# Patient Record
Sex: FEMALE | Race: WHITE | Employment: OTHER | URBAN - NONMETROPOLITAN AREA
[De-identification: names, ages, dates, MRNs, and addresses within clinical notes are randomized per-mention and may not be internally consistent; named-entity substitution may affect disease eponyms.]

---

## 2021-03-11 ENCOUNTER — IMMUNIZATION (OUTPATIENT)
Dept: PRIMARY CARE CLINIC | Age: 66
End: 2021-03-11
Payer: OTHER GOVERNMENT

## 2021-03-11 PROCEDURE — 0001A COVID-19, PFIZER VACCINE 30MCG/0.3ML DOSE: CPT

## 2021-03-11 PROCEDURE — 91300 COVID-19, PFIZER VACCINE 30MCG/0.3ML DOSE: CPT

## 2021-04-01 ENCOUNTER — IMMUNIZATION (OUTPATIENT)
Dept: PRIMARY CARE CLINIC | Age: 66
End: 2021-04-01
Payer: OTHER GOVERNMENT

## 2021-04-01 PROCEDURE — 0002A COVID-19, PFIZER VACCINE 30MCG/0.3ML DOSE: CPT | Performed by: FAMILY MEDICINE

## 2021-07-26 ENCOUNTER — HOSPITAL ENCOUNTER (INPATIENT)
Age: 66
LOS: 4 days | Discharge: HOME | DRG: 199 | End: 2021-07-30
Attending: INTERNAL MEDICINE | Admitting: INTERNAL MEDICINE
Payer: MEDICAID

## 2021-07-26 DIAGNOSIS — I16.1 HYPERTENSIVE EMERGENCY: Primary | ICD-10-CM

## 2021-07-26 PROBLEM — I10 ACCELERATED ESSENTIAL HYPERTENSION: Status: ACTIVE | Noted: 2021-07-26

## 2021-07-26 LAB
ALBUMIN SERPL-MCNC: 3.9 G/DL (ref 3.5–5.1)
ALP BLD-CCNC: 98 U/L (ref 38–126)
ALT SERPL-CCNC: 10 U/L (ref 11–66)
ANION GAP SERPL CALCULATED.3IONS-SCNC: 12 MEQ/L (ref 8–16)
ANISOCYTOSIS: PRESENT
AST SERPL-CCNC: 14 U/L (ref 5–40)
ATYPICAL LYMPHOCYTES: ABNORMAL %
BACTERIA: ABNORMAL
BASOPHILS # BLD: 0.6 %
BASOPHILS ABSOLUTE: 0.1 THOU/MM3 (ref 0–0.1)
BILIRUB SERPL-MCNC: 0.5 MG/DL (ref 0.3–1.2)
BILIRUBIN URINE: NEGATIVE
BLOOD, URINE: ABNORMAL
BUN BLDV-MCNC: 25 MG/DL (ref 7–22)
CALCIUM SERPL-MCNC: 9.8 MG/DL (ref 8.5–10.5)
CASTS: ABNORMAL /LPF
CASTS: ABNORMAL /LPF
CHARACTER, URINE: CLEAR
CHLORIDE BLD-SCNC: 103 MEQ/L (ref 98–111)
CO2: 22 MEQ/L (ref 23–33)
COLOR: YELLOW
CREAT SERPL-MCNC: 1.2 MG/DL (ref 0.4–1.2)
CRYSTALS: ABNORMAL
EOSINOPHIL # BLD: 4.1 %
EOSINOPHILS ABSOLUTE: 0.5 THOU/MM3 (ref 0–0.4)
EPITHELIAL CELLS, UA: ABNORMAL /HPF
ERYTHROCYTE [DISTWIDTH] IN BLOOD BY AUTOMATED COUNT: 12.6 % (ref 11.5–14.5)
ERYTHROCYTE [DISTWIDTH] IN BLOOD BY AUTOMATED COUNT: 38.5 FL (ref 35–45)
GLUCOSE BLD-MCNC: 163 MG/DL (ref 70–108)
GLUCOSE, URINE: NEGATIVE MG/DL
HCT VFR BLD CALC: 39.8 % (ref 37–47)
HEMOGLOBIN: 12.6 GM/DL (ref 12–16)
IMMATURE GRANS (ABS): 0.04 THOU/MM3 (ref 0–0.07)
IMMATURE GRANULOCYTES: 0.3 %
KETONES, URINE: NEGATIVE
LEUKOCYTE ESTERASE, URINE: ABNORMAL
LYMPHOCYTES # BLD: 37.5 %
LYMPHOCYTES ABSOLUTE: 4.7 THOU/MM3 (ref 1–4.8)
MAGNESIUM: 2.3 MG/DL (ref 1.6–2.4)
MCH RBC QN AUTO: 27 PG (ref 26–33)
MCHC RBC AUTO-ENTMCNC: 31.7 GM/DL (ref 32.2–35.5)
MCV RBC AUTO: 85.4 FL (ref 81–99)
MISCELLANEOUS LAB TEST RESULT: ABNORMAL
MONOCYTES # BLD: 7.5 %
MONOCYTES ABSOLUTE: 0.9 THOU/MM3 (ref 0.4–1.3)
NITRITE, URINE: NEGATIVE
NUCLEATED RED BLOOD CELLS: 0 /100 WBC
OSMOLALITY CALCULATION: 281.8 MOSMOL/KG (ref 275–300)
PH UA: 7.5 (ref 5–9)
PLATELET # BLD: 284 THOU/MM3 (ref 130–400)
PMV BLD AUTO: 9.7 FL (ref 9.4–12.4)
POTASSIUM SERPL-SCNC: 4.7 MEQ/L (ref 3.5–5.2)
PROTEIN UA: 300 MG/DL
RBC # BLD: 4.66 MILL/MM3 (ref 4.2–5.4)
RBC URINE: ABNORMAL /HPF
RENAL EPITHELIAL, UA: ABNORMAL
SCAN OF BLOOD SMEAR: NORMAL
SEG NEUTROPHILS: 50 %
SEGMENTED NEUTROPHILS ABSOLUTE COUNT: 6.3 THOU/MM3 (ref 1.8–7.7)
SODIUM BLD-SCNC: 137 MEQ/L (ref 135–145)
SPECIFIC GRAVITY UA: 1.01 (ref 1–1.03)
TOTAL PROTEIN: 7.6 G/DL (ref 6.1–8)
TROPONIN T: 0.02 NG/ML
TROPONIN T: 0.03 NG/ML
TSH SERPL DL<=0.05 MIU/L-ACNC: 0.86 UIU/ML (ref 0.4–4.2)
UROBILINOGEN, URINE: 0.2 EU/DL (ref 0–1)
WBC # BLD: 12.6 THOU/MM3 (ref 4.8–10.8)
WBC UA: ABNORMAL /HPF
YEAST: ABNORMAL

## 2021-07-26 PROCEDURE — 36415 COLL VENOUS BLD VENIPUNCTURE: CPT

## 2021-07-26 PROCEDURE — 99283 EMERGENCY DEPT VISIT LOW MDM: CPT

## 2021-07-26 PROCEDURE — 6360000002 HC RX W HCPCS: Performed by: INTERNAL MEDICINE

## 2021-07-26 PROCEDURE — 6370000000 HC RX 637 (ALT 250 FOR IP): Performed by: INTERNAL MEDICINE

## 2021-07-26 PROCEDURE — 2500000003 HC RX 250 WO HCPCS: Performed by: NURSE PRACTITIONER

## 2021-07-26 PROCEDURE — 2060000000 HC ICU INTERMEDIATE R&B

## 2021-07-26 PROCEDURE — 81001 URINALYSIS AUTO W/SCOPE: CPT

## 2021-07-26 PROCEDURE — 80053 COMPREHEN METABOLIC PANEL: CPT

## 2021-07-26 PROCEDURE — 85025 COMPLETE CBC W/AUTO DIFF WBC: CPT

## 2021-07-26 PROCEDURE — 99223 1ST HOSP IP/OBS HIGH 75: CPT | Performed by: INTERNAL MEDICINE

## 2021-07-26 PROCEDURE — 83735 ASSAY OF MAGNESIUM: CPT

## 2021-07-26 PROCEDURE — 6360000002 HC RX W HCPCS: Performed by: HOSPITALIST

## 2021-07-26 PROCEDURE — 84443 ASSAY THYROID STIM HORMONE: CPT

## 2021-07-26 PROCEDURE — 2500000003 HC RX 250 WO HCPCS: Performed by: INTERNAL MEDICINE

## 2021-07-26 PROCEDURE — 84484 ASSAY OF TROPONIN QUANT: CPT

## 2021-07-26 PROCEDURE — 2580000003 HC RX 258: Performed by: INTERNAL MEDICINE

## 2021-07-26 PROCEDURE — 96374 THER/PROPH/DIAG INJ IV PUSH: CPT

## 2021-07-26 RX ORDER — SODIUM CHLORIDE 9 MG/ML
25 INJECTION, SOLUTION INTRAVENOUS PRN
Status: DISCONTINUED | OUTPATIENT
Start: 2021-07-26 | End: 2021-07-30 | Stop reason: SDUPTHER

## 2021-07-26 RX ORDER — ASPIRIN 81 MG/1
81 TABLET, CHEWABLE ORAL DAILY
Status: DISCONTINUED | OUTPATIENT
Start: 2021-07-27 | End: 2021-07-30 | Stop reason: HOSPADM

## 2021-07-26 RX ORDER — SODIUM CHLORIDE 0.9 % (FLUSH) 0.9 %
10 SYRINGE (ML) INJECTION PRN
Status: DISCONTINUED | OUTPATIENT
Start: 2021-07-26 | End: 2021-07-30 | Stop reason: SDUPTHER

## 2021-07-26 RX ORDER — ONDANSETRON 2 MG/ML
4 INJECTION INTRAMUSCULAR; INTRAVENOUS EVERY 6 HOURS PRN
Status: DISCONTINUED | OUTPATIENT
Start: 2021-07-26 | End: 2021-07-30 | Stop reason: HOSPADM

## 2021-07-26 RX ORDER — MAGNESIUM SULFATE IN WATER 40 MG/ML
2000 INJECTION, SOLUTION INTRAVENOUS PRN
Status: DISCONTINUED | OUTPATIENT
Start: 2021-07-26 | End: 2021-07-30 | Stop reason: HOSPADM

## 2021-07-26 RX ORDER — POTASSIUM CHLORIDE 7.45 MG/ML
10 INJECTION INTRAVENOUS PRN
Status: DISCONTINUED | OUTPATIENT
Start: 2021-07-26 | End: 2021-07-30 | Stop reason: HOSPADM

## 2021-07-26 RX ORDER — DIAPER,BRIEF,INFANT-TODD,DISP
EACH MISCELLANEOUS 2 TIMES DAILY
Status: DISCONTINUED | OUTPATIENT
Start: 2021-07-26 | End: 2021-07-30 | Stop reason: HOSPADM

## 2021-07-26 RX ORDER — LABETALOL 20 MG/4 ML (5 MG/ML) INTRAVENOUS SYRINGE
20 ONCE
Status: COMPLETED | OUTPATIENT
Start: 2021-07-26 | End: 2021-07-26

## 2021-07-26 RX ORDER — ONDANSETRON 4 MG/1
4 TABLET, ORALLY DISINTEGRATING ORAL EVERY 8 HOURS PRN
Status: DISCONTINUED | OUTPATIENT
Start: 2021-07-26 | End: 2021-07-30 | Stop reason: HOSPADM

## 2021-07-26 RX ORDER — ACETAMINOPHEN 325 MG/1
650 TABLET ORAL EVERY 6 HOURS PRN
Status: DISCONTINUED | OUTPATIENT
Start: 2021-07-26 | End: 2021-07-30 | Stop reason: HOSPADM

## 2021-07-26 RX ORDER — ACETAMINOPHEN 650 MG/1
650 SUPPOSITORY RECTAL EVERY 6 HOURS PRN
Status: DISCONTINUED | OUTPATIENT
Start: 2021-07-26 | End: 2021-07-30 | Stop reason: HOSPADM

## 2021-07-26 RX ORDER — ASPIRIN 81 MG/1
81 TABLET, CHEWABLE ORAL DAILY
COMMUNITY

## 2021-07-26 RX ORDER — POTASSIUM CHLORIDE 20 MEQ/1
40 TABLET, EXTENDED RELEASE ORAL PRN
Status: DISCONTINUED | OUTPATIENT
Start: 2021-07-26 | End: 2021-07-30 | Stop reason: HOSPADM

## 2021-07-26 RX ORDER — POLYETHYLENE GLYCOL 3350 17 G/17G
17 POWDER, FOR SOLUTION ORAL DAILY PRN
Status: DISCONTINUED | OUTPATIENT
Start: 2021-07-26 | End: 2021-07-30 | Stop reason: HOSPADM

## 2021-07-26 RX ORDER — FAMOTIDINE 20 MG/1
20 TABLET, FILM COATED ORAL 2 TIMES DAILY
Status: DISCONTINUED | OUTPATIENT
Start: 2021-07-26 | End: 2021-07-30 | Stop reason: HOSPADM

## 2021-07-26 RX ORDER — HYDRALAZINE HYDROCHLORIDE 20 MG/ML
20 INJECTION INTRAMUSCULAR; INTRAVENOUS EVERY 6 HOURS PRN
Status: DISCONTINUED | OUTPATIENT
Start: 2021-07-26 | End: 2021-07-28

## 2021-07-26 RX ORDER — SODIUM CHLORIDE 0.9 % (FLUSH) 0.9 %
10 SYRINGE (ML) INJECTION EVERY 12 HOURS SCHEDULED
Status: DISCONTINUED | OUTPATIENT
Start: 2021-07-26 | End: 2021-07-30 | Stop reason: SDUPTHER

## 2021-07-26 RX ADMIN — HYDRALAZINE HYDROCHLORIDE 20 MG: 20 INJECTION INTRAMUSCULAR; INTRAVENOUS at 20:34

## 2021-07-26 RX ADMIN — FAMOTIDINE 20 MG: 20 TABLET, FILM COATED ORAL at 20:34

## 2021-07-26 RX ADMIN — CEFTRIAXONE SODIUM 1000 MG: 1 INJECTION, POWDER, FOR SOLUTION INTRAMUSCULAR; INTRAVENOUS at 20:24

## 2021-07-26 RX ADMIN — ENOXAPARIN SODIUM 40 MG: 40 INJECTION SUBCUTANEOUS at 20:34

## 2021-07-26 RX ADMIN — SODIUM CHLORIDE, PRESERVATIVE FREE 10 ML: 5 INJECTION INTRAVENOUS at 20:24

## 2021-07-26 RX ADMIN — HYDROCORTISONE: 1 CREAM TOPICAL at 20:24

## 2021-07-26 RX ADMIN — LABETALOL 20 MG/4 ML (5 MG/ML) INTRAVENOUS SYRINGE 20 MG: at 14:42

## 2021-07-26 RX ADMIN — DEXTROSE MONOHYDRATE 5 MG/HR: 50 INJECTION, SOLUTION INTRAVENOUS at 17:44

## 2021-07-26 ASSESSMENT — ENCOUNTER SYMPTOMS
SHORTNESS OF BREATH: 0
EYE REDNESS: 0
PHOTOPHOBIA: 0
RESPIRATORY NEGATIVE: 1
GASTROINTESTINAL NEGATIVE: 1
EYE PAIN: 0
PHOTOPHOBIA: 1
ABDOMINAL PAIN: 0
CHEST TIGHTNESS: 0
ALLERGIC/IMMUNOLOGIC NEGATIVE: 1

## 2021-07-26 ASSESSMENT — PAIN SCALES - GENERAL
PAINLEVEL_OUTOF10: 0

## 2021-07-26 NOTE — ED NOTES
Patient presents to ED for hypertension. States she was seen at [address] [place] [address] today and told to go to Dr. Donnlel Maria office for evaluation of leaking retina's. States her BP read 200's/102 and was told to come here for evaluation. Denies any headache, dizziness, or new visual changes. Denies any pain. Shows no signs of distress. Skin warm and dry. Respirations even and unlabored.       Yasmine Estrada RN  07/26/21 2446

## 2021-07-26 NOTE — H&P
HISTORY AND PHYSICAL             Date: 7/26/2021        Patient Name: Eula Medina     YOB: 1955      Age:  60 y.o. Chief Complaint     Chief Complaint   Patient presents with    Hypertension      Blurry vision     History Obtained From   patient    History of Present Illness   Patient presents to ED for hypertension. States she was seen at [address] [place] [address] today and told to go to Dr. Gutierrez Cook office for evaluation of leaking retina's. States her BP read 200's/102 and was told to come go to ED for evaluation. Denies any headache, dizziness, or new visual changes. Denies any pain. Shows no signs of distress. But does report blurry vision after the pupils were dilated for retinal exam    Past Medical History   Hypertension  Psoriasis     Past Surgical History   None    Medications Prior to Admission     Prior to Admission medications    Medication Sig Start Date End Date Taking? Authorizing Provider   aspirin 81 MG chewable tablet Take 81 mg by mouth daily    Historical Provider, MD        Allergies   Patient has no known allergies. Social History     Social History     Tobacco History     Smoking Status  Former Smoker    Smokeless Tobacco Use  Never Used          Alcohol History     Alcohol Use Status  Yes Comment  social          Drug Use     Drug Use Status  Never          Sexual Activity     Sexually Active  Not Asked                Family History   Non contributory     Review of Systems   Review of Systems   Constitutional: Negative. HENT: Negative. Eyes: Positive for photophobia and visual disturbance. Respiratory: Negative. Cardiovascular: Negative. Gastrointestinal: Negative. Endocrine: Negative. Genitourinary: Negative. Musculoskeletal: Negative. Skin: Negative. Allergic/Immunologic: Negative. Neurological: Negative. Hematological: Negative. Psychiatric/Behavioral: Negative.         Physical Exam   BP (!) 162/80   Pulse 77   Temp 98.4 °F (36.9 °C) (Oral)   Resp 16   Ht 5' 10\" (1.778 m)   Wt 168 lb (76.2 kg)   SpO2 96%   BMI 24.11 kg/m²     Physical Exam  Constitutional:       Appearance: Normal appearance. He is normal weight. HENT:      Head: Normocephalic and atraumatic. Right Ear: External ear normal.      Left Ear: External ear normal.      Nose: Nose normal.      Mouth/Throat:      Mouth: Mucous membranes are moist.      Pharynx: Oropharynx is clear. Eyes:      Conjunctiva/sclera: Conjunctivae normal.      Pupils: Pupils are equal, round, and reactive to light. Cardiovascular:      Rate and Rhythm: Normal rate. Pulses: Normal pulses. Pulmonary:      Effort: Pulmonary effort is normal.   Abdominal:      General: Bowel sounds are normal.      Palpations: Abdomen is soft. Musculoskeletal:         General: Normal range of motion. Cervical back: Normal range of motion. Skin:     General: Skin is warm. Capillary Refill: Capillary refill takes less than 2 seconds. Comments: silvery white scales noted on the dorsal aspects the upper extremities   Neurological:      General: No focal deficit present. Mental Status: He is alert.    Psychiatric:         Mood and Affect: Mood normal.       Labs      Recent Results (from the past 24 hour(s))   CBC auto differential    Collection Time: 07/26/21  2:32 PM   Result Value Ref Range    WBC 12.6 (H) 4.8 - 10.8 thou/mm3    RBC 4.66 (L) 4.70 - 6.10 mill/mm3    Hemoglobin 12.6 (L) 14.0 - 18.0 gm/dl    Hematocrit 39.8 (L) 42.0 - 52.0 %    MCV 85.4 80.0 - 94.0 fL    MCH 27.0 26.0 - 33.0 pg    MCHC 31.7 (L) 32.2 - 35.5 gm/dl    RDW-CV 12.6 11.5 - 14.5 %    RDW-SD 38.5 35.0 - 45.0 fL    Platelets 440 300 - 228 thou/mm3    MPV 9.7 9.4 - 12.4 fL    Seg Neutrophils 50.0 %    Lymphocytes 37.5 %    Monocytes 7.5 %    Eosinophils 4.1 %    Basophils 0.6 %    Immature Granulocytes 0.3 %    Atypical Lymphocytes RARE %    Segs Absolute 6.3 1 - 7 thou/mm3    Lymphocytes Absolute 4.7 1.0 - 4.8 thou/mm3    Monocytes Absolute 0.9 0.4 - 1.3 thou/mm3    Eosinophils Absolute 0.5 (H) 0.0 - 0.4 thou/mm3    Basophils Absolute 0.1 0.0 - 0.1 thou/mm3    Immature Grans (Abs) 0.04 0.00 - 0.07 thou/mm3    nRBC 0 /100 wbc    Anisocytosis Present Absent   Comprehensive Metabolic Panel    Collection Time: 07/26/21  2:32 PM   Result Value Ref Range    Glucose 163 (H) 70 - 108 mg/dL    CREATININE 1.2 0.4 - 1.2 mg/dL    BUN 25 (H) 7 - 22 mg/dL    Sodium 137 135 - 145 meq/L    Potassium 4.7 3.5 - 5.2 meq/L    Chloride 103 98 - 111 meq/L    CO2 22 (L) 23 - 33 meq/L    Calcium 9.8 8.5 - 10.5 mg/dL    AST 14 5 - 40 U/L    Alkaline Phosphatase 98 38 - 126 U/L    Total Protein 7.6 6.1 - 8.0 g/dL    Albumin 3.9 3.5 - 5.1 g/dL    Total Bilirubin 0.5 0.3 - 1.2 mg/dL    ALT 10 (L) 11 - 66 U/L   Troponin    Collection Time: 07/26/21  2:32 PM   Result Value Ref Range    Troponin T 0.028 (A) ng/ml   Magnesium    Collection Time: 07/26/21  2:32 PM   Result Value Ref Range    Magnesium 2.3 1.6 - 2.4 mg/dL   TSH without Reflex    Collection Time: 07/26/21  2:32 PM   Result Value Ref Range    TSH 0.865 0.400 - 4.200 uIU/mL   Anion Gap    Collection Time: 07/26/21  2:32 PM   Result Value Ref Range    Anion Gap 12.0 8.0 - 16.0 meq/L   Glomerular Filtration Rate, Estimated    Collection Time: 07/26/21  2:32 PM   Result Value Ref Range    Est, Glom Filt Rate 61 (A) ml/min/1.73m2   Osmolality    Collection Time: 07/26/21  2:32 PM   Result Value Ref Range    Osmolality Calc 281.8 275.0 - 300.0 mOsmol/kg   Scan of Blood Smear    Collection Time: 07/26/21  2:32 PM   Result Value Ref Range    SCAN OF BLOOD SMEAR see below    Urinalysis with microscopic    Collection Time: 07/26/21  2:45 PM   Result Value Ref Range    Glucose, Urine NEGATIVE NEGATIVE mg/dl    Bilirubin Urine NEGATIVE NEGATIVE    Ketones, Urine NEGATIVE NEGATIVE    Specific Gravity, UA 1.007 1.002 - 1.030    Blood, Urine SMALL (A) NEGATIVE    pH, UA 7.5 5.0 - 9.0    Protein,  (A) NEGATIVE mg/dl    Urobilinogen, Urine 0.2 0.0 - 1.0 eu/dl    Nitrite, Urine NEGATIVE NEGATIVE    Leukocyte Esterase, Urine MODERATE (A) NEGATIVE    Color, UA YELLOW YELLOW-STRAW    Character, Urine CLEAR [id number]    RBC, UA 3-5 0-2/hpf /hpf    WBC, UA  0-4/hpf /hpf    Epithelial Cells, UA 0-2 3-5/hpf /hpf    Bacteria, UA MANY FEW/NONE SEEN    Casts NONE SEEN NONE SEEN /lpf    Crystals NONE SEEN NONE SEEN    Renal Epithelial, UA NONE SEEN NONE SEEN    Yeast, UA NONE SEEN NONE SEEN    Casts NONE SEEN /lpf    Miscellaneous Lab Test Result NONE SEEN         Imaging/Diagnostics Last 24 Hours   No results found. Assessment / Plan      1. Accelerated hypertension, initial blood pressure was noted to be 225 slightly improved to 170s with IV labetalol in the ER  Will admit to stepdown- CCU, and started on Cardene drip to titrate blood pressure to below 180 in about 160  2. Mild elevation troponin no EKG changes noted we will continue to trend and add Lopressor if troponin continues to go up   3. Possible urinary tract infection, moderate leukocyte esterase noted however nitrite is negative nevertheless we will start on IV ceftriaxone and send urine for culture and sensitivities  4.   History of psoriasis no acute flare noted, continue to monitor           Consultations Ordered:  None    Electronically signed by Nae Cooney MD on 7/26/21 at 4:36 PM EDT

## 2021-07-26 NOTE — ED NOTES
Patient resting quietly in cot showing no signs of distress at this time. Denies any pain. Family at bedside. Medicated per MAR. Updated on POC. Will continue to monitor.       Isabel Barrera RN  07/26/21 [id number]

## 2021-07-26 NOTE — PLAN OF CARE
Problem: Falls - Risk of:  Goal: Will remain free from falls  Description: Will remain free from falls  Outcome: Ongoing  Note: Call light in reach, bed in lowest position, and bed alarm activated. Education given on use of call light before ambulation and when in need of assistance. Patient expressed understanding. Hourly visual checks performed and charted. Toileting offered to patient. No falls this shift, at any time. Will continue to monitor. Problem: Cardiac:  Goal: Ability to maintain vital signs within normal range will improve  Description: Ability to maintain vital signs within normal range will improve  Outcome: Ongoing  Note:   Vitals:    07/26/21 1445 07/26/21 1539 07/26/21 1627 07/26/21 1704   BP: (!) 197/72 (!) 170/71 (!) 162/80 (!) 195/82   Pulse:  77 77 74   Resp:  16 16 18   Temp:    98.1 °F (36.7 °C)   TempSrc:    Oral   SpO2:  96% 96% 98%   Weight:    230 lb (104.3 kg)   Height:    5' 10\" (1.778 m)          Problem: Discharge Planning:  Goal: Discharged to appropriate level of care  Description: Discharged to appropriate level of care  Outcome: Ongoing  Note: Patient currently living with her daughter. Discharge planning in process and discussed with patient/family. Social work consulted for any additional needs. Care manager aware of discharge needs. Problem: Pain:  Goal: Pain level will decrease  Description: Pain level will decrease  Outcome: Ongoing  Note: Patient able to use 0-10 pain scale, pain goal of no pain. Denies pain at this time. Agreeable to take PRN pain medications. Care plan reviewed with patient. Patient verbalizes understanding of the plan of care and contributed to goal setting.

## 2021-07-26 NOTE — ED PROVIDER NOTES
[facility] Emergency Department    CHIEF COMPLAINT       Chief Complaint   Patient presents with    Hypertension     Nurses Notes reviewed and I agree except as noted in the HPI. HISTORY OF PRESENT ILLNESS    Jesus Alberto Mares rio 60 y.o. male who presents to the ED for evaluation of hypertension. Patient states she was at a retinal specialist today for the evaluation of a leaky retina, and they dilated her eyes but before they could continue they checked her BP which was elevated to 230/90. She says she has blurry vision d/t the dilated eyes. She denies HA, n/v, diplopia, abdominal pain, hematuria, dysuria. She notes history of hypertension in the past but has not been on medicine for several years. She denies chest pain or shortness of breath. She notes she signed up for health insurance in January but she was scammed. Experienced previously: yes    HPI was provided by the patient. REVIEW OF SYSTEMS     Review of Systems   Constitutional: Negative for chills, diaphoresis and fatigue. Eyes: Negative for photophobia, pain, redness and visual disturbance. Respiratory: Negative for chest tightness and shortness of breath. Cardiovascular: Negative for chest pain, palpitations and leg swelling. Gastrointestinal: Negative for abdominal pain. Genitourinary: Negative for dysuria, frequency and hematuria. Neurological: Negative for dizziness, tremors, syncope, weakness, light-headedness and headaches. PAST MEDICAL HISTORY     Past Medical History:   Diagnosis Date    Hypertension     Psoriasis        SURGICALHISTORY      has no past surgical history on file. CURRENT MEDICATIONS       Current Discharge Medication List      CONTINUE these medications which have NOT CHANGED    Details   aspirin 81 MG chewable tablet Take 81 mg by mouth daily             ALLERGIES     has No Known Allergies. FAMILY HISTORY     He indicated that his mother is .  He indicated that his father is . family history includes Diabetes in his mother; Heart Disease in his father and mother; Other in his mother. SOCIAL HISTORY       Social History     Socioeconomic History    Marital status: Single     Spouse name: Not on file    Number of children: 1    Years of education: Not on file    Highest education level: Not on file   Occupational History    Not on file   Tobacco Use    Smoking status: Former Smoker     Packs/day: 0.50     Years: 15.00     Pack years: 7.50     Types: Cigarettes    Smokeless tobacco: Never Used   Substance and Sexual Activity    Alcohol use: Yes     Comment: social    Drug use: Never    Sexual activity: Not on file   Other Topics Concern    Not on file   Social History Narrative    Not on file     Social Determinants of Health     Financial Resource Strain:     Difficulty of Paying Living Expenses:    Food Insecurity:     Worried About Running Out of Food in the Last Year:     [address] in the Last Year:    Transportation Needs:     Lack of Transportation (Medical):  Lack of Transportation (Non-Medical):    Physical Activity:     Days of Exercise per Week:     Minutes of Exercise per Session:    Stress:     Feeling of Stress :    Social Connections:     Frequency of Communication with Friends and Family:     Frequency of Social Gatherings with Friends and Family:     Attends [employer] Services:     Active Member of Clubs or Organizations:     Attends Club or Organization Meetings:     Marital Status:    Intimate Partner Violence:     Fear of Current or Ex-Partner:     Emotionally Abused:     Physically Abused:     Sexually Abused:        PHYSICAL EXAM     INITIAL VITALS:  height is 5' 10\" (1.778 m) and weight is 230 lb (104.3 kg). His oral temperature is 98.1 °F (36.7 °C). His blood pressure is 195/82 (abnormal) and his pulse is 74. His respiration is 18 and oxygen saturation is 98%.     Physical Exam  Constitutional:       General: He is components:    Glucose 163 (*)     BUN 25 (*)     CO2 22 (*)     ALT 10 (*)     All other components within normal limits   TROPONIN - Abnormal; Notable for the following components:    Troponin T 0.028 (*)     All other components within normal limits   URINALYSIS WITH MICROSCOPIC - Abnormal; Notable for the following components:    Blood, Urine SMALL (*)     Protein,  (*)     Leukocyte Esterase, Urine MODERATE (*)     All other components within normal limits   GLOMERULAR FILTRATION RATE, ESTIMATED - Abnormal; Notable for the following components:    Est, Glom Filt Rate 61 (*)     All other components within normal limits   MAGNESIUM   TSH WITHOUT REFLEX   ANION GAP   OSMOLALITY   SCAN OF BLOOD SMEAR   TROPONIN   TROPONIN       EMERGENCY DEPARTMENT COURSE:   Vitals:    Vitals:    07/26/21 1445 07/26/21 1539 07/26/21 1627 07/26/21 1704   BP: (!) 197/72 (!) 170/71 (!) 162/80 (!) 195/82   Pulse:  77 77 74   Resp:  16 16 18   Temp:    98.1 °F (36.7 °C)   TempSrc:    Oral   SpO2:  96% 96% 98%   Weight:    230 lb (104.3 kg)   Height:    5' 10\" (1.778 m)       MDM    Patient was seen and evaluated in the emergency department, patient appears to be in no acute distress, vital signs reviewed, significant hypertension noted. Physical exam was completed, no significant findings noted. Labs were obtained, noted proteinuria and elevated troponin. She was treated with labetalol, noted slight improvement, patient has no family doctor to follow-up, I do not feel comfortable discharging her on new oral antihypertensive. She needs further work-up associated with elevated troponin. She verbalizes understanding. Discussed the case with hospitalist service who agrees to admit the patient.   Medications   aspirin chewable tablet 81 mg (has no administration in time range)   sodium chloride flush 0.9 % injection 10 mL (has no administration in time range)   sodium chloride flush 0.9 % injection 10 mL (has no administration in time range)   0.9 % sodium chloride infusion (has no administration in time range)   enoxaparin (LOVENOX) injection 40 mg (has no administration in time range)   ondansetron (ZOFRAN-ODT) disintegrating tablet 4 mg (has no administration in time range)     Or   ondansetron (ZOFRAN) injection 4 mg (has no administration in time range)   polyethylene glycol (GLYCOLAX) packet 17 g (has no administration in time range)   acetaminophen (TYLENOL) tablet 650 mg (has no administration in time range)     Or   acetaminophen (TYLENOL) suppository 650 mg (has no administration in time range)   potassium chloride (KLOR-CON M) extended release tablet 40 mEq (has no administration in time range)     Or   potassium bicarb-citric acid (EFFER-K) effervescent tablet 40 mEq (has no administration in time range)     Or   potassium chloride 10 mEq/100 mL IVPB (Peripheral Line) (has no administration in time range)   magnesium sulfate 2000 mg in 50 mL IVPB premix (has no administration in time range)   famotidine (PEPCID) tablet 20 mg (has no administration in time range)   niCARdipine (CARDENE) 25 mg in dextrose 5 % 250 mL infusion (has no administration in time range)   cefTRIAXone (ROCEPHIN) 1000 mg IVPB in 50 mL D5W minibag (has no administration in time range)   labetalol (NORMODYNE;TRANDATE) injection syringe 20 mg (20 mg Intravenous Given 7/26/21 1442)       Patient was seenindependently by myself. The patient's final impression and disposition and plan was determined by myself. CRITICAL CARE:   None    CONSULTS:  Hospitalist    PROCEDURES:  None    FINAL IMPRESSION     1. Hypertensive emergency          DISPOSITION/PLAN   Patient admitted  PATIENT REFERREDTO:  No follow-up provider specified.     DISCHARGE MEDICATIONS:  Current Discharge Medication List          (Please note that portions of this note were completed with a voice recognition program.  Efforts were made to edit the dictations but occasionally words are mis-transcribed.)    Provider:  I personally performed the services described in the documentation,reviewed and edited the documentation which was dictated to the scribe in my presence, and it accurately records my words and actions.     Manolo Julian CNP 07/26/21 5:45 PM    JOSÉ MIGUEL Sands - KARAN          [employer], JOSÉ MIGUEL - CNP  07/26/21 0010

## 2021-07-26 NOTE — PROGRESS NOTES
Patient admitted to [facility] Room 04 from ED. IV site free of s/s of infection or infiltration. Vital signs obtained, assessment completed. Oriented to room. Policies and procedures for 4a explained. All questions answered with no further questions at this time. Fall prevention and safety discussed with patient. 2 person skin check completed.

## 2021-07-26 NOTE — ED NOTES
ED to inpatient nurses report    Chief Complaint   Patient presents with    Hypertension      Present to ED from home  LOC: alert and orientated to name, place, date  Vital signs   Vitals:    07/26/21 1141 07/26/21 1349 07/26/21 1445 07/26/21 1539   BP: (!) 222/88 (!) 230/94 (!) 197/72 (!) 170/71   Pulse: 74 77  77   Resp: 16 16  16   Temp:  98.4 °F (36.9 °C)     TempSrc:  Oral     SpO2: 98% 98%  96%   Weight:  168 lb (76.2 kg)     Height:  5' 10\" (1.778 m)        Oxygen Baseline roomair    Current needs required none   Bipap/Cpap No  LDAs:   Peripheral IV 07/26/21 Right Forearm (Active)   Site Assessment Clean;Dry; Intact 07/26/21 1441   Line Status Blood return noted;Normal saline locked; Flushed 07/26/21 1441   Dressing Status Clean;Dry; Intact 07/26/21 1441   Dressing Intervention New 07/26/21 1441     Mobility: Requires assistance * 1  Pending ED orders: none  Present condition: stable    Electronically signed by Jerry Joe RN on 7/26/2021 at 4:27 PM       Jerry Joe RN  07/26/21 4842

## 2021-07-27 LAB
ALBUMIN SERPL-MCNC: 3.1 G/DL (ref 3.5–5.1)
ALP BLD-CCNC: 80 U/L (ref 38–126)
ALT SERPL-CCNC: 8 U/L (ref 11–66)
ANION GAP SERPL CALCULATED.3IONS-SCNC: 9 MEQ/L (ref 8–16)
AST SERPL-CCNC: 11 U/L (ref 5–40)
BILIRUB SERPL-MCNC: 0.4 MG/DL (ref 0.3–1.2)
BILIRUBIN DIRECT: < 0.2 MG/DL (ref 0–0.3)
BUN BLDV-MCNC: 29 MG/DL (ref 7–22)
CALCIUM SERPL-MCNC: 9.1 MG/DL (ref 8.5–10.5)
CHLORIDE BLD-SCNC: 104 MEQ/L (ref 98–111)
CO2: 22 MEQ/L (ref 23–33)
CREAT SERPL-MCNC: 1.5 MG/DL (ref 0.4–1.2)
EKG ATRIAL RATE: 76 BPM
EKG ATRIAL RATE: 82 BPM
EKG P AXIS: 62 DEGREES
EKG P AXIS: 69 DEGREES
EKG P-R INTERVAL: 132 MS
EKG P-R INTERVAL: 140 MS
EKG Q-T INTERVAL: 410 MS
EKG Q-T INTERVAL: 412 MS
EKG QRS DURATION: 148 MS
EKG QRS DURATION: 148 MS
EKG QTC CALCULATION (BAZETT): 461 MS
EKG QTC CALCULATION (BAZETT): 481 MS
EKG R AXIS: 19 DEGREES
EKG R AXIS: 24 DEGREES
EKG T AXIS: 18 DEGREES
EKG T AXIS: 19 DEGREES
EKG VENTRICULAR RATE: 76 BPM
EKG VENTRICULAR RATE: 82 BPM
ERYTHROCYTE [DISTWIDTH] IN BLOOD BY AUTOMATED COUNT: 12.6 % (ref 11.5–14.5)
ERYTHROCYTE [DISTWIDTH] IN BLOOD BY AUTOMATED COUNT: 39.2 FL (ref 35–45)
GLUCOSE BLD-MCNC: 175 MG/DL (ref 70–108)
HCT VFR BLD CALC: 33.6 % (ref 37–47)
HEMOGLOBIN: 10.6 GM/DL (ref 12–16)
LACTIC ACID: 1 MMOL/L (ref 0.5–2)
LV EF: 55 %
LVEF MODALITY: NORMAL
MCH RBC QN AUTO: 27 PG (ref 26–33)
MCHC RBC AUTO-ENTMCNC: 31.5 GM/DL (ref 32.2–35.5)
MCV RBC AUTO: 85.7 FL (ref 81–99)
PLATELET # BLD: 256 THOU/MM3 (ref 130–400)
PMV BLD AUTO: 9.8 FL (ref 9.4–12.4)
POTASSIUM SERPL-SCNC: 4.7 MEQ/L (ref 3.5–5.2)
RBC # BLD: 3.92 MILL/MM3 (ref 4.2–5.4)
SODIUM BLD-SCNC: 135 MEQ/L (ref 135–145)
TOTAL PROTEIN: 6.3 G/DL (ref 6.1–8)
TROPONIN T: 0.03 NG/ML
TROPONIN T: 0.03 NG/ML
TROPONIN T: 0.04 NG/ML
WBC # BLD: 14.1 THOU/MM3 (ref 4.8–10.8)

## 2021-07-27 PROCEDURE — 80053 COMPREHEN METABOLIC PANEL: CPT

## 2021-07-27 PROCEDURE — 84484 ASSAY OF TROPONIN QUANT: CPT

## 2021-07-27 PROCEDURE — 6360000002 HC RX W HCPCS: Performed by: HOSPITALIST

## 2021-07-27 PROCEDURE — 99233 SBSQ HOSP IP/OBS HIGH 50: CPT | Performed by: INTERNAL MEDICINE

## 2021-07-27 PROCEDURE — 93306 TTE W/DOPPLER COMPLETE: CPT

## 2021-07-27 PROCEDURE — 93005 ELECTROCARDIOGRAM TRACING: CPT | Performed by: INTERNAL MEDICINE

## 2021-07-27 PROCEDURE — 6360000002 HC RX W HCPCS: Performed by: INTERNAL MEDICINE

## 2021-07-27 PROCEDURE — 82248 BILIRUBIN DIRECT: CPT

## 2021-07-27 PROCEDURE — 36415 COLL VENOUS BLD VENIPUNCTURE: CPT

## 2021-07-27 PROCEDURE — 2580000003 HC RX 258: Performed by: INTERNAL MEDICINE

## 2021-07-27 PROCEDURE — 83605 ASSAY OF LACTIC ACID: CPT

## 2021-07-27 PROCEDURE — 2060000000 HC ICU INTERMEDIATE R&B

## 2021-07-27 PROCEDURE — 6370000000 HC RX 637 (ALT 250 FOR IP): Performed by: INTERNAL MEDICINE

## 2021-07-27 PROCEDURE — 85027 COMPLETE CBC AUTOMATED: CPT

## 2021-07-27 RX ORDER — CLONIDINE HYDROCHLORIDE 0.1 MG/1
0.1 TABLET ORAL PRN
Status: DISCONTINUED | OUTPATIENT
Start: 2021-07-27 | End: 2021-07-28

## 2021-07-27 RX ORDER — MAGNESIUM SULFATE IN WATER 40 MG/ML
2000 INJECTION, SOLUTION INTRAVENOUS ONCE
Status: COMPLETED | OUTPATIENT
Start: 2021-07-27 | End: 2021-07-27

## 2021-07-27 RX ORDER — AMLODIPINE BESYLATE 10 MG/1
10 TABLET ORAL DAILY
Status: DISCONTINUED | OUTPATIENT
Start: 2021-07-27 | End: 2021-07-30 | Stop reason: HOSPADM

## 2021-07-27 RX ORDER — CLONIDINE HYDROCHLORIDE 0.1 MG/1
0.1 TABLET ORAL ONCE
Status: DISCONTINUED | OUTPATIENT
Start: 2021-07-27 | End: 2021-07-27

## 2021-07-27 RX ORDER — LISINOPRIL 10 MG/1
10 TABLET ORAL DAILY
Status: DISCONTINUED | OUTPATIENT
Start: 2021-07-27 | End: 2021-07-28

## 2021-07-27 RX ADMIN — AMLODIPINE BESYLATE 10 MG: 10 TABLET ORAL at 09:51

## 2021-07-27 RX ADMIN — ASPIRIN 81 MG: 81 TABLET, CHEWABLE ORAL at 09:23

## 2021-07-27 RX ADMIN — HYDROCORTISONE: 1 CREAM TOPICAL at 09:23

## 2021-07-27 RX ADMIN — FAMOTIDINE 20 MG: 20 TABLET, FILM COATED ORAL at 09:23

## 2021-07-27 RX ADMIN — HYDRALAZINE HYDROCHLORIDE 20 MG: 20 INJECTION INTRAMUSCULAR; INTRAVENOUS at 23:29

## 2021-07-27 RX ADMIN — ENOXAPARIN SODIUM 40 MG: 40 INJECTION SUBCUTANEOUS at 21:09

## 2021-07-27 RX ADMIN — SODIUM CHLORIDE, PRESERVATIVE FREE 10 ML: 5 INJECTION INTRAVENOUS at 21:00

## 2021-07-27 RX ADMIN — SODIUM CHLORIDE, PRESERVATIVE FREE 10 ML: 5 INJECTION INTRAVENOUS at 09:24

## 2021-07-27 RX ADMIN — FAMOTIDINE 20 MG: 20 TABLET, FILM COATED ORAL at 21:10

## 2021-07-27 RX ADMIN — LISINOPRIL 10 MG: 10 TABLET ORAL at 09:51

## 2021-07-27 RX ADMIN — CEFTRIAXONE SODIUM 1000 MG: 1 INJECTION, POWDER, FOR SOLUTION INTRAMUSCULAR; INTRAVENOUS at 18:32

## 2021-07-27 RX ADMIN — HYDROCORTISONE: 1 CREAM TOPICAL at 21:10

## 2021-07-27 RX ADMIN — MAGNESIUM SULFATE HEPTAHYDRATE 2000 MG: 40 INJECTION, SOLUTION INTRAVENOUS at 11:05

## 2021-07-27 ASSESSMENT — PAIN SCALES - GENERAL
PAINLEVEL_OUTOF10: 0

## 2021-07-27 NOTE — PROGRESS NOTES
Hospitalist Progress Note      Patient:  Irvin Pulse    Unit/Bed:4A-04/004-A  YOB: 1955  MRN: [id number]   Acct: [de-identified]   PCP: No primary care provider on file. Date of Admission: 7/26/2021    Assessment/Plan:    1. Hypertension emergency   - elevated BP with end organ damage. BP improved on cardene. Will start on PO medications and wean off cardene   - Has had history of Hypertension but stopped taking them some time ago    2. Elevated troponins due to above   - Trend troponin   - No chest pain, EKG NSR   - Check echo    3. LOLY due to #1   - monitor renal function closely   - Avoid nephrotoxins    4. Acute cystitis   - Continue rocephin   - Await cultures    5. Hx of psoriasis - stable      Chief Complaint: Vision changes    Initial H and P:-    Patient presents to ED for hypertension.  States she was seen at [facility] center today and told to go to Dr. Mayra Emmanuel office for evaluation of leaking retina's.  States her BP read 200's/102 and was told to come go to ED for evaluation.  Denies any headache, dizziness, or new visual changes.  Denies any pain.  Shows no signs of distress.    But does report blurry vision after the pupils were dilated for retinal exam    Subjective (past 24 hours):     Feels good. Wants to go home. Has no complaints. Denies chest pain, dyspnea, NV. Past medical history, family history, social history and allergies reviewed again and is unchanged since admission. ROS (12 point review of systems completed. Pertinent positives noted.  Otherwise ROS is negative)     Medications:  Reviewed    Infusion Medications    sodium chloride       Scheduled Medications    amLODIPine  10 mg Oral Daily    lisinopril  10 mg Oral Daily    aspirin  81 mg Oral Daily    sodium chloride flush  10 mL Intravenous 2 times per day    enoxaparin  40 mg Subcutaneous Q24H    famotidine  20 mg Oral BID    cefTRIAXone (ROCEPHIN) IV  1,000 mg Intravenous Q24H    hydrocortisone   Topical BID     PRN Meds: perflutren lipid microspheres, cloNIDine, sodium chloride flush, sodium chloride, ondansetron **OR** ondansetron, polyethylene glycol, acetaminophen **OR** acetaminophen, potassium chloride **OR** potassium alternative oral replacement **OR** potassium chloride, magnesium sulfate, hydrALAZINE      Intake/Output Summary (Last 24 hours) at 7/27/2021 1355  Last data filed at 7/27/2021 1256  Gross per 24 hour   Intake 1399.73 ml   Output --   Net 1399.73 ml       Diet:  ADULT DIET; Regular; Low Fat/Low Chol/High Fiber/JACKI; Low Sodium (2 gm)    Exam:  BP (!) 150/63   Pulse 69   Temp 98.2 °F (36.8 °C) (Oral)   Resp 18   Ht 5' 10\" (1.778 m)   Wt 230 lb (104.3 kg)   SpO2 96%   BMI 33.00 kg/m²   General appearance: No apparent distress, appears stated age and cooperative. HEENT: Pupils equal, round, and reactive to light. Conjunctivae/corneas clear. Neck: Supple, with full range of motion. No jugular venous distention. Trachea midline. Respiratory:  Normal respiratory effort. Clear to auscultation, bilaterally without Rales/Wheezes/Rhonchi. Cardiovascular: Regular rate and rhythm with normal S1/S2 without murmurs, rubs or gallops. Abdomen: Soft, non-tender, non-distended with normal bowel sounds. Musculoskeletal: passive and active ROM x 4 extremities. Skin: psoriasis b/l hands  Neurologic:  Neurovascularly intact without any focal sensory/motor deficits.  Cranial nerves: II-XII intact, grossly non-focal.  Psychiatric: Alert and oriented, thought content appropriate, normal insight  Capillary Refill: Brisk,< 3 seconds   Peripheral Pulses: +2 palpable, equal bilaterally     Labs:   Recent Labs     07/26/21  1432 07/27/21  0335   WBC 12.6* 14.1*   HGB 12.6* 10.6*   HCT 39.8* 33.6*    256     Recent Labs     07/26/21  1432 07/27/21  0335    135   K 4.7 4.7    104   CO2 22* 22*   BUN 25* 29*   CREATININE 1.2 1.5* CALCIUM 9.8 9.1     Recent Labs     07/26/21  1432 07/27/21  0335   AST 14 11   ALT 10* 8*   BILIDIR  --  <0.2   BILITOT 0.5 0.4   ALKPHOS 98 80     No results for input(s): INR in the last 72 hours. No results for input(s): Joyice Branchland in the last 72 hours. Microbiology:    Blood culture #1: No results found for: BC    Blood culture #2:No results found for: Darnell Ruts    Organism:No results found for: ORG    No results found for: LABGRAM    MRSA culture only:No results found for: [facility]    Urine culture: No results found for: LABURIN    Respiratory culture: No results found for: CULTRESP    Aerobic and Anaerobic :  No results found for: LABAERO  No results found for: LABANAE    Urinalysis:      Lab Results   Component Value Date    NITRU NEGATIVE 07/26/2021    WBCUA  07/26/2021    BACTERIA MANY 07/26/2021    RBCUA 3-5 07/26/2021    BLOODU SMALL 07/26/2021    [address] 1.007 07/26/2021       Radiology:  No orders to display     No results found.     Electronically signed by Ayse Corrigan MD on 7/27/2021 at 1:55 PM

## 2021-07-27 NOTE — CARE COORDINATION
7/27/21, 7:13 AM EDT    DISCHARGE ON GOING EVALUATION    Critical access hospital day: 1  Location: -04/004-A Reason for admit: Accelerated essential hypertension [I10]   Procedure: none  Barriers to Discharge: From ED, creatinine 1.5, troponin 0.027, WBC 14.1, urine (+), telemetry, Asa, IV Rocephin, Lovenox, Pepcid, IV Apresoline prn, Cardene drip, electrolyte replacement protocols. PCP: No primary care provider on file. Readmission Risk Score: 11%  Patient Goals/Plan/Treatment Preferences: Met with [facility]. She is from [place] but will be staying here in [place] with her daughter. She is independent. She denies need for DME and declines HH. She is agreeable to being established the [address] [address] for hospital follow up. Will follow.

## 2021-07-27 NOTE — PLAN OF CARE
Problem: Falls - Risk of:  Goal: Will remain free from falls  Description: Will remain free from falls  7/26/2021 2241 by Bin Atkinson RN  Outcome: Ongoing  Note: Patient remains free from falls this shift. Fall precautions in place with bed/chair exit alarmed. Fall sign posted and fall armband in place. Nonskid footwear used with transferring. Educated patient to use call light when in need of staff assistance with transferring, ambulating, and other activities of daily living. Patient appropriately uses call light this shift. Problem: Falls - Risk of:  Goal: Absence of physical injury  Description: Absence of physical injury  7/26/2021 2241 by Bin Atkinson RN  Outcome: Ongoing  Note: Hourly rounding in place to anticipate patient needs, such as assistance with pain, toileting, or repositioning, in order to decrease risk for injuries such as falls. Problem: Cardiac:  Goal: Ability to maintain vital signs within normal range will improve  Description: Ability to maintain vital signs within normal range will improve  7/26/2021 2241 by Bin Atkinson RN  Outcome: Ongoing  Note:   Vitals:    07/26/21 2030 07/26/21 2059 07/26/21 2130 07/26/21 2230   BP: (!) 190/85 (!) 122/56 100/84 (!) 127/59   Pulse: 76 76 77 79   Resp:       Temp:       TempSrc:       SpO2:       Weight:       Height:              Problem: Discharge Planning:  Goal: Discharged to appropriate level of care  Description: Discharged to appropriate level of care  7/26/2021 2241 by Bin Atkinson RN  Outcome: Ongoing  Note: Patient came from home with family and would like to return there at discharge. Problem: Pain:  Goal: Pain level will decrease  Description: Pain level will decrease  7/26/2021 2241 by Bin Atkinson RN  Outcome: Ongoing  Note: Patient able to use 0-10 pain scale. Denies pain at this time. Patient has a pain goal of \"no pain. \" Agreeable to take PRN pain medications.        Problem: Skin Integrity:  Goal: Absence of new skin breakdown  Description: Absence of new skin breakdown  Outcome: Ongoing  Note: No signs of skin breakdown. Skin warm, dry, and intact. Mucous membranes pink and moist.  Assistance with turns/ambulation provided PRN. Will continue to monitor. Care plan reviewed with patient and daughter. Patient and daughter verbalize understanding of the plan of care and contribute to goal setting.

## 2021-07-28 LAB
ANION GAP SERPL CALCULATED.3IONS-SCNC: 10 MEQ/L (ref 8–16)
BUN BLDV-MCNC: 27 MG/DL (ref 7–22)
CALCIUM SERPL-MCNC: 9 MG/DL (ref 8.5–10.5)
CHLORIDE BLD-SCNC: 103 MEQ/L (ref 98–111)
CO2: 22 MEQ/L (ref 23–33)
CREAT SERPL-MCNC: 1.5 MG/DL (ref 0.4–1.2)
GFR SERPL CREATININE-BSD FRML MDRD: 35 ML/MIN/1.73M2
GFR SERPL CREATININE-BSD FRML MDRD: 35 ML/MIN/1.73M2
GFR SERPL CREATININE-BSD FRML MDRD: 45 ML/MIN/1.73M2
GLUCOSE BLD-MCNC: 151 MG/DL (ref 70–108)
MAGNESIUM: 2.5 MG/DL (ref 1.6–2.4)
POTASSIUM SERPL-SCNC: 4.6 MEQ/L (ref 3.5–5.2)
SODIUM BLD-SCNC: 135 MEQ/L (ref 135–145)
TROPONIN T: 0.05 NG/ML

## 2021-07-28 PROCEDURE — 99232 SBSQ HOSP IP/OBS MODERATE 35: CPT | Performed by: INTERNAL MEDICINE

## 2021-07-28 PROCEDURE — 6370000000 HC RX 637 (ALT 250 FOR IP): Performed by: INTERNAL MEDICINE

## 2021-07-28 PROCEDURE — 80048 BASIC METABOLIC PNL TOTAL CA: CPT

## 2021-07-28 PROCEDURE — 84484 ASSAY OF TROPONIN QUANT: CPT

## 2021-07-28 PROCEDURE — 6360000002 HC RX W HCPCS: Performed by: INTERNAL MEDICINE

## 2021-07-28 PROCEDURE — 2580000003 HC RX 258: Performed by: INTERNAL MEDICINE

## 2021-07-28 PROCEDURE — 83735 ASSAY OF MAGNESIUM: CPT

## 2021-07-28 PROCEDURE — 2060000000 HC ICU INTERMEDIATE R&B

## 2021-07-28 PROCEDURE — 99223 1ST HOSP IP/OBS HIGH 75: CPT | Performed by: INTERNAL MEDICINE

## 2021-07-28 PROCEDURE — 36415 COLL VENOUS BLD VENIPUNCTURE: CPT

## 2021-07-28 PROCEDURE — 87086 URINE CULTURE/COLONY COUNT: CPT

## 2021-07-28 RX ORDER — HYDRALAZINE HYDROCHLORIDE 10 MG/1
10 TABLET, FILM COATED ORAL EVERY 8 HOURS SCHEDULED
Status: DISCONTINUED | OUTPATIENT
Start: 2021-07-28 | End: 2021-07-30 | Stop reason: HOSPADM

## 2021-07-28 RX ORDER — ALPRAZOLAM 0.25 MG/1
0.25 TABLET ORAL 4 TIMES DAILY PRN
Status: DISCONTINUED | OUTPATIENT
Start: 2021-07-28 | End: 2021-07-30 | Stop reason: HOSPADM

## 2021-07-28 RX ORDER — ALPRAZOLAM 0.5 MG/1
0.5 TABLET ORAL 4 TIMES DAILY PRN
Status: DISCONTINUED | OUTPATIENT
Start: 2021-07-28 | End: 2021-07-28

## 2021-07-28 RX ADMIN — SODIUM CHLORIDE, PRESERVATIVE FREE 10 ML: 5 INJECTION INTRAVENOUS at 08:48

## 2021-07-28 RX ADMIN — SODIUM CHLORIDE, PRESERVATIVE FREE 10 ML: 5 INJECTION INTRAVENOUS at 20:24

## 2021-07-28 RX ADMIN — HYDRALAZINE HYDROCHLORIDE 10 MG: 10 TABLET, FILM COATED ORAL at 20:24

## 2021-07-28 RX ADMIN — ENOXAPARIN SODIUM 40 MG: 40 INJECTION SUBCUTANEOUS at 20:25

## 2021-07-28 RX ADMIN — HYDROCORTISONE: 1 CREAM TOPICAL at 20:25

## 2021-07-28 RX ADMIN — ASPIRIN 81 MG: 81 TABLET, CHEWABLE ORAL at 08:47

## 2021-07-28 RX ADMIN — HYDROCORTISONE: 1 CREAM TOPICAL at 08:48

## 2021-07-28 RX ADMIN — FAMOTIDINE 20 MG: 20 TABLET, FILM COATED ORAL at 20:24

## 2021-07-28 RX ADMIN — AMLODIPINE BESYLATE 10 MG: 10 TABLET ORAL at 08:47

## 2021-07-28 RX ADMIN — ALPRAZOLAM 0.5 MG: 0.5 TABLET ORAL at 14:00

## 2021-07-28 RX ADMIN — HYDRALAZINE HYDROCHLORIDE 10 MG: 10 TABLET, FILM COATED ORAL at 13:09

## 2021-07-28 RX ADMIN — LISINOPRIL 10 MG: 10 TABLET ORAL at 08:47

## 2021-07-28 RX ADMIN — FAMOTIDINE 20 MG: 20 TABLET, FILM COATED ORAL at 08:47

## 2021-07-28 RX ADMIN — CEFTRIAXONE SODIUM 1000 MG: 1 INJECTION, POWDER, FOR SOLUTION INTRAMUSCULAR; INTRAVENOUS at 17:38

## 2021-07-28 ASSESSMENT — PAIN SCALES - GENERAL
PAINLEVEL_OUTOF10: 0

## 2021-07-28 NOTE — PROGRESS NOTES
Urine culture order noted. Sample from ED too old to be used. New  Sample will have to be obtained. Dr. Tanya Bueno made aware. Patient made aware of need for sample and how to obtain. She verbalized understanding. Patient does not need to void at this time.

## 2021-07-28 NOTE — CONSULTS
[address] [place] [address]                                  CONSULTATION    PATIENT NAME: Ken Clement                     :        1955  MED REC NO:   [id number]                           ROOM:       0004  ACCOUNT NO:   [de-identified]                           ADMIT DATE: 2021  PROVIDER:     Nguyễn Eli. Davina Pope M.D.    Yogesh Beet:  2021    REASON OF CONSULTATION:  Elevated troponin in a 78-year-old female  patient, admitted with accelerated hypertension. HISTORY OF PRESENT ILLNESS:  This is actually a 78-year-old female  patient, presented to the emergency room after sent from the  Ophthalmology office for accelerated hypertension. The patient stated  that she went to retinal specialist for evaluation and management, and  there blood pressure was checked and it was noted to be markedly  elevated like 230/90, and the patient was having blurring of vision  following the use of eye dilator, but denied having any headache. No  chest pain and no shortness of breath. No history of chest pain. No  known history of coronary artery disease, and her blood pressure is  markedly elevated. The patient has been managed after admission here  with nicardipine and subsequent medication and following those  medications, blood pressure seems to be controlled; however, she has  initially troponin elevated and the troponin remained to be elevated,  hence Cardiology evaluation was sought. Currently, the patient has no  history of chest pain and she is comfortable. REVIEW OF SYSTEMS:  Negative except the above-mentioned ones. PAST MEDICAL HISTORY:  Includes hypertension, psoriasis. She was supposed to be on a medication, but she stopped her blood  pressure medication long time ago. PAST SURGICAL HISTORY:  None. FAMILY HISTORY:  Her father and mother has coronary artery disease with  bypass in their 76s.     SOCIAL HISTORY: Former smoker, half pack a day for 15 years, quit a  while ago, and occasional alcohol consumption. No drug use. ALLERGIES:  NO KNOWN DRUG ALLERGIES. HOME MEDICATIONS:  Prior to this admission, aspirin and she was supposed  to be on a blood pressure medication, but she is not taking blood  pressure medication. PHYSICAL EXAMINATION:  GENERAL:  Looks sick, in no form distress. VITAL SIGNS:  Blood pressure 125/58, pulse rate 67, respiratory rate 16,  temperature 98.1. On arrival, blood pressure was markedly elevated like  to a maximum of 201/89 has been recorded. HEENT:  Pink conjunctivae. Anicteric sclerae. Pupils are equal and  reactive bilaterally to light. NECK:  No lymphadenopathy. No goiter. No JVD. CHEST:  Clear to auscultation and resonant to percussion. CARDIOVASCULAR:  Arteries are felt; carotid, femoral, pedal.  No bruits. S1, S2 well heard. No murmur or gallop rhythm. ABDOMEN:  Soft, nontender, nondistended. Bowel sounds are positive. GENITOURINARY:  No CVA, flank, or suprapubic tenderness. LOCOMOTOR:  No cyanosis, clubbing, or muscle or joint tenderness. SKIN:  No rashes, ulcers, or nodules. CNS:  Alert, awake, and oriented to time, person, and place. Cranial  nerves are intact. Sensation is intact to light touch and pain. Motor:  Normal muscle strength and tone. Appropriate mood and affect. WORKUP:  EKG:  Sinus rhythm with right bundle-branch block. No acute  EKG abnormality. Blood chemistry:  Sodium 135, potassium 4.6, BUN 25, creatinine 1.5. Magnesium 2.5. Enzymes:  Troponin on admission 0.021, 0.027, 0.04,  0.028, 0.049. I have done echocardiogram on 07/27/2021, ejection fraction 55% and has  evidence of urinary tract infection with new abnormalities, microscopic  show wbc 75 to 100. ASSESSMENT:  1. Accelerated hypertension, apparently now controlled.   2.  Elevated troponin with no chest pain, no angina or anginal  equivalent, probably seems to be related to the accelerated  hypertension, could be a demand ischemia. 3.  Acute kidney injury and chronic kidney disease. 4.  Urinary tract infection. 5.  History of visual problem. 6.  History of balance problem, walks with a cane. 7.  History of clubfoot as a childhood, corrected without any surgery,  is on physical therapy. PLAN AND RECOMMENDATION:  1. At this level, the elevated troponin seems to be demand-related  ischemia and the patient does not have any chest pain, no angina or  anginal equivalent and she has abnormal EKG which has a right  bundle-branch block. For the benefit of the doubt, I believe it is  prudent to do functional study in view of the elevated troponin and  abnormal EKG. So, I will do functional study, Lexiscan nuclear stress  test provided the functional study is acceptable. We will continue with  medication, medical therapy, and control of the blood pressure. 2.  Continue with significant control of the blood pressure. The  patient agreed to be on her medication down the line. The patient used  to be on blood pressure medication, she stopped a while ago. Based on the course, we will gauge further care. Thank you for allowing me to participate in the care of this patient. I  will follow up with you. Brady Winters.  Rosezetta Najjar, M.D.    D: 07/28/2021 13:06:23       T: 07/28/2021 14:43:25     [email or web address]  Job#: [id number]     Doc#: [id number]    CC:

## 2021-07-28 NOTE — PROGRESS NOTES
Patient tearful. Reports a lot of stress/anxiety related to mostly outside of hospital life & personal things. . Denies depression. She feels a lot of that may be playing a role in Blood pressure. Dr. Abdelrahman Calvin made aware. See orders for details.

## 2021-07-28 NOTE — CARE COORDINATION
7/28/21, 1:52 PM EDT    DISCHARGE ON GOING EVALUATION    Critical access hospital day: 2  Location: -04/004-A Reason for admit: Accelerated essential hypertension [I10]   Procedure:   Stress test Lexiscan pending  Barriers to Discharge: Cardiology consult, troponin 0.049, WBC 12.6, creatinine 1.5, Xanax prn, IV Rocephin, Norvasc, Asa, Lovenox, Pepcid, electrolyte replacement protocols. PCP: No primary care provider on file. Readmission Risk Score: 11%  Patient Goals/Plan/Treatment Preferences: Plan is return home with daughter. Will follow for potential needs.

## 2021-07-28 NOTE — PLAN OF CARE
Problem: Falls - Risk of:  Goal: Will remain free from falls  Description: Will remain free from falls  7/28/2021 1554 by Tosha Dos Santos  Outcome: Ongoing  Note: Call light in reach, bed in lowest position, and bed alarm activated. Education given on use of call light before ambulation and when in need of assistance. Patient expressed understanding. Hourly visual checks performed and charted. Toileting offered to patient. No falls this shift, at any time. Arm band and falling star in place. Will continue to monitor. 7/28/2021 1554 by Tosha Dos Santos  Outcome: Ongoing  Goal: Absence of physical injury  Description: Absence of physical injury  7/28/2021 1554 by Tosha Dos Santos  Outcome: Ongoing  Note: Call light in reach, bed in lowest position, and bed alarm activated. Education given on use of call light before ambulation and when in need of assistance. Patient expressed understanding. Hourly visual checks performed and charted. Toileting offered to patient. No falls this shift, at any time. Arm band and falling star in place. Will continue to monitor.    7/28/2021 1554 by Tosha Dos Santos  Outcome: Ongoing     Problem: Cardiac:  Goal: Ability to maintain vital signs within normal range will improve  Description: Ability to maintain vital signs within normal range will improve  7/28/2021 1554 by Tosha Dos Santos  Outcome: Ongoing  Note: Monitor VS per routine and PRN  7/28/2021 1554 by Tosha Dos Santos  Outcome: Ongoing  Goal: Cardiovascular alteration will improve  Description: Cardiovascular alteration will improve  7/28/2021 1554 by Tosha Dos Santos  Outcome: Ongoing  Note: Monitor VS per routine and PRN  7/28/2021 1554 by Tosha Dos Santos  Outcome: Ongoing     Problem: Discharge Planning:  Goal: Discharged to appropriate level of care  Description: Discharged to appropriate level of care  7/28/2021 1554 by Tosha Dos Santos  Outcome: Ongoing  7/28/2021 1554 by Yandel Babcock  Outcome: Ongoing     Problem: Pain:  Goal: Pain level will decrease  Description: Pain level will decrease  7/28/2021 1554 by Yandel Babcock  Outcome: Ongoing  Note: Patient able to use 0-10 pain scale. Denies pain at this time. Agreeable to take PRN pain medications. 7/28/2021 1554 by Yandel Babcock  Outcome: Ongoing  Goal: Control of acute pain  Description: Control of acute pain  7/28/2021 1554 by Yandel Babcock  Outcome: Ongoing  Note: Patient able to use 0-10 pain scale. Denies pain at this time. Agreeable to take PRN pain medications. 7/28/2021 1554 by Yandel Babcock  Outcome: Ongoing  Goal: Control of chronic pain  Description: Control of chronic pain  7/28/2021 1554 by Yandel Babcock  Outcome: Ongoing  7/28/2021 1554 by Yandel Babcock  Outcome: Ongoing     Problem: Skin Integrity:  Goal: Absence of new skin breakdown  Description: Absence of new skin breakdown  7/28/2021 1554 by Yandel Babcock  Outcome: Ongoing  Note: No signs of skin breakdown. Skin warm, dry, and intact. Mucous membranes pink and moist.  Assistance with turns/ambulation provided PRN. Will continue to monitor. 7/28/2021 1554 by Yandel Babcock  Outcome: Ongoing   Care plan reviewed with patient. Patient verbalized understanding of the plan of care and contributed to goal setting.

## 2021-07-28 NOTE — PROGRESS NOTES
Patient reported feeling \"loopy\" after xanax administration. Dr. Rachelle Lepe made aware. Received orders to decrease dose to 0.25mg.  completed.

## 2021-07-28 NOTE — PROGRESS NOTES
Hospitalist      Patient:  Phani Scott    Unit/Bed:4A-04/004-A  YOB: 1955  MRN: [id number]   Acct: [de-identified]     PCP: No primary care provider on file. Date of Admission: 7/26/2021        Assessment and Plan:        1. Hypertensive emergency: BPs are under slightly better control, patient currently on Norvasc 10 mg lisinopril 10 mg. Possibly secondary to stopping of hold the clonidine abruptly potential clonidine withdrawal syndrome  2. Elevated troponins still increasing normal EKG, echo EF 55%, will consult cardiology. 3. LOLY creatinine 1.5 baseline 1.2 it appears will DC lisinopril and add hydralazine 10 mg every 8 hours  4. Acute cystitis: Urine cultures not ordered on admission correctly will obtain urine culture now    CC: Vision changes    HPI: Patient presents to the ED for hypertension. Patient states she was at [address] [place] [address] told to go to the doctor's office for evaluation of leaking redness. States her blood pressure was 200/102 was told to come to the ED for evaluation denies any headache dizziness or new vision changes but does report blurry vision and the pupils were dilated for retinal exam.    ROS (14 point review of systems completed. Pertinent positives noted. Otherwise ROS is negative) : Vision changes    PMH:  Per HPI and       Diagnosis Date    Hypertension     Psoriasis      SHX:  No past surgical history on file.   FHX:       Problem Relation Age of Onset    Other Mother         thyroidectomy    Diabetes Mother     Heart Disease Mother     Heart Disease Father      SOCHX:   Social History     Socioeconomic History    Marital status: Single     Spouse name: Not on file    Number of children: 1    Years of education: Not on file    Highest education level: Not on file   Occupational History    Not on file   Tobacco Use    Smoking status: Former Smoker     Packs/day: 0.50     Years: 15.00     Pack years: 7.50     Types: Cigarettes    Smokeless tobacco: Never Used   Substance and Sexual Activity    Alcohol use: Yes     Comment: social    Drug use: Never    Sexual activity: Not on file   Other Topics Concern    Not on file   Social History Narrative    Not on file     Social Determinants of Health     Financial Resource Strain:     Difficulty of Paying Living Expenses:    Food Insecurity:     Worried About Running Out of Food in the Last Year:     [address] in the Last Year:    Transportation Needs:     Lack of Transportation (Medical):  Lack of Transportation (Non-Medical):    Physical Activity:     Days of Exercise per Week:     Minutes of Exercise per Session:    Stress:     Feeling of Stress :    Social Connections:     Frequency of Communication with Friends and Family:     Frequency of Social Gatherings with Friends and Family:     Attends [employer] Services:     Active Member of Clubs or Organizations:     Attends Club or Organization Meetings:     Marital Status:    Intimate Partner Violence:     Fear of Current or Ex-Partner:     Emotionally Abused:     Physically Abused:     Sexually Abused: Allergies: Patient has no known allergies. Medications:     sodium chloride        amLODIPine  10 mg Oral Daily    lisinopril  10 mg Oral Daily    aspirin  81 mg Oral Daily    sodium chloride flush  10 mL Intravenous 2 times per day    enoxaparin  40 mg Subcutaneous Q24H    famotidine  20 mg Oral BID    cefTRIAXone (ROCEPHIN) IV  1,000 mg Intravenous Q24H    hydrocortisone   Topical BID     Prior to Admission medications    Medication Sig Start Date End Date Taking?  Authorizing Provider   aspirin 81 MG chewable tablet Take 81 mg by mouth daily   Yes Historical Provider, MD      PHYSICAL EXAM:    BP (!) 125/59   Pulse 67   Temp 98.1 °F (36.7 °C) (Oral)   Resp 16   Ht 5' 10\" (1.778 m)   Wt 230 lb 8 oz (104.6 kg)   SpO2 96%   BMI 33.07 kg/m²     General appearance:  No apparent distress, appears stated age and cooperative. HEENT:  Normal cephalic, atraumatic without obvious deformity. Pupils equal, round, and reactive to light. Extra ocular muscles intact. Conjunctivae/corneas clear. Neck: Supple, with full range of motion. no jugular venous distention. Trachea midline. no carotid bruits  Respiratory:  Normal respiratory effort. Clear to auscultation, bilaterally without Rales/Wheezes/Rhonchi. Breath sounds equal bilaterally  Cardiovascular:  Regular rate and rhythm with normal S1/S2 without murmurs, rubs or gallops. PMI non displaced  Abdomen: Soft, non-tender, non-distended with normal bowel sounds. No guarding, rebound. Musculoskeletal:  No clubbing, cyanosis or edema bilaterally. Full range of motion without deformity. Skin: Skin color, texture, turgor normal.  No rashes or lesions, or suspicious lesions. Neurologic:  Neurovascularly intact without any focal sensory/motor deficits. Cranial nerves: II-XII intact, grossly non-focal.  Psychiatric:  Alert and oriented, thought content appropriate, normal insight  Capillary Refill: Brisk,< 2 seconds   Peripheral Pulses: +2 palpable, equal bilaterally upper and lower extremities  Lymphatics: no lymphadenopathy    Data: (All radiographs, tracings, PFTs, and imaging are personally viewed and interpreted unless otherwise noted).    Recent Labs     07/26/21  1432 07/27/21  0335   WBC 12.6* 14.1*   HGB 12.6* 10.6*   HCT 39.8* 33.6*    256      Recent Labs     07/26/21  1432 07/27/21  0335 07/28/21  0358    135 135   K 4.7 4.7 4.6    104 103   CO2 22* 22* 22*   BUN 25* 29* 27*   CREATININE 1.2 1.5* 1.5*   CALCIUM 9.8 9.1 9.0      Recent Labs     07/26/21  1432 07/27/21  0335   AST 14 11   ALT 10* 8*   BILIDIR  --  <0.2   BILITOT 0.5 0.4   ALKPHOS 98 80       No results for input(s): INR in the last 72 hours.  No results for input(s): Truong Snowball in the last 72 hours.     Radiology reports-    Echo Complete    Result Date: 7/27/2021  Transthoracic Echocardiography Report (TTE)  Demographics   Patient Name    Luis Enrique Record Gender               Male   MR #            [id number]      Race                                                   Ethnicity   Account #       [de-identified]      Room Number          0004   Accession       [id number]     Date of Study        07/27/2021  Number   Date of Birth   1955     Referring Physician  Ubaldo Mendiola MD   Age             60 year(s)     [address] [facility]                                  Interpreting         Echo reader of the                                 Physician            week                                                      Rudy Mora MD  Procedure Type of Study   TTE procedure:ECHOCARDIOGRAM COMPLETE 2D W DOPPLER W COLOR. Procedure Date Date: 07/27/2021 Start: 10:09 AM Study Location: Bedside Technical Quality: Adequate visualization Indications:Elevated troponin and Dyspnea / Shortness of breath. Additional Medical History:Hypertension Patient Status: Routine Height: 70 inches Weight: 230.01 pounds BSA: 2.22 m^2 BMI: 33 kg/m^2 BP: 177/74 mmHg  Conclusions   Summary  Ejection fraction is visually estimated at 55%. Overall left ventricular function is normal.   Signature   ----------------------------------------------------------------  Electronically signed by Rudy Mora MD (Interpreting  physician) on 07/27/2021 at 06:40 PM  ----------------------------------------------------------------   Findings   Mitral Valve  The mitral valve structure was normal with normal leaflet separation. DOPPLER: The transmitral velocity was within the normal range with no  evidence for mitral stenosis. There was no evidence of mitral  regurgitation. Aortic Valve  The aortic valve was trileaflet with normal thickness and cuspal  separation. DOPPLER: Transaortic velocity was within the normal range with  no evidence of aortic stenosis.  There was no evidence of aortic  regurgitation. Tricuspid Valve  Tricuspid valve was not well visualized. Trivial tricuspid regurgitation visualized. Pulmonic Valve  The pulmonic valve was not well visualized . Trivial pulmonic regurgitation visualized. Left Atrium  Left atrial size was normal.   Left Ventricle  Ejection fraction is visually estimated at 55%. Overall left ventricular function is normal.   Right Atrium  Right atrial size was normal.   Right Ventricle  The right ventricular size was normal with normal systolic function and  wall thickness. Pericardial Effusion  The pericardium was normal in appearance with no evidence of a pericardial  effusion. Pleural Effusion  No evidence of pleural effusion. Aorta / Great Vessels  -Aortic root dimension within normal limits.  -The Pulmonary artery is within normal limits. -IVC size is within normal limits with normal respiratory phasic changes.   M-Mode/2D Measurements & Calculations   LV Diastolic    LV Systolic Dimension:    AV Cusp Separation: 1.7 cmLA  Dimension: 4.7  3.3 cm                    Dimension: 4 cmAO Root  cm              LV Volume Diastolic: 684  Dimension: 2.7 cmLA Area: 13.4  LV FS:29.8 %    ml                        cm^2  LV PW           LV Volume Systolic: 22.4  Diastolic: 0.8  ml  cm              LV EDV/LV EDV Index: 102  Septum          ml/46 m^2LV ESV/LV ESV    RV Diastolic Dimension: 2.2 cm  Diastolic: 0.9  Index: 59.1 ml/20 m^2  cm              EF Calculated: 56.8 %     LA/Aorta: 1.48                                             LA volume/Index: 32.5 ml /15m^2  Doppler Measurements & Calculations   MV Peak E-Wave: 117 cm/s   AV Peak Velocity: 157  LVOT Peak Velocity: 105  MV Peak A-Wave: 93.8 cm/s  cm/s                   cm/s  MV E/A Ratio: 1.25         AV Peak Gradient: 9.86 LVOT Peak Gradient: 4  MV Peak Gradient: 5.48     mmHg                   mmHg  mmHg                                                    TV Peak E-Wave: 55.3  MV Deceleration Time: 197                         cm/s  msec                                              TV Peak A-Wave: 49.3  MV P1/2t: 58 msec                                 cm/s  MVA by PHT:3.79 cm^2                                                    TV Peak Gradient: 1.22  MV E' Septal Velocity: 5.9 AV DVI (Vmax):0.67     mmHg  cm/s                                              TR Velocity:231 cm/s  MV A' Septal Velocity: 8.6                        TR Gradient:21.34 mmHg  cm/s                                              PV Peak Velocity: 89.1  MV E' Lateral Velocity:                           cm/s  5.2 cm/s                                          PV Peak Gradient: 3.18  MV A' Lateral Velocity:                           mmHg  8.2 cm/s  E/E' septal: 19.83  E/E' lateral: 22.5  MR Velocity: 471 cm/s  [email or web address] [id number]      Electronically signed by Erika Rubio DO on 7/28/2021 at 10:29 AM

## 2021-07-29 ENCOUNTER — APPOINTMENT (OUTPATIENT)
Dept: NON INVASIVE DIAGNOSTICS | Age: 66
DRG: 199 | End: 2021-07-29
Payer: MEDICAID

## 2021-07-29 LAB
ANION GAP SERPL CALCULATED.3IONS-SCNC: 10 MEQ/L (ref 8–16)
BUN BLDV-MCNC: 32 MG/DL (ref 7–22)
CALCIUM SERPL-MCNC: 9 MG/DL (ref 8.5–10.5)
CHLORIDE BLD-SCNC: 100 MEQ/L (ref 98–111)
CO2: 24 MEQ/L (ref 23–33)
CREAT SERPL-MCNC: 1.5 MG/DL (ref 0.4–1.2)
GFR SERPL CREATININE-BSD FRML MDRD: 35 ML/MIN/1.73M2
GLUCOSE BLD-MCNC: 234 MG/DL (ref 70–108)
POTASSIUM SERPL-SCNC: 4.7 MEQ/L (ref 3.5–5.2)
SODIUM BLD-SCNC: 134 MEQ/L (ref 135–145)
URINE CULTURE, ROUTINE: NORMAL

## 2021-07-29 PROCEDURE — 93017 CV STRESS TEST TRACING ONLY: CPT | Performed by: INTERNAL MEDICINE

## 2021-07-29 PROCEDURE — 6370000000 HC RX 637 (ALT 250 FOR IP): Performed by: INTERNAL MEDICINE

## 2021-07-29 PROCEDURE — 99232 SBSQ HOSP IP/OBS MODERATE 35: CPT | Performed by: INTERNAL MEDICINE

## 2021-07-29 PROCEDURE — 2060000000 HC ICU INTERMEDIATE R&B

## 2021-07-29 PROCEDURE — 6360000002 HC RX W HCPCS

## 2021-07-29 PROCEDURE — 36415 COLL VENOUS BLD VENIPUNCTURE: CPT

## 2021-07-29 PROCEDURE — 78452 HT MUSCLE IMAGE SPECT MULT: CPT

## 2021-07-29 PROCEDURE — 2580000003 HC RX 258: Performed by: INTERNAL MEDICINE

## 2021-07-29 PROCEDURE — A9500 TC99M SESTAMIBI: HCPCS | Performed by: INTERNAL MEDICINE

## 2021-07-29 PROCEDURE — 99232 SBSQ HOSP IP/OBS MODERATE 35: CPT | Performed by: PHYSICIAN ASSISTANT

## 2021-07-29 PROCEDURE — 80048 BASIC METABOLIC PNL TOTAL CA: CPT

## 2021-07-29 PROCEDURE — 2580000003 HC RX 258: Performed by: PHYSICIAN ASSISTANT

## 2021-07-29 PROCEDURE — 3430000000 HC RX DIAGNOSTIC RADIOPHARMACEUTICAL: Performed by: INTERNAL MEDICINE

## 2021-07-29 RX ORDER — AMLODIPINE BESYLATE 10 MG/1
10 TABLET ORAL DAILY
Qty: 30 TABLET | Refills: 3 | Status: SHIPPED | OUTPATIENT
Start: 2021-07-30 | End: 2021-07-30

## 2021-07-29 RX ORDER — HYDRALAZINE HYDROCHLORIDE 10 MG/1
10 TABLET, FILM COATED ORAL EVERY 8 HOURS SCHEDULED
Qty: 90 TABLET | Refills: 3 | Status: SHIPPED | OUTPATIENT
Start: 2021-07-29 | End: 2021-07-30

## 2021-07-29 RX ORDER — SODIUM CHLORIDE 9 MG/ML
INJECTION, SOLUTION INTRAVENOUS CONTINUOUS
Status: DISCONTINUED | OUTPATIENT
Start: 2021-07-29 | End: 2021-07-30

## 2021-07-29 RX ADMIN — HYDROCORTISONE: 1 CREAM TOPICAL at 20:12

## 2021-07-29 RX ADMIN — FAMOTIDINE 20 MG: 20 TABLET, FILM COATED ORAL at 20:12

## 2021-07-29 RX ADMIN — Medication 10.2 MILLICURIE: at 08:00

## 2021-07-29 RX ADMIN — Medication 34 MILLICURIE: at 08:59

## 2021-07-29 RX ADMIN — SODIUM CHLORIDE: 9 INJECTION, SOLUTION INTRAVENOUS at 17:21

## 2021-07-29 RX ADMIN — HYDRALAZINE HYDROCHLORIDE 10 MG: 10 TABLET, FILM COATED ORAL at 23:32

## 2021-07-29 RX ADMIN — SODIUM CHLORIDE, PRESERVATIVE FREE 10 ML: 5 INJECTION INTRAVENOUS at 07:39

## 2021-07-29 RX ADMIN — HYDROCORTISONE: 1 CREAM TOPICAL at 07:42

## 2021-07-29 RX ADMIN — ASPIRIN 81 MG: 81 TABLET, CHEWABLE ORAL at 07:39

## 2021-07-29 RX ADMIN — AMLODIPINE BESYLATE 10 MG: 10 TABLET ORAL at 07:39

## 2021-07-29 RX ADMIN — HYDRALAZINE HYDROCHLORIDE 10 MG: 10 TABLET, FILM COATED ORAL at 15:18

## 2021-07-29 RX ADMIN — FAMOTIDINE 20 MG: 20 TABLET, FILM COATED ORAL at 07:39

## 2021-07-29 RX ADMIN — HYDRALAZINE HYDROCHLORIDE 10 MG: 10 TABLET, FILM COATED ORAL at 06:13

## 2021-07-29 ASSESSMENT — PAIN SCALES - GENERAL
PAINLEVEL_OUTOF10: 0

## 2021-07-29 NOTE — CARE COORDINATION
7/29/21, 9:32 AM EDT    DISCHARGE ON GOING EVALUATION    [facility] day: 3  Location: -04/004-A Reason for admit: Accelerated essential hypertension [I10]   Procedure: 07/29 stress test today  Barriers to Discharge: follow blood pressures, on Amlodipine and Hydralazine (new meds), cont ASA. PCP: No primary care provider on file. Readmission Risk Score: 11%  Patient Goals/Plan/Treatment Preferences:plans home with daughter; assisting with new PCP (Dr Beto Watkins). See AVS.        7/29/21, 9:32 AM EDT    Patient goals/plan/ treatment preferences discussed by  and . Patient goals/plan/ treatment preferences reviewed with patient/ family. Patient/ family verbalize understanding of discharge plan and are in agreement with goal/plan/treatment preferences. Understanding was demonstrated using the teach back method. AVS provided by RN at time of discharge, which includes all necessary medical information pertaining to the patients current course of illness, treatment, post-discharge goals of care, and treatment preferences.

## 2021-07-29 NOTE — PLAN OF CARE
Problem: Falls - Risk of:  Goal: Will remain free from falls  Description: Will remain free from falls  7/28/2021 2137 by Dat Fernando RN  Outcome: Ongoing  Note: Call light in reach, bed in lowest position, and bed alarm activated. Education given on use of call light before ambulation and when in need of assistance. Patient expressed understanding. Hourly visual checks performed and charted. Toileting offered to patient. No falls this shift, at any time. Arm band and falling star in place. Will continue to monitor. Problem: Falls - Risk of:  Goal: Absence of physical injury  Description: Absence of physical injury  7/28/2021 2137 by Dat Fernando RN  Outcome: Ongoing  Note: Call light in reach, bed in lowest position, and bed alarm activated. Education given on use of call light before ambulation and when in need of assistance. Patient expressed understanding. Hourly visual checks performed and charted. Toileting offered to patient. No falls this shift, at any time. Arm band and falling star in place. Will continue to monitor. Problem: Cardiac:  Goal: Ability to maintain vital signs within normal range will improve  Description: Ability to maintain vital signs within normal range will improve  7/28/2021 2137 by Dat Fernando RN  Outcome: Ongoing  Note:   Vitals:    07/28/21 1304 07/28/21 1511 07/28/21 1600 07/28/21 2019   BP: (!) 166/66 (!) 116/57  124/65   Pulse:  62  61   Resp:  16 16 14   Temp:  98.2 °F (36.8 °C)  97.8 °F (36.6 °C)   TempSrc:  Oral  Oral   SpO2:  97%  98%   Weight:       Height:         Hydarlazine 10 mg Q8H started and lisinopril held due to LOLY. Patient's vitals within defined limits. Problem: Cardiac:  Goal: Cardiovascular alteration will improve  Description: Cardiovascular alteration will improve  7/28/2021 2137 by Dat Fernando RN  Outcome: Ongoing  Note: Patient's troponin remains elevated. Cardiology consulted. Plan for stress test on 7/29/21. Problem: Discharge Planning:  Goal: Discharged to appropriate level of care  Description: Discharged to appropriate level of care  7/28/2021 2137 by Lady Barrington RN  Outcome: Ongoing  Note: Discharge planning in process and discussed with patient/family. Social work consulted for any additional needs. Care manager aware of discharge needs. Patient is from home with family support and plans to return there upon discharge. Problem: Pain:  Goal: Pain level will decrease  Description: Pain level will decrease  7/28/2021 2137 by Lady Barrington RN  Outcome: Ongoing  Note: Patient able to use 0-10 pain scale. Denies pain at this time. Patient has a pain goal of \"no pain. \" Agreeable to take PRN pain medications. Problem: Skin Integrity:  Goal: Absence of new skin breakdown  Description: Absence of new skin breakdown  7/28/2021 2137 by Lady Barrington RN  Outcome: Ongoing  Note: No signs of skin breakdown. Skin warm, dry, and intact. Mucous membranes pink and moist.  Assistance with turns/ambulation provided PRN. Will continue to monitor. Care plan reviewed with patient and family. Patient and family verbalize understanding of the plan of care and contribute to goal setting.

## 2021-07-29 NOTE — PROGRESS NOTES
Cardiology Progress Note      Patient:  Eula Medina  YOB: 1955  MRN: [id number]   Acct: [de-identified]  Admit Date:  7/26/2021  Primary Cardiologist: none    Note per dr Nataliia Boyce OF CONSULTATION:  Elevated troponin in a 80-year-old female  patient, admitted with accelerated hypertension.     HISTORY OF PRESENT ILLNESS:  This is actually a 80-year-old female  patient, presented to the emergency room after sent from the  Ophthalmology office for accelerated hypertension. The patient stated  that she went to retinal specialist for evaluation and management, and  there blood pressure was checked and it was noted to be markedly  elevated like 230/90, and the patient was having blurring of vision  following the use of eye dilator, but denied having any headache. No  chest pain and no shortness of breath. No history of chest pain. No  known history of coronary artery disease, and her blood pressure is  markedly elevated. The patient has been managed after admission here  with nicardipine and subsequent medication and following those  medications, blood pressure seems to be controlled; however, she has  initially troponin elevated and the troponin remained to be elevated,  hence Cardiology evaluation was sought. Currently, the patient has no  history of chest pain and she is comfortable. \"    Subjective (Events in last 24 hours): pt awake and alert. NAD.  No cp or sob      Objective:   BP (!) 127/93   Pulse 66   Temp 97.8 °F (36.6 °C) (Oral)   Resp 15   Ht 5' 10\" (1.778 m)   Wt 227 lb 6.4 oz (103.1 kg)   SpO2 99%   BMI 32.63 kg/m²        TELEMETRY: nsr    Physical Exam:  General Appearance: alert and oriented to person, place and time, in no acute distress  Cardiovascular: normal rate, regular rhythm, normal S1 and S2, no murmurs, rubs, clicks, or gallops, distal pulses intact, no carotid bruits, no JVD  Pulmonary/Chest: clear to auscultation bilaterally- no wheezes, rales or rhonchi, normal air movement, no respiratory distress  Abdomen: soft, non-tender, non-distended, normal bowel sounds, no masses Extremities: no cyanosis, clubbing or edema, pulse   Skin: warm and dry  Head: normocephalic and atraumatic  Eyes: pupils equal, round, and reactive to light  Neck: supple and non-tender without mass, no thyromegaly   Neurological: alert, oriented, normal speech, no focal findings or movement disorder noted    Medications:    hydrALAZINE  10 mg Oral 3 times per day    amLODIPine  10 mg Oral Daily    aspirin  81 mg Oral Daily    sodium chloride flush  10 mL Intravenous 2 times per day    enoxaparin  40 mg Subcutaneous Q24H    famotidine  20 mg Oral BID    cefTRIAXone (ROCEPHIN) IV  1,000 mg Intravenous Q24H    hydrocortisone   Topical BID      sodium chloride       ALPRAZolam, 0.25 mg, 4x Daily PRN  perflutren lipid microspheres, 1.5 mL, ONCE PRN  sodium chloride flush, 10 mL, PRN  sodium chloride, 25 mL, PRN  ondansetron, 4 mg, Q8H PRN   Or  ondansetron, 4 mg, Q6H PRN  polyethylene glycol, 17 g, Daily PRN  acetaminophen, 650 mg, Q6H PRN   Or  acetaminophen, 650 mg, Q6H PRN  potassium chloride, 40 mEq, PRN   Or  potassium alternative oral replacement, 40 mEq, PRN   Or  potassium chloride, 10 mEq, PRN  magnesium sulfate, 2,000 mg, PRN        Diagnostics:  TTE 7/27/21  Summary   Ejection fraction is visually estimated at 55%. Overall left ventricular function is normal.      Signature      ----------------------------------------------------------------   Electronically signed by Jacey Pinon MD (Interpreting   physician) on 07/27/2021 at 06:40 PM    Lab Data:    Cardiac Enzymes:  No results for input(s): CKTOTAL, CKMB, CKMBINDEX, TROPONINI in the last 72 hours.     CBC:   Lab Results   Component Value Date    WBC 14.1 07/27/2021    RBC 3.92 07/27/2021    HGB 10.6 07/27/2021    HCT 33.6 07/27/2021     07/27/2021       CMP:    Lab Results   Component Value Date     07/28/2021    K 4.6 07/28/2021     07/28/2021    CO2 22 07/28/2021    BUN 27 07/28/2021    CREATININE 1.5 07/28/2021    LABGLOM 35 07/28/2021    GLUCOSE 151 07/28/2021    CALCIUM 9.0 07/28/2021       Hepatic Function Panel:    Lab Results   Component Value Date    ALKPHOS 80 07/27/2021    ALT 8 07/27/2021    AST 11 07/27/2021    PROT 6.3 07/27/2021    BILITOT 0.4 07/27/2021    BILIDIR <0.2 07/27/2021    LABALBU 3.1 07/27/2021       Magnesium:    Lab Results   Component Value Date    MG 2.5 07/28/2021       PT/INR:  No results found for: PROTIME, INR    HgBA1c:  No results found for: LABA1C    FLP:  No results found for: TRIG, HDL, LDLCALC, LDLDIRECT, LABVLDL    TSH:    Lab Results   Component Value Date    TSH 0.865 07/26/2021         Assessment:    Accelerated HTN - improved  Elevated troponins - denies anginal symptoms - likely related to accelerated HTN or LOLY/CKD, cannot exclude CAD  Ef 55 per TTE 7/27/21  LOLY/CKD  UTI - ATB per attending      Plan:    Stress test today  bp management per attending  If stress test unremarkable, will see prn  F/up dr Sharan Vergara 2-4 weeks         Electronically signed by Abby Rodas PA-C on 7/29/2021 at 8:55 AM

## 2021-07-29 NOTE — PROGRESS NOTES
Collette Squires called and stated patient's stress test abnormal. Cardio wants a heart cath for tomorrow. She gave pre cath orders. Orders signed and held.

## 2021-07-29 NOTE — PROGRESS NOTES
Cath lab called. Heart cath to be added on for tomorrow. Sarah Arnett stated it would likely be after lunch. Aspirin okay to admin, but hold next lovenox dose. Dr. Evan Arciniega made aware and gave order to hold dose. Kamran ANGELA updated to hold.

## 2021-07-29 NOTE — PLAN OF CARE
Problem: Falls - Risk of:  Goal: Will remain free from falls  Description: Will remain free from falls  Outcome: Ongoing  Goal: Absence of physical injury  Description: Absence of physical injury  Outcome: Ongoing     Problem: Cardiac:  Goal: Ability to maintain vital signs within normal range will improve  Description: Ability to maintain vital signs within normal range will improve  Outcome: Ongoing  Goal: Cardiovascular alteration will improve  Description: Cardiovascular alteration will improve  Outcome: Ongoing     Problem: Discharge Planning:  Goal: Discharged to appropriate level of care  Description: Discharged to appropriate level of care  Outcome: Ongoing     Problem: Pain:  Goal: Pain level will decrease  Description: Pain level will decrease  Outcome: Ongoing  Goal: Control of acute pain  Description: Control of acute pain  Outcome: Ongoing  Goal: Control of chronic pain  Description: Control of chronic pain  Outcome: Ongoing     Problem: Skin Integrity:  Goal: Absence of new skin breakdown  Description: Absence of new skin breakdown  Outcome: Ongoing

## 2021-07-29 NOTE — PROGRESS NOTES
Hospitalist      Patient:  Douglas Reyes    Unit/Bed:4A-04/004-A  YOB: 1955  MRN: [id number]   Acct: [de-identified]     PCP: No primary care provider on file. Date of Admission: 7/26/2021        Assessment and Plan:        1. Hypertensive emergency: BPs are under slightly better control, patient currently on Norvasc 10 mg lisinopril 10 mg. Possibly secondary to stopping of hold the clonidine abruptly potential clonidine withdrawal syndrome  2. Elevated troponins still increasing normal EKG, echo EF 55%, will consult cardiology. 3. LOLY creatinine 1.5 baseline 1.2 it appears will DC lisinopril and add hydralazine 10 mg every 8 hours  4. Acute cystitis: Urine cultures not ordered on admission correctly will obtain urine culture now    7/29/2021 blood pressures markedly improved and stable undergoing nuclear Kevin Drafts today, if negative will discharge home, she agrees to start taking her medicine long-term. CC: Vision changes    HPI: Patient presents to the ED for hypertension. Patient states she was at [address] [place] [address] told to go to the doctor's office for evaluation of leaking redness. States her blood pressure was 200/102 was told to come to the ED for evaluation denies any headache dizziness or new vision changes but does report blurry vision and the pupils were dilated for retinal exam.    ROS (14 point review of systems completed. Pertinent positives noted. Otherwise ROS is negative) : Vision changes    PMH:  Per HPI and       Diagnosis Date    Hypertension     Psoriasis      SHX:  No past surgical history on file.   FHX:       Problem Relation Age of Onset    Other Mother         thyroidectomy    Diabetes Mother     Heart Disease Mother     Heart Disease Father      SOCHX:   Social History     Socioeconomic History    Marital status: Single     Spouse name: Not on file    Number of children: 1    Years of education: Not on file    Highest education level: Not on file Occupational History    Not on file   Tobacco Use    Smoking status: Former Smoker     Packs/day: 0.50     Years: 15.00     Pack years: 7.50     Types: Cigarettes    Smokeless tobacco: Never Used   Substance and Sexual Activity    Alcohol use: Yes     Comment: social    Drug use: Never    Sexual activity: Not on file   Other Topics Concern    Not on file   Social History Narrative    Not on file     Social Determinants of Health     Financial Resource Strain:     Difficulty of Paying Living Expenses:    Food Insecurity:     Worried About Running Out of Food in the Last Year:     [address] in the Last Year:    Transportation Needs:     Lack of Transportation (Medical):  Lack of Transportation (Non-Medical):    Physical Activity:     Days of Exercise per Week:     Minutes of Exercise per Session:    Stress:     Feeling of Stress :    Social Connections:     Frequency of Communication with Friends and Family:     Frequency of Social Gatherings with Friends and Family:     Attends Jain Services:     Active Member of Clubs or Organizations:     Attends Club or Organization Meetings:     Marital Status:    Intimate Partner Violence:     Fear of Current or Ex-Partner:     Emotionally Abused:     Physically Abused:     Sexually Abused: Allergies: Patient has no known allergies. Medications:     sodium chloride        hydrALAZINE  10 mg Oral 3 times per day    amLODIPine  10 mg Oral Daily    aspirin  81 mg Oral Daily    sodium chloride flush  10 mL Intravenous 2 times per day    enoxaparin  40 mg Subcutaneous Q24H    famotidine  20 mg Oral BID    cefTRIAXone (ROCEPHIN) IV  1,000 mg Intravenous Q24H    hydrocortisone   Topical BID     Prior to Admission medications    Medication Sig Start Date End Date Taking?  Authorizing Provider   aspirin 81 MG chewable tablet Take 81 mg by mouth daily   Yes Historical Provider, MD      PHYSICAL EXAM:    BP (!) 127/93   Pulse 66 Temp 97.8 °F (36.6 °C) (Oral)   Resp 15   Ht 5' 10\" (1.778 m)   Wt 227 lb 6.4 oz (103.1 kg)   SpO2 99%   BMI 32.63 kg/m²     General appearance:  No apparent distress, appears stated age and cooperative. HEENT:  Normal cephalic, atraumatic without obvious deformity. Pupils equal, round, and reactive to light. Extra ocular muscles intact. Conjunctivae/corneas clear. Neck: Supple, with full range of motion. no jugular venous distention. Trachea midline. no carotid bruits  Respiratory:  Normal respiratory effort. Clear to auscultation, bilaterally without Rales/Wheezes/Rhonchi. Breath sounds equal bilaterally  Cardiovascular:  Regular rate and rhythm with normal S1/S2 without murmurs, rubs or gallops. PMI non displaced  Abdomen: Soft, non-tender, non-distended with normal bowel sounds. No guarding, rebound. Musculoskeletal:  No clubbing, cyanosis or edema bilaterally. Full range of motion without deformity. Skin: Skin color, texture, turgor normal.  No rashes or lesions, or suspicious lesions. Neurologic:  Neurovascularly intact without any focal sensory/motor deficits. Cranial nerves: II-XII intact, grossly non-focal.  Psychiatric:  Alert and oriented, thought content appropriate, normal insight  Capillary Refill: Brisk,< 2 seconds   Peripheral Pulses: +2 palpable, equal bilaterally upper and lower extremities  Lymphatics: no lymphadenopathy    Data: (All radiographs, tracings, PFTs, and imaging are personally viewed and interpreted unless otherwise noted).       Recent Labs     07/26/21  1432 07/27/21  0335   WBC 12.6* 14.1*   HGB 12.6 10.6*   HCT 39.8 33.6*    256     Recent Labs     07/26/21  1432 07/27/21  0335 07/28/21  0358    135 135   K 4.7 4.7 4.6    104 103   CO2 22* 22* 22*   BUN 25* 29* 27*   CREATININE 1.2 1.5* 1.5*   CALCIUM 9.8 9.1 9.0     Recent Labs     07/26/21  1432 07/27/21  0335   AST 14 11   ALT 10* 8*   BILIDIR  --  <0.2   BILITOT 0.5 0.4   ALKPHOS 98 80     No results for input(s): INR in the last 72 hours. No results for input(s): [place] in the last 72 hours. Radiology reports-    Echo Complete    Result Date: 7/27/2021  Transthoracic Echocardiography Report (TTE)  Demographics   Patient Name    Donita Suarez Gender               Male   MR #            [id number]      Race                                                   Ethnicity   Account #       [de-identified]      Room Number          0004   Accession       [id number]     Date of Study        07/27/2021  Number   Date of Birth   1955     Referring Physician  Simón Samuels MD   Age             60 year(s)     [facility]                                  Interpreting         Echo reader of the                                 Physician            week                                                      Jil Linda MD  Procedure Type of Study   TTE procedure:ECHOCARDIOGRAM COMPLETE 2D W DOPPLER W COLOR. Procedure Date Date: 07/27/2021 Start: 10:09 AM Study Location: Bedside Technical Quality: Adequate visualization Indications:Elevated troponin and Dyspnea / Shortness of breath. Additional Medical History:Hypertension Patient Status: Routine Height: 70 inches Weight: 230.01 pounds BSA: 2.22 m^2 BMI: 33 kg/m^2 BP: 177/74 mmHg  Conclusions   Summary  Ejection fraction is visually estimated at 55%. Overall left ventricular function is normal.   Signature   ----------------------------------------------------------------  Electronically signed by Jil Linda MD (Interpreting  physician) on 07/27/2021 at 06:40 PM  ----------------------------------------------------------------   Findings   Mitral Valve  The mitral valve structure was normal with normal leaflet separation. DOPPLER: The transmitral velocity was within the normal range with no  evidence for mitral stenosis. There was no evidence of mitral  regurgitation.    Aortic Valve  The aortic valve was trileaflet with normal thickness and cuspal  separation. DOPPLER: Transaortic velocity was within the normal range with  no evidence of aortic stenosis. There was no evidence of aortic  regurgitation. Tricuspid Valve  Tricuspid valve was not well visualized. Trivial tricuspid regurgitation visualized. Pulmonic Valve  The pulmonic valve was not well visualized . Trivial pulmonic regurgitation visualized. Left Atrium  Left atrial size was normal.   Left Ventricle  Ejection fraction is visually estimated at 55%. Overall left ventricular function is normal.   Right Atrium  Right atrial size was normal.   Right Ventricle  The right ventricular size was normal with normal systolic function and  wall thickness. Pericardial Effusion  The pericardium was normal in appearance with no evidence of a pericardial  effusion. Pleural Effusion  No evidence of pleural effusion. Aorta / Great Vessels  -Aortic root dimension within normal limits.  -The Pulmonary artery is within normal limits. -IVC size is within normal limits with normal respiratory phasic changes.   M-Mode/2D Measurements & Calculations   LV Diastolic    LV Systolic Dimension:    AV Cusp Separation: 1.7 cmLA  Dimension: 4.7  3.3 cm                    Dimension: 4 cmAO Root  cm              LV Volume Diastolic: 372  Dimension: 2.7 cmLA Area: 13.4  LV FS:29.8 %    ml                        cm^2  LV PW           LV Volume Systolic: 04.0  Diastolic: 0.8  ml  cm              LV EDV/LV EDV Index: 102  Septum          ml/46 m^2LV ESV/LV ESV    RV Diastolic Dimension: 2.2 cm  Diastolic: 0.9  Index: 15.0 ml/20 m^2  cm              EF Calculated: 56.8 %     LA/Aorta: 1.48                                             LA volume/Index: 32.5 ml /15m^2  Doppler Measurements & Calculations   MV Peak E-Wave: 117 cm/s   AV Peak Velocity: 157  LVOT Peak Velocity: 105  MV Peak A-Wave: 93.8 cm/s  cm/s                   cm/s  MV E/A Ratio: 1.25         AV Peak Gradient: 9.86 LVOT Peak Gradient: 4  MV Peak Gradient: 5.48     mmHg                   mmHg  mmHg                                                    TV Peak E-Wave: 55.3  MV Deceleration Time: 197                         cm/s  msec                                              TV Peak A-Wave: 49.3  MV P1/2t: 58 msec                                 cm/s  MVA by PHT:3.79 cm^2                                                    TV Peak Gradient: 1.22  MV E' Septal Velocity: 5.9 AV DVI (Vmax):0.67     mmHg  cm/s                                              TR Velocity:231 cm/s  MV A' Septal Velocity: 8.6                        TR Gradient:21.34 mmHg  cm/s                                              PV Peak Velocity: 89.1  MV E' Lateral Velocity:                           cm/s  5.2 cm/s                                          PV Peak Gradient: 3.18  MV A' Lateral Velocity:                           mmHg  8.2 cm/s  E/E' septal: 19.83  E/E' lateral: 22.5  MR Velocity: 471 cm/s  [id number]      Electronically signed by Antonino Abraham DO on 7/29/2021 at 9:23 AM

## 2021-07-29 NOTE — FLOWSHEET NOTE
[place] [address] PROGRESS NOTE      Patient: Noah Guthrie  Room #: 4A-04/004-A            YOB: 1955  Age: 60 y.o. Gender: female            Admit Date & Time: 7/26/2021  1:43 PM    Assessment:  Melva Appiah is a 61 yo lady who thought she was going home yesterday and now is staying for a heart cath tomorrow. She admits to anxiety over this as it was unexpected. 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 She is even trying to deal with details of that while she is in the hospital, due to pressure from her brother who said, \"Youa re just sitting there. Why can't you make the phone calls? \"    Her daughter and KIKI live in [facility]; patient has been living in [place]. Patient admits to carrying some anxiety as her norm and always feeling like she has to do things for others. Interventions:  Discussed the priority of self care - especially when it comes to health. Discussed strategy for dealing with people pressuring her. Offered song and prayer for healing and peace. Outcomes:  \"You came in here exactly at the right time. Right when I needed you. \"    Plan:    1. Continue support as needed.     Electronically signed by Divya Ram, on 7/29/2021 at 7:36 PM.  [facility]  [phone]

## 2021-07-30 VITALS
TEMPERATURE: 97.5 F | HEART RATE: 66 BPM | OXYGEN SATURATION: 98 % | RESPIRATION RATE: 18 BRPM | WEIGHT: 227.7 LBS | SYSTOLIC BLOOD PRESSURE: 148 MMHG | HEIGHT: 70 IN | DIASTOLIC BLOOD PRESSURE: 56 MMHG | BODY MASS INDEX: 32.6 KG/M2

## 2021-07-30 LAB
ABO: NORMAL
ANION GAP SERPL CALCULATED.3IONS-SCNC: 12 MEQ/L (ref 8–16)
ANTIBODY SCREEN: NORMAL
APTT: 34.4 SECONDS (ref 22–38)
AVERAGE GLUCOSE: 156 MG/DL (ref 70–126)
BUN BLDV-MCNC: 27 MG/DL (ref 7–22)
CALCIUM SERPL-MCNC: 9.2 MG/DL (ref 8.5–10.5)
CHLORIDE BLD-SCNC: 106 MEQ/L (ref 98–111)
CHOLESTEROL, TOTAL: 249 MG/DL (ref 100–199)
CO2: 21 MEQ/L (ref 23–33)
CREAT SERPL-MCNC: 1.2 MG/DL (ref 0.4–1.2)
ERYTHROCYTE [DISTWIDTH] IN BLOOD BY AUTOMATED COUNT: 12.6 % (ref 11.5–14.5)
ERYTHROCYTE [DISTWIDTH] IN BLOOD BY AUTOMATED COUNT: 39.6 FL (ref 35–45)
GFR SERPL CREATININE-BSD FRML MDRD: 45 ML/MIN/1.73M2
GLUCOSE BLD-MCNC: 157 MG/DL (ref 70–108)
HBA1C MFR BLD: 7.2 % (ref 4.4–6.4)
HCT VFR BLD CALC: 39.3 % (ref 37–47)
HDLC SERPL-MCNC: 44 MG/DL
HEMOGLOBIN: 12.2 GM/DL (ref 12–16)
INR BLD: 0.92 (ref 0.85–1.13)
LDL CHOLESTEROL CALCULATED: 167 MG/DL
MCH RBC QN AUTO: 26.8 PG (ref 26–33)
MCHC RBC AUTO-ENTMCNC: 31 GM/DL (ref 32.2–35.5)
MCV RBC AUTO: 86.2 FL (ref 81–99)
PLATELET # BLD: 299 THOU/MM3 (ref 130–400)
PMV BLD AUTO: 9.5 FL (ref 9.4–12.4)
POTASSIUM REFLEX MAGNESIUM: 4.8 MEQ/L (ref 3.5–5.2)
RBC # BLD: 4.56 MILL/MM3 (ref 4.2–5.4)
RH FACTOR: NORMAL
SODIUM BLD-SCNC: 139 MEQ/L (ref 135–145)
TRIGL SERPL-MCNC: 188 MG/DL (ref 0–199)
WBC # BLD: 10.1 THOU/MM3 (ref 4.8–10.8)

## 2021-07-30 PROCEDURE — 85610 PROTHROMBIN TIME: CPT

## 2021-07-30 PROCEDURE — 36415 COLL VENOUS BLD VENIPUNCTURE: CPT

## 2021-07-30 PROCEDURE — 85027 COMPLETE CBC AUTOMATED: CPT

## 2021-07-30 PROCEDURE — 80048 BASIC METABOLIC PNL TOTAL CA: CPT

## 2021-07-30 PROCEDURE — 83036 HEMOGLOBIN GLYCOSYLATED A1C: CPT

## 2021-07-30 PROCEDURE — 93005 ELECTROCARDIOGRAM TRACING: CPT | Performed by: PHYSICIAN ASSISTANT

## 2021-07-30 PROCEDURE — 6370000000 HC RX 637 (ALT 250 FOR IP): Performed by: INTERNAL MEDICINE

## 2021-07-30 PROCEDURE — 85730 THROMBOPLASTIN TIME PARTIAL: CPT

## 2021-07-30 PROCEDURE — 86901 BLOOD TYPING SEROLOGIC RH(D): CPT

## 2021-07-30 PROCEDURE — 80061 LIPID PANEL: CPT

## 2021-07-30 PROCEDURE — 99232 SBSQ HOSP IP/OBS MODERATE 35: CPT | Performed by: PHYSICIAN ASSISTANT

## 2021-07-30 PROCEDURE — 86900 BLOOD TYPING SEROLOGIC ABO: CPT

## 2021-07-30 PROCEDURE — 2580000003 HC RX 258: Performed by: STUDENT IN AN ORGANIZED HEALTH CARE EDUCATION/TRAINING PROGRAM

## 2021-07-30 PROCEDURE — 99239 HOSP IP/OBS DSCHRG MGMT >30: CPT | Performed by: INTERNAL MEDICINE

## 2021-07-30 PROCEDURE — 86850 RBC ANTIBODY SCREEN: CPT

## 2021-07-30 RX ORDER — NICOTINE POLACRILEX 4 MG
15 LOZENGE BUCCAL PRN
Status: CANCELLED | OUTPATIENT
Start: 2021-07-30

## 2021-07-30 RX ORDER — SODIUM CHLORIDE 0.9 % (FLUSH) 0.9 %
5-40 SYRINGE (ML) INJECTION EVERY 12 HOURS SCHEDULED
Status: DISCONTINUED | OUTPATIENT
Start: 2021-07-30 | End: 2021-07-30 | Stop reason: HOSPADM

## 2021-07-30 RX ORDER — AMLODIPINE BESYLATE 10 MG/1
10 TABLET ORAL DAILY
Qty: 30 TABLET | Refills: 1 | Status: SHIPPED | OUTPATIENT
Start: 2021-07-30 | End: 2021-08-04 | Stop reason: SDUPTHER

## 2021-07-30 RX ORDER — ATORVASTATIN CALCIUM 40 MG/1
40 TABLET, FILM COATED ORAL NIGHTLY
Status: DISCONTINUED | OUTPATIENT
Start: 2021-07-30 | End: 2021-07-30 | Stop reason: HOSPADM

## 2021-07-30 RX ORDER — SODIUM CHLORIDE 9 MG/ML
25 INJECTION, SOLUTION INTRAVENOUS PRN
Status: DISCONTINUED | OUTPATIENT
Start: 2021-07-30 | End: 2021-07-30 | Stop reason: HOSPADM

## 2021-07-30 RX ORDER — DEXTROSE MONOHYDRATE 25 G/50ML
12.5 INJECTION, SOLUTION INTRAVENOUS PRN
Status: CANCELLED | OUTPATIENT
Start: 2021-07-30

## 2021-07-30 RX ORDER — HYDRALAZINE HYDROCHLORIDE 10 MG/1
10 TABLET, FILM COATED ORAL EVERY 8 HOURS SCHEDULED
Qty: 90 TABLET | Refills: 1 | Status: SHIPPED | OUTPATIENT
Start: 2021-07-30 | End: 2021-08-04 | Stop reason: ALTCHOICE

## 2021-07-30 RX ORDER — SODIUM CHLORIDE 0.9 % (FLUSH) 0.9 %
5-40 SYRINGE (ML) INJECTION PRN
Status: DISCONTINUED | OUTPATIENT
Start: 2021-07-30 | End: 2021-07-30 | Stop reason: HOSPADM

## 2021-07-30 RX ORDER — 0.9 % SODIUM CHLORIDE 0.9 %
500 INTRAVENOUS SOLUTION INTRAVENOUS ONCE
Status: COMPLETED | OUTPATIENT
Start: 2021-07-30 | End: 2021-07-30

## 2021-07-30 RX ORDER — ATORVASTATIN CALCIUM 40 MG/1
40 TABLET, FILM COATED ORAL NIGHTLY
Qty: 30 TABLET | Refills: 1 | Status: SHIPPED | OUTPATIENT
Start: 2021-07-30 | End: 2021-08-04 | Stop reason: SDUPTHER

## 2021-07-30 RX ORDER — ASPIRIN 325 MG
325 TABLET ORAL ONCE
Status: DISCONTINUED | OUTPATIENT
Start: 2021-07-30 | End: 2021-07-30 | Stop reason: HOSPADM

## 2021-07-30 RX ORDER — DEXTROSE MONOHYDRATE 50 MG/ML
100 INJECTION, SOLUTION INTRAVENOUS PRN
Status: CANCELLED | OUTPATIENT
Start: 2021-07-30

## 2021-07-30 RX ORDER — SODIUM CHLORIDE 9 MG/ML
INJECTION, SOLUTION INTRAVENOUS CONTINUOUS
Status: DISCONTINUED | OUTPATIENT
Start: 2021-07-30 | End: 2021-07-30

## 2021-07-30 RX ADMIN — SODIUM CHLORIDE 500 ML: 9 INJECTION, SOLUTION INTRAVENOUS at 11:56

## 2021-07-30 RX ADMIN — ALPRAZOLAM 0.25 MG: 0.25 TABLET ORAL at 13:45

## 2021-07-30 RX ADMIN — ASPIRIN 81 MG: 81 TABLET, CHEWABLE ORAL at 09:04

## 2021-07-30 RX ADMIN — FAMOTIDINE 20 MG: 20 TABLET, FILM COATED ORAL at 09:04

## 2021-07-30 RX ADMIN — HYDRALAZINE HYDROCHLORIDE 10 MG: 10 TABLET, FILM COATED ORAL at 06:29

## 2021-07-30 RX ADMIN — AMLODIPINE BESYLATE 10 MG: 10 TABLET ORAL at 09:04

## 2021-07-30 ASSESSMENT — PAIN SCALES - GENERAL
PAINLEVEL_OUTOF10: 0
PAINLEVEL_OUTOF10: 0

## 2021-07-30 NOTE — CARE COORDINATION
7/30/21, 11:46 AM EDT    DISCHARGE ON GOING EVALUATION    [facility] day: 4  Location: -04/004-A Reason for admit: Accelerated essential hypertension [I10]   Procedure:   7/29 stress test  7/30 Left heart cath  Barriers to Discharge: Cardiology following, diabetes management, telemetry, IV fluids, Asa, Lovenox, Pepcid, Zofran, electrolyte replacement protocols. Amlodipine and Hydralazine for BP. PCP: No primary care provider on file. Readmission Risk Score: 11%  Patient Goals/Plan/Treatment Preferences: Plan is to return home with daughter. Will follow for needs. 7/30/21, 2:10 PM EDT    Patient goals/plan/ treatment preferences discussed by  and . Patient goals/plan/ treatment preferences reviewed with patient/ family. Patient/ family verbalize understanding of discharge plan and are in agreement with goal/plan/treatment preferences. Understanding was demonstrated using the teach back method. AVS provided by RN at time of discharge, which includes all necessary medical information pertaining to the patients current course of illness, treatment, post-discharge goals of care, and treatment preferences. Pt to be discharged to home with daughter. Denies needs or services.

## 2021-07-30 NOTE — PLAN OF CARE
Problem: Falls - Risk of:  Goal: Will remain free from falls  Description: Will remain free from falls  7/29/2021 2152 by Rosa Moncada RN  Outcome: Ongoing  Note: Patient remains free from falls this shift. Fall precautions in place with bed/chair exit alarmed. Fall sign posted and fall armband in place. Nonskid footwear used with transferring. Educated patient to use call light when in need of staff assistance with transferring, ambulating, and other activities of daily living. Patient appropriately uses call light this shift. Problem: Falls - Risk of:  Goal: Absence of physical injury  Description: Absence of physical injury  7/29/2021 2152 by Rosa Moncada RN  Outcome: Ongoing  Note: Hourly rounding in place to anticipate patient needs, such as assistance with pain, toileting, or repositioning, in order to decrease risk for injuries such as falls.         Problem: Cardiac:  Goal: Ability to maintain vital signs within normal range will improve  Description: Ability to maintain vital signs within normal range will improve  7/29/2021 2152 by Rosa Moncada RN  Outcome: Ongoing  Note:   Vitals:    07/29/21 1220 07/29/21 1527 07/29/21 1625 07/29/21 2006   BP:  (!) 148/67  (!) 148/100   Pulse:  65  65   Resp: 18 17 16 16   Temp:  95.2 °F (35.1 °C)  97.7 °F (36.5 °C)   TempSrc:  Oral  Oral   SpO2:  97%  97%   Weight:       Height:              Problem: Cardiac:  Goal: Cardiovascular alteration will improve  Description: Cardiovascular alteration will improve  7/29/2021 2152 by Rosa Moncada RN  Outcome: Ongoing  Note: Blood pressure is WNL at this time, heart cath tomorrow after lunch planned     Problem: Discharge Planning:  Goal: Discharged to appropriate level of care  Description: Discharged to appropriate level of care  7/29/2021 2152 by Rosa Moncada RN  Outcome: Ongoing  Note: Patient came from home alone and plans to return there when medically stable      Problem: Pain:  Goal: Pain level will decrease  Description: Pain level will decrease  7/29/2021 2152 by Melba Trevino RN  Outcome: Ongoing  Note: Patient able to use 0-10 pain scale. Denies pain at this time. Patient has a pain goal of \"no pain. \" Agreeable to take PRN pain medications. Problem: Skin Integrity:  Goal: Absence of new skin breakdown  Description: Absence of new skin breakdown  7/29/2021 2152 by Melba Trevino RN  Outcome: Ongoing  Note: No signs of skin breakdown. Skin warm, dry, and intact. Mucous membranes pink and moist.  Assistance with turns/ambulation provided PRN. Will continue to monitor. Patient has psoriasis on bilateral hands and elbows, patient applying hydrocortisone cream to areas.

## 2021-07-30 NOTE — PLAN OF CARE
Problem: Falls - Risk of:  Goal: Will remain free from falls  Description: Will remain free from falls  Outcome: Completed     Problem: Falls - Risk of:  Goal: Absence of physical injury  Description: Absence of physical injury  Outcome: Completed     Problem: Cardiac:  Goal: Ability to maintain vital signs within normal range will improve  Description: Ability to maintain vital signs within normal range will improve  Outcome: Completed     Problem: Cardiac:  Goal: Cardiovascular alteration will improve  Description: Cardiovascular alteration will improve  Outcome: Completed     Problem: Discharge Planning:  Goal: Discharged to appropriate level of care  Description: Discharged to appropriate level of care  Outcome: Completed     Problem: Pain:  Goal: Pain level will decrease  Description: Pain level will decrease  Outcome: Completed     Problem: Pain:  Goal: Control of acute pain  Description: Control of acute pain  Outcome: Completed     Problem: Pain:  Goal: Control of chronic pain  Description: Control of chronic pain  Outcome: Completed     Problem: Skin Integrity:  Goal: Absence of new skin breakdown  Description: Absence of new skin breakdown  Outcome: Completed

## 2021-07-30 NOTE — DISCHARGE SUMMARY
Hospital Medicine Discharge Summary      Patient Identification:   Jake Rowland   : 1955  MRN: [id number]   Account: [de-identified]      Patient's PCP: No primary care provider on file. Admit Date: 2021     Discharge Date:   2021    Admitting Physician: No admitting provider for patient encounter. Discharge Physician: Price Raphael MD     Discharge Diagnoses:  Hypertensive emergency: with associated visual changes. Resolved. Weaned off of Cardene gtt. Started on Norvasc and Lisinopril (later stopped due to mild LOLY). BP better controlled at 120s-140s at this time. Will discharge on Norvasc and Hydralazine at this time. Will need close follow up outpatient with Cardiology. Elevated troponin: likely demand ischemia. No chest pain. TTE with normal EF. Stress test done with ?ischemia. Due to patient being asymptomatic and resolving LOLY, inpatient cath cancelled. Continue aspirin and lipitor. No BB due to borderline HR. Follow up with Cardiology outpatient. Patient instructed to return if becomes symptomatic. May need outpatient Cardiac cath in future. LOLY: resolved. Lisinopril discontinued. Cr 1.2 at baseline. Suspected acute cystitis: UA positive for WBC and bacteria. Asymptomatic per patient. Culture pending. Will defer treatment at this time. Follow up with PCP. Hx Psoriasis: rash noted on both hands. Patient stating it is getting worse due to anxiety related to hospitalization. Continue topical steroid cream and hand lotion as needed. The patient was seen and examined on day of discharge and this discharge summary is in conjunction with any daily progress note from day of discharge. Hospital Course:   Jake Rowland is a 60 y.o. female admitted to [address] [place] [address] on 2021 for vision changes. Patient presents to the ED for hypertension.   Patient states she was at [address] [place] [address] told to go to the doctor's office for evaluation of leaking redness. States her blood pressure was 200/102 was told to come to the ED for evaluation denies any headache dizziness or new vision changes but does report blurry vision and the pupils were dilated for retinal exam. Admitted for hypertensive emergency. Noted to have uptrending troponin. EKG NSR positive for RBB. TTE 7/27/2021 showed EF 55% with overall normal LV function. Stress test 7/29/2021: There was a small sized, mild in intensity, paradoxical myocardial perfusion defect of the inferior wall secondary to attenuation artifact. There was a moderate sized, mild in intensity, partially reversible myocardial perfusion defect of the mid to apical anterior wall. This is suggestive of ischemia Vs breast attenuation artifact. Initial plan was for Cardiac cath on 7/30/2021 but on review, inpatient cath was cancelled per interventional Cardiology with plan for close follow up outpatient with Cardiology. Patient reported no symptoms at the time of discharge. Exam:     Vitals:  Vitals:    07/30/21 0306 07/30/21 0629 07/30/21 0809 07/30/21 1241   BP: 129/62 (!) 128/59 130/80 (!) 148/56   Pulse: 70  63 66   Resp: 16  18 18   Temp: 98 °F (36.7 °C)  97.8 °F (36.6 °C) 97.5 °F (36.4 °C)   TempSrc: Oral  Oral Oral   SpO2: 97%  98% 98%   Weight: 227 lb 11.2 oz (103.3 kg)      Height:         Weight: Weight: 227 lb 11.2 oz (103.3 kg)     24 hour intake/output:    Intake/Output Summary (Last 24 hours) at 7/30/2021 1405  Last data filed at 7/30/2021 0306  Gross per 24 hour   Intake 1557.82 ml   Output --   Net 1557.82 ml         General appearance:  No apparent distress, appears stated age and cooperative. HEENT:  Normal cephalic, atraumatic without obvious deformity. Extra ocular muscles intact. Conjunctivae/corneas clear. Neck: Supple, with full range of motion. No jugular venous distention. Respiratory:  Normal respiratory effort. Clear to auscultation, bilaterally without Rales/Wheezes/Rhonchi.   Cardiovascular: Regular rate and rhythm with normal S1/S2 without murmurs, rubs or gallops. Abdomen: Soft, non-tender, non-distended with normal bowel sounds. Musculoskeletal:  No clubbing, cyanosis or edema bilaterally. Full range of motion without deformity. Skin: Skin color, texture, turgor normal.  Acute on chronic flaky lesions related to psoriasis noted on BUE. Neurologic:  Neurovascularly intact without any focal sensory/motor deficits. Cranial nerves: II-XII intact, grossly non-focal.  Psychiatric:  Alert and oriented, thought content appropriate, normal insight  Capillary Refill: Brisk,< 3 seconds   Peripheral Pulses: +2 palpable, equal bilaterally       Labs: For convenience and continuity at follow-up the following most recent labs are provided:      CBC:    Lab Results   Component Value Date    WBC 10.1 07/30/2021    HGB 12.2 07/30/2021    HCT 39.3 07/30/2021     07/30/2021       Renal:    Lab Results   Component Value Date     07/30/2021    K 4.8 07/30/2021     07/30/2021    CO2 21 07/30/2021    BUN 27 07/30/2021    CREATININE 1.2 07/30/2021    CALCIUM 9.2 07/30/2021         Significant Diagnostic Studies    Radiology:   No orders to display          Consults:     IP CONSULT TO CARDIOLOGY    Disposition: Home  Condition at Discharge: Stable    Code Status:  Full Code     Patient Instructions:    Discharge lab work: Hgb A1c pending. Activity: activity as tolerated  Diet: Diet NPO Exceptions are: Sips of Water with Meds  Diet NPO Exceptions are: Sips of Water with Meds  ADULT DIET;  Regular      Follow-up visits:   Sonia Daly MD  [address]  [phone]    On 8/4/2021  appt time:  2pm           Discharge Medications:      Cliff Lizama   Home Medication Instructions WSD:[id number]    Printed on:07/30/21 1405   Medication Information                      amLODIPine (NORVASC) 10 MG tablet  Take 1 tablet by mouth daily             aspirin 81 MG chewable tablet  Take 81 mg by mouth daily             atorvastatin (LIPITOR) 40 MG tablet  Take 1 tablet by mouth nightly             hydrALAZINE (APRESOLINE) 10 MG tablet  Take 1 tablet by mouth every 8 hours                 Time Spent on discharge is more than 1 hour in the examination, evaluation, counseling and review of medications and discharge plan.       Signed:    Electronically signed by Moses Ruggiero MD on 7/30/2021 at 2:05 PM

## 2021-07-30 NOTE — FLOWSHEET NOTE
Pt was anointed     07/30/21 0382   Encounter Summary   Services provided to: Patient and family together   [facility]ult [facility]: [facility]   Support System Children   Continue Visiting No  (7/30)   Complexity of Encounter Low   Length of Encounter 15 minutes   Routine   Type Follow up   Assessment Approachable;Calm   Intervention Prayer;[place]   Outcome Acceptance;Expressed gratitude;Encouraged; Hopeful;Receptive   Sacraments   Sacrament of Sick-Anointing Anointed

## 2021-07-30 NOTE — PROGRESS NOTES
Cardiology Progress Note      Patient:  Jake Rowland  YOB: 1955  MRN: [id number]   Acct: [de-identified]  Admit Date:  7/26/2021  Primary Cardiologist: none    Note per dr Leonard Stake OF CONSULTATION:  Elevated troponin in a 71-year-old female  patient, admitted with accelerated hypertension.     HISTORY OF PRESENT ILLNESS:  This is actually a 71-year-old female  patient, presented to the emergency room after sent from the  Ophthalmology office for accelerated hypertension. The patient stated  that she went to retinal specialist for evaluation and management, and  there blood pressure was checked and it was noted to be markedly  elevated like 230/90, and the patient was having blurring of vision  following the use of eye dilator, but denied having any headache. No  chest pain and no shortness of breath. No history of chest pain. No  known history of coronary artery disease, and her blood pressure is  markedly elevated. The patient has been managed after admission here  with nicardipine and subsequent medication and following those  medications, blood pressure seems to be controlled; however, she has  initially troponin elevated and the troponin remained to be elevated,  hence Cardiology evaluation was sought. Currently, the patient has no  history of chest pain and she is comfortable. \"    Subjective (Events in last 24 hours):   Pt awake and alert. NAD. No cp or sob.   No complaints    Objective:   /80 Comment: manual blood pressure  Pulse 63   Temp 97.8 °F (36.6 °C) (Oral)   Resp 18   Ht 5' 10\" (1.778 m)   Wt 227 lb 11.2 oz (103.3 kg)   SpO2 98%   BMI 32.67 kg/m²        TELEMETRY: nsr    Physical Exam:  General Appearance: alert and oriented to person, place and time, in no acute distress  Cardiovascular: normal rate, regular rhythm, normal S1 and S2, no murmurs, rubs, clicks, or gallops, distal pulses intact, no carotid bruits, no JVD  Pulmonary/Chest: clear to auscultation bilaterally- no wheezes, rales or rhonchi, normal air movement, no respiratory distress  Abdomen: soft, non-tender, non-distended, normal bowel sounds, no masses Extremities: no cyanosis, clubbing or edema, pulse   Skin: warm and dry  Head: normocephalic and atraumatic  Eyes: pupils equal, round, and reactive to light  Neck: supple and non-tender without mass, no thyromegaly   Neurological: alert, oriented, normal speech, no focal findings or movement disorder noted    Medications:    sodium chloride flush  5-40 mL Intravenous 2 times per day    hydrALAZINE  10 mg Oral 3 times per day    amLODIPine  10 mg Oral Daily    aspirin  81 mg Oral Daily    enoxaparin  40 mg Subcutaneous Q24H    famotidine  20 mg Oral BID    hydrocortisone   Topical BID      sodium chloride      sodium chloride 75 mL/hr at 07/29/21 1721     sodium chloride flush, 5-40 mL, PRN  sodium chloride, 25 mL, PRN  ALPRAZolam, 0.25 mg, 4x Daily PRN  perflutren lipid microspheres, 1.5 mL, ONCE PRN  ondansetron, 4 mg, Q8H PRN   Or  ondansetron, 4 mg, Q6H PRN  polyethylene glycol, 17 g, Daily PRN  acetaminophen, 650 mg, Q6H PRN   Or  acetaminophen, 650 mg, Q6H PRN  potassium chloride, 40 mEq, PRN   Or  potassium alternative oral replacement, 40 mEq, PRN   Or  potassium chloride, 10 mEq, PRN  magnesium sulfate, 2,000 mg, PRN        Diagnostics:  TTE 7/27/21  Summary   Ejection fraction is visually estimated at 55%. Overall left ventricular function is normal.      Signature      ----------------------------------------------------------------   Electronically signed by Jarad Tam MD (Interpreting   physician) on 07/27/2021 at 06:40 PM    Stress test 7/29/21  Summary   There was a small sized, mild in intensity, paradoxical myocardial   perfusion defect of the inferior wall secondary to attenuation artifact.    There was a moderate sized, mild in intensity, partially reversible   myocardial perfusion defect of the mid to apical anterior wall. This is   suggestive of ischemia Vs breast attenuation artifact. Recommendation   Clinical correlation is recommended. Invasive ischemia workup is recommended if clinically indicated. Signatures      ----------------------------------------------------------------   Electronically signed by Julia Alejo MD (Interpreting   Cardiologist) on 07/29/2021 at 13:39    Lab Data:    Cardiac Enzymes:  No results for input(s): CKTOTAL, CKMB, CKMBINDEX, TROPONINI in the last 72 hours.     CBC:   Lab Results   Component Value Date    WBC 14.1 07/27/2021    RBC 3.92 07/27/2021    HGB 10.6 07/27/2021    HCT 33.6 07/27/2021     07/27/2021       CMP:    Lab Results   Component Value Date     07/29/2021    K 4.7 07/29/2021     07/29/2021    CO2 24 07/29/2021    BUN 32 07/29/2021    CREATININE 1.5 07/29/2021    LABGLOM 35 07/29/2021    GLUCOSE 234 07/29/2021    CALCIUM 9.0 07/29/2021       Hepatic Function Panel:    Lab Results   Component Value Date    ALKPHOS 80 07/27/2021    ALT 8 07/27/2021    AST 11 07/27/2021    PROT 6.3 07/27/2021    BILITOT 0.4 07/27/2021    BILIDIR <0.2 07/27/2021    LABALBU 3.1 07/27/2021       Magnesium:    Lab Results   Component Value Date    MG 2.5 07/28/2021       PT/INR:  No results found for: PROTIME, INR    HgBA1c:  No results found for: LABA1C    FLP:  No results found for: TRIG, HDL, LDLCALC, LDLDIRECT, LABVLDL    TSH:    Lab Results   Component Value Date    TSH 0.865 07/26/2021         Assessment:    Accelerated HTN - improved  Elevated troponins - denies anginal symptoms - likely related to accelerated HTN or LOLY/CKD, cannot exclude CAD  Abn stress test 7/29/21  Ef 55 per TTE 7/27/21  LOLY/CKD  UTI - ATB per attending  hyperglycemia - attending to follow      Plan:    Npo  LHC today  Check lipid panel and Hemoglobin A1c  bp management per attending    Addendum  Discussed with dr Scarlet Murphy  Due to patient being asymptomatic, she denies any anginal symptoms, and

## 2021-07-31 LAB
EKG ATRIAL RATE: 66 BPM
EKG P AXIS: 64 DEGREES
EKG P-R INTERVAL: 134 MS
EKG Q-T INTERVAL: 450 MS
EKG QRS DURATION: 148 MS
EKG QTC CALCULATION (BAZETT): 471 MS
EKG R AXIS: 23 DEGREES
EKG T AXIS: 12 DEGREES
EKG VENTRICULAR RATE: 66 BPM

## 2021-08-04 ENCOUNTER — OFFICE VISIT (OUTPATIENT)
Dept: INTERNAL MEDICINE CLINIC | Age: 66
End: 2021-08-04
Payer: MEDICAID

## 2021-08-04 VITALS
WEIGHT: 226.2 LBS | SYSTOLIC BLOOD PRESSURE: 132 MMHG | BODY MASS INDEX: 32.38 KG/M2 | HEART RATE: 74 BPM | HEIGHT: 70 IN | DIASTOLIC BLOOD PRESSURE: 80 MMHG | TEMPERATURE: 97.4 F

## 2021-08-04 DIAGNOSIS — I10 ESSENTIAL HYPERTENSION: Primary | ICD-10-CM

## 2021-08-04 DIAGNOSIS — E78.5 HYPERLIPIDEMIA, UNSPECIFIED HYPERLIPIDEMIA TYPE: ICD-10-CM

## 2021-08-04 DIAGNOSIS — R73.9 HYPERGLYCEMIA: ICD-10-CM

## 2021-08-04 DIAGNOSIS — L40.9 PSORIASIS: ICD-10-CM

## 2021-08-04 PROCEDURE — 99204 OFFICE O/P NEW MOD 45 MIN: CPT | Performed by: STUDENT IN AN ORGANIZED HEALTH CARE EDUCATION/TRAINING PROGRAM

## 2021-08-04 RX ORDER — ATORVASTATIN CALCIUM 40 MG/1
40 TABLET, FILM COATED ORAL NIGHTLY
Qty: 30 TABLET | Refills: 3 | Status: SHIPPED | OUTPATIENT
Start: 2021-08-04 | End: 2021-09-29 | Stop reason: SDUPTHER

## 2021-08-04 RX ORDER — HYDRALAZINE HYDROCHLORIDE 10 MG/1
10 TABLET, FILM COATED ORAL EVERY 8 HOURS SCHEDULED
Qty: 90 TABLET | Refills: 3 | Status: CANCELLED | OUTPATIENT
Start: 2021-08-04

## 2021-08-04 RX ORDER — AMLODIPINE BESYLATE 10 MG/1
10 TABLET ORAL DAILY
Qty: 30 TABLET | Refills: 3 | Status: SHIPPED | OUTPATIENT
Start: 2021-08-04 | End: 2021-09-29 | Stop reason: SDUPTHER

## 2021-08-04 RX ORDER — LISINOPRIL 5 MG/1
5 TABLET ORAL DAILY
Qty: 90 TABLET | Refills: 0 | Status: SHIPPED | OUTPATIENT
Start: 2021-08-04 | End: 2021-09-15 | Stop reason: SDUPTHER

## 2021-08-04 SDOH — ECONOMIC STABILITY: FOOD INSECURITY: WITHIN THE PAST 12 MONTHS, THE FOOD YOU BOUGHT JUST DIDN'T LAST AND YOU DIDN'T HAVE MONEY TO GET MORE.: NEVER TRUE

## 2021-08-04 SDOH — ECONOMIC STABILITY: FOOD INSECURITY: WITHIN THE PAST 12 MONTHS, YOU WORRIED THAT YOUR FOOD WOULD RUN OUT BEFORE YOU GOT MONEY TO BUY MORE.: NEVER TRUE

## 2021-08-04 ASSESSMENT — ENCOUNTER SYMPTOMS
SHORTNESS OF BREATH: 0
COUGH: 0
CHEST TIGHTNESS: 0

## 2021-08-04 ASSESSMENT — SOCIAL DETERMINANTS OF HEALTH (SDOH): HOW HARD IS IT FOR YOU TO PAY FOR THE VERY BASICS LIKE FOOD, HOUSING, MEDICAL CARE, AND HEATING?: NOT HARD AT ALL

## 2021-08-04 ASSESSMENT — PATIENT HEALTH QUESTIONNAIRE - PHQ9
SUM OF ALL RESPONSES TO PHQ QUESTIONS 1-9: 0
2. FEELING DOWN, DEPRESSED OR HOPELESS: 0
SUM OF ALL RESPONSES TO PHQ QUESTIONS 1-9: 0
SUM OF ALL RESPONSES TO PHQ QUESTIONS 1-9: 0
SUM OF ALL RESPONSES TO PHQ9 QUESTIONS 1 & 2: 0
1. LITTLE INTEREST OR PLEASURE IN DOING THINGS: 0

## 2021-08-04 NOTE — PROGRESS NOTES
Kali Kennedy (:  1955) is a 60 y.o. female,New patient, here for evaluation of the following chief complaint(s):  Established New Doctor (HTN / d/c [facility] )         ASSESSMENT/PLAN:  1. Essential hypertension  -     amLODIPine (NORVASC) 10 MG tablet; Take 1 tablet by mouth daily, Disp-30 tablet, R-3Normal  -     lisinopril (PRINIVIL;ZESTRIL) 5 MG tablet; Take 1 tablet by mouth daily, Disp-90 tablet, R-0Normal  2. Hyperglycemia  -     Hemoglobin A1C; Future  3. Hyperlipidemia, unspecified hyperlipidemia type  -     atorvastatin (LIPITOR) 40 MG tablet; Take 1 tablet by mouth nightly, Disp-30 tablet, R-3Normal  -     Basic Metabolic Panel; Future  -     Microalbumin / Creatinine Urine Ratio; Future  4. Psoriasis  -     External Referral To Dermatology    Discontinue Hydralazine. Start on Lisinopril 5 mg daily. Advised to check BP regularly at home, keep a log, and bring to next visit. Will check BMP in 1-2 weeks prior to next visit. Follow up HbA1c in 3 months- 2021  Discussed lifestyle changes with diet and more physical activity. Advised to follow up with Ophthalmology for bilateral blurry vision. Follow up with Cardiology outpatient. Daughter to call and make an appointment within the next 1-2 weeks. Patient seen and case discussed with Dr. Shyanne Buenrostro. Return in about 4 weeks (around 2021), or if symptoms worsen or fail to improve. Subjective   SUBJECTIVE/OBJECTIVE:  59 yo female with recent hospitalization for hypertensive emergency discharged on 2021, here today for a follow up appointment. Visual disturbance: Patient started having blurry vision bilaterally about 2 months ago and has been persistent since then. Patient was also diagnosed with COVID-19 in 2020 which she believes might have contributed. Describes the blurry vision as a \"vaseline layer\" on both eyes. Admits to having issues with balance because she is unable to see the ground well.  Has no associated dizziness/lightheadedness or paresthesia. Patient was seen by Ophthalmology with Ashok Mendoza but was told she has \"leaky retina\" related to high BP and sent to  for further evaluation. Essential HTN: Noted to have BP [id number] on office visuit with ophthalmology. Admitted for hypertensive emergency. Treated with Norvasc and Lisinopril initially. Lisinopril was discontinued due to LOLY. Discharged on Norvasc and Hydralazine. /80 today. Reports checking BP once at home, SBP 110s. Reports no associated symptoms of chest pain, dyspnea, dizziness/lightheadedness, headaches. Reports inconvenience with having to take hydralazine TID. Discussed possible change in medications. Patient amenable. Hyperglycemia: Noted to be hyperglycemic 150s-200s during hospitalization. HbA1c 7/30/2021 at 7.2. Patient admits to unhealthy diet that involves predominantly high calorie food items. Physical activity has been more limited recently due to changes in vision. Psoriasis: Diagnosed clinically. Never worked up for it. Has never seen a Rheumatologist or Dermatologist. Usually, has had psoriatic lesions in hands and elbows. No associated joint pain or known arthritis. Has tired using hydrocortisone cream as well as triple antibiotic cream with some help. Attributes worsening of lesions to anxiety. Willing to see a specialist for further management. Review of Systems   Constitutional: Negative for chills, diaphoresis and fatigue. Eyes: Positive for visual disturbance. Respiratory: Negative for cough, chest tightness and shortness of breath. Cardiovascular: Negative for chest pain, palpitations and leg swelling. Musculoskeletal: Negative for arthralgias and myalgias. Skin: Positive for rash. Negative for wound. Neurological: Negative for dizziness, syncope, weakness, light-headedness and numbness. Psychiatric/Behavioral: The patient is nervous/anxious.     All other systems reviewed and are negative. Objective    /80 (Site: Left Upper Arm, Position: Sitting, Cuff Size: Large Adult)   Pulse 74   Temp 97.4 °F (36.3 °C)   Ht 5' 9.75\" (1.772 m)   Wt 226 lb 3.2 oz (102.6 kg)   BMI 32.69 kg/m²     Physical Exam  Constitutional:       Appearance: She is obese. HENT:      Head: Normocephalic. Nose: Nose normal.   Eyes:      Extraocular Movements: Extraocular movements intact. Conjunctiva/sclera: Conjunctivae normal.   Cardiovascular:      Rate and Rhythm: Normal rate and regular rhythm. Pulses: Normal pulses. Heart sounds: Normal heart sounds. Pulmonary:      Effort: Pulmonary effort is normal.      Breath sounds: Normal breath sounds. Abdominal:      General: Abdomen is flat. Palpations: Abdomen is soft. Musculoskeletal:         General: No swelling. Normal range of motion. Cervical back: Normal range of motion. Skin:     General: Skin is warm and dry. Capillary Refill: Capillary refill takes less than 2 seconds. Neurological:      General: No focal deficit present. Mental Status: She is alert. Psychiatric:         Mood and Affect: Mood normal.            On this date 8/4/2021 I have spent 50 minutes reviewing previous notes, test results and face to face with the patient discussing the diagnosis and importance of compliance with the treatment plan as well as documenting on the day of the visit. An electronic signature was used to authenticate this note. --Altagracia Rowley MD   [address] [place] [address] INTERNAL MEDICINE  [address]  Dept: [phone]  Dept Fax: [phone] : [phone]

## 2021-09-11 ENCOUNTER — NURSE ONLY (OUTPATIENT)
Dept: LAB | Age: 66
End: 2021-09-11

## 2021-09-11 DIAGNOSIS — R73.9 HYPERGLYCEMIA: ICD-10-CM

## 2021-09-11 DIAGNOSIS — E78.5 HYPERLIPIDEMIA, UNSPECIFIED HYPERLIPIDEMIA TYPE: ICD-10-CM

## 2021-09-11 LAB
ANION GAP SERPL CALCULATED.3IONS-SCNC: 9 MEQ/L (ref 8–16)
AVERAGE GLUCOSE: 159 MG/DL (ref 70–126)
BUN BLDV-MCNC: 24 MG/DL (ref 7–22)
CALCIUM SERPL-MCNC: 9.4 MG/DL (ref 8.5–10.5)
CHLORIDE BLD-SCNC: 102 MEQ/L (ref 98–111)
CO2: 25 MEQ/L (ref 23–33)
CREAT SERPL-MCNC: 1.1 MG/DL (ref 0.4–1.2)
CREATININE, URINE: 51.5 MG/DL
GFR SERPL CREATININE-BSD FRML MDRD: 50 ML/MIN/1.73M2
GLUCOSE BLD-MCNC: 264 MG/DL (ref 70–108)
HBA1C MFR BLD: 7.3 % (ref 4.4–6.4)
MICROALBUMIN UR-MCNC: 203.5 MG/DL
MICROALBUMIN/CREAT UR-RTO: 3951 MG/G (ref 0–30)
POTASSIUM SERPL-SCNC: 4.5 MEQ/L (ref 3.5–5.2)
SODIUM BLD-SCNC: 136 MEQ/L (ref 135–145)

## 2021-09-15 DIAGNOSIS — I10 ESSENTIAL HYPERTENSION: ICD-10-CM

## 2021-09-15 RX ORDER — LISINOPRIL 5 MG/1
5 TABLET ORAL DAILY
Qty: 30 TABLET | Refills: 0 | Status: SHIPPED | OUTPATIENT
Start: 2021-09-15 | End: 2021-09-29 | Stop reason: SDUPTHER

## 2021-09-29 ENCOUNTER — OFFICE VISIT (OUTPATIENT)
Dept: INTERNAL MEDICINE CLINIC | Age: 66
End: 2021-09-29
Payer: MEDICAID

## 2021-09-29 ENCOUNTER — TELEPHONE (OUTPATIENT)
Dept: CARDIOLOGY CLINIC | Age: 66
End: 2021-09-29

## 2021-09-29 VITALS
SYSTOLIC BLOOD PRESSURE: 172 MMHG | BODY MASS INDEX: 30.98 KG/M2 | HEART RATE: 84 BPM | HEIGHT: 70 IN | TEMPERATURE: 97.7 F | WEIGHT: 216.4 LBS | DIASTOLIC BLOOD PRESSURE: 104 MMHG

## 2021-09-29 DIAGNOSIS — E78.5 HYPERLIPIDEMIA, UNSPECIFIED HYPERLIPIDEMIA TYPE: ICD-10-CM

## 2021-09-29 DIAGNOSIS — R94.39 ABNORMAL STRESS TEST: ICD-10-CM

## 2021-09-29 DIAGNOSIS — I10 ESSENTIAL HYPERTENSION: Primary | ICD-10-CM

## 2021-09-29 PROCEDURE — G8427 DOCREV CUR MEDS BY ELIG CLIN: HCPCS | Performed by: STUDENT IN AN ORGANIZED HEALTH CARE EDUCATION/TRAINING PROGRAM

## 2021-09-29 PROCEDURE — 99214 OFFICE O/P EST MOD 30 MIN: CPT | Performed by: STUDENT IN AN ORGANIZED HEALTH CARE EDUCATION/TRAINING PROGRAM

## 2021-09-29 PROCEDURE — 1036F TOBACCO NON-USER: CPT | Performed by: STUDENT IN AN ORGANIZED HEALTH CARE EDUCATION/TRAINING PROGRAM

## 2021-09-29 PROCEDURE — G8417 CALC BMI ABV UP PARAM F/U: HCPCS | Performed by: STUDENT IN AN ORGANIZED HEALTH CARE EDUCATION/TRAINING PROGRAM

## 2021-09-29 PROCEDURE — 3017F COLORECTAL CA SCREEN DOC REV: CPT | Performed by: STUDENT IN AN ORGANIZED HEALTH CARE EDUCATION/TRAINING PROGRAM

## 2021-09-29 RX ORDER — ATORVASTATIN CALCIUM 40 MG/1
40 TABLET, FILM COATED ORAL NIGHTLY
Qty: 30 TABLET | Refills: 5 | Status: SHIPPED | OUTPATIENT
Start: 2021-09-29 | End: 2022-04-19 | Stop reason: SDUPTHER

## 2021-09-29 RX ORDER — AMLODIPINE BESYLATE 10 MG/1
10 TABLET ORAL DAILY
Qty: 30 TABLET | Refills: 5 | Status: SHIPPED | OUTPATIENT
Start: 2021-09-29 | End: 2022-04-19 | Stop reason: SDUPTHER

## 2021-09-29 RX ORDER — LISINOPRIL 5 MG/1
10 TABLET ORAL 2 TIMES DAILY
Qty: 30 TABLET | Refills: 5 | Status: SHIPPED | OUTPATIENT
Start: 2021-09-29 | End: 2021-11-17

## 2021-09-29 ASSESSMENT — ENCOUNTER SYMPTOMS
WHEEZING: 0
CHEST TIGHTNESS: 0
COUGH: 0
SHORTNESS OF BREATH: 0
NAUSEA: 0

## 2021-09-29 NOTE — TELEPHONE ENCOUNTER
Call out to patient. No voicemail setup yet. new pt referral  Received: Today  [facility] Clinical Support Pool  New patient referral to Dr. Maddie Kline. Referred by Dr. David Doyle for abnormal stress test. Unable to get in within 2 weeks please contact patient to schedule.  AR

## 2021-09-29 NOTE — PATIENT INSTRUCTIONS
Please take Lisinopril 10 mg twice a day. Continue Norvasc 10 mg daily. Follow up with Cardiology as soon as possible. Follow up with Dermatology.

## 2021-09-29 NOTE — PROGRESS NOTES
Sheri Zuniga (:  1955) is a 60 y.o. female,Established patient, here for evaluation of the following chief complaint(s):  Hypertension (4 weeks - A1c 7.3 on ), Hyperlipidemia, and Hyperglycemia         ASSESSMENT/PLAN:  1. Essential hypertension  -     lisinopril (PRINIVIL;ZESTRIL) 5 MG tablet; Take 2 tablets by mouth 2 times daily, Disp-30 tablet, R-5Normal  -     amLODIPine (NORVASC) 10 MG tablet; Take 1 tablet by mouth daily, Disp-30 tablet, R-5Normal  2. Hyperlipidemia, unspecified hyperlipidemia type  -     atorvastatin (LIPITOR) 40 MG tablet; Take 1 tablet by mouth nightly, Disp-30 tablet, R-5Normal  3. Abnormal stress test  -     Radha Taylor MD, Cardiology, [facility]    Will increase Lisinopril to 10 mg BID. Continue Norvasc 10 mg daily. Advised to check BP daily and keep a log. Bring log to next office visit. Follow up with Dr. Tres Duvall in 3 weeks. Obtain records from Ophthalmology, Dr. Glenn Alcantar. Will check HbA1c at next office visit. Encouraged to continue with healthy lifestyle modifications. Patient seen and plan discussed with Dr. Brandy Ontiveros. Return in about 2 weeks (around 10/13/2021), or if symptoms worsen or fail to improve. Subjective   SUBJECTIVE/OBJECTIVE:  59 yo female with HTN and psoriasis here today for a follow up appointment.      Essential HTN: Noted to have /104 today on arrival. Repeat /110. Reports no associated symptoms of chest pain, dyspnea, dizziness/lightheadedness, headaches. Reportedly taking only 2 pills, unsure which medications so she may not be taking both Norvasc and Lisinopril. Last BP check at home was  with /63. Reports she has been very stressed recently, moving places in [place]. Also dealing with some real estate issues related to two  \"people\". Admits to forgetting details about her medications and follow ups due to stress and blurry vision (unable to read medication labels).  Asking if she can get colored caps on her medications bottles to help her distinguish between meds.     Hyperglycemia: Noted to be hyperglycemic 150s-200s during hospitalization. HbA1c 7/30/2021 at 7.2. Has lost 10lbs since last visit. Trying to make lifestyle changes. BG      Psoriasis: Diagnosed clinically. Never worked up for it. Has never seen a Rheumatologist or Dermatologist. Usually, has had psoriatic lesions in hands and elbows. No associated joint pain or known arthritis. Has tried using hydrocortisone cream as well as triple antibiotic cream with some help. Reports lesions being stable. Did not make it to her dermatology appointment due to her busy schedule. Visual disturbance: Continues to have blurry vision. Seen by Dr. Nathaniel Jeong. Getting \"eye shots for retinal problem\". Last visit Monday 09/27/2021. No documentation see on Epic. Review of Systems   Constitutional: Negative for fatigue and fever. Eyes: Positive for visual disturbance (chronic, no acute changes). Respiratory: Negative for cough, chest tightness, shortness of breath and wheezing. Cardiovascular: Negative for chest pain, palpitations and leg swelling. Gastrointestinal: Negative for nausea. Musculoskeletal: Negative for joint swelling. Skin: Positive for rash (psoraitic lesions). Neurological: Negative for dizziness, syncope, light-headedness and headaches. Psychiatric/Behavioral: The patient is nervous/anxious. All other systems reviewed and are negative. Objective    BP (!) 172/104 (Site: Left Upper Arm, Position: Sitting, Cuff Size: Large Adult)   Pulse 84   Temp 97.7 °F (36.5 °C)   Ht 5' 9.75\" (1.772 m)   Wt 216 lb 6.4 oz (98.2 kg)   BMI 31.27 kg/m²     Physical Exam  Constitutional:       Appearance: Normal appearance. HENT:      Head: Normocephalic. Eyes:      Extraocular Movements: Extraocular movements intact.       Conjunctiva/sclera: Conjunctivae normal.   Cardiovascular:      Rate and Rhythm: Normal rate and regular rhythm. Pulses: Normal pulses. Heart sounds: Normal heart sounds. Pulmonary:      Effort: Pulmonary effort is normal.      Breath sounds: Normal breath sounds. Abdominal:      Palpations: Abdomen is soft. Musculoskeletal:         General: Normal range of motion. Cervical back: Normal range of motion. Skin:     General: Skin is warm and dry. Neurological:      General: No focal deficit present. Mental Status: She is alert and oriented to person, place, and time. Mental status is at baseline. Psychiatric:         Mood and Affect: Mood normal.            On this date 9/29/2021 I have spent 35 minutes reviewing previous notes, test results and face to face with the patient discussing the diagnosis and importance of compliance with the treatment plan as well as documenting on the day of the visit. An electronic signature was used to authenticate this note.     --Daquan Arriaza MD

## 2021-09-29 NOTE — PROGRESS NOTES
Attending supervising physicians attestation statement:      I was present with the resident physician during the history and exam.  I Discussed the findings and plans with the resident physician  personally   After examining the patient, and agree with the resident'S note as outlined . Pt with HTN, who lives [place], here with her daughter who lives here. Pt lives in [place], but prefers to follows with our office, as her daughter lives in the area,   Who brings her to the appointments. Increase the lisinopril to 10 mg BID, and close monitoring, and refer to cardiology. Does have an appt with dr. Tres Duvall . Ambulatory BP readings recommended.        Electronically signed by Melvin Dunbar MD on 9/29/2021 at 3:52 PM

## 2021-09-30 NOTE — TELEPHONE ENCOUNTER
Patient has an appointment on 10/21/2021 at 0800 with Dr. Ani Aguila. She doesn't have a preference and she confirmed appt date and time.

## 2021-10-20 ENCOUNTER — IMMUNIZATION (OUTPATIENT)
Dept: PRIMARY CARE CLINIC | Age: 66
End: 2021-10-20
Payer: MEDICAID

## 2021-10-20 PROCEDURE — 91300 COVID-19, PFIZER VACCINE 30MCG/0.3ML DOSE: CPT

## 2021-10-20 PROCEDURE — 0003A COVID-19, PFIZER VACCINE 30MCG/0.3ML DOSE: CPT

## 2021-10-21 ENCOUNTER — OFFICE VISIT (OUTPATIENT)
Dept: CARDIOLOGY CLINIC | Age: 66
End: 2021-10-21
Payer: MEDICAID

## 2021-10-21 VITALS
BODY MASS INDEX: 31.38 KG/M2 | WEIGHT: 219.2 LBS | DIASTOLIC BLOOD PRESSURE: 60 MMHG | HEIGHT: 70 IN | SYSTOLIC BLOOD PRESSURE: 111 MMHG | HEART RATE: 78 BPM

## 2021-10-21 DIAGNOSIS — R94.39 ABNORMAL NUCLEAR STRESS TEST: ICD-10-CM

## 2021-10-21 DIAGNOSIS — I10 PRIMARY HYPERTENSION: ICD-10-CM

## 2021-10-21 DIAGNOSIS — E78.00 PURE HYPERCHOLESTEROLEMIA: ICD-10-CM

## 2021-10-21 PROBLEM — E78.5 HYPERLIPIDEMIA: Status: ACTIVE | Noted: 2021-10-21

## 2021-10-21 PROCEDURE — G8427 DOCREV CUR MEDS BY ELIG CLIN: HCPCS | Performed by: INTERNAL MEDICINE

## 2021-10-21 PROCEDURE — 3017F COLORECTAL CA SCREEN DOC REV: CPT | Performed by: INTERNAL MEDICINE

## 2021-10-21 PROCEDURE — G8417 CALC BMI ABV UP PARAM F/U: HCPCS | Performed by: INTERNAL MEDICINE

## 2021-10-21 PROCEDURE — G8484 FLU IMMUNIZE NO ADMIN: HCPCS | Performed by: INTERNAL MEDICINE

## 2021-10-21 PROCEDURE — 99214 OFFICE O/P EST MOD 30 MIN: CPT | Performed by: INTERNAL MEDICINE

## 2021-10-21 PROCEDURE — 1036F TOBACCO NON-USER: CPT | Performed by: INTERNAL MEDICINE

## 2021-10-21 NOTE — PROGRESS NOTES
Chief Complaint   Patient presents with   [address] from Hospital   PROVIDER:     Josy Moy M.D.     CONSULT DATE:  07/28/2021     REASON OF CONSULTATION:  Elevated troponin in a 60-year-old female  patient, admitted with accelerated hypertension. Post hospital F/u     Denied chest pain, sob or orthopnea    Denied dizziness , palpitation or syncope    Nonsmoker    FHX  Father had CABg in his 63's  Mother had CABG I her 63's      History reviewed. No pertinent surgical history. No Known Allergies     Family History   Problem Relation Age of Onset    Other Mother         thyroidectomy    Diabetes Mother     Heart Disease Mother     Heart Disease Father         Social History     Socioeconomic History    Marital status: Single     Spouse name: Not on file    Number of children: 1    Years of education: Not on file    Highest education level: Not on file   Occupational History    Not on file   Tobacco Use    Smoking status: Former Smoker     Packs/day: 0.50     Years: 15.00     Pack years: 7.50     Types: Cigarettes     Quit date: 1/1/2011     Years since quitting: 10.8    Smokeless tobacco: Never Used   Substance and Sexual Activity    Alcohol use: Yes     Comment: social    Drug use: Never    Sexual activity: Not on file   Other Topics Concern    Not on file   Social History Narrative    Not on file     Social Determinants of Health     Financial Resource Strain: Low Risk     Difficulty of Paying Living Expenses: Not hard at all   Food Insecurity: No Food Insecurity    Worried About [address] in the Last Year: Never true    [facility] in the Last Year: Never true   Transportation Needs:     Lack of Transportation (Medical):      Lack of Transportation (Non-Medical):    Physical Activity:     Days of Exercise per Week:     Minutes of Exercise per Session:    Stress:     Feeling of Stress :    Social Connections:     Frequency of Communication with Friends and Family:     Frequency of Social Gatherings with Friends and Family:     Attends Congregation Services:     Active Member of Clubs or Organizations:     Attends Club or Organization Meetings:     Marital Status:    Intimate Partner Violence:     Fear of Current or Ex-Partner:     Emotionally Abused:     Physically Abused:     Sexually Abused:        Current Outpatient Medications   Medication Sig Dispense Refill    lisinopril (PRINIVIL;ZESTRIL) 5 MG tablet Take 2 tablets by mouth 2 times daily 30 tablet 5    atorvastatin (LIPITOR) 40 MG tablet Take 1 tablet by mouth nightly 30 tablet 5    amLODIPine (NORVASC) 10 MG tablet Take 1 tablet by mouth daily 30 tablet 5    aspirin 81 MG chewable tablet Take 81 mg by mouth daily       No current facility-administered medications for this visit. Review of Systems -     General ROS: negative  Psychological ROS: negative  Hematological and Lymphatic ROS: No history of blood clots or bleeding disorder. Respiratory ROS: no cough,  or wheezing, the rest see HPI  Cardiovascular ROS: See HPI  Gastrointestinal ROS: negative  Genito-Urinary ROS: no dysuria, trouble voiding, or hematuria  Musculoskeletal ROS: negative  Neurological ROS: no TIA or stroke symptoms  Dermatological ROS: negative      Blood pressure (!) 142/68, pulse 82, height 5' 10\" (1.778 m), weight 219 lb 3.2 oz (99.4 kg).         Physical Examination:    General appearance - alert, well appearing, and in no distress  HEENT- Pink conjunctiva  , Non-icteri sclera,PERRLA  Mental status - alert, oriented to person, place, and time  Neck - supple, no significant adenopathy, no JVD, or carotid bruits  Chest - clear to auscultation, no wheezes, rales or rhonchi, symmetric air entry  Heart - normal rate, regular rhythm, normal S1, S2, no murmurs, rubs, clicks or gallops  Abdomen - soft, nontender, nondistended, no masses or organomegaly  JHON- no CVA or flank tenderness, no suprapubic tenderness  Neurological - alert, oriented, normal speech, no focal findings or movement disorder noted  Musculoskeletal/limbs - no joint tenderness, deformity or swelling   - peripheral pulses normal, no pedal edema, no clubbing or cyanosis  Skin - normal coloration and turgor, no rashes, no suspicious skin lesions noted  Psych- appropriate mood and affect    Lab  No results for input(s): CKTOTAL, CKMB, CKMBINDEX, TROPONINI in the last 72 hours. CBC:   Lab Results   Component Value Date    WBC 10.1 07/30/2021    RBC 4.56 07/30/2021    HGB 12.2 07/30/2021    HCT 39.3 07/30/2021    MCV 86.2 07/30/2021    MCH 26.8 07/30/2021    MCHC 31.0 07/30/2021     07/30/2021    MPV 9.5 07/30/2021     BMP:    Lab Results   Component Value Date     09/11/2021    K 4.5 09/11/2021    K 4.8 07/30/2021     09/11/2021    CO2 25 09/11/2021    BUN 24 09/11/2021    LABALBU 3.1 07/27/2021    CREATININE 1.1 09/11/2021    CALCIUM 9.4 09/11/2021    LABGLOM 50 09/11/2021    GLUCOSE 264 09/11/2021     Hepatic Function Panel:    Lab Results   Component Value Date    ALKPHOS 80 07/27/2021    ALT 8 07/27/2021    AST 11 07/27/2021    PROT 6.3 07/27/2021    BILITOT 0.4 07/27/2021    BILIDIR <0.2 07/27/2021    LABALBU 3.1 07/27/2021     Magnesium:    Lab Results   Component Value Date    MG 2.5 07/28/2021     Warfarin PT/INR:  No components found for: PTPATWAR, PTINRWAR  HgBA1c:    Lab Results   Component Value Date    LABA1C 7.3 09/11/2021     FLP:    Lab Results   Component Value Date    TRIG 188 07/30/2021    HDL 44 07/30/2021    LDLCALC 167 07/30/2021     TSH:    Lab Results   Component Value Date    TSH 0.865 07/26/2021       Conclusions      Summary   Ejection fraction is visually estimated at 55%.    Overall left ventricular function is normal.      Signature      ----------------------------------------------------------------   Electronically signed by Dustin Flores MD (Interpreting   physician) on 07/27/2021 at 06:40 PM ----------------------------------------------------------------   Conclusions      Summary   There was a small sized, mild in intensity, paradoxical myocardial   perfusion defect of the inferior wall secondary to attenuation artifact. There was a moderate sized, mild in intensity, partially reversible   myocardial perfusion defect of the mid to apical anterior wall. This is   suggestive of ischemia Vs breast attenuation artifact. Recommendation   Clinical correlation is recommended. Invasive ischemia workup is recommended if clinically indicated. Signatures      ----------------------------------------------------------------   Electronically signed by Raysa Mckenna MD (Interpreting   Cardiologist) on 07/29/2021 at 13:39    Assessment   Diagnosis Orders   1. Primary hypertension     2. Pure hypercholesterolemia     3.  borderline Abnormal nuclear stress test- NO angina or angina equiv- med RX         July 28, 2021 consult assessment  ASSESSMENT:  1. Accelerated hypertension, apparently now controlled. 2.  Elevated troponin with no chest pain, no angina or anginal  equivalent, probably seems to be related to the accelerated  hypertension, could be a demand ischemia. 3.  Acute kidney injury and chronic kidney disease. 4.  Urinary tract infection. 5.  History of visual problem. 6.  History of balance problem, walks with a cane. 7.  History of clubfoot as a childhood, corrected without any surgery,  is on physical therapy. Plan     Continue the current treatment and with constant vigilance to changes in symptoms and also any potential side effects. Return for care or seek medical attention immediately if symptoms got worse and/or develop new symptoms. Hypertension, on medical treatment. Seems to be under good control. Patient is compliant with medical treatment. Hyperlipidemia: on statins, followed periodically. Patient need periodic lipid and liver profile.     ESM best at aortic area 3/6    Borderline abn nuc   suggestive of ischemia Vs breast attenuation artifact. No angina or angina equivalent  Cont asa and statin  Advised to come back if get cp  For now [facility] record reviewed  D/w the pat the plan of care  Discussed use, benefit, and side effects of prescribed medications. All patient questions answered. Pt voiced understanding. Instructed to continue current medications, diet and exercise. Continue risk factor modification and medical management. Patient agreed with treatment plan. Follow up as directed.         RTC in 5 month      [facility]

## 2021-11-17 ENCOUNTER — OFFICE VISIT (OUTPATIENT)
Dept: INTERNAL MEDICINE CLINIC | Age: 66
End: 2021-11-17
Payer: MEDICAID

## 2021-11-17 VITALS
SYSTOLIC BLOOD PRESSURE: 152 MMHG | TEMPERATURE: 97.4 F | HEART RATE: 84 BPM | WEIGHT: 217.4 LBS | DIASTOLIC BLOOD PRESSURE: 90 MMHG | HEIGHT: 69 IN | BODY MASS INDEX: 32.2 KG/M2

## 2021-11-17 DIAGNOSIS — R73.9 HYPERGLYCEMIA: Primary | ICD-10-CM

## 2021-11-17 DIAGNOSIS — I10 ESSENTIAL HYPERTENSION: ICD-10-CM

## 2021-11-17 DIAGNOSIS — Z23 NEED FOR INFLUENZA VACCINATION: ICD-10-CM

## 2021-11-17 PROCEDURE — G8417 CALC BMI ABV UP PARAM F/U: HCPCS | Performed by: STUDENT IN AN ORGANIZED HEALTH CARE EDUCATION/TRAINING PROGRAM

## 2021-11-17 PROCEDURE — 99214 OFFICE O/P EST MOD 30 MIN: CPT | Performed by: STUDENT IN AN ORGANIZED HEALTH CARE EDUCATION/TRAINING PROGRAM

## 2021-11-17 PROCEDURE — 90471 IMMUNIZATION ADMIN: CPT | Performed by: INTERNAL MEDICINE

## 2021-11-17 PROCEDURE — 3017F COLORECTAL CA SCREEN DOC REV: CPT | Performed by: STUDENT IN AN ORGANIZED HEALTH CARE EDUCATION/TRAINING PROGRAM

## 2021-11-17 PROCEDURE — G8428 CUR MEDS NOT DOCUMENT: HCPCS | Performed by: STUDENT IN AN ORGANIZED HEALTH CARE EDUCATION/TRAINING PROGRAM

## 2021-11-17 PROCEDURE — 1036F TOBACCO NON-USER: CPT | Performed by: STUDENT IN AN ORGANIZED HEALTH CARE EDUCATION/TRAINING PROGRAM

## 2021-11-17 PROCEDURE — G8482 FLU IMMUNIZE ORDER/ADMIN: HCPCS | Performed by: STUDENT IN AN ORGANIZED HEALTH CARE EDUCATION/TRAINING PROGRAM

## 2021-11-17 PROCEDURE — 90674 CCIIV4 VAC NO PRSV 0.5 ML IM: CPT | Performed by: INTERNAL MEDICINE

## 2021-11-17 RX ORDER — SPIRONOLACTONE 25 MG/1
12.5 TABLET ORAL DAILY
Qty: 45 TABLET | Refills: 1 | Status: SHIPPED | OUTPATIENT
Start: 2021-11-17 | End: 2022-01-05 | Stop reason: SINTOL

## 2021-11-17 RX ORDER — LISINOPRIL 10 MG/1
10 TABLET ORAL 2 TIMES DAILY
Qty: 60 TABLET | Refills: 5 | Status: CANCELLED | OUTPATIENT
Start: 2021-11-17

## 2021-11-17 RX ORDER — LISINOPRIL 10 MG/1
20 TABLET ORAL NIGHTLY
Qty: 90 TABLET | Refills: 1 | Status: SHIPPED | OUTPATIENT
Start: 2021-11-17 | End: 2022-04-19 | Stop reason: SDUPTHER

## 2021-11-17 ASSESSMENT — ENCOUNTER SYMPTOMS
ABDOMINAL PAIN: 0
DIARRHEA: 0
COUGH: 0
CONSTIPATION: 0
SHORTNESS OF BREATH: 0
NAUSEA: 0
CHEST TIGHTNESS: 0

## 2021-11-17 NOTE — PROGRESS NOTES
After obtaining consent, and per orders of Dr. Shaina Montes injection of influenza vaccine given in left deltoid by Christina Lowry MA. Patient instructed to remain in clinic for 20 minutes afterwards, and to report any adverse reaction to me immediately.

## 2021-11-17 NOTE — PROGRESS NOTES
Naima Álvarez (:  1955) is a 60 y.o. female,Established patient, here for evaluation of the following chief complaint(s):  Hypertension (1 month ) and Hyperlipidemia         ASSESSMENT/PLAN:  1. Hyperglycemia  -     Basic Metabolic Panel; Future  2. Essential hypertension  -     lisinopril (PRINIVIL;ZESTRIL) 10 MG tablet; Take 2 tablets by mouth nightly, Disp-90 tablet, R-1Normal  -     spironolactone (ALDACTONE) 25 MG tablet; Take 0.5 tablets by mouth daily, Disp-45 tablet, R-1Normal  3. Need for influenza vaccination  -     INFLUENZA, MDCK QUADV, 2 YRS AND OLDER, IM, PF, PREFILL SYR OR SDV, 0.5ML (FLUCELVAX QUADV, PF)  -     WI ADMIN INFLUENZA VIRUS VAC    Change Lisinopril from 10 mg BID to 20 mg nightly. Start on Aldactone 12.5 mg daily. Continue Norvasc 10 mg daily. Check BMP tomorrow for fasting BG. May need to be started on Metformin for new diagnosis of diabetes. Encouraged to follow up with Dermatology despite improvement in rash to establish care. Encouraged to continue making healthy lifestyle changes. May need to transfer care to [place] to be closer to her residence and optimum care. Currently [place] since her daughter lives here. Unable to drive independently. S/p COVID shot. Will receive Flu shot today in the clinic. Patient seen and plan discussed with Dr. Sepideh Jackson. Return in about 4 weeks (around 12/15/2021). Subjective   SUBJECTIVE/OBJECTIVE:  59 yo female with HTN and psoriasis here today for a follow up appointment. Last visit 2021. Essential HTN: Noted to have /104 during the last visit. Reports no associated symptoms of chest pain, dyspnea, dizziness/lightheadedness, headaches. Reports compliance to medications. Unable to check BP regularly at home due to difficulties with vision. States BP has been better at other office visits. Currently feeling anxious.     Hyperglycemia: Noted to be hyperglycemic- not fasting BG.  HbA1c 7.3 on 09/11/2021, previously 7.2 (07/30/2021).    Psoriasis: Diagnosed clinically. Never worked up for it. Has never seen a Rheumatologist or Dermatologist. Usually, has had psoriatic lesions in hands and elbows.  No associated joint pain or known arthritis. Improvement in lesions noted.      Visual disturbance: Continues to have blurry vision. Seen by Dr. Jesus Huerta. Getting \"eye shots for retinal problem\". Last visit yesterday 11/16/2021 and next visit 12/03/2021. Did not go for appt with Dermatology due to her busy schedule. Anxiety: Reports anxiety and feeling emotional due to personal issues. Stressed due to real estate related issues. States she is an anxious person at baseline. This has also been affecting her ability to care for herself. Review of Systems   Constitutional: Negative for activity change, diaphoresis, fatigue and fever. Eyes: Negative for visual disturbance. Respiratory: Negative for cough, chest tightness and shortness of breath. Cardiovascular: Negative for chest pain, palpitations and leg swelling. Gastrointestinal: Negative for abdominal pain, constipation, diarrhea and nausea. Endocrine: Negative for polyuria. Genitourinary: Negative for dysuria. Neurological: Positive for dizziness. Negative for syncope and weakness. Intermittent, usually when standing up for too long   Psychiatric/Behavioral: The patient is nervous/anxious. All other systems reviewed and are negative. Objective    BP (!) 152/90 (Site: Left Upper Arm, Position: Sitting, Cuff Size: Medium Adult)   Pulse 84   Temp 97.4 °F (36.3 °C)   Ht 5' 9.25\" (1.759 m)   Wt 217 lb 6.4 oz (98.6 kg)   BMI 31.87 kg/m²     Physical Exam  Constitutional:       General: She is not in acute distress. HENT:      Head: Normocephalic. Nose: Nose normal.   Eyes:      Extraocular Movements: Extraocular movements intact.       Conjunctiva/sclera: Conjunctivae normal.   Cardiovascular:      Rate and Rhythm: Normal rate and regular rhythm. Pulses: Normal pulses. Pulmonary:      Effort: Pulmonary effort is normal.   Abdominal:      General: Abdomen is flat. Palpations: Abdomen is soft. Musculoskeletal:         General: No swelling. Normal range of motion. Cervical back: Normal range of motion. Skin:     General: Skin is warm. Capillary Refill: Capillary refill takes less than 2 seconds. Neurological:      General: No focal deficit present. Mental Status: She is alert. Psychiatric:      Comments: anxious            On this date 11/17/2021 I have spent 45 minutes reviewing previous notes, test results and face to face with the patient discussing the diagnosis and importance of compliance with the treatment plan as well as documenting on the day of the visit. An electronic signature was used to authenticate this note. --Emmy Jacobo MD   . Mai Baker  INTERNAL MEDICINE  [address]  Dept: [phone]  Dept Fax: 90 : [phone]

## 2021-11-17 NOTE — PATIENT INSTRUCTIONS
Please change taking Lisinopril from 10 mg twice daily to 20 mg nightly PM.  Start taking Aldactone 12.5 mg daily AM.  Check BMP tomorrow morning- fast overnight.

## 2021-11-20 ENCOUNTER — NURSE ONLY (OUTPATIENT)
Dept: LAB | Age: 66
End: 2021-11-20

## 2021-11-20 DIAGNOSIS — R73.9 HYPERGLYCEMIA: ICD-10-CM

## 2021-11-20 LAB
ANION GAP SERPL CALCULATED.3IONS-SCNC: 8 MEQ/L (ref 8–16)
BUN BLDV-MCNC: 28 MG/DL (ref 7–22)
CALCIUM SERPL-MCNC: 9.8 MG/DL (ref 8.5–10.5)
CHLORIDE BLD-SCNC: 108 MEQ/L (ref 98–111)
CO2: 23 MEQ/L (ref 23–33)
CREAT SERPL-MCNC: 1 MG/DL (ref 0.4–1.2)
GFR SERPL CREATININE-BSD FRML MDRD: 56 ML/MIN/1.73M2
GLUCOSE BLD-MCNC: 165 MG/DL (ref 70–108)
POTASSIUM SERPL-SCNC: 5.8 MEQ/L (ref 3.5–5.2)
SODIUM BLD-SCNC: 139 MEQ/L (ref 135–145)

## 2021-12-30 ENCOUNTER — TELEPHONE (OUTPATIENT)
Dept: INTERNAL MEDICINE CLINIC | Age: 66
End: 2021-12-30

## 2021-12-30 NOTE — TELEPHONE ENCOUNTER
Pt called in saying she is in [place] for the holidays and has had a cough and congestion x 1 week. She is wondering if she can get a script for the cough. She does not think she has a fever but has not checked it. Mucous is thick but not discolored. She said she is taking coricidin cough and cold but has had it over a week  I advised her she will need to go to urgent care as she is in [place]. She may need tested for covid. She could also try sugar free cough syrup.

## 2022-01-05 ENCOUNTER — TELEPHONE (OUTPATIENT)
Dept: INTERNAL MEDICINE CLINIC | Age: 67
End: 2022-01-05

## 2022-01-05 ENCOUNTER — OFFICE VISIT (OUTPATIENT)
Dept: INTERNAL MEDICINE CLINIC | Age: 67
End: 2022-01-05
Payer: MEDICARE

## 2022-01-05 VITALS
DIASTOLIC BLOOD PRESSURE: 84 MMHG | HEART RATE: 72 BPM | SYSTOLIC BLOOD PRESSURE: 128 MMHG | BODY MASS INDEX: 30.96 KG/M2 | TEMPERATURE: 97.3 F | WEIGHT: 211.2 LBS

## 2022-01-05 DIAGNOSIS — R73.9 HYPERGLYCEMIA: ICD-10-CM

## 2022-01-05 LAB — HBA1C MFR BLD: 7 % (ref 4.3–5.7)

## 2022-01-05 PROCEDURE — G8417 CALC BMI ABV UP PARAM F/U: HCPCS

## 2022-01-05 PROCEDURE — 1123F ACP DISCUSS/DSCN MKR DOCD: CPT

## 2022-01-05 PROCEDURE — 1036F TOBACCO NON-USER: CPT

## 2022-01-05 PROCEDURE — G8400 PT W/DXA NO RESULTS DOC: HCPCS

## 2022-01-05 PROCEDURE — G8482 FLU IMMUNIZE ORDER/ADMIN: HCPCS

## 2022-01-05 PROCEDURE — G8427 DOCREV CUR MEDS BY ELIG CLIN: HCPCS

## 2022-01-05 PROCEDURE — 4040F PNEUMOC VAC/ADMIN/RCVD: CPT

## 2022-01-05 PROCEDURE — 83036 HEMOGLOBIN GLYCOSYLATED A1C: CPT

## 2022-01-05 PROCEDURE — 3017F COLORECTAL CA SCREEN DOC REV: CPT

## 2022-01-05 PROCEDURE — 99214 OFFICE O/P EST MOD 30 MIN: CPT

## 2022-01-05 PROCEDURE — 1090F PRES/ABSN URINE INCON ASSESS: CPT

## 2022-01-05 NOTE — TELEPHONE ENCOUNTER
Called to check on copay for [place] [facility]. Found that patient's new Rx insurance is not accepted at [facility]. Per patient request, had prescriptions transferred to [facility].). Will follow-up with copay next week.     For Pharmacy Admin Tracking Only   Time Spent (min): [address] ([address]) Kecia Graf, PharmD, [facility]  Internal Medicine Clinical Pharmacist  [phone]

## 2022-01-05 NOTE — PROGRESS NOTES
[address] [facility] Internal Medicine   [facility] [address], Justin Arteaga [facility]  Dept: [phone]  Dept Fax: [phone]    This is an established patient, here for evaluation of the following chief complaint(s):  Chief Complaint   Patient presents with    Hypertension    Hyperglycemia     1. Type 2 Diabetes Mellitus-Newly diagnosed: The patient's A1C done in office was 7.0 and the patient's A1C's have typically been ranging from 7.2-7.3; this has been going on since the patient had an episode of hypertensive urgency 2 years ago and was incidentally found to have elevated sugars  -Will start the patient Farxiga 10 mg in the morning; gave the patient a one month free supply and an additional one month from the pharmacy  -Will get a BMP to assess kidney function in one week  -Encourage the patient to check sugars daily    2. Hyperlipidemia: The patient's last lipid panel in July 2021 showed cholesterol 249  -Will obtain another lipid panel in one week  -Continue lipitor    3. Essential hypertension: The patient's blood pressures were normotensive in office today and are somewhat elevated at home with systolic around 232'D  -Continue amlodipine  -Continue lisinopril  -Took the patient off aldactone due to hyperkalemia found on labs    4. Upper respiratory tract infection: The patient has been having symptoms of the common cold for about 3 weeks; robitussin has not helped  -Recommend benadryl prn at this time    5. Psoriasis: Psoriatic lesion are present but diminished at this time in hands and elbows; patient denies any pain  -Plans to see dermatologist/rheumatologist in the summer    6. Retinitis: follows with Dr. Jay Don  -Gets eye injections for retina  -Has follow-up appointment at the end of the month      Disposition:  Return in about 3 months (around 4/5/2022).      AFUA Cooley (1955) is a pleasant 73 y.o. female, former smoker PMH newly diagnosed type 2 diabetes mellitus, essential hypertension, psoriasis, anxiety, who presents for a one month follow-up visit for her hypertension and newly diagnosed type 2 diabetes mellitus. The patient typically follows with Dr. Alissa Barton at the resident clinic. The patient just had an A1C done in the office which was 7.0. The patient has had previous A1C's in the 7's which has been going on since the patient had an episode of hypertensive urgency while she was seeing her eye doctor about 1-2 years ago. The patient also just had a fasting BMP done that showed glucose of 165 which confirmed a diagnosis of type 2 diabetes mellitus. The patient's potassium was also considerably elevated at 5.8 and BUN of 28. The patient has previously never been on any medications for diabetes in the past before. The patient also has a significant history of blurry vision and has to have her daughter or son-in-law drive her from where she lives in [facility]. She typically gets retinal eye injections that are managed by Dr. Noemy Morrell. The patient also sparingly checks her blood pressures at home but her last check in office was 128/84 within normal limits and the blood pressure at home was 732'L systolic. The patient denies any neuropathy at this time and had a microalbumin/creatinine ratio of 3951 in September 2021.          ROS  Constitutional: Denies any recent wt change, denies any new weakness or fatigue, denies fevers, denies night sweats  Eyes:  Denies any blurring or double vision  Ears/Nose/Mouth/Throat:  Denies any chronic sinus/rhinitis, sore throats  Cardiovascular: Denies any chest pain, palpitations, leg swelling  Respiratory:  Denies any frequent cough, wheezing or shortness of breath  Genitourinary:  Denies difficulty with urination, denies blood in urine, denies foamy urine  Gastrointestinal:  Denies any new abdominal pain, denies changes in stool frequency, denies blood in stool  Musculoskeletal:  Denies any joint pain, back pain, or difficulty walking  Integumentary:  Denies any rash or lesions  Neurological:  No numbness or tingling  Endocrine:  Denies any heat or cold interolerance   Hematologic/Lymphatic:  Denies any new bruises, denies any new lumps or bumps    Past Medical History:  has a past medical history of Hypertension and Psoriasis. .     Past Surgical History:  has no past surgical history on file. .     Family History: family history includes Diabetes in her mother; Heart Disease in her father and mother; Other in her mother. .     Social History:  reports that she quit smoking about 11 years ago. Her smoking use included cigarettes. She has a 7.50 pack-year smoking history. She has never used smokeless tobacco. She reports previous alcohol use. She reports that she does not use drugs. .     Allergies: has No Known Allergies. .     Current Medications:  Outpatient Medications Marked as Taking for the 1/5/22 encounter (Office Visit) with Shelley Gordon, DO   Medication Sig Dispense Refill    lisinopril (PRINIVIL;ZESTRIL) 10 MG tablet Take 2 tablets by mouth nightly 90 tablet 1    spironolactone (ALDACTONE) 25 MG tablet Take 0.5 tablets by mouth daily 45 tablet 1    atorvastatin (LIPITOR) 40 MG tablet Take 1 tablet by mouth nightly 30 tablet 5    amLODIPine (NORVASC) 10 MG tablet Take 1 tablet by mouth daily 30 tablet 5    aspirin 81 MG chewable tablet Take 81 mg by mouth daily          PHYSICAL EXAMINATION     Vitals:    01/05/22 1421   BP: 128/84   Pulse: 72   Temp: 97.3 °F (36.3 °C)     Body mass index is 30.96 kg/m². Constitutional: In no acute distress. Appears documented age. Pleasant and cooperative. HENT:   Head: Normocephalic and atraumatic. Mouth/Throat: Oropharynx is clear and moist.  Eyes: Conjunctivae are normal. Pupils are equal, round, and reactive to light. No scleral icterus. Neck: Neck supple. No JVD present. Cardiovascular: Normal rate, regular rhythm, normal heart sounds. No murmur heard. Pulmonary/Chest: Effort normal and breath sounds normal. No stridor. No respiratory distress. No wheezes. No rales. Patient exhibits no tenderness. Abdominal: Soft. Patient exhibits no distension. No tenderness. Bowel sounds present. Musculoskeletal: Normal range of motion. Extremities: Patient exhibits no edema and no tenderness. Lymphadenopathy: No cervical adenopathy. Neurological: Patient is alert and oriented to person, place, and time. Skin: Skin is warm and dry. Patient is not diaphoretic. No rashes or lesions. Psychiatric: Patient has a normal mood and affect. Patient behavior is normal.    MOST RECENT DIAGNOSTIC DATA     Recent Labs:  Potassium 5.8  BUN 28  Microalbumin/creatinnie ratio 3951    Radiology:  No results found. Other Studies:  None    An electronic signature was used to authenticate this note. Please note that major portions of this note were dictated using dragon voice recognition software and electronically transcribed. This transcription may contain errors not detected in proofreading. Please do not hesitate to reach out to myself or our office for any clarification if needed. Chart reviewed with and plan created in collaboration with attending physician, Dr. Diogo Lao. All orders placed were reviewed by the attending physician prior to signing.     Electronically signed by Prince Dieudonne DO on 1/5/2022  Internal Medicine PGY-1  [facility]  [address] [facility], [address]

## 2022-01-05 NOTE — PROGRESS NOTES
After pharmacist chart review, the following recommendations are made:    -Consider initiation of an SGLT-2 inhibitor - Farxiga 10mg for diabetes & renal protection     -Sample provided Farxiga 10mg #28 tabs (Lot: [id number])  -Recheck BMP due to elevated potassium on last BMP   -Spironolactone was initiated a few days prior to the [address] in place:  No   Intervention Detail: Lab(s) Ordered, New Rx: 1, reason: Needs Additional Therapy and Patient Access Assistance/Sample Provided   Total # of Interventions Recommended: 3   Total # of Interventions Accepted: 3   Time Spent (min): [address] ([place]) Katherine Finney, PharmD, [address]  Internal Medicine Clinical Pharmacist  [phone]

## 2022-01-13 NOTE — TELEPHONE ENCOUNTER
Called and spoke with [facility] [address]. They did not yet obtain the transfers. Spoke with [facility] [employer] - They are transferring prescriptions to [facility] [address] now. Will follow up to ensure prescriptions are transferred and to determine copay for [address].     Riya Argueta, PharmD   1/13/2022, 9:24 AM

## 2022-01-15 ENCOUNTER — NURSE ONLY (OUTPATIENT)
Dept: LAB | Age: 67
End: 2022-01-15

## 2022-01-15 DIAGNOSIS — R73.9 HYPERGLYCEMIA: ICD-10-CM

## 2022-01-15 LAB
ANION GAP SERPL CALCULATED.3IONS-SCNC: 11 MEQ/L (ref 8–16)
BUN BLDV-MCNC: 29 MG/DL (ref 7–22)
CALCIUM SERPL-MCNC: 9.9 MG/DL (ref 8.5–10.5)
CHLORIDE BLD-SCNC: 108 MEQ/L (ref 98–111)
CHOLESTEROL, TOTAL: 140 MG/DL (ref 100–199)
CO2: 22 MEQ/L (ref 23–33)
CREAT SERPL-MCNC: 1.3 MG/DL (ref 0.4–1.2)
GFR SERPL CREATININE-BSD FRML MDRD: 41 ML/MIN/1.73M2
GLUCOSE BLD-MCNC: 194 MG/DL (ref 70–108)
HDLC SERPL-MCNC: 40 MG/DL
LDL CHOLESTEROL CALCULATED: 63 MG/DL
POTASSIUM SERPL-SCNC: 5.2 MEQ/L (ref 3.5–5.2)
SODIUM BLD-SCNC: 141 MEQ/L (ref 135–145)
TRIGL SERPL-MCNC: 186 MG/DL (ref 0–199)

## 2022-02-07 RX ORDER — DAPAGLIFLOZIN 10 MG/1
TABLET, FILM COATED ORAL
Qty: 30 TABLET | Refills: 0 | Status: SHIPPED | OUTPATIENT
Start: 2022-02-07 | End: 2022-02-07 | Stop reason: SDUPTHER

## 2022-02-07 NOTE — TELEPHONE ENCOUNTER
Pt called in,   [facility] and [facility] [address] [place], [place] [address] . She asked me to send to [facility] locally and she will have it transferred. I called [facility] in [facility] and cancelled script there.     I called in to [facility] and spoke to Adriane Laura

## 2022-02-15 ENCOUNTER — TELEPHONE (OUTPATIENT)
Dept: INTERNAL MEDICINE CLINIC | Age: 67
End: 2022-02-15

## 2022-02-15 NOTE — TELEPHONE ENCOUNTER
Pt is asking for a letter for dismissal from jury duty d/t her poor eye sight(says she can barely see) and inability to get around.   [facility] pt

## 2022-02-17 NOTE — TELEPHONE ENCOUNTER
I notified pt we would do jury duty excuse. I advised her dr Imtiaz Banks out of office until 2/21 and she asked that we call her when ready and she will have someone pick it up for her.

## 2022-02-20 DIAGNOSIS — I10 ESSENTIAL HYPERTENSION: ICD-10-CM

## 2022-02-20 RX ORDER — LISINOPRIL 10 MG/1
20 TABLET ORAL NIGHTLY
Qty: 90 TABLET | Refills: 1 | Status: CANCELLED | OUTPATIENT
Start: 2022-02-20

## 2022-03-24 ENCOUNTER — OFFICE VISIT (OUTPATIENT)
Dept: CARDIOLOGY CLINIC | Age: 67
End: 2022-03-24
Payer: MEDICARE

## 2022-03-24 VITALS
HEART RATE: 62 BPM | HEIGHT: 70 IN | DIASTOLIC BLOOD PRESSURE: 65 MMHG | SYSTOLIC BLOOD PRESSURE: 127 MMHG | BODY MASS INDEX: 30.21 KG/M2 | WEIGHT: 211 LBS

## 2022-03-24 DIAGNOSIS — E78.00 PURE HYPERCHOLESTEROLEMIA: Primary | ICD-10-CM

## 2022-03-24 DIAGNOSIS — I10 PRIMARY HYPERTENSION: ICD-10-CM

## 2022-03-24 DIAGNOSIS — R94.39 ABNORMAL NUCLEAR STRESS TEST: ICD-10-CM

## 2022-03-24 PROCEDURE — 99214 OFFICE O/P EST MOD 30 MIN: CPT | Performed by: INTERNAL MEDICINE

## 2022-03-24 NOTE — PROGRESS NOTES
Chief Complaint   Patient presents with    Check-Up    Hypertension   PROVIDER:     Warner James M.D.     CONSULT DATE:  2021     REASON OF CONSULTATION:  Elevated troponin in a 61-year-old female  patient, admitted with accelerated hypertension. Pt here for a 5 month f/u    EKG done 21    Denied chest pain, sob or orthopnea    Still no angina or angina equivalent    Denied dizziness ,edema , palpitation or syncope    Nonsmoker    Walk with cane at home    [facility]  Father had CABg in his 63's  Mother had CABG I her 63's      History reviewed. No pertinent surgical history. No Known Allergies     Family History   Problem Relation Age of Onset    Other Mother         thyroidectomy    Diabetes Mother     Heart Disease Mother     Heart Disease Father         Social History     Socioeconomic History    Marital status: Single     Spouse name: Not on file    Number of children: 1    Years of education: Not on file    Highest education level: Not on file   Occupational History    Not on file   Tobacco Use    Smoking status: Former Smoker     Packs/day: 0.50     Years: 15.00     Pack years: 7.50     Types: Cigarettes     Quit date: 2011     Years since quittin.2    Smokeless tobacco: Never Used   Substance and Sexual Activity    Alcohol use: Not Currently     Comment: social    Drug use: Never    Sexual activity: Not on file   Other Topics Concern    Not on file   Social History Narrative    Not on file     Social Determinants of Health     Financial Resource Strain: Low Risk     Difficulty of Paying Living Expenses: Not hard at all   Food Insecurity: No Food Insecurity    Worried About [address] in the Last Year: Never true    [address] in the Last Year: Never true   Transportation Needs:     Lack of Transportation (Medical): Not on file    Lack of Transportation (Non-Medical):  Not on file   Physical Activity:     Days of Exercise per Week: Not on file  Minutes of Exercise per Session: Not on file   Stress:     Feeling of Stress : Not on file   Social Connections:     Frequency of Communication with Friends and Family: Not on file    Frequency of Social Gatherings with Friends and Family: Not on file    Attends Yazidi Services: Not on file    Active Member of [address] [place] [address] or Organizations: Not on file    Attends Club or Organization Meetings: Not on file    Marital Status: Not on file   Intimate Partner Violence:     Fear of Current or Ex-Partner: Not on file    Emotionally Abused: Not on file    Physically Abused: Not on file    Sexually Abused: Not on file   Housing Stability:     Unable to Pay for Housing in the Last Year: Not on file    Number of [place] in the Last Year: Not on file    Unstable Housing in the Last Year: Not on file       Current Outpatient Medications   Medication Sig Dispense Refill    dapagliflozin (FARXIGA) 10 MG tablet take 1 tablet by mouth IN THE MORNING 30 tablet 0    lisinopril (PRINIVIL;ZESTRIL) 10 MG tablet Take 2 tablets by mouth nightly 90 tablet 1    atorvastatin (LIPITOR) 40 MG tablet Take 1 tablet by mouth nightly 30 tablet 5    amLODIPine (NORVASC) 10 MG tablet Take 1 tablet by mouth daily 30 tablet 5    aspirin 81 MG chewable tablet Take 81 mg by mouth daily       No current facility-administered medications for this visit. Review of Systems -     General ROS: negative  Psychological ROS: negative  Hematological and Lymphatic ROS: No history of blood clots or bleeding disorder. Respiratory ROS: no cough,  or wheezing, the rest see HPI  Cardiovascular ROS: See HPI  Gastrointestinal ROS: negative  Genito-Urinary ROS: no dysuria, trouble voiding, or hematuria  Musculoskeletal ROS: negative  Neurological ROS: no TIA or stroke symptoms  Dermatological ROS: negative      Blood pressure 127/65, pulse 62, height 5' 9.5\" (1.765 m), weight 211 lb (95.7 kg).         Physical Examination:    General appearance - alert, well appearing, and in no distress  HEENT- Pink conjunctiva  , Non-icteri sclera,PERRLA  Mental status - alert, oriented to person, place, and time  Neck - supple, no significant adenopathy, no JVD, or carotid bruits  Chest - clear to auscultation, no wheezes, rales or rhonchi, symmetric air entry  Heart - normal rate, regular rhythm, normal S1, S2, no murmurs, rubs, clicks or gallops  Abdomen - soft, nontender, nondistended, no masses or organomegaly  JHON- no CVA or flank tenderness, no suprapubic tenderness  Neurological - alert, oriented, normal speech, no focal findings or movement disorder noted  Musculoskeletal/limbs - no joint tenderness, deformity or swelling   - peripheral pulses normal, no pedal edema, no clubbing or cyanosis  Skin - normal coloration and turgor, no rashes, no suspicious skin lesions noted  Psych- appropriate mood and affect    Lab  No results for input(s): CKTOTAL, CKMB, CKMBINDEX, TROPONINI in the last 72 hours.   CBC:   Lab Results   Component Value Date    WBC 10.1 07/30/2021    RBC 4.56 07/30/2021    HGB 12.2 07/30/2021    HCT 39.3 07/30/2021    MCV 86.2 07/30/2021    MCH 26.8 07/30/2021    MCHC 31.0 07/30/2021     07/30/2021    MPV 9.5 07/30/2021     BMP:    Lab Results   Component Value Date     01/15/2022    K 5.2 01/15/2022    K 4.8 07/30/2021     01/15/2022    CO2 22 01/15/2022    BUN 29 01/15/2022    LABALBU 3.1 07/27/2021    CREATININE 1.3 01/15/2022    CALCIUM 9.9 01/15/2022    LABGLOM 41 01/15/2022    GLUCOSE 194 01/15/2022     Hepatic Function Panel:    Lab Results   Component Value Date    ALKPHOS 80 07/27/2021    ALT 8 07/27/2021    AST 11 07/27/2021    PROT 6.3 07/27/2021    BILITOT 0.4 07/27/2021    BILIDIR <0.2 07/27/2021    LABALBU 3.1 07/27/2021     Magnesium:    Lab Results   Component Value Date    MG 2.5 07/28/2021     Warfarin PT/INR:  No components found for: PTPATWAR, PTINRWAR  HgBA1c:    Lab Results   Component Value Date LABA1C 7.0 01/05/2022    LABA1C 7.3 09/11/2021     FLP:    Lab Results   Component Value Date    TRIG 186 01/15/2022    HDL 40 01/15/2022    LDLCALC 63 01/15/2022     TSH:    Lab Results   Component Value Date    TSH 0.865 07/26/2021       Conclusions      Summary   Ejection fraction is visually estimated at 55%. Overall left ventricular function is normal.      Signature      ----------------------------------------------------------------   Electronically signed by Lashell sEcudero MD (Interpreting   physician) on 07/27/2021 at 06:40 PM   ----------------------------------------------------------------   Conclusions      Summary   There was a small sized, mild in intensity, paradoxical myocardial   perfusion defect of the inferior wall secondary to attenuation artifact. There was a moderate sized, mild in intensity, partially reversible   myocardial perfusion defect of the mid to apical anterior wall. This is   suggestive of ischemia Vs breast attenuation artifact. Recommendation   Clinical correlation is recommended. Invasive ischemia workup is recommended if clinically indicated. Signatures      ----------------------------------------------------------------   Electronically signed by Oscar Brasher MD (Interpreting   Cardiologist) on 07/29/2021 at 13:39    Assessment     Diagnosis Orders   1. Pure hypercholesterolemia     2. Primary hypertension     3.  borderline Abnormal nuclear stress test- NO angina or angina equiv- med RX         July 28, 2021 consult assessment  ASSESSMENT:  1. Accelerated hypertension, apparently now controlled. 2.  Elevated troponin with no chest pain, no angina or anginal  equivalent, probably seems to be related to the accelerated  hypertension, could be a demand ischemia. 3.  Acute kidney injury and chronic kidney disease. 4.  Urinary tract infection. 5.  History of visual problem. 6.  History of balance problem, walks with a cane.   7.  History of clubfoot

## 2022-04-19 DIAGNOSIS — E78.5 HYPERLIPIDEMIA, UNSPECIFIED HYPERLIPIDEMIA TYPE: ICD-10-CM

## 2022-04-19 DIAGNOSIS — I10 ESSENTIAL HYPERTENSION: ICD-10-CM

## 2022-04-19 RX ORDER — ATORVASTATIN CALCIUM 40 MG/1
40 TABLET, FILM COATED ORAL NIGHTLY
Qty: 30 TABLET | Refills: 5 | Status: SHIPPED | OUTPATIENT
Start: 2022-04-19 | End: 2022-06-29 | Stop reason: SDUPTHER

## 2022-04-19 RX ORDER — LISINOPRIL 10 MG/1
20 TABLET ORAL NIGHTLY
Qty: 90 TABLET | Refills: 1 | Status: SHIPPED | OUTPATIENT
Start: 2022-04-19 | End: 2022-05-04

## 2022-04-19 RX ORDER — AMLODIPINE BESYLATE 10 MG/1
10 TABLET ORAL DAILY
Qty: 30 TABLET | Refills: 5 | Status: SHIPPED | OUTPATIENT
Start: 2022-04-19 | End: 2022-06-29 | Stop reason: SDUPTHER

## 2022-04-20 ENCOUNTER — HOSPITAL ENCOUNTER (INPATIENT)
Age: 67
LOS: 10 days | Discharge: HOME | DRG: 982 | End: 2022-04-30
Attending: STUDENT IN AN ORGANIZED HEALTH CARE EDUCATION/TRAINING PROGRAM | Admitting: STUDENT IN AN ORGANIZED HEALTH CARE EDUCATION/TRAINING PROGRAM
Payer: MEDICARE

## 2022-04-20 ENCOUNTER — APPOINTMENT (OUTPATIENT)
Dept: GENERAL RADIOLOGY | Age: 67
DRG: 982 | End: 2022-04-20
Payer: MEDICARE

## 2022-04-20 DIAGNOSIS — E11.621 DIABETIC ULCER OF RIGHT FOOT ASSOCIATED WITH TYPE 2 DIABETES MELLITUS, WITH MUSCLE INVOLVEMENT WITHOUT EVIDENCE OF NECROSIS, UNSPECIFIED PART OF FOOT (HCC): ICD-10-CM

## 2022-04-20 DIAGNOSIS — L97.515 DIABETIC ULCER OF RIGHT FOOT ASSOCIATED WITH TYPE 2 DIABETES MELLITUS, WITH MUSCLE INVOLVEMENT WITHOUT EVIDENCE OF NECROSIS, UNSPECIFIED PART OF FOOT (HCC): ICD-10-CM

## 2022-04-20 DIAGNOSIS — L03.115 CELLULITIS OF RIGHT FOOT: Primary | ICD-10-CM

## 2022-04-20 PROBLEM — L03.90 CELLULITIS: Status: ACTIVE | Noted: 2022-04-20

## 2022-04-20 LAB
ANION GAP SERPL CALCULATED.3IONS-SCNC: 12 MEQ/L (ref 8–16)
AVERAGE GLUCOSE: 162 MG/DL (ref 70–126)
BASOPHILS # BLD: 0.4 %
BASOPHILS ABSOLUTE: 0.1 THOU/MM3 (ref 0–0.1)
BUN BLDV-MCNC: 23 MG/DL (ref 7–22)
C-REACTIVE PROTEIN: 5.79 MG/DL (ref 0–1)
CALCIUM SERPL-MCNC: 9.4 MG/DL (ref 8.5–10.5)
CHLORIDE BLD-SCNC: 98 MEQ/L (ref 98–111)
CO2: 20 MEQ/L (ref 23–33)
CREAT SERPL-MCNC: 1.2 MG/DL (ref 0.4–1.2)
EOSINOPHIL # BLD: 0.5 %
EOSINOPHILS ABSOLUTE: 0.1 THOU/MM3 (ref 0–0.4)
ERYTHROCYTE [DISTWIDTH] IN BLOOD BY AUTOMATED COUNT: 11.9 % (ref 11.5–14.5)
ERYTHROCYTE [DISTWIDTH] IN BLOOD BY AUTOMATED COUNT: 36.4 FL (ref 35–45)
GFR SERPL CREATININE-BSD FRML MDRD: 45 ML/MIN/1.73M2
GLUCOSE BLD-MCNC: 196 MG/DL (ref 70–108)
GLUCOSE BLD-MCNC: 284 MG/DL (ref 70–108)
GLUCOSE BLD-MCNC: 306 MG/DL (ref 70–108)
HBA1C MFR BLD: 7.4 % (ref 4.4–6.4)
HCT VFR BLD CALC: 35.9 % (ref 37–47)
HEMOGLOBIN: 11.7 GM/DL (ref 12–16)
IMMATURE GRANS (ABS): 0.14 THOU/MM3 (ref 0–0.07)
IMMATURE GRANULOCYTES: 0.6 %
LYMPHOCYTES # BLD: 18.6 %
LYMPHOCYTES ABSOLUTE: 4.1 THOU/MM3 (ref 1–4.8)
MCH RBC QN AUTO: 27.7 PG (ref 26–33)
MCHC RBC AUTO-ENTMCNC: 32.6 GM/DL (ref 32.2–35.5)
MCV RBC AUTO: 85.1 FL (ref 81–99)
MONOCYTES # BLD: 6.5 %
MONOCYTES ABSOLUTE: 1.4 THOU/MM3 (ref 0.4–1.3)
NUCLEATED RED BLOOD CELLS: 0 /100 WBC
OSMOLALITY: 299 MOSMOL/KG (ref 275–295)
PLATELET # BLD: 389 THOU/MM3 (ref 130–400)
PMV BLD AUTO: 9.3 FL (ref 9.4–12.4)
POTASSIUM REFLEX MAGNESIUM: 5 MEQ/L (ref 3.5–5.2)
RBC # BLD: 4.22 MILL/MM3 (ref 4.2–5.4)
SEG NEUTROPHILS: 73.4 %
SEGMENTED NEUTROPHILS ABSOLUTE COUNT: 16.3 THOU/MM3 (ref 1.8–7.7)
SODIUM BLD-SCNC: 130 MEQ/L (ref 135–145)
WBC # BLD: 22.2 THOU/MM3 (ref 4.8–10.8)

## 2022-04-20 PROCEDURE — 6360000002 HC RX W HCPCS: Performed by: PHYSICIAN ASSISTANT

## 2022-04-20 PROCEDURE — 1200000003 HC TELEMETRY R&B

## 2022-04-20 PROCEDURE — 87070 CULTURE OTHR SPECIMN AEROBIC: CPT

## 2022-04-20 PROCEDURE — 99285 EMERGENCY DEPT VISIT HI MDM: CPT

## 2022-04-20 PROCEDURE — 73620 X-RAY EXAM OF FOOT: CPT

## 2022-04-20 PROCEDURE — 83930 ASSAY OF BLOOD OSMOLALITY: CPT

## 2022-04-20 PROCEDURE — 87186 SC STD MICRODIL/AGAR DIL: CPT

## 2022-04-20 PROCEDURE — 86140 C-REACTIVE PROTEIN: CPT

## 2022-04-20 PROCEDURE — 2580000003 HC RX 258: Performed by: PHYSICIAN ASSISTANT

## 2022-04-20 PROCEDURE — 87205 SMEAR GRAM STAIN: CPT

## 2022-04-20 PROCEDURE — 36415 COLL VENOUS BLD VENIPUNCTURE: CPT

## 2022-04-20 PROCEDURE — 80048 BASIC METABOLIC PNL TOTAL CA: CPT

## 2022-04-20 PROCEDURE — 87075 CULTR BACTERIA EXCEPT BLOOD: CPT

## 2022-04-20 PROCEDURE — 87147 CULTURE TYPE IMMUNOLOGIC: CPT

## 2022-04-20 PROCEDURE — 82948 REAGENT STRIP/BLOOD GLUCOSE: CPT

## 2022-04-20 PROCEDURE — 87040 BLOOD CULTURE FOR BACTERIA: CPT

## 2022-04-20 PROCEDURE — 85025 COMPLETE CBC W/AUTO DIFF WBC: CPT

## 2022-04-20 PROCEDURE — 99223 1ST HOSP IP/OBS HIGH 75: CPT | Performed by: PHYSICIAN ASSISTANT

## 2022-04-20 PROCEDURE — 83036 HEMOGLOBIN GLYCOSYLATED A1C: CPT

## 2022-04-20 PROCEDURE — 6370000000 HC RX 637 (ALT 250 FOR IP): Performed by: PHYSICIAN ASSISTANT

## 2022-04-20 PROCEDURE — 87077 CULTURE AEROBIC IDENTIFY: CPT

## 2022-04-20 RX ORDER — SODIUM CHLORIDE 9 MG/ML
INJECTION, SOLUTION INTRAVENOUS CONTINUOUS
Status: DISCONTINUED | OUTPATIENT
Start: 2022-04-20 | End: 2022-04-21

## 2022-04-20 RX ORDER — ATORVASTATIN CALCIUM 40 MG/1
40 TABLET, FILM COATED ORAL NIGHTLY
Status: DISCONTINUED | OUTPATIENT
Start: 2022-04-20 | End: 2022-04-30 | Stop reason: HOSPADM

## 2022-04-20 RX ORDER — LISINOPRIL 20 MG/1
20 TABLET ORAL NIGHTLY
Status: DISCONTINUED | OUTPATIENT
Start: 2022-04-20 | End: 2022-04-25

## 2022-04-20 RX ORDER — ACETAMINOPHEN 325 MG/1
650 TABLET ORAL EVERY 6 HOURS PRN
Status: DISCONTINUED | OUTPATIENT
Start: 2022-04-20 | End: 2022-04-30 | Stop reason: HOSPADM

## 2022-04-20 RX ORDER — POLYETHYLENE GLYCOL 3350 17 G/17G
17 POWDER, FOR SOLUTION ORAL DAILY PRN
Status: DISCONTINUED | OUTPATIENT
Start: 2022-04-20 | End: 2022-04-30 | Stop reason: HOSPADM

## 2022-04-20 RX ORDER — SODIUM CHLORIDE 9 MG/ML
INJECTION, SOLUTION INTRAVENOUS PRN
Status: DISCONTINUED | OUTPATIENT
Start: 2022-04-20 | End: 2022-04-23 | Stop reason: SDUPTHER

## 2022-04-20 RX ORDER — SODIUM CHLORIDE 0.9 % (FLUSH) 0.9 %
10 SYRINGE (ML) INJECTION PRN
Status: DISCONTINUED | OUTPATIENT
Start: 2022-04-20 | End: 2022-04-23 | Stop reason: SDUPTHER

## 2022-04-20 RX ORDER — SODIUM CHLORIDE 0.9 % (FLUSH) 0.9 %
10 SYRINGE (ML) INJECTION EVERY 12 HOURS SCHEDULED
Status: DISCONTINUED | OUTPATIENT
Start: 2022-04-20 | End: 2022-04-23 | Stop reason: SDUPTHER

## 2022-04-20 RX ORDER — AMLODIPINE BESYLATE 10 MG/1
10 TABLET ORAL DAILY
Status: DISCONTINUED | OUTPATIENT
Start: 2022-04-21 | End: 2022-04-30 | Stop reason: HOSPADM

## 2022-04-20 RX ORDER — ONDANSETRON 4 MG/1
4 TABLET, ORALLY DISINTEGRATING ORAL EVERY 8 HOURS PRN
Status: DISCONTINUED | OUTPATIENT
Start: 2022-04-20 | End: 2022-04-27 | Stop reason: SDUPTHER

## 2022-04-20 RX ORDER — ACETAMINOPHEN 650 MG/1
650 SUPPOSITORY RECTAL EVERY 6 HOURS PRN
Status: DISCONTINUED | OUTPATIENT
Start: 2022-04-20 | End: 2022-04-30 | Stop reason: HOSPADM

## 2022-04-20 RX ORDER — ONDANSETRON 2 MG/ML
4 INJECTION INTRAMUSCULAR; INTRAVENOUS EVERY 6 HOURS PRN
Status: DISCONTINUED | OUTPATIENT
Start: 2022-04-20 | End: 2022-04-27 | Stop reason: SDUPTHER

## 2022-04-20 RX ADMIN — PIPERACILLIN AND TAZOBACTAM 3375 MG: 3; .375 INJECTION, POWDER, LYOPHILIZED, FOR SOLUTION INTRAVENOUS at 20:46

## 2022-04-20 RX ADMIN — LISINOPRIL 20 MG: 20 TABLET ORAL at 20:47

## 2022-04-20 RX ADMIN — SODIUM CHLORIDE, PRESERVATIVE FREE 10 ML: 5 INJECTION INTRAVENOUS at 20:47

## 2022-04-20 RX ADMIN — VANCOMYCIN HYDROCHLORIDE 1500 MG: 5 INJECTION, POWDER, LYOPHILIZED, FOR SOLUTION INTRAVENOUS at 17:44

## 2022-04-20 RX ADMIN — ATORVASTATIN CALCIUM 40 MG: 40 TABLET, FILM COATED ORAL at 20:47

## 2022-04-20 RX ADMIN — SODIUM CHLORIDE: 9 INJECTION, SOLUTION INTRAVENOUS at 17:43

## 2022-04-20 ASSESSMENT — PAIN SCALES - GENERAL: PAINLEVEL_OUTOF10: 0

## 2022-04-20 ASSESSMENT — ENCOUNTER SYMPTOMS
RESPIRATORY NEGATIVE: 1
EYES NEGATIVE: 1
GASTROINTESTINAL NEGATIVE: 1

## 2022-04-20 ASSESSMENT — LIFESTYLE VARIABLES
HOW OFTEN DO YOU HAVE A DRINK CONTAINING ALCOHOL: MONTHLY OR LESS
HOW MANY STANDARD DRINKS CONTAINING ALCOHOL DO YOU HAVE ON A TYPICAL DAY: 1 OR 2

## 2022-04-20 NOTE — ED TRIAGE NOTES
Pt to ED via intake with c/o right foot infection. Pt reports that they pulled what they thought was a \"corn\" off their foot and now foot I has a wound on it. Pt is diabetic. VSS.

## 2022-04-20 NOTE — ED PROVIDER NOTES
[facility] ENCOUNTER          Pt Name: Nico Flores  MRN: [id number]  [place] 1955  Date of evaluation: 4/20/2022  Treating Resident Physician: Nile Mercer DPM  Supervising Physician: Divya King MD    [address]       Chief Complaint   Patient presents with    Foot Pain     rigth foot     History obtained from the patient. HISTORY OF PRESENT ILLNESS    HPI  Nico Flores is a 73 y.o. female who presents to the emergency department for evaluation of pain to her right foot that began the day before yesterday. Patient states she noticed a corn on the outside of her right foot and pulled it off the day before yesterday. She states she has been having dull pain to the area ever since. She states her eyesight is not very good and she cannot see her feet well, so she had her daughter come over to look at her feet. She states her daughter noticed redness and swelling to her right foot. She denies trauma to the area. She states she was recently diagnosed with diabetes by her PCP, she last saw her PCP 3 months ago. She denies N/V/F/SOB/CP/C or other constitutional symptoms at this time. The patient has no other acute complaints at this time. REVIEW OF SYSTEMS   Review of Systems   Constitutional: Negative. HENT: Negative. Eyes: Negative. Respiratory: Negative. Cardiovascular: Negative. Gastrointestinal: Negative. Endocrine: Negative. Genitourinary: Negative. Skin: Positive for wound. Neurological: Negative. Hematological: Negative. Psychiatric/Behavioral: Negative. PAST MEDICAL AND SURGICAL HISTORY     Past Medical History:   Diagnosis Date    Hypertension     Psoriasis      No past surgical history on file.       MEDICATIONS     Current Facility-Administered Medications:     piperacillin-tazobactam (ZOSYN) 3,375 mg in dextrose 5 % 50 mL IVPB (mini-bag), 3,375 mg, IntraVENous, Once, [facility] HOWARD Suggs    vancomycin (VANCOCIN) 1,500 mg in dextrose 5 % 500 mL IVPB, 1,500 mg, IntraVENous, Once, HOWARD Molina    amLODIPine (NORVASC) tablet 10 mg, 10 mg, Oral, Daily, HOWARD Alarcon    atorvastatin (LIPITOR) tablet 40 mg, 40 mg, Oral, Nightly, HOWARD Alarcon    lisinopril (PRINIVIL;ZESTRIL) tablet 20 mg, 20 mg, Oral, Nightly, Diogenes Alarcon    sodium chloride flush 0.9 % injection 10 mL, 10 mL, IntraVENous, 2 times per day, HOWARD Molina    sodium chloride flush 0.9 % injection 10 mL, 10 mL, IntraVENous, PRN, HOWARD Alarcon    0.9 % sodium chloride infusion, , IntraVENous, PRN, HOWARD Alarcon    ondansetron (ZOFRAN-ODT) disintegrating tablet 4 mg, 4 mg, Oral, Q8H PRN **OR** ondansetron (ZOFRAN) injection 4 mg, 4 mg, IntraVENous, Q6H PRN, HOWARD Alarcon    polyethylene glycol (GLYCOLAX) packet 17 g, 17 g, Oral, Daily PRN, HOWARD Molina    acetaminophen (TYLENOL) tablet 650 mg, 650 mg, Oral, Q6H PRN **OR** acetaminophen (TYLENOL) suppository 650 mg, 650 mg, Rectal, Q6H PRN, HOWARD Alarcon    0.9 % sodium chloride infusion, , IntraVENous, Continuous, HOWARD Alarcon    piperacillin-tazobactam (ZOSYN) 3,375 mg in dextrose 5 % 50 mL IVPB extended infusion (mini-bag), 3,375 mg, IntraVENous, Q8H, HOWARD Alarcon    Current Outpatient Medications:     dapagliflozin (FARXIGA) 10 MG tablet, take 1 tablet by mouth IN THE MORNING, Disp: 30 tablet, Rfl: 0    amLODIPine (NORVASC) 10 MG tablet, Take 1 tablet by mouth daily, Disp: 30 tablet, Rfl: 5    atorvastatin (LIPITOR) 40 MG tablet, Take 1 tablet by mouth nightly, Disp: 30 tablet, Rfl: 5    lisinopril (PRINIVIL;ZESTRIL) 10 MG tablet, Take 2 tablets by mouth nightly, Disp: 90 tablet, Rfl: 1    aspirin 81 MG chewable tablet, Take 81 mg by mouth daily, Disp: , Rfl:       SOCIAL HISTORY     Social History     Social History Narrative    Not on file     Social History     Tobacco Use    Smoking status: Former Smoker     Packs/day: 0.50     Years: 15.00     Pack years: 7.50     Types: Cigarettes     Quit date: 2011     Years since quittin.3    Smokeless tobacco: Never Used   Substance Use Topics    Alcohol use: Not Currently     Comment: social    Drug use: Never         ALLERGIES   No Known Allergies      FAMILY HISTORY     Family History   Problem Relation Age of Onset    Other Mother         thyroidectomy    Diabetes Mother     Heart Disease Mother     Heart Disease Father          PREVIOUS RECORDS   Previous records reviewed: This is this patient's first visit to [facility] ED, no previous records available on EMR. .        PHYSICAL EXAM     ED Triage Vitals [22 1240]   BP Temp Temp src Pulse Resp SpO2 Height Weight   (!) 147/68 99.3 °F (37.4 °C) -- 84 17 99 % -- --     Initial vital signs and nursing assessment reviewed and abnormal from elevated BP. There is no height or weight on file to calculate BMI. Pulsoximetry is normal per my interpretation. Additional Vital Signs:  Vitals:    22 1240   BP: (!) 147/68   Pulse: 84   Resp: 17   Temp: 99.3 °F (37.4 °C)   SpO2: 99%       Physical Exam  Constitutional:       Appearance: She is obese. HENT:      Head: Normocephalic and atraumatic. Cardiovascular:      Rate and Rhythm: Normal rate and regular rhythm. Pulses:           Dorsalis pedis pulses are 2+ on the right side and 2+ on the left side. Posterior tibial pulses are 2+ on the right side and 2+ on the left side. Pulmonary:      Effort: Pulmonary effort is normal.      Breath sounds: Normal breath sounds. Abdominal:      Palpations: Abdomen is soft. Tenderness: There is no abdominal tenderness. Musculoskeletal:        Feet:    Feet:      Right foot:      Protective Sensation: 10 sites tested. 5 sites sensed. Skin integrity: Ulcer, erythema and warmth present. Toenail Condition: Fungal disease present.      Left foot:      Protective Sensation: 10 sites tested. 5 sites sensed. Toenail Condition: Fungal disease present. Comments: Erythema and edema tracking proximally up to mid leg on right. Skin:     Findings: Lesion present. Neurological:      Mental Status: She is alert. MEDICAL DECISION MAKING   Initial Assessment:   1. Patient presents with recent diagnosis of DM type 2, not currently insulin dependent. PE consistent with moderate diabetic peripheral neuropathy, no pain on palpation of ulcer to right foot, negative drainage, negative PTB XR right foot to rule out soft tissue emphysema and bony erosion. Labs to evaluate systemic response to infection. 2. WBC 22 CRP 5.79, patient hyponatremic 130 Lrinec score 5 we will have patient admitted for further evaluation by Podiatry, IV antibiotic therapy  Plan:    XR right foot 3 views   CBC, BMP, CRP.    Vancomycin IV x1 pharmacy to dose   Zosyn x1 3.375mg IV   Admit to med/surg        ED RESULTS   Laboratory results:  Labs Reviewed   C-REACTIVE PROTEIN - Abnormal; Notable for the following components:       Result Value    CRP 5.79 (*)     All other components within normal limits   CBC WITH AUTO DIFFERENTIAL - Abnormal; Notable for the following components:    WBC 22.2 (*)     Hemoglobin 11.7 (*)     Hematocrit 35.9 (*)     MPV 9.3 (*)     Segs Absolute 16.3 (*)     Monocytes Absolute 1.4 (*)     Immature Grans (Abs) 0.14 (*)     All other components within normal limits   BASIC METABOLIC PANEL W/ REFLEX TO MG FOR LOW K - Abnormal; Notable for the following components:    Sodium 130 (*)     CO2 20 (*)     Glucose 284 (*)     BUN 23 (*)     All other components within normal limits   GLOMERULAR FILTRATION RATE, ESTIMATED - Abnormal; Notable for the following components:    Est, Glom Filt Rate 45 (*)     All other components within normal limits   CULTURE, ANAEROBIC AND AEROBIC    Narrative:     iPipeline Plan - [id number]   CULTURE, BLOOD 1    Narrative:     Epic Plan - [id number]   CULTURE, BLOOD 2    Narrative:     Epic Plan - [id number]   ANION GAP   URINALYSIS WITH MICROSCOPIC   SODIUM, URINE, RANDOM   OSMOLALITY, SERUM    Narrative:     Epic Plan - [id number]   HEMOGLOBIN A1C       Radiologic studies results:  XR FOOT RIGHT (2 VIEWS)   Final Result   1. I do not see radiographic evidence of bony destruction, bony erosion, or periostitis to reflect osteomyelitis at this time. Early osteomyelitis may be occult on radiographs. Clinical correlation is recommended. 2. A soft tissue wound/ulcer is seen overlying the fifth metatarsal            **This report has been created using voice recognition software. It may contain minor errors which are inherent in voice recognition technology. **      Final report electronically signed by Dr Doretha Navarro on 4/20/2022 2:15 PM          ED Medications administered this visit:   Medications   piperacillin-tazobactam (ZOSYN) 3,375 mg in dextrose 5 % 50 mL IVPB (mini-bag) (has no administration in time range)   vancomycin (VANCOCIN) 1,500 mg in dextrose 5 % 500 mL IVPB (has no administration in time range)   amLODIPine (NORVASC) tablet 10 mg (has no administration in time range)   atorvastatin (LIPITOR) tablet 40 mg (has no administration in time range)   lisinopril (PRINIVIL;ZESTRIL) tablet 20 mg (has no administration in time range)   sodium chloride flush 0.9 % injection 10 mL (has no administration in time range)   sodium chloride flush 0.9 % injection 10 mL (has no administration in time range)   0.9 % sodium chloride infusion (has no administration in time range)   ondansetron (ZOFRAN-ODT) disintegrating tablet 4 mg (has no administration in time range)     Or   ondansetron (ZOFRAN) injection 4 mg (has no administration in time range)   polyethylene glycol (GLYCOLAX) packet 17 g (has no administration in time range)   acetaminophen (TYLENOL) tablet 650 mg (has no administration in time range)     Or   acetaminophen (TYLENOL) suppository 650 mg (has no administration in time range)   0.9 % sodium chloride infusion (has no administration in time range)   piperacillin-tazobactam (ZOSYN) 3,375 mg in dextrose 5 % 50 mL IVPB extended infusion (mini-bag) (has no administration in time range)         ED COURSE         Strict return precautions and follow up instructions were discussed with the patient prior to discharge, with which the patient agrees. MEDICATION CHANGES     New Prescriptions    No medications on file         FINAL DISPOSITION     Final diagnoses:   Cellulitis of right foot   Diabetic ulcer of right foot associated with type 2 diabetes mellitus, with muscle involvement without evidence of necrosis, unspecified part of foot ([facility] [address]     Condition: condition: stable  Dispo: Admit to med/surg floor      This transcription was electronically signed. Parts of this transcriptions may have been dictated by use of voice recognition software and electronically transcribed, and parts may have been transcribed with the assistance of an ED scribe. The transcription may contain errors not detected in proofreading. Please refer to my supervising physician's documentation if my documentation differs.     Electronically Signed: Emma Pennington DPM, 04/20/22, 3:36 PM         Angela Koch DPM  Resident  04/20/22 [id number]

## 2022-04-20 NOTE — PROGRESS NOTES
Pt admitted to  6K4 from ED. Complaints: right foot pain. IV site free of s/s of infection or infiltration. Vital signs obtained. Assessment and data collection initiated. Two nurse skin assessment performed by Uche TOVAR and Megha TOVAR. Oriented to room. Policies and procedures for  explained. Uche TOVAR discussed hourly rounding with patient addressing 5 P's. Fall prevention and safety brochure discussed with patient. Bed alarm on. Call light in reach.

## 2022-04-20 NOTE — H&P
Hospitalist - History & Physical      Patient: Jagruti Sloan    Unit/Bed:42/042A  YOB: 1955  MRN: [id number]   Acct: [de-identified]   PCP: Venkat Saldivar MD    Date of Service: Pt seen/examined on 04/20/22  and Admitted to Inpatient with expected LOS greater than two midnights due to medical therapy. Chief Complaint:  Cellulitis     Assessment and Plan:-  1. Cellulitis, right foot: Podiatry & ID consulted. Pt started on Vanco (Pharmacy to Dose) & Zosyn in the ED, this will be continued until cultures are resulted or ID discretion. XR does not show osteomyelitis at this time. WBC 22.2, pt afebrile, CRP 5.79. CBC in the AM. We will have RN rachel borders of cellulitis. 2. Hyponatremia: Corrected sodium with hyperglycemia 134. BMP in the AM. IVF. 3. NIDDMII:  on admission. HgbA1c pending. SSI while inpatient. Hold PO DM medications. Continue to monitor. Pt may need tighter control. 4. Metabolic Acidosis, NAG: Aware. CO2 20. BMP in the AM.   5. Essential HTN: BP stable. Continue home medications. History Of Present Illness: This is a relatively healthy 56-year-old female who presented centers [address] emergency department on 4/20 due to foot pain in her right foot. Patient states that her foot started to hurt on 4/18 and describes the pain as constant and dull pain. Patient rates the pain currently 4 out of 10. Denies any alleviating or aggravating factors. Patient states she noticed a corn on the outside of her foot and pulled it off on 4/18 as well. Patient states that her eyesight is not very good and she cannot see her feet well so today her daughter came to look at her feet due to the pain. Daughter saw redness and swelling to her right foot. Patient denies nausea, vomiting, fever, chills, shortness of breath, chest pain. Patient does not look toxic at this time. Of note, patient was recently diagnosed with diabetes by her PCP about 3 months ago.   Patient has been taking her diabetes medications as prescribed. In the ED, patient was found to have elevated white blood cell count 20.2 elevated CRP 5. 84. X-ray of right foot did not show osteomyelitis. Patient was given IV antibiotics. Hospitalist were asked to admit patient for podiatry and infectious disease consult and further work-up. Past Medical History:        Diagnosis Date    Hypertension     Psoriasis        Past Surgical History:    No past surgical history on file. Home Medications:   No current facility-administered medications on file prior to encounter. Current Outpatient Medications on File Prior to Encounter   Medication Sig Dispense Refill    dapagliflozin (FARXIGA) 10 MG tablet take 1 tablet by mouth IN THE MORNING 30 tablet 0    amLODIPine (NORVASC) 10 MG tablet Take 1 tablet by mouth daily 30 tablet 5    atorvastatin (LIPITOR) 40 MG tablet Take 1 tablet by mouth nightly 30 tablet 5    lisinopril (PRINIVIL;ZESTRIL) 10 MG tablet Take 2 tablets by mouth nightly 90 tablet 1    aspirin 81 MG chewable tablet Take 81 mg by mouth daily         Allergies:    Patient has no known allergies. Social History:    reports that she quit smoking about 11 years ago. Her smoking use included cigarettes. She has a 7.50 pack-year smoking history. She has never used smokeless tobacco. She reports previous alcohol use. She reports that she does not use drugs. Family History:       Problem Relation Age of Onset    Other Mother         thyroidectomy    Diabetes Mother     Heart Disease Mother     Heart Disease Father        Diet:  No diet orders on file    Review of systems:   Pertinent positives as noted in the HPI. All other systems reviewed and negative. PHYSICAL EXAM:  BP (!) 147/68   Pulse 84   Temp 99.3 °F (37.4 °C)   Resp 17   SpO2 99%   General appearance: No apparent distress, appears stated age and cooperative. HEENT: Normal cephalic, atraumatic without obvious deformity. Pupils equal, round, and reactive to light. Extra ocular muscles intact. Conjunctivae/corneas clear. Poor dentition. Neck: Supple, with full range of motion. No jugular venous distention. Trachea midline. Respiratory:  Normal respiratory effort on RA. Clear to auscultation, bilaterally without Rales/Wheezes/Rhonchi. Cardiovascular: Regular rate and rhythm with normal S1/S2 without murmurs, rubs or gallops. Abdomen: Soft, non-tender, non-distended with normal bowel sounds. Musculoskeletal:  No clubbing, cyanosis or edema bilaterally. Skin: Red right foot tracking up the ankle. Mildly TTP. Neurologic:  Neurovascularly intact without any focal sensory/motor deficits. Cranial nerves: II-XII intact, grossly non-focal.  Psychiatric: Alert and oriented, thought content appropriate, normal insight  Capillary Refill: Brisk,< 3 seconds   Peripheral Pulses: +2 palpable, equal bilaterally     Labs:   Recent Labs     04/20/22  1328   WBC 22.2*   HGB 11.7*   HCT 35.9*        Recent Labs     04/20/22  1328   *   K 5.0   CL 98   CO2 20*   BUN 23*   CREATININE 1.2   CALCIUM 9.4     No results for input(s): AST, ALT, BILIDIR, BILITOT, ALKPHOS in the last 72 hours. No results for input(s): INR in the last 72 hours. No results for input(s): Connee Matar in the last 72 hours. Urinalysis:    Lab Results   Component Value Date    NITRU NEGATIVE 07/26/2021    WBCUA  07/26/2021    BACTERIA MANY 07/26/2021    RBCUA 3-5 07/26/2021    BLOODU SMALL 07/26/2021    SPECGRAV 1.007 07/26/2021       Radiology:   XR FOOT RIGHT (2 VIEWS)   Final Result   1. I do not see radiographic evidence of bony destruction, bony erosion, or periostitis to reflect osteomyelitis at this time. Early osteomyelitis may be occult on radiographs. Clinical correlation is recommended. 2. A soft tissue wound/ulcer is seen overlying the fifth metatarsal            **This report has been created using voice recognition software. It may contain minor errors which are inherent in voice recognition technology. **      Final report electronically signed by Dr Cony Sims on 4/20/2022 2:15 PM          Electronically signed by HOWARD Lopez on 4/20/2022 at 3:17 PM

## 2022-04-20 NOTE — PLAN OF CARE
Problem: Discharge Planning  Goal: Discharge to home or other facility with appropriate resources  Outcome: Progressing  Flowsheets (Taken 4/20/2022 1719)  Discharge to home or other facility with appropriate resources:   Identify barriers to discharge with patient and caregiver   Arrange for needed discharge resources and transportation as appropriate   Identify discharge learning needs (meds, wound care, etc)  Note: Collaborate with provider and Case management to ensure discharge planning is initiated. Identify and anticipate the needs of patient. Problem: Safety - Adult  Goal: Free from fall injury  Outcome: Progressing  Flowsheets (Taken 4/20/2022 1721)  Free From Fall Injury:   Instruct family/caregiver on patient safety   Based on caregiver fall risk screen, instruct family/caregiver to ask for assistance with transferring infant if caregiver noted to have fall risk factors  Note: Instruct pt on use of call light and keep needs items within reach. Pt bed to remain in lowest position. Manage the need of patient to prevent injuries or falls. Problem: Skin/Tissue Integrity  Goal: Absence of new skin breakdown  Description: 1. Monitor for areas of redness and/or skin breakdown  2. Assess vascular access sites hourly  3. Every 4-6 hours minimum:  Change oxygen saturation probe site  4. Every 4-6 hours:  If on nasal continuous positive airway pressure, respiratory therapy assess nares and determine need for appliance change or resting period. Outcome: Progressing  Note: Pt admitted with wound to right foot. Podiatry consulted, dressing clean dry and intact.  Will continue to reassess     Problem: Pain  Goal: Verbalizes/displays adequate comfort level or baseline comfort level  Outcome: Progressing  Flowsheets (Taken 4/20/2022 1716)  Verbalizes/displays adequate comfort level or baseline comfort level:   Encourage patient to monitor pain and request assistance   Assess pain using appropriate pain scale   Administer analgesics based on type and severity of pain and evaluate response   Implement non-pharmacological measures as appropriate and evaluate response  Note: Pt will verbalize pain as soon as possible. Pain will will monitored per policy. Non pharmacological measures will be initiated as needed. Problem: ABCDS Injury Assessment  Goal: Absence of physical injury  Note: Pt will remain free of injury during hospital stay. Will assist patient as needed for bathroom assistance and activities of daily living.

## 2022-04-20 NOTE — PROGRESS NOTES
[facility] Pharmacokinetic Monitoring Service - Vancomycin     Jagruti Sloan is a 73 y.o. female starting on vancomycin therapy for cellulitis. Pharmacy consulted by Tan Lau for monitoring and adjustment. Target Concentration: Goal AUC/TIFFANIE 400-600 mg*hr/L    Additional Antimicrobials: Zosyn    Pertinent Laboratory Values: Wt Readings from Last 1 Encounters:   03/24/22 211 lb (95.7 kg)     Temp Readings from Last 1 Encounters:   04/20/22 98.1 °F (36.7 °C) (Oral)     CrCl cannot be calculated (Unknown ideal weight. ). Recent Labs     04/20/22  1328   CREATININE 1.2   WBC 22.2*         Pertinent Cultures:  Culture Date Source Results   4/20/22 BC x 2    4/20/22 Foot culture    MRSA Nasal Swab: N/A. Non-respiratory infection.     Plan:  Dosing recommendations based on Bayesian software  Start vancomycin 1500mg IV q24h  Anticipated AUC of 482 and trough concentration of 14.4 at steady state  Renal labs as indicated   Vancomycin concentration not ordered yet as first dose has not been given  Pharmacy will continue to monitor patient and adjust therapy as indicated    Thank you for the consult,  Brandon Underwood, [address] [facility]  4/20/2022 4:59 PM

## 2022-04-21 ENCOUNTER — APPOINTMENT (OUTPATIENT)
Dept: MRI IMAGING | Age: 67
DRG: 982 | End: 2022-04-21
Payer: MEDICARE

## 2022-04-21 LAB
ANION GAP SERPL CALCULATED.3IONS-SCNC: 10 MEQ/L (ref 8–16)
BASOPHILS # BLD: 0.5 %
BASOPHILS ABSOLUTE: 0.1 THOU/MM3 (ref 0–0.1)
BUN BLDV-MCNC: 21 MG/DL (ref 7–22)
CALCIUM SERPL-MCNC: 8.6 MG/DL (ref 8.5–10.5)
CHLORIDE BLD-SCNC: 106 MEQ/L (ref 98–111)
CO2: 19 MEQ/L (ref 23–33)
CREAT SERPL-MCNC: 1.1 MG/DL (ref 0.4–1.2)
DIFFERENTIAL TYPE: ABNORMAL
EOSINOPHIL # BLD: 1.7 %
EOSINOPHILS ABSOLUTE: 0.4 THOU/MM3 (ref 0–0.4)
ERYTHROCYTE [DISTWIDTH] IN BLOOD BY AUTOMATED COUNT: 11.9 % (ref 11.5–14.5)
ERYTHROCYTE [DISTWIDTH] IN BLOOD BY AUTOMATED COUNT: 37 FL (ref 35–45)
GFR SERPL CREATININE-BSD FRML MDRD: 50 ML/MIN/1.73M2
GLUCOSE BLD-MCNC: 143 MG/DL (ref 70–108)
GLUCOSE BLD-MCNC: 162 MG/DL (ref 70–108)
GLUCOSE BLD-MCNC: 205 MG/DL (ref 70–108)
GLUCOSE BLD-MCNC: 231 MG/DL (ref 70–108)
GLUCOSE BLD-MCNC: 279 MG/DL (ref 70–108)
HCT VFR BLD CALC: 30.5 % (ref 37–47)
HEMOGLOBIN: 9.7 GM/DL (ref 12–16)
IMMATURE GRANS (ABS): 0.09 THOU/MM3 (ref 0–0.07)
IMMATURE GRANULOCYTES: 0.4 %
INR BLD: 1.04 (ref 0.85–1.13)
LACTIC ACID: 0.7 MMOL/L (ref 0.5–2)
LYMPHOCYTES # BLD: 41.9 %
LYMPHOCYTES ABSOLUTE: 8.7 THOU/MM3 (ref 1–4.8)
MCH RBC QN AUTO: 27.2 PG (ref 26–33)
MCHC RBC AUTO-ENTMCNC: 31.8 GM/DL (ref 32.2–35.5)
MCV RBC AUTO: 85.7 FL (ref 81–99)
MONOCYTES # BLD: 7.2 %
MONOCYTES ABSOLUTE: 1.5 THOU/MM3 (ref 0.4–1.3)
NUCLEATED RED BLOOD CELLS: 0 /100 WBC
PATHOLOGIST REVIEW: ABNORMAL
PLATELET # BLD: 331 THOU/MM3 (ref 130–400)
PLATELET ESTIMATE: ADEQUATE
PMV BLD AUTO: 9.9 FL (ref 9.4–12.4)
POTASSIUM REFLEX MAGNESIUM: 4.1 MEQ/L (ref 3.5–5.2)
RBC # BLD: 3.56 MILL/MM3 (ref 4.2–5.4)
SCAN OF BLOOD SMEAR: NORMAL
SEG NEUTROPHILS: 48.3 %
SEGMENTED NEUTROPHILS ABSOLUTE COUNT: 10 THOU/MM3 (ref 1.8–7.7)
SODIUM BLD-SCNC: 135 MEQ/L (ref 135–145)
WBC # BLD: 20.8 THOU/MM3 (ref 4.8–10.8)

## 2022-04-21 PROCEDURE — 99233 SBSQ HOSP IP/OBS HIGH 50: CPT | Performed by: INTERNAL MEDICINE

## 2022-04-21 PROCEDURE — 80048 BASIC METABOLIC PNL TOTAL CA: CPT

## 2022-04-21 PROCEDURE — 6360000002 HC RX W HCPCS: Performed by: PHYSICIAN ASSISTANT

## 2022-04-21 PROCEDURE — 1200000003 HC TELEMETRY R&B

## 2022-04-21 PROCEDURE — 82948 REAGENT STRIP/BLOOD GLUCOSE: CPT

## 2022-04-21 PROCEDURE — 73718 MRI LOWER EXTREMITY W/O DYE: CPT

## 2022-04-21 PROCEDURE — 85610 PROTHROMBIN TIME: CPT

## 2022-04-21 PROCEDURE — 6370000000 HC RX 637 (ALT 250 FOR IP): Performed by: PHYSICIAN ASSISTANT

## 2022-04-21 PROCEDURE — 2580000003 HC RX 258: Performed by: PHYSICIAN ASSISTANT

## 2022-04-21 PROCEDURE — 83605 ASSAY OF LACTIC ACID: CPT

## 2022-04-21 PROCEDURE — 85025 COMPLETE CBC W/AUTO DIFF WBC: CPT

## 2022-04-21 PROCEDURE — 36415 COLL VENOUS BLD VENIPUNCTURE: CPT

## 2022-04-21 RX ADMIN — ACETAMINOPHEN 650 MG: 325 TABLET ORAL at 08:47

## 2022-04-21 RX ADMIN — VANCOMYCIN HYDROCHLORIDE 1500 MG: 5 INJECTION, POWDER, LYOPHILIZED, FOR SOLUTION INTRAVENOUS at 17:42

## 2022-04-21 RX ADMIN — ATORVASTATIN CALCIUM 40 MG: 40 TABLET, FILM COATED ORAL at 20:12

## 2022-04-21 RX ADMIN — PIPERACILLIN AND TAZOBACTAM 3375 MG: 3; .375 INJECTION, POWDER, LYOPHILIZED, FOR SOLUTION INTRAVENOUS at 01:12

## 2022-04-21 RX ADMIN — SODIUM CHLORIDE, PRESERVATIVE FREE 10 ML: 5 INJECTION INTRAVENOUS at 08:43

## 2022-04-21 RX ADMIN — SODIUM CHLORIDE: 9 INJECTION, SOLUTION INTRAVENOUS at 04:32

## 2022-04-21 RX ADMIN — PIPERACILLIN AND TAZOBACTAM 3375 MG: 3; .375 INJECTION, POWDER, LYOPHILIZED, FOR SOLUTION INTRAVENOUS at 20:22

## 2022-04-21 RX ADMIN — AMLODIPINE BESYLATE 10 MG: 10 TABLET ORAL at 08:43

## 2022-04-21 RX ADMIN — PIPERACILLIN AND TAZOBACTAM 3375 MG: 3; .375 INJECTION, POWDER, LYOPHILIZED, FOR SOLUTION INTRAVENOUS at 08:47

## 2022-04-21 RX ADMIN — LISINOPRIL 20 MG: 20 TABLET ORAL at 20:12

## 2022-04-21 ASSESSMENT — PAIN DESCRIPTION - PAIN TYPE: TYPE: ACUTE PAIN

## 2022-04-21 ASSESSMENT — PAIN DESCRIPTION - ORIENTATION
ORIENTATION: RIGHT
ORIENTATION: RIGHT

## 2022-04-21 ASSESSMENT — PAIN DESCRIPTION - DESCRIPTORS
DESCRIPTORS: PRESSURE
DESCRIPTORS: PRESSURE

## 2022-04-21 ASSESSMENT — PAIN SCALES - GENERAL
PAINLEVEL_OUTOF10: 2
PAINLEVEL_OUTOF10: 4

## 2022-04-21 ASSESSMENT — PAIN DESCRIPTION - LOCATION
LOCATION: FOOT
LOCATION: FOOT

## 2022-04-21 ASSESSMENT — PAIN - FUNCTIONAL ASSESSMENT: PAIN_FUNCTIONAL_ASSESSMENT: ACTIVITIES ARE NOT PREVENTED

## 2022-04-21 NOTE — CONSULTS
CONSULTATION NOTE :ID       Patient - Sonja Edgar,  Age - 73 y.o.    - 1955      Room Number - 6K-04/004-A   N -  [id number]   [facility]  Date of Admission -  2022 12:44 PM  Patient's PCP: Amanda Collazo MD     Requesting Physician: Delfin Nagy DO    REASON FOR CONSULTATION   Cellulitis and possible early OM in foot  CHIEF COMPLAINT   R foot wound    HISTORY OF PRESENT ILLNESS       This is a very pleasant 73 y.o. female who was admitted to the hospital with a chief complaints of right foot wound. Noticed the wound and pain start 3 days ago. Noticed a corn on the outside of her foot and pulled it foot. Daughter saw redness and swelling of foot. Recently diagnosed with diabetes. Had elevated WBC on arrival and elevated Crp. Xray showed no bony erosion to reflect OM. Early OM may be occult in radiographs. Denies any other systemic symptoms. Started IV Vanc and Zosyn in ED. PAST MEDICAL  HISTORY       Past Medical History:   Diagnosis Date    Diabetes mellitus ([facility] [address]     Hypertension     Psoriasis        PAST SURGICAL HISTORY     History reviewed. No pertinent surgical history. MEDICATIONS:       Scheduled Meds:   amLODIPine  10 mg Oral Daily    atorvastatin  40 mg Oral Nightly    lisinopril  20 mg Oral Nightly    sodium chloride flush  10 mL IntraVENous 2 times per day    piperacillin-tazobactam  3,375 mg IntraVENous Q8H    vancomycin  1,500 mg IntraVENous Q24H     Continuous Infusions:   sodium chloride      sodium chloride 75 mL/hr at 22 0631     PRN Meds:sodium chloride flush, sodium chloride, ondansetron **OR** ondansetron, polyethylene glycol, acetaminophen **OR** acetaminophen  Allergies:   ALLERGIES:    Patient has no known allergies. SOCIAL HISTORY:     TOBACCO:   reports that she quit smoking about 11 years ago. Her smoking use included cigarettes. She has a 7.50 pack-year smoking history.  She has never used smokeless tobacco.     ETOH:   reports previous alcohol use. Patient currently lives alone      FAMILY HISTORY:         Problem Relation Age of Onset    Other Mother         thyroidectomy    Diabetes Mother     Heart Disease Mother     Heart Disease Father        REVIEW OF SYSTEMS:     Constitutional: no fever, no night sweats, no fatigue, no weight loss. Head: no head ache , no head injury, no migranes. Eye: no eye discharge, blurring of vision, no double vision,no eye pain. Ears: no hearing difficulty, no tinnitus  Mouth/throat: no ulceration, dental caries , dysphagia, no hoarseness and voice change  Respiratory: no cough no chest pain,no shortness of breath,no wheezing  CVS: no palpitation, no chest pain,   GI: no abdominal pain, no nausea , no vomiting, no constipation,no diarrhea. JHON: no dysuria, frequency and urgency, no hematuria, no kidney stones  Musculoskeletal: no joint pain, swelling , stiffness,  Endocrine: no polyuria, polydipsia, no cold or heat intolerance  Hematology: no anemia, no easy brusing or bleeding, no hx of clotting disorder  Dermatology: no skin rash, no skin lesions, no pruritis,  Neurological:no headaches,no dizziness, no seizure, no numbness. Psychiatry: no depression, no anxiety,no panic attacks, no suicide ideation    PHYSICAL EXAM:     BP (!) 102/54   Pulse 73   Temp 98.4 °F (36.9 °C) (Oral)   Resp 16   SpO2 98%   General apperance:  Awake, alert, not in distress. HEENT: pink conjunctiva, unicteric sclera, moist oral mucosa. Cardiovascular:  RRR ,S1S2, no murmur or gallop. Abdomen:  Soft, non tender to palpation. Extremities: R foot wrapped  Skin:  Warm and dry.   CNS:alert        LABS:     CBC:   Recent Labs     04/20/22  1328 04/21/22  0456   WBC 22.2* 20.8*   HGB 11.7* 9.7*    331     BMP:    Recent Labs     04/20/22  1328 04/21/22  0456   * 135   K 5.0 4.1   CL 98 106   CO2 20* 19*   BUN 23* 21   CREATININE 1.2 1.1   GLUCOSE 284* 143* Calcium:  Recent Labs     04/21/22  0456   CALCIUM 8.6     Ionized Calcium:No results for input(s): IONCA in the last 72 hours. Magnesium:No results for input(s): MG in the last 72 hours. Phosphorus:No results for input(s): PHOS in the last 72 hours. BNP:No results for input(s): BNP in the last 72 hours. Glucose:  Recent Labs     04/20/22  1746 04/20/22 2005 04/21/22  0603   POCGLU 196* 306* 162*     HgbA1C:   Recent Labs     04/20/22  1328   LABA1C 7.4*     INR:   Recent Labs     04/21/22  0456   INR 1.04     Hepatic: No results for input(s): ALKPHOS, ALT, AST, PROT, BILITOT, BILIDIR, LABALBU in the last 72 hours. Amylase and Lipase:  Recent Labs     04/21/22  0456   LACTA 0.7     Lactic Acid:   Recent Labs     04/21/22  0456   LACTA 0.7     Troponin: No results for input(s): CKTOTAL, CKMB, TROPONINI in the last 72 hours. BNP: No results for input(s): BNP in the last 72 hours. Lipids: No results for input(s): CHOL, TRIG, HDL, LDL, LDLCALC in the last 72 hours. ABGs: No results found for: PHART, PO2ART, WTW8MMR, PLR6XCD, BEART    UA: No results for input(s): SPECGRAV, PHUR, COLORU, CLARITYU, MUCUS, PROTEINU, BLOODU, RBCUA, [address], BACTERIA, NITRU, GLUCOSEU, BILIRUBINUR, UROBILINOGEN, KETUA, LABCAST, LABCASTTY, AMORPHOS in the last 72 hours.     Invalid input(s): CRYSTALS      IMAGING:    Micro:   Lab Results   Component Value Date    BC No growth-preliminary  04/20/2022       Problem list of patient      Patient Active Problem List   Diagnosis Code    Accelerated essential hypertension I10    Hypertensive emergency I16.1    Primary hypertension I10    Hyperlipidemia E78.5     borderline Abnormal nuclear stress test- NO angina or angina equiv- med RX R94.39    Cellulitis L03.90           Impression and Recommendation:   R foot non healing wound, concern for OM  MRI foot pending  Culture shows many gram + and gram - bacilli  Continue current management  Will deescalate abx per final culture findings and sensitivity  Type 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 Veronica Cooley DO  Internal Medicine Resident PGY-2      Thank you Jennifer Sarkar DO for allowing me to participate in this patient's care. Patient was seen and examined face-to-face by me  The chart, progress notes, labs and radiographs were reviewed. The MRI result shows osteomyelites of the suspected area. Case discussed with the nurse. Questions and concerns were addressed. I agree with the progress note.   Will discuss with podiatry about debridement of the area

## 2022-04-21 NOTE — CARE COORDINATION
04/21/22 1328   Service Assessment   Patient Orientation Alert and Oriented   Cognition Alert   History Provided By Patient   Primary [address]   PCP Verified by CM Yes   Last Visit to PCP Within last 3 months   Prior Functional Level Independent in ADLs/IADLs   Can patient return to prior living arrangement Yes   Ability to make needs known: Good   Family able to assist with home care needs: Yes   Social/Functional History   Lives With Alone; Other (comment)  (home is [facility], stays with her daughter and family in [address])   Type of [address] Help From Family   Active  No   Patient's  Info daughter provides transport   Mode of Transportation Car   Occupation Retired   Discharge Planning   Type of [facility];Family Members   [address]  (possibly open to)   DME Ordered?  --        4/21/22, 7:31 AM EDT  DISCHARGE PLANNING EVALUATION:    Ruth Iqbal       Admitted: 4/20/2022/ [facility] day: 1   Location: [facility]04/004 Reason for admit: Cellulitis [L03.90]  Cellulitis of right foot [L03.115]  Diabetic ulcer of right foot associated with type 2 diabetes mellitus, with muscle involvement without evidence of necrosis, unspecified part of foot ([address] [S42.993, L97.515]   PMH:  has a past medical history of Diabetes mellitus ([address] Hypertension, and Psoriasis. Procedure:   4/20 XR R foot: No radiographic evidence of bony destruction, bony erosion, or periostitis to reflect osteomyelitis at this time. Early osteomyelitis may be occult on radiographs. Clinical correlation is recommended. A soft tissue wound/ulcer is seen overlying the fifth metatarsal    Barriers to Discharge:  Admitted 4/20 through the ER as inpatient for right foot pain. Recently diagnosed DM (not insulin dependent). Hospitalist, Podiatry, and ID following. Vanc and Zosyn started in ER.  Awaiting culture of foot wound and blood cultures. Tmax 99.3. Room air. CRP 5.79. WBC 22.2 on admission. IVF  PCP: Brittni Self MD  Readmission Risk Score: 7.4 ( )%    Patient Goals/Plan/Treatment Preferences: see above. Will also send message to attending that patient is requesting a glucometer at discharge. Transportation/Food Security/Housekeeping Addressed:  No issues identified.

## 2022-04-21 NOTE — PROGRESS NOTES
Hospitalist      Patient:  Mayte Ventura    Unit/Bed:6K-04/004-A  YOB: 1955  MRN: [id number]   Acct: [de-identified]     PCP: Sameera Dale MD  Date of Admission: 4/20/2022        Assessment and Plan:        1. Diabetic foot infection, right: MRI pending for possible osteomyelitis, will continue with IV antibiotics (Zosyn, vancomycin). Hemoglobin A1c is 7.4.  2. Non-insulin-dependent diabetes type 2: Hemoglobin A1c 7.4 currently on insulin sliding scale and a carb controlled diet, will check urine for microalbumin  3. Essential hypertension: We will continue home medication of lisinopril and Norvasc  4. Hyperlipidemia we will continue with Lipitor    CC: Foot infection    HPI: Per H&P \"This is a relatively healthy 71-year-old female who presented centers [address] [place] [address] emergency department on 4/20 due to foot pain in her right foot. Patient states that her foot started to hurt on 4/18 and describes the pain as constant and dull pain. Patient rates the pain currently 4 out of 10. Denies any alleviating or aggravating factors. Patient states she noticed a corn on the outside of her foot and pulled it off on 4/18 as well. Patient states that her eyesight is not very good and she cannot see her feet well so today her daughter came to look at her feet due to the pain. Daughter saw redness and swelling to her right foot. Patient denies nausea, vomiting, fever, chills, shortness of breath, chest pain. Patient does not look toxic at this time. Of note, patient was recently diagnosed with diabetes by her PCP about 3 months ago. Patient has been taking her diabetes medications as prescribed.     In the ED, patient was found to have elevated white blood cell count 20.2 elevated CRP 5. 84. X-ray of right foot did not show osteomyelitis. Patient was given IV antibiotics. Hospitalist were asked to admit patient for podiatry and infectious disease consult and further work-up. \"    ZHOU (14 point review of systems completed. Pertinent positives noted. Otherwise ROS is negative) : Foot wound    PMH:  Per HPI and       Diagnosis Date    Diabetes mellitus ([facility] [address]     Hypertension     Psoriasis      SHX:  History reviewed. No pertinent surgical history. FHX:       Problem Relation Age of Onset    Other Mother         thyroidectomy    Diabetes Mother     Heart Disease Mother     Heart Disease Father      SOCHX:   Social History     Socioeconomic History    Marital status: Single     Spouse name: None    Number of children: 1    Years of education: None    Highest education level: None   Occupational History    None   Tobacco Use    Smoking status: Former Smoker     Packs/day: 0.50     Years: 15.00     Pack years: 7.50     Types: Cigarettes     Quit date: 2011     Years since quittin.3    Smokeless tobacco: Never Used   Substance and Sexual Activity    Alcohol use: Not Currently     Comment: social    Drug use: Never    Sexual activity: None   Other Topics Concern    None   Social History Narrative    None     Social Determinants of Health     Financial Resource Strain: Low Risk     Difficulty of Paying Living Expenses: Not hard at all   Food Insecurity: No Food Insecurity    Worried About Running Out of Food in the Last Year: Never true    Samara of Food in the Last Year: Never true   Transportation Needs:     Lack of Transportation (Medical): Not on file    Lack of Transportation (Non-Medical):  Not on file   Physical Activity:     Days of Exercise per Week: Not on file    Minutes of Exercise per Session: Not on file   Stress:     Feeling of Stress : Not on file   Social Connections:     Frequency of Communication with Friends and Family: Not on file    Frequency of Social Gatherings with Friends and Family: Not on file    Attends Nondenominational Services: Not on file    Active Member of Clubs or Organizations: Not on file    Attends Club or Organization Meetings: Not on file    Marital Status: Not on file   Intimate Partner Violence:     Fear of Current or Ex-Partner: Not on file    Emotionally Abused: Not on file    Physically Abused: Not on file    Sexually Abused: Not on file   Housing Stability:     Unable to Pay for Housing in the Last Year: Not on file    Number of [place] in the Last Year: Not on file    Unstable Housing in the Last Year: Not on file      Allergies: Patient has no known allergies. Medications:     sodium chloride        amLODIPine  10 mg Oral Daily    atorvastatin  40 mg Oral Nightly    lisinopril  20 mg Oral Nightly    sodium chloride flush  10 mL IntraVENous 2 times per day    piperacillin-tazobactam  3,375 mg IntraVENous Q8H    vancomycin  1,500 mg IntraVENous Q24H     Prior to Admission medications    Medication Sig Start Date End Date Taking? Authorizing Provider   dapagliflozin (FARXIGA) 10 MG tablet take 1 tablet by mouth IN THE MORNING 4/19/22   Chace Harrington MD   amLODIPine (NORVASC) 10 MG tablet Take 1 tablet by mouth daily 4/19/22   Chace Harrington MD   atorvastatin (LIPITOR) 40 MG tablet Take 1 tablet by mouth nightly 4/19/22   Chace Harrington MD   lisinopril (PRINIVIL;ZESTRIL) 10 MG tablet Take 2 tablets by mouth nightly 4/19/22   Chace Harrington MD   aspirin 81 MG chewable tablet Take 81 mg by mouth daily    Historical Provider, MD      PHYSICAL EXAM:    BP (!) 117/52   Pulse 73   Temp 98.4 °F (36.9 °C) (Oral)   Resp 16   SpO2 98%     General appearance:  No apparent distress, appears stated age and cooperative. HEENT:  Normal cephalic, atraumatic without obvious deformity. Pupils equal, round, and reactive to light. Extra ocular muscles intact. Conjunctivae/corneas clear. Neck: Supple, with full range of motion. no jugular venous distention. Trachea midline. no carotid bruits  Respiratory:  Normal respiratory effort. Clear to auscultation, bilaterally without Rales/Wheezes/Rhonchi.  Breath sounds equal bilaterally  Cardiovascular: Regular rate and rhythm with normal S1/S2 without murmurs, rubs or gallops. PMI non displaced  Abdomen: Soft, non-tender, non-distended with normal bowel sounds. No guarding, rebound. Musculoskeletal: Edema right foot, erythema, warmth. Skin: Wound right foot approximately fifth metatarsal head erythematous, discharge present. Neurologic:  Neurovascularly intact without any focal sensory/motor deficits. Cranial nerves: II-XII intact, grossly non-focal.  Psychiatric:  Alert and oriented, thought content appropriate, normal insight  Capillary Refill: Brisk,< 2 seconds   Peripheral Pulses: +2 palpable, equal bilaterally upper and lower extremities  Lymphatics: no lymphadenopathy    Data: (All radiographs, tracings, PFTs, and imaging are personally viewed and interpreted unless otherwise noted).    Recent Labs     04/20/22  1328 04/21/22  0456   WBC 22.2* 20.8*   HGB 11.7* 9.7*   HCT 35.9* 30.5*    331      Recent Labs     04/20/22  1328 04/21/22  0456   * 135   K 5.0 4.1   CL 98 106   CO2 20* 19*   BUN 23* 21   CREATININE 1.2 1.1   CALCIUM 9.4 8.6       No results for input(s): AST, ALT, BILIDIR, BILITOT, ALKPHOS in the last 72 hours. Recent Labs     04/21/22  0456   INR 1.04       No results for input(s): Winford McDonough in the last 72 hours. Radiology reports-   XR FOOT RIGHT (2 VIEWS)    Result Date: 4/20/2022  PROCEDURE: XR FOOT RIGHT (2 VIEWS) CLINICAL INFORMATION: Non-healing wound COMPARISON: None TECHNIQUE: 2 views of the right foot FINDINGS: A soft tissue wound is seen overlying the fifth metatarsal. Plantar calcaneal and retrocalcaneal spurs. Mild degenerative changes of the right foot. No bony destruction, erosion or periostitis to suggest osteomyelitis. No acute fracture or dislocation. Bone mineralization is unremarkable. No significant soft tissue abnormality.      1. I do not see radiographic evidence of bony destruction, bony erosion, or periostitis to reflect osteomyelitis at this time. Early osteomyelitis may be occult on radiographs. Clinical correlation is recommended. 2. A soft tissue wound/ulcer is seen overlying the fifth metatarsal **This report has been created using voice recognition software. It may contain minor errors which are inherent in voice recognition technology. ** Final report electronically signed by Dr Massimo Rivera on 4/20/2022 2:15 PM      Electronically signed by Estrella Quiñonez DO on 4/21/2022 at 8:59 AM

## 2022-04-21 NOTE — CONSULTS
Podiatric Surgery Consult    Reason for Consult:  Right foot wound  Requesting Physician:  HOWARD Denis    CHIEF COMPLAINT:  Right foot wound    HISTORY OF PRESENT ILLNESS:                The patient is a 73 y.o. female with significant past medical history of diabetes mellitus, hypertension, psoriasis who is being seen at bedside on behalf of Dr. Jessee Allison with complaints of a right foot wound. Patient states that the right foot wound apparently came on in the past 3 days. She noticed a corn on her right foot and peeled it away. She has had increasing redness and swelling to her foot since then. Patient was admitted yesterday from the emergency department for this condition. Patient states that she does not have any pain to her right foot. She is a newly diagnosed diabetic and believes she has some degree of neuropathy. She denies any nausea, vomiting, fever, chills, chest pain, shortness of breath. No other pedal complaints. ADDENDUM: Patient seen at bedside today alongside Dr. Jessee Allison. Reviewed MRI findings with patient; discussed with the patient that she needs to have the wound debrided, and that she may have an underlying bone infection. Discussed with patient that we will schedule her for debridement with possible right fifth metatarsal head resection for this Saturday, 4/23/2022 at 10 or 11 AM.  Patient still denies any pain to bilateral LE. She also denies any B/F/C/SOB/CP or bilateral calf tenderness. No new pedal complaints at this time. Past Medical History:        Diagnosis Date    Diabetes mellitus ([address]     Hypertension     Psoriasis      Past Surgical History:    History reviewed. No pertinent surgical history.   Current Medications:    Current Facility-Administered Medications: amLODIPine (NORVASC) tablet 10 mg, 10 mg, Oral, Daily  atorvastatin (LIPITOR) tablet 40 mg, 40 mg, Oral, Nightly  lisinopril (PRINIVIL;ZESTRIL) tablet 20 mg, 20 mg, Oral, Nightly  sodium chloride flush 0.9 % injection 10 mL, 10 mL, IntraVENous, 2 times per day  sodium chloride flush 0.9 % injection 10 mL, 10 mL, IntraVENous, PRN  0.9 % sodium chloride infusion, , IntraVENous, PRN  ondansetron (ZOFRAN-ODT) disintegrating tablet 4 mg, 4 mg, Oral, Q8H PRN **OR** ondansetron (ZOFRAN) injection 4 mg, 4 mg, IntraVENous, Q6H PRN  polyethylene glycol (GLYCOLAX) packet 17 g, 17 g, Oral, Daily PRN  acetaminophen (TYLENOL) tablet 650 mg, 650 mg, Oral, Q6H PRN **OR** acetaminophen (TYLENOL) suppository 650 mg, 650 mg, Rectal, Q6H PRN  0.9 % sodium chloride infusion, , IntraVENous, Continuous  piperacillin-tazobactam (ZOSYN) 3,375 mg in dextrose 5 % 50 mL IVPB extended infusion (mini-bag), 3,375 mg, IntraVENous, Q8H  vancomycin (VANCOCIN) 1,500 mg in dextrose 5 % 500 mL IVPB, 1,500 mg, IntraVENous, Q24H  Allergies:  Patient has no known allergies. Social History:    TOBACCO:   reports that she quit smoking about 11 years ago. Her smoking use included cigarettes. She has a 7.50 pack-year smoking history. She has never used smokeless tobacco.  ETOH:   reports previous alcohol use. DRUGS:   reports no history of drug use. Family History:       Problem Relation Age of Onset    Other Mother         thyroidectomy    Diabetes Mother     Heart Disease Mother     Heart Disease Father      REVIEW OF SYSTEMS:    Pertinent ROS in HPI  PHYSICAL EXAM:      Vitals:    /62   Pulse 70   Temp 98.2 °F (36.8 °C) (Oral)   Resp 14   SpO2 98%     Exam:   Dressing intact and well maintained. No apparent strike through noted. Vascular: Dorsalis pedis and posterior tibial pulses are palpable bilaterally. Skin temperature is warm to hot from proximal tibial tuberosity to distal digits of right foot. CFT brisk to exposed digits. Edema present on right foot lateral aspect. Hair growth negative. Quality of skin diminished.     Dermatologic: Nails 1-5 bilaterally are thickened, elongated and dystrophic, with presence of subungual debris. Webspaces 1-4 bilaterally are clean, dry and intact. Patient is a full-thickness ulceration to the plantar lateral aspect of her right foot. Wound has tendons exposed in the base and does probe to bone. There is a mild amount of purulence that is expressed from proximally. Wound edges are hyperkeratotic. There is erythema and edema extending proximally to the mid leg from this wound. There is interval discoloration of the skin superior to the wound on the lateral aspect of the foot, concern for early necrotic changes at this area. Neurovascular:  Light touch sensation grossly intact at the digits bilaterally. Musculoskeletal: Muscle strength testing deferred. Right foot dorsiflexes to 10 degrees with knee extended, increases with knee flexed. Negative pain on palpation of the right fifth metatarsal head. Otherwise rectus foot and ankle. XRAY:XR FOOT RIGHT (2 VIEWS)    Result Date: 4/20/2022  PROCEDURE: XR FOOT RIGHT (2 VIEWS) CLINICAL INFORMATION: Non-healing wound COMPARISON: None TECHNIQUE: 2 views of the right foot FINDINGS: A soft tissue wound is seen overlying the fifth metatarsal. Plantar calcaneal and retrocalcaneal spurs. Mild degenerative changes of the right foot. No bony destruction, erosion or periostitis to suggest osteomyelitis. No acute fracture or dislocation. Bone mineralization is unremarkable. No significant soft tissue abnormality. 1. I do not see radiographic evidence of bony destruction, bony erosion, or periostitis to reflect osteomyelitis at this time. Early osteomyelitis may be occult on radiographs. Clinical correlation is recommended. 2. A soft tissue wound/ulcer is seen overlying the fifth metatarsal **This report has been created using voice recognition software. It may contain minor errors which are inherent in voice recognition technology. ** Final report electronically signed by Dr Jenna Arriaza on 4/20/2022 2:15 PM    MRI, right foot  Impression     1. Abnormal signal intensity in the head and neck of the fifth metatarsal extending to the distal shaft consistent with osteomyelitis. There is abnormal soft tissue signal intensity adjacent to the head of the fifth metatarsal consistent with edema    and/or cellulitis. 2. Degenerative changes. 3. Areas of abnormal signal intensity at the bases of the third and fourth metatarsals. These may represent small cystic changes. 4. Abnormal soft tissue signal intensity surrounding the second, third and fourth digits consistent with edema and/or cellulitis. 5. Atrophy involving the foot muscles.                   **This report has been created using voice recognition software. It may contain minor errors which are inherent in voice recognition technology. **       Final report electronically signed by DR Eddy Zimmerman on 4/21/2022 2:37 PM         LABS:  Recent Labs     04/20/22  1328 04/21/22  0456   WBC 22.2* 20.8*   HGB 11.7* 9.7*   HCT 35.9* 30.5*    331        Recent Labs     04/21/22  0456      K 4.1      CO2 19*   BUN 21   CREATININE 1.1        Recent Labs     04/21/22  0456   INR 1.04      No results for input(s): CKTOTAL, CKMB, CKMBINDEX, TROPONINI in the last 72 hours.     Assessment  Principle  1) Diabetic foot infection, right foot    Plan  -Patient initially examined and evaluated  -Labs reviewed, white blood cell count 20.8, hemoglobin 9.7  - Patient is afebrile  - Culture reviewed, many gram-positive cocci occurring singly and in pairs, many gram-negative bacilli  - Continue IV antibiotics per internal medicine, pending consult to infectious disease  -Wound irrigated with 30 cc of sterile saline Wound dressed with Betadine, gauze, Kerlix, Ace bandage.    -Discussed with patient that there is high clinical concern that she has bony infection of the right fifth metatarsal head  - Order MRI to rule out osteomyelitis  - Reviewed MRI findings with Dr. Sharlene Baron and patient at bedside   - Discussed with patient that should she have bony infection she could either undergo long-term IV antibiotics or surgical resection of the infected bone.  -Discussed with patient that we will schedule her for surgery for debridement of the right foot wound with possible right fifth metatarsal head resection for the Saturday morning, 4/23/2022  - Surgical shoe ordered right foot  - Weightbearing as tolerated in surgical shoe right foot  -Podiatry will continue to follow patient    DISPO: Pending response to IV antibiotics; debridement of right foot wound to be scheduled for Saturday, 4/23/2022 with possible right fifth metatarsal head resection    HOWARD Corbett, thank you for the consultation, allowing podiatry to assist in the medical welfare of this patient. Podiatry will continue to follow this patient throughout the duration of hospitalization.    Carmelita Nicolas DPM DPM  4/21/2022 8:12 AM

## 2022-04-21 NOTE — PLAN OF CARE
Problem: Discharge Planning  Goal: Discharge to home or other facility with appropriate resources  4/21/2022 0133 by Georgina Hunt RN  Outcome: Progressing  Problem: Safety - Adult  Goal: Free from fall injury  4/21/2022 0133 by Georgina Hunt RN  Outcome: Progressing    Problem: ABCDS Injury Assessment  Goal: Absence of physical injury  4/21/2022 0133 by Georgina Hunt RN  Outcome: Progressing    Problem: Skin/Tissue Integrity  Goal: Absence of new skin breakdown  Description: 1. Monitor for areas of redness and/or skin breakdown  2. Assess vascular access sites hourly  3. Every 4-6 hours minimum:  Change oxygen saturation probe site  4. Every 4-6 hours:  If on nasal continuous positive airway pressure, respiratory therapy assess nares and determine need for appliance change or resting period.   4/21/2022 0133 by Georgina Hunt RN  Outcome: Progressing    Problem: Pain  Goal: Verbalizes/displays adequate comfort level or baseline comfort level  4/21/2022 0133 by Georgina Hunt RN  Outcome: Progressing

## 2022-04-21 NOTE — ED PROVIDER NOTES
[address] [facility]  EMERGENCY MEDICINE ATTENDING ATTESTATION      Evaluation of Micheal Steen. Case discussed and care plan developed with resident physician. I agree with the resident physician documentation and plan as documented by him, except if my documentation differs. Patient seen, interviewed and examined by me. I reviewed the medical, surgical, family and social history, medications and allergies. I have reviewed the nursing documentation. I have reviewed the patient's vital signs and are normal per my interpretation. There is no height or weight on file to calculate BMI. Pulsoxymetry is normal per my interpretation. Brief H&P   Patient c/o right foot pain/redness, history of diabetes     Physical exam is notable for wound to the right lateral MCP joint, erythema tracking up leg, palpable pulses      Medical Decision Making   MDM:   Patient is a 78-year-old female with a history of diabetes who presents with wound to the right foot with surrounding redness/erythema tracking up the leg with foot wound. Patient is hemodynamically stable and in no distress. Work-up significant for leukocytosis, elevated CRP. Patient to be admitted for IV antibiotics and podiatry evaluation. Plan:    Admit to hospitalist team    Please see the resident physician completed note for final disposition except as documented on this attestation. I have reviewed and interpreted all available lab, radiology and ekg results available at the moment. Diagnosis, treatment and disposition plans were discussed and agreed upon by patient. This transcription was electronically signed. It was dictated by use of voice recognition software and electronically transcribed. The transcription may contain errors not detected in proofreading.      I performed direct supervision and was present for the critical portion following procedures: None  Critical care time on this case: None    Electronically signed by Kody Vides MD on 4/20/22 at 8:50 PM EDT        Kody Vides MD  04/20/22 2056

## 2022-04-21 NOTE — PROGRESS NOTES
Physician Progress Note      Aleksandr Mccarthy  CSN #:                  [id number]  :                       1955  ADMIT DATE:       2022 12:44 PM  [address] [place] DATE:  RESPONDING  PROVIDER #:        Francesca Tang DO          QUERY TEXT:    Pt admitted with cellulitis right foot. Pt noted to have DM II. If possible,   please document in progress notes and discharge summary the relationship, if   any, between cellulitis and DM. The medical record reflects the following:    Risk Factors: DM II  Clinical Indicators: presents with right foot cellulitis, hgbA1C 7.4  Treatment: IV Vanco, IV Zosyn, SSI insulin, ID consult, podiatry consult    Thank you! Emerald Fall CRCR  RN Clinical   P: [phone]  Options provided:  -- Right foot cellulitis associated with Diabetes  -- Right foot cellulitis unrelated to Diabetes  -- Other - I will add my own diagnosis  -- Disagree - Not applicable / Not valid  -- Disagree - Clinically unable to determine / Unknown  -- Refer to Clinical Documentation Reviewer    PROVIDER RESPONSE TEXT:    Right foot cellulitis associated with Diabetes.     Query created by: Marco Young on 2022 9:09 AM      Electronically signed by:  Francesca Tang DO 2022 1:48 PM

## 2022-04-21 NOTE — PROGRESS NOTES
Pharmacy Medication History Note      List of current medications patient is taking is complete. Source of information: Patient and surescript    Changes made to medication list:  Medications removed (include reason, ex. therapy complete or physician discontinued):  · None    Medications added/doses adjusted:  · None    Other notes (ex. Recent course of antibiotics, Coumadin dosing):  · Denies use of other OTC or herbal medications.       Allergies reviewed      Electronically signed by Nanci Hansen on 4/21/2022 at 12:08 PM

## 2022-04-22 ENCOUNTER — ANESTHESIA EVENT (OUTPATIENT)
Dept: OPERATING ROOM | Age: 67
DRG: 982 | End: 2022-04-22
Payer: MEDICARE

## 2022-04-22 LAB
ANION GAP SERPL CALCULATED.3IONS-SCNC: 12 MEQ/L (ref 8–16)
BASOPHILS # BLD: 0.4 %
BASOPHILS ABSOLUTE: 0.1 THOU/MM3 (ref 0–0.1)
BUN BLDV-MCNC: 23 MG/DL (ref 7–22)
CALCIUM SERPL-MCNC: 8.5 MG/DL (ref 8.5–10.5)
CHLORIDE BLD-SCNC: 106 MEQ/L (ref 98–111)
CO2: 17 MEQ/L (ref 23–33)
CREAT SERPL-MCNC: 1.2 MG/DL (ref 0.4–1.2)
EOSINOPHIL # BLD: 1.7 %
EOSINOPHILS ABSOLUTE: 0.3 THOU/MM3 (ref 0–0.4)
ERYTHROCYTE [DISTWIDTH] IN BLOOD BY AUTOMATED COUNT: 11.9 % (ref 11.5–14.5)
ERYTHROCYTE [DISTWIDTH] IN BLOOD BY AUTOMATED COUNT: 38.2 FL (ref 35–45)
GFR SERPL CREATININE-BSD FRML MDRD: 45 ML/MIN/1.73M2
GLUCOSE BLD-MCNC: 144 MG/DL (ref 70–108)
GLUCOSE BLD-MCNC: 176 MG/DL (ref 70–108)
GLUCOSE BLD-MCNC: 230 MG/DL (ref 70–108)
GLUCOSE BLD-MCNC: 234 MG/DL (ref 70–108)
GLUCOSE BLD-MCNC: 244 MG/DL (ref 70–108)
HCT VFR BLD CALC: 30.7 % (ref 37–47)
HEMOGLOBIN: 9.7 GM/DL (ref 12–16)
IMMATURE GRANS (ABS): 0.12 THOU/MM3 (ref 0–0.07)
IMMATURE GRANULOCYTES: 0.6 %
LYMPHOCYTES # BLD: 31.1 %
LYMPHOCYTES ABSOLUTE: 6.1 THOU/MM3 (ref 1–4.8)
MCH RBC QN AUTO: 27.6 PG (ref 26–33)
MCHC RBC AUTO-ENTMCNC: 31.6 GM/DL (ref 32.2–35.5)
MCV RBC AUTO: 87.2 FL (ref 81–99)
MONOCYTES # BLD: 8.5 %
MONOCYTES ABSOLUTE: 1.7 THOU/MM3 (ref 0.4–1.3)
NUCLEATED RED BLOOD CELLS: 0 /100 WBC
PLATELET # BLD: 304 THOU/MM3 (ref 130–400)
PLATELET ESTIMATE: ADEQUATE
PMV BLD AUTO: 9.8 FL (ref 9.4–12.4)
POTASSIUM SERPL-SCNC: 4 MEQ/L (ref 3.5–5.2)
RBC # BLD: 3.52 MILL/MM3 (ref 4.2–5.4)
SCAN OF BLOOD SMEAR: NORMAL
SEG NEUTROPHILS: 57.7 %
SEGMENTED NEUTROPHILS ABSOLUTE COUNT: 11.3 THOU/MM3 (ref 1.8–7.7)
SODIUM BLD-SCNC: 135 MEQ/L (ref 135–145)
VANCOMYCIN RANDOM: 14.8 UG/ML (ref 0.1–39.9)
WBC # BLD: 19.6 THOU/MM3 (ref 4.8–10.8)

## 2022-04-22 PROCEDURE — 1200000003 HC TELEMETRY R&B

## 2022-04-22 PROCEDURE — 6360000002 HC RX W HCPCS: Performed by: PHYSICIAN ASSISTANT

## 2022-04-22 PROCEDURE — 82948 REAGENT STRIP/BLOOD GLUCOSE: CPT

## 2022-04-22 PROCEDURE — 80048 BASIC METABOLIC PNL TOTAL CA: CPT

## 2022-04-22 PROCEDURE — 6370000000 HC RX 637 (ALT 250 FOR IP): Performed by: PHYSICIAN ASSISTANT

## 2022-04-22 PROCEDURE — 80202 ASSAY OF VANCOMYCIN: CPT

## 2022-04-22 PROCEDURE — 36415 COLL VENOUS BLD VENIPUNCTURE: CPT

## 2022-04-22 PROCEDURE — 2580000003 HC RX 258: Performed by: PHYSICIAN ASSISTANT

## 2022-04-22 PROCEDURE — 85025 COMPLETE CBC W/AUTO DIFF WBC: CPT

## 2022-04-22 PROCEDURE — 99233 SBSQ HOSP IP/OBS HIGH 50: CPT | Performed by: INTERNAL MEDICINE

## 2022-04-22 RX ADMIN — SODIUM CHLORIDE, PRESERVATIVE FREE 10 ML: 5 INJECTION INTRAVENOUS at 20:12

## 2022-04-22 RX ADMIN — PIPERACILLIN AND TAZOBACTAM 3375 MG: 3; .375 INJECTION, POWDER, LYOPHILIZED, FOR SOLUTION INTRAVENOUS at 20:11

## 2022-04-22 RX ADMIN — AMLODIPINE BESYLATE 10 MG: 10 TABLET ORAL at 08:14

## 2022-04-22 RX ADMIN — SODIUM CHLORIDE, PRESERVATIVE FREE 10 ML: 5 INJECTION INTRAVENOUS at 08:15

## 2022-04-22 RX ADMIN — PIPERACILLIN AND TAZOBACTAM 3375 MG: 3; .375 INJECTION, POWDER, LYOPHILIZED, FOR SOLUTION INTRAVENOUS at 03:29

## 2022-04-22 RX ADMIN — LISINOPRIL 20 MG: 20 TABLET ORAL at 20:04

## 2022-04-22 RX ADMIN — PIPERACILLIN AND TAZOBACTAM 3375 MG: 3; .375 INJECTION, POWDER, LYOPHILIZED, FOR SOLUTION INTRAVENOUS at 12:12

## 2022-04-22 RX ADMIN — ATORVASTATIN CALCIUM 40 MG: 40 TABLET, FILM COATED ORAL at 20:04

## 2022-04-22 NOTE — PLAN OF CARE
Problem: Safety - Adult  Goal: Free from fall injury  Outcome: Progressing  Problem: ABCDS Injury Assessment  Goal: Absence of physical injury  Outcome: Progressing  Note: No physical injury obsrved     Problem: Skin/Tissue Integrity  Goal: Absence of new skin breakdown  Description: 1. Monitor for areas of redness and/or skin breakdown  2. Assess vascular access sites hourly  3. Every 4-6 hours minimum:  Change oxygen saturation probe site  4. Every 4-6 hours:  If on nasal continuous positive airway pressure, respiratory therapy assess nares and determine need for appliance change or resting period.   Outcome: Progressing  Note: No new signs or symptoms of skin breakdown noted this shift, encouraging patient to turn and reposition self in bed q2h       Problem: Pain  Goal: Verbalizes/displays adequate comfort level or baseline comfort level  Outcome: Progressing  Note: Pt denies any pain at this time

## 2022-04-22 NOTE — PROGRESS NOTES
Progress note: Infectious diseases    Patient - Manuela Trujillo,  Age - 73 y.o.    - 1955      Room Number - 6K-04/004-A   MRN -  [id number]   Acct # - [de-identified]  Date of Admission -  2022 12:44 PM    SUBJECTIVE:   No new issues. Wound cx :polymicrobial, no MRSA  OBJECTIVE   VITALS    weight is 205 lb 14.6 oz (93.4 kg). Her oral temperature is 98.6 °F (37 °C). Her blood pressure is 125/51 (abnormal) and her pulse is 70. Her respiration is 18 and oxygen saturation is 99%. Wt Readings from Last 3 Encounters:   22 205 lb 14.6 oz (93.4 kg)   22 211 lb (95.7 kg)   22 211 lb 3.2 oz (95.8 kg)       I/O (24 Hours)    Intake/Output Summary (Last 24 hours) at 2022 1300  Last data filed at 2022 9014  Gross per 24 hour   Intake 470 ml   Output 1050 ml   Net -580 ml       General Appearance  Awake, alert, oriented,  not  In acute distress  HEENT - normocephalic, atraumatic, pink conjunctiva,  anicteric sclera  Neck - Supple, no mass  Lungs -  Bilateral good air entry, no rhonchi, no wheeze  Cardiovascular - Heart sounds are normal.     Abdomen - soft, not distended, nontender,   Neurologic -oriented  Skin - No bruising or bleeding  Extremities - dressed right foot wound.     MEDICATIONS:      amLODIPine  10 mg Oral Daily    atorvastatin  40 mg Oral Nightly    lisinopril  20 mg Oral Nightly    sodium chloride flush  10 mL IntraVENous 2 times per day    piperacillin-tazobactam  3,375 mg IntraVENous Q8H      sodium chloride       sodium chloride flush, sodium chloride, ondansetron **OR** ondansetron, polyethylene glycol, acetaminophen **OR** acetaminophen      LABS:     CBC:   Recent Labs     22  1328 22  0456 22  0503   WBC 22.2* 20.8* 19.6*   HGB 11.7* 9.7* 9.7*    331 304     BMP:    Recent Labs     22  1328 22  0456 22  0503   * 135 135 K 5.0 4.1 4.0   CL 98 106 106   CO2 20* 19* 17*   BUN 23* 21 23*   CREATININE 1.2 1.1 1.2   GLUCOSE 284* 143* 144*     Calcium:  Recent Labs     04/22/22  0503   CALCIUM 8.5      Recent Labs     04/21/22 2026 04/22/22  0611 04/22/22  1051   POCGLU 279* 176* 244*     HgbA1C:   Recent Labs     04/20/22  1328   LABA1C 7.4*     INR:   Recent Labs     04/21/22  0456   INR 1.04     Hepatic: No results for input(s): ALKPHOS, ALT, AST, PROT, BILITOT, BILIDIR, LABALBU in the last 72 hours. Amylase and Lipase:  Recent Labs     04/21/22  0456   LACTA 0.7     Lactic Acid:   Recent Labs     04/21/22  0456   LACTA 0.7          Problem list of patient:     Patient Active Problem List   Diagnosis Code    Accelerated essential hypertension I10    Hypertensive emergency I16.1    Primary hypertension I10    Hyperlipidemia E78.5     borderline Abnormal nuclear stress test- NO angina or angina equiv- med RX R94.39    Cellulitis L03.90         ASSESSMENT/PLAN   Diabetic foot ulcer with underlying OM/cellulites. Continue iv zosyn  Will have surgery tomorrow  HTN. Stop vancomycin: no MRSA isolated.       Holly Bowman MD, MD, FACP 4/22/2022 1:00 PM

## 2022-04-22 NOTE — CARE COORDINATION
4/22/22, 7:58 AM EDT    DISCHARGE ON GOING EVALUATION    [facility] day: 2  Location: 6-04/004-A Reason for admit: Cellulitis [L03.90]  Cellulitis of right foot [L03.115]  Diabetic ulcer of right foot associated with type 2 diabetes mellitus, with muscle involvement without evidence of necrosis, unspecified part of foot ([address] [P28.274, L97.515]   Procedure:   4/21 MRI R Foot: Abnormal signal intensity in the head and neck of the fifth metatarsal extending to the distal shaft consistent with osteomyelitis. There is abnormal soft tissue signal intensity adjacent to the head of the fifth metatarsal consistent with edema and/or cellulitis. Degenerative changes. Areas of abnormal signal intensity at the bases of the third and fourth metatarsals. These may represent small cystic changes. Abnormal soft tissue signal intensity surrounding the second, third and fourth digits consistent with edema and/or cellulitis. Atrophy involving the foot muscles. Barriers to Discharge: WBC 19.6, Tmax 99.2. Continues on Zosyn and Vanc. ID and podiatry following. Awaiting final culture findings and sensitivities, preliminary results showing staphylococcus. Planning surgery with podiatry tomorrow. PCP: Herb Perez MD  Readmission Risk Score: 10.4 ( )%  Patient Goals/Plan/Treatment Preferences: Patient is open to [facility] should needs arrive during admission. Will follow for any potential wound and antibiotic needs.

## 2022-04-22 NOTE — PLAN OF CARE
Problem: Safety - Adult  Goal: Free from fall injury  4/22/2022 1138 by Fatou Jacobs RN  Outcome: Progressing   Bed alarm on, hourly rounding, patient free from falls    Problem: Skin/Tissue Integrity  Goal: Absence of new skin breakdown  Description: 1. Monitor for areas of redness and/or skin breakdown  2. Assess vascular access sites hourly  3. Every 4-6 hours minimum:  Change oxygen saturation probe site  4. Every 4-6 hours:  If on nasal continuous positive airway pressure, respiratory therapy assess nares and determine need for appliance change or resting period.   4/22/2022 1138 by Fatou Jacobs RN  Outcome: Progressing   4/23/22 wound debridement    Problem: Pain  Goal: Verbalizes/displays adequate comfort level or baseline comfort level  4/22/2022 1138 by Fatou Jacobs RN  Outcome: Progressing   Patient denied any pain this morning

## 2022-04-22 NOTE — PROGRESS NOTES
Hospitalist      Patient:  Phani Scott    Unit/Bed:6K-04/004-A  YOB: 1955  MRN: [id number]   Acct: [de-identified]     PCP: Donna Hooks MD  Date of Admission: 4/20/2022        Assessment and Plan:        1. Diabetic foot infection, right: MRI pending for possible osteomyelitis, will continue with IV antibiotics (Zosyn, vancomycin). Hemoglobin A1c is 7.4.  2. Non-insulin-dependent diabetes type 2: Hemoglobin A1c 7.4 currently on insulin sliding scale and a carb controlled diet, will check urine for microalbumin  3. Essential hypertension: We will continue home medication of lisinopril and Norvasc  4. Hyperlipidemia we will continue with Lipitor    CC: Foot infection    HPI: Per H&P \"This is a relatively healthy 69-year-old female who presented centers [address] [place] [address] emergency department on 4/20 due to foot pain in her right foot. Patient states that her foot started to hurt on 4/18 and describes the pain as constant and dull pain. Patient rates the pain currently 4 out of 10. Denies any alleviating or aggravating factors. Patient states she noticed a corn on the outside of her foot and pulled it off on 4/18 as well. Patient states that her eyesight is not very good and she cannot see her feet well so today her daughter came to look at her feet due to the pain. Daughter saw redness and swelling to her right foot. Patient denies nausea, vomiting, fever, chills, shortness of breath, chest pain. Patient does not look toxic at this time. Of note, patient was recently diagnosed with diabetes by her PCP about 3 months ago. Patient has been taking her diabetes medications as prescribed.     In the ED, patient was found to have elevated white blood cell count 20.2 elevated CRP 5. 84. X-ray of right foot did not show osteomyelitis. Patient was given IV antibiotics. Hospitalist were asked to admit patient for podiatry and infectious disease consult and further work-up. \"    4.22.2022 patient seen this a.m. states that she is having a \"debridement\" tomorrow, also discussed possible osteomyelitis and treatment. Patient states that she thinks that the wound has been there for possible 1 month but over the last week became more painful. Infectious disease and podiatry are on the case    ROS (14 point review of systems completed. Pertinent positives noted. Otherwise ROS is negative) : Foot wound    PMH:  Per HPI and       Diagnosis Date    Diabetes mellitus ([facility] [address]     Hypertension     Psoriasis      SHX:  History reviewed. No pertinent surgical history. FHX:       Problem Relation Age of Onset    Other Mother         thyroidectomy    Diabetes Mother     Heart Disease Mother     Heart Disease Father      SOCHX:   Social History     Socioeconomic History    Marital status: Single     Spouse name: None    Number of children: 1    Years of education: None    Highest education level: None   Occupational History    None   Tobacco Use    Smoking status: Former Smoker     Packs/day: 0.50     Years: 15.00     Pack years: 7.50     Types: Cigarettes     Quit date: 2011     Years since quittin.3    Smokeless tobacco: Never Used   Substance and Sexual Activity    Alcohol use: Not Currently     Comment: social    Drug use: Never    Sexual activity: None   Other Topics Concern    None   Social History Narrative    None     Social Determinants of Health     Financial Resource Strain: Low Risk     Difficulty of Paying Living Expenses: Not hard at all   Food Insecurity: No Food Insecurity    Worried About Running Out of Food in the Last Year: Never true    Samara of Food in the Last Year: Never true   Transportation Needs:     Lack of Transportation (Medical): Not on file    Lack of Transportation (Non-Medical):  Not on file   Physical Activity:     Days of Exercise per Week: Not on file    Minutes of Exercise per Session: Not on file   Stress:     Feeling of Stress : Not on file   Social Connections:     Frequency of Communication with Friends and Family: Not on file    Frequency of Social Gatherings with Friends and Family: Not on file    Attends Sabianist Services: Not on file    Active Member of Clubs or Organizations: Not on file    Attends Club or Organization Meetings: Not on file    Marital Status: Not on file   Intimate Partner Violence:     Fear of Current or Ex-Partner: Not on file    Emotionally Abused: Not on file    Physically Abused: Not on file    Sexually Abused: Not on file   Housing Stability:     Unable to Pay for Housing in the Last Year: Not on file    Number of [place] in the Last Year: Not on file    Unstable Housing in the Last Year: Not on file      Allergies: Patient has no known allergies. Medications:     sodium chloride        amLODIPine  10 mg Oral Daily    atorvastatin  40 mg Oral Nightly    lisinopril  20 mg Oral Nightly    sodium chloride flush  10 mL IntraVENous 2 times per day    piperacillin-tazobactam  3,375 mg IntraVENous Q8H    vancomycin  1,500 mg IntraVENous Q24H     Prior to Admission medications    Medication Sig Start Date End Date Taking? Authorizing Provider   dapagliflozin (FARXIGA) 10 MG tablet take 1 tablet by mouth IN THE MORNING 4/19/22   [facility], MD   amLODIPine (NORVASC) 10 MG tablet Take 1 tablet by mouth daily 4/19/22   [facility], MD   atorvastatin (LIPITOR) 40 MG tablet Take 1 tablet by mouth nightly 4/19/22   [facility], MD   lisinopril (PRINIVIL;ZESTRIL) 10 MG tablet Take 2 tablets by mouth nightly 4/19/22   [facility], MD   aspirin 81 MG chewable tablet Take 81 mg by mouth daily    Historical Provider, MD      PHYSICAL EXAM:    BP (!) 120/59   Pulse 66   Temp 100.7 °F (38.2 °C) (Oral)   Resp 18   Wt 205 lb 14.6 oz (93.4 kg)   SpO2 99%   BMI 29.97 kg/m²     General appearance:  No apparent distress, appears stated age and cooperative.   HEENT:  Normal cephalic, atraumatic without obvious deformity. Pupils equal, round, and reactive to light. Extra ocular muscles intact. Conjunctivae/corneas clear. Neck: Supple, with full range of motion. no jugular venous distention. Trachea midline. no carotid bruits  Respiratory:  Normal respiratory effort. Clear to auscultation, bilaterally without Rales/Wheezes/Rhonchi. Breath sounds equal bilaterally  Cardiovascular:  Regular rate and rhythm with normal S1/S2 without murmurs, rubs or gallops. PMI non displaced  Abdomen: Soft, non-tender, non-distended with normal bowel sounds. No guarding, rebound. Musculoskeletal: Edema right foot, erythema, warmth. Skin: Wound right foot approximately fifth metatarsal head erythematous, discharge present. Neurologic:  Neurovascularly intact without any focal sensory/motor deficits. Cranial nerves: II-XII intact, grossly non-focal.  Psychiatric:  Alert and oriented, thought content appropriate, normal insight  Capillary Refill: Brisk,< 2 seconds   Peripheral Pulses: +2 palpable, equal bilaterally upper and lower extremities  Lymphatics: no lymphadenopathy    Data: (All radiographs, tracings, PFTs, and imaging are personally viewed and interpreted unless otherwise noted).    Recent Labs     04/20/22  1328 04/21/22  0456 04/22/22  0503   WBC 22.2* 20.8* 19.6*   HGB 11.7* 9.7* 9.7*   HCT 35.9* 30.5* 30.7*    331 304     Recent Labs     04/20/22  1328 04/21/22  0456 04/22/22  0503   * 135 135   K 5.0 4.1 4.0   CL 98 106 106   CO2 20* 19* 17*   BUN 23* 21 23*   CREATININE 1.2 1.1 1.2   CALCIUM 9.4 8.6 8.5     No results for input(s): AST, ALT, BILIDIR, BILITOT, ALKPHOS in the last 72 hours. Recent Labs     04/21/22  0456   INR 1.04     No results for input(s): Soraida Lowers in the last 72 hours.     Radiology reports-   XR FOOT RIGHT (2 VIEWS)    Result Date: 4/20/2022  PROCEDURE: XR FOOT RIGHT (2 VIEWS) CLINICAL INFORMATION: Non-healing wound COMPARISON: None TECHNIQUE: 2 views of the right foot FINDINGS: A soft tissue wound is seen overlying the fifth metatarsal. Plantar calcaneal and retrocalcaneal spurs. Mild degenerative changes of the right foot. No bony destruction, erosion or periostitis to suggest osteomyelitis. No acute fracture or dislocation. Bone mineralization is unremarkable. No significant soft tissue abnormality. 1. I do not see radiographic evidence of bony destruction, bony erosion, or periostitis to reflect osteomyelitis at this time. Early osteomyelitis may be occult on radiographs. Clinical correlation is recommended. 2. A soft tissue wound/ulcer is seen overlying the fifth metatarsal **This report has been created using voice recognition software. It may contain minor errors which are inherent in voice recognition technology. ** Final report electronically signed by Dr Mir Ospina on 4/20/2022 2:15 PM      Electronically signed by Antonino Abraham DO on 4/22/2022 at 10:39 AM

## 2022-04-23 ENCOUNTER — ANESTHESIA (OUTPATIENT)
Dept: OPERATING ROOM | Age: 67
DRG: 982 | End: 2022-04-23
Payer: MEDICARE

## 2022-04-23 VITALS
DIASTOLIC BLOOD PRESSURE: 58 MMHG | RESPIRATION RATE: 15 BRPM | OXYGEN SATURATION: 100 % | SYSTOLIC BLOOD PRESSURE: 111 MMHG

## 2022-04-23 LAB
AEROBIC CULTURE: ABNORMAL
AEROBIC CULTURE: ABNORMAL
ANAEROBIC CULTURE: ABNORMAL
ANION GAP SERPL CALCULATED.3IONS-SCNC: 12 MEQ/L (ref 8–16)
ATYPICAL LYMPHOCYTES: ABNORMAL %
AVERAGE GLUCOSE: 156 MG/DL (ref 70–126)
BACTERIA: ABNORMAL
BASOPHILS # BLD: 0.6 %
BASOPHILS ABSOLUTE: 0.1 THOU/MM3 (ref 0–0.1)
BILIRUBIN URINE: NEGATIVE
BLOOD, URINE: ABNORMAL
BUN BLDV-MCNC: 23 MG/DL (ref 7–22)
CALCIUM SERPL-MCNC: 8.7 MG/DL (ref 8.5–10.5)
CASTS: ABNORMAL /LPF
CASTS: ABNORMAL /LPF
CHARACTER, URINE: CLEAR
CHLORIDE BLD-SCNC: 103 MEQ/L (ref 98–111)
CO2: 19 MEQ/L (ref 23–33)
COLOR: YELLOW
CREAT SERPL-MCNC: 1.3 MG/DL (ref 0.4–1.2)
CRYSTALS: ABNORMAL
EOSINOPHIL # BLD: 2.8 %
EOSINOPHILS ABSOLUTE: 0.5 THOU/MM3 (ref 0–0.4)
EPITHELIAL CELLS, UA: ABNORMAL /HPF
ERYTHROCYTE [DISTWIDTH] IN BLOOD BY AUTOMATED COUNT: 11.8 % (ref 11.5–14.5)
ERYTHROCYTE [DISTWIDTH] IN BLOOD BY AUTOMATED COUNT: 36.7 FL (ref 35–45)
GFR SERPL CREATININE-BSD FRML MDRD: 41 ML/MIN/1.73M2
GLUCOSE BLD-MCNC: 142 MG/DL (ref 70–108)
GLUCOSE BLD-MCNC: 153 MG/DL (ref 70–108)
GLUCOSE BLD-MCNC: 174 MG/DL (ref 70–108)
GLUCOSE BLD-MCNC: 229 MG/DL (ref 70–108)
GLUCOSE BLD-MCNC: 337 MG/DL (ref 70–108)
GLUCOSE, URINE: >= 1000 MG/DL
GRAM STAIN RESULT: ABNORMAL
HBA1C MFR BLD: 7.2 % (ref 4.4–6.4)
HCT VFR BLD CALC: 30.8 % (ref 37–47)
HEMOGLOBIN: 9.8 GM/DL (ref 12–16)
IMMATURE GRANS (ABS): 0.07 THOU/MM3 (ref 0–0.07)
IMMATURE GRANULOCYTES: 0.4 %
KETONES, URINE: NEGATIVE
LEUKOCYTE ESTERASE, URINE: ABNORMAL
LYMPHOCYTES # BLD: 44.9 %
LYMPHOCYTES ABSOLUTE: 8.4 THOU/MM3 (ref 1–4.8)
MCH RBC QN AUTO: 27.4 PG (ref 26–33)
MCHC RBC AUTO-ENTMCNC: 31.8 GM/DL (ref 32.2–35.5)
MCV RBC AUTO: 86 FL (ref 81–99)
MISCELLANEOUS LAB TEST RESULT: ABNORMAL
MONOCYTES # BLD: 6.6 %
MONOCYTES ABSOLUTE: 1.2 THOU/MM3 (ref 0.4–1.3)
NITRITE, URINE: NEGATIVE
NUCLEATED RED BLOOD CELLS: 0 /100 WBC
ORGANISM: ABNORMAL
PH UA: 5.5 (ref 5–9)
PLATELET # BLD: 361 THOU/MM3 (ref 130–400)
PLATELET ESTIMATE: ADEQUATE
PMV BLD AUTO: 9.8 FL (ref 9.4–12.4)
POTASSIUM SERPL-SCNC: 4 MEQ/L (ref 3.5–5.2)
PROTEIN UA: 100 MG/DL
RBC # BLD: 3.58 MILL/MM3 (ref 4.2–5.4)
RBC URINE: ABNORMAL /HPF
RENAL EPITHELIAL, UA: ABNORMAL
SCAN OF BLOOD SMEAR: NORMAL
SEG NEUTROPHILS: 44.7 %
SEGMENTED NEUTROPHILS ABSOLUTE COUNT: 8.4 THOU/MM3 (ref 1.8–7.7)
SODIUM BLD-SCNC: 134 MEQ/L (ref 135–145)
SODIUM URINE: 47 MEQ/L
SPECIFIC GRAVITY UA: 1.02 (ref 1–1.03)
UROBILINOGEN, URINE: 0.2 EU/DL (ref 0–1)
WBC # BLD: 18.8 THOU/MM3 (ref 4.8–10.8)
WBC UA: ABNORMAL /HPF
YEAST: ABNORMAL

## 2022-04-23 PROCEDURE — 2580000003 HC RX 258

## 2022-04-23 PROCEDURE — 87077 CULTURE AEROBIC IDENTIFY: CPT

## 2022-04-23 PROCEDURE — 87205 SMEAR GRAM STAIN: CPT

## 2022-04-23 PROCEDURE — 87147 CULTURE TYPE IMMUNOLOGIC: CPT

## 2022-04-23 PROCEDURE — 6370000000 HC RX 637 (ALT 250 FOR IP): Performed by: PHYSICIAN ASSISTANT

## 2022-04-23 PROCEDURE — 6360000002 HC RX W HCPCS

## 2022-04-23 PROCEDURE — 6370000000 HC RX 637 (ALT 250 FOR IP)

## 2022-04-23 PROCEDURE — 87070 CULTURE OTHR SPECIMN AEROBIC: CPT

## 2022-04-23 PROCEDURE — 99232 SBSQ HOSP IP/OBS MODERATE 35: CPT | Performed by: INTERNAL MEDICINE

## 2022-04-23 PROCEDURE — 0QTN0ZZ RESECTION OF RIGHT METATARSAL, OPEN APPROACH: ICD-10-PCS | Performed by: PODIATRIST

## 2022-04-23 PROCEDURE — 85025 COMPLETE CBC W/AUTO DIFF WBC: CPT

## 2022-04-23 PROCEDURE — 7100000000 HC PACU RECOVERY - FIRST 15 MIN: Performed by: PODIATRIST

## 2022-04-23 PROCEDURE — 6360000002 HC RX W HCPCS: Performed by: REGISTERED NURSE

## 2022-04-23 PROCEDURE — 84300 ASSAY OF URINE SODIUM: CPT

## 2022-04-23 PROCEDURE — 3600000012 HC SURGERY LEVEL 2 ADDTL 15MIN: Performed by: PODIATRIST

## 2022-04-23 PROCEDURE — 3700000001 HC ADD 15 MINUTES (ANESTHESIA): Performed by: PODIATRIST

## 2022-04-23 PROCEDURE — 7100000001 HC PACU RECOVERY - ADDTL 15 MIN: Performed by: PODIATRIST

## 2022-04-23 PROCEDURE — 82948 REAGENT STRIP/BLOOD GLUCOSE: CPT

## 2022-04-23 PROCEDURE — 2709999900 HC NON-CHARGEABLE SUPPLY: Performed by: PODIATRIST

## 2022-04-23 PROCEDURE — 83036 HEMOGLOBIN GLYCOSYLATED A1C: CPT

## 2022-04-23 PROCEDURE — 88305 TISSUE EXAM BY PATHOLOGIST: CPT

## 2022-04-23 PROCEDURE — 1200000003 HC TELEMETRY R&B

## 2022-04-23 PROCEDURE — 87186 SC STD MICRODIL/AGAR DIL: CPT

## 2022-04-23 PROCEDURE — 2500000003 HC RX 250 WO HCPCS: Performed by: PODIATRIST

## 2022-04-23 PROCEDURE — 80048 BASIC METABOLIC PNL TOTAL CA: CPT

## 2022-04-23 PROCEDURE — 3700000000 HC ANESTHESIA ATTENDED CARE: Performed by: PODIATRIST

## 2022-04-23 PROCEDURE — 87075 CULTR BACTERIA EXCEPT BLOOD: CPT

## 2022-04-23 PROCEDURE — 36415 COLL VENOUS BLD VENIPUNCTURE: CPT

## 2022-04-23 PROCEDURE — 2580000003 HC RX 258: Performed by: REGISTERED NURSE

## 2022-04-23 PROCEDURE — 81001 URINALYSIS AUTO W/SCOPE: CPT

## 2022-04-23 PROCEDURE — 2580000003 HC RX 258: Performed by: PHYSICIAN ASSISTANT

## 2022-04-23 PROCEDURE — 3600000002 HC SURGERY LEVEL 2 BASE: Performed by: PODIATRIST

## 2022-04-23 PROCEDURE — 6360000002 HC RX W HCPCS: Performed by: PHYSICIAN ASSISTANT

## 2022-04-23 PROCEDURE — 2500000003 HC RX 250 WO HCPCS: Performed by: REGISTERED NURSE

## 2022-04-23 RX ORDER — PROPOFOL 10 MG/ML
INJECTION, EMULSION INTRAVENOUS PRN
Status: DISCONTINUED | OUTPATIENT
Start: 2022-04-23 | End: 2022-04-23 | Stop reason: SDUPTHER

## 2022-04-23 RX ORDER — BUPIVACAINE HYDROCHLORIDE 5 MG/ML
INJECTION, SOLUTION EPIDURAL; INTRACAUDAL PRN
Status: DISCONTINUED | OUTPATIENT
Start: 2022-04-23 | End: 2022-04-23 | Stop reason: ALTCHOICE

## 2022-04-23 RX ORDER — INSULIN LISPRO 100 [IU]/ML
0-3 INJECTION, SOLUTION INTRAVENOUS; SUBCUTANEOUS NIGHTLY
Status: DISCONTINUED | OUTPATIENT
Start: 2022-04-23 | End: 2022-04-28

## 2022-04-23 RX ORDER — SODIUM CHLORIDE 9 MG/ML
INJECTION, SOLUTION INTRAVENOUS CONTINUOUS PRN
Status: DISCONTINUED | OUTPATIENT
Start: 2022-04-23 | End: 2022-04-23 | Stop reason: SDUPTHER

## 2022-04-23 RX ORDER — DEXTROSE MONOHYDRATE 50 MG/ML
100 INJECTION, SOLUTION INTRAVENOUS PRN
Status: DISCONTINUED | OUTPATIENT
Start: 2022-04-23 | End: 2022-04-30 | Stop reason: HOSPADM

## 2022-04-23 RX ORDER — SODIUM CHLORIDE 0.9 % (FLUSH) 0.9 %
5-40 SYRINGE (ML) INJECTION PRN
Status: DISCONTINUED | OUTPATIENT
Start: 2022-04-23 | End: 2022-04-23 | Stop reason: HOSPADM

## 2022-04-23 RX ORDER — INSULIN LISPRO 100 [IU]/ML
0-6 INJECTION, SOLUTION INTRAVENOUS; SUBCUTANEOUS
Status: DISCONTINUED | OUTPATIENT
Start: 2022-04-23 | End: 2022-04-28

## 2022-04-23 RX ORDER — DEXTROSE MONOHYDRATE 25 G/50ML
12.5 INJECTION, SOLUTION INTRAVENOUS PRN
Status: DISCONTINUED | OUTPATIENT
Start: 2022-04-23 | End: 2022-04-30 | Stop reason: HOSPADM

## 2022-04-23 RX ORDER — SODIUM CHLORIDE 9 MG/ML
INJECTION, SOLUTION INTRAVENOUS PRN
Status: DISCONTINUED | OUTPATIENT
Start: 2022-04-23 | End: 2022-04-27 | Stop reason: SDUPTHER

## 2022-04-23 RX ORDER — FENTANYL CITRATE 50 UG/ML
INJECTION, SOLUTION INTRAMUSCULAR; INTRAVENOUS PRN
Status: DISCONTINUED | OUTPATIENT
Start: 2022-04-23 | End: 2022-04-23 | Stop reason: SDUPTHER

## 2022-04-23 RX ORDER — FENTANYL CITRATE 50 UG/ML
50 INJECTION, SOLUTION INTRAMUSCULAR; INTRAVENOUS EVERY 5 MIN PRN
Status: DISCONTINUED | OUTPATIENT
Start: 2022-04-23 | End: 2022-04-23 | Stop reason: HOSPADM

## 2022-04-23 RX ORDER — LIDOCAINE HCL/PF 100 MG/5ML
SYRINGE (ML) INJECTION PRN
Status: DISCONTINUED | OUTPATIENT
Start: 2022-04-23 | End: 2022-04-23 | Stop reason: SDUPTHER

## 2022-04-23 RX ORDER — MORPHINE SULFATE 2 MG/ML
2 INJECTION, SOLUTION INTRAMUSCULAR; INTRAVENOUS
Status: DISCONTINUED | OUTPATIENT
Start: 2022-04-23 | End: 2022-04-30 | Stop reason: HOSPADM

## 2022-04-23 RX ORDER — ONDANSETRON 2 MG/ML
4 INJECTION INTRAMUSCULAR; INTRAVENOUS
Status: DISCONTINUED | OUTPATIENT
Start: 2022-04-23 | End: 2022-04-23 | Stop reason: HOSPADM

## 2022-04-23 RX ORDER — SODIUM CHLORIDE 0.9 % (FLUSH) 0.9 %
5-40 SYRINGE (ML) INJECTION PRN
Status: DISCONTINUED | OUTPATIENT
Start: 2022-04-23 | End: 2022-04-27 | Stop reason: SDUPTHER

## 2022-04-23 RX ORDER — DEXAMETHASONE SODIUM PHOSPHATE 10 MG/ML
INJECTION, EMULSION INTRAMUSCULAR; INTRAVENOUS PRN
Status: DISCONTINUED | OUTPATIENT
Start: 2022-04-23 | End: 2022-04-23 | Stop reason: SDUPTHER

## 2022-04-23 RX ORDER — DIPHENHYDRAMINE HYDROCHLORIDE 50 MG/ML
12.5 INJECTION INTRAMUSCULAR; INTRAVENOUS
Status: DISCONTINUED | OUTPATIENT
Start: 2022-04-23 | End: 2022-04-23 | Stop reason: HOSPADM

## 2022-04-23 RX ORDER — OXYCODONE HYDROCHLORIDE 5 MG/1
10 TABLET ORAL EVERY 4 HOURS PRN
Status: DISCONTINUED | OUTPATIENT
Start: 2022-04-23 | End: 2022-04-27 | Stop reason: SDUPTHER

## 2022-04-23 RX ORDER — SODIUM CHLORIDE 9 MG/ML
INJECTION, SOLUTION INTRAVENOUS PRN
Status: DISCONTINUED | OUTPATIENT
Start: 2022-04-23 | End: 2022-04-23 | Stop reason: HOSPADM

## 2022-04-23 RX ORDER — MEPERIDINE HYDROCHLORIDE 25 MG/ML
12.5 INJECTION INTRAMUSCULAR; INTRAVENOUS; SUBCUTANEOUS EVERY 5 MIN PRN
Status: DISCONTINUED | OUTPATIENT
Start: 2022-04-23 | End: 2022-04-23 | Stop reason: HOSPADM

## 2022-04-23 RX ORDER — SODIUM CHLORIDE 0.9 % (FLUSH) 0.9 %
5-40 SYRINGE (ML) INJECTION EVERY 12 HOURS SCHEDULED
Status: DISCONTINUED | OUTPATIENT
Start: 2022-04-23 | End: 2022-04-23 | Stop reason: HOSPADM

## 2022-04-23 RX ORDER — MORPHINE SULFATE 4 MG/ML
4 INJECTION, SOLUTION INTRAMUSCULAR; INTRAVENOUS
Status: DISCONTINUED | OUTPATIENT
Start: 2022-04-23 | End: 2022-04-30 | Stop reason: HOSPADM

## 2022-04-23 RX ORDER — SODIUM CHLORIDE 9 MG/ML
INJECTION, SOLUTION INTRAVENOUS CONTINUOUS
Status: DISCONTINUED | OUTPATIENT
Start: 2022-04-23 | End: 2022-04-27 | Stop reason: SDUPTHER

## 2022-04-23 RX ORDER — SODIUM CHLORIDE 0.9 % (FLUSH) 0.9 %
5-40 SYRINGE (ML) INJECTION EVERY 12 HOURS SCHEDULED
Status: DISCONTINUED | OUTPATIENT
Start: 2022-04-23 | End: 2022-04-27 | Stop reason: SDUPTHER

## 2022-04-23 RX ORDER — OXYCODONE HYDROCHLORIDE 5 MG/1
5 TABLET ORAL EVERY 4 HOURS PRN
Status: DISCONTINUED | OUTPATIENT
Start: 2022-04-23 | End: 2022-04-27 | Stop reason: SDUPTHER

## 2022-04-23 RX ORDER — MIDAZOLAM HYDROCHLORIDE 1 MG/ML
INJECTION INTRAMUSCULAR; INTRAVENOUS PRN
Status: DISCONTINUED | OUTPATIENT
Start: 2022-04-23 | End: 2022-04-23 | Stop reason: SDUPTHER

## 2022-04-23 RX ORDER — NICOTINE POLACRILEX 4 MG
15 LOZENGE BUCCAL PRN
Status: DISCONTINUED | OUTPATIENT
Start: 2022-04-23 | End: 2022-04-30 | Stop reason: HOSPADM

## 2022-04-23 RX ORDER — ONDANSETRON 2 MG/ML
INJECTION INTRAMUSCULAR; INTRAVENOUS PRN
Status: DISCONTINUED | OUTPATIENT
Start: 2022-04-23 | End: 2022-04-23 | Stop reason: SDUPTHER

## 2022-04-23 RX ADMIN — PROPOFOL 200 MG: 10 INJECTION, EMULSION INTRAVENOUS at 12:22

## 2022-04-23 RX ADMIN — SODIUM CHLORIDE, PRESERVATIVE FREE 10 ML: 5 INJECTION INTRAVENOUS at 21:20

## 2022-04-23 RX ADMIN — LISINOPRIL 20 MG: 20 TABLET ORAL at 21:19

## 2022-04-23 RX ADMIN — INSULIN LISPRO 2 UNITS: 100 INJECTION, SOLUTION INTRAVENOUS; SUBCUTANEOUS at 16:48

## 2022-04-23 RX ADMIN — PIPERACILLIN AND TAZOBACTAM 3375 MG: 3; .375 INJECTION, POWDER, LYOPHILIZED, FOR SOLUTION INTRAVENOUS at 03:40

## 2022-04-23 RX ADMIN — PIPERACILLIN AND TAZOBACTAM 3375 MG: 3; .375 INJECTION, POWDER, LYOPHILIZED, FOR SOLUTION INTRAVENOUS at 21:41

## 2022-04-23 RX ADMIN — ATORVASTATIN CALCIUM 40 MG: 40 TABLET, FILM COATED ORAL at 21:19

## 2022-04-23 RX ADMIN — FENTANYL CITRATE 50 MCG: 50 INJECTION, SOLUTION INTRAMUSCULAR; INTRAVENOUS at 12:30

## 2022-04-23 RX ADMIN — AMLODIPINE BESYLATE 10 MG: 10 TABLET ORAL at 07:44

## 2022-04-23 RX ADMIN — ONDANSETRON 4 MG: 2 INJECTION INTRAMUSCULAR; INTRAVENOUS at 12:25

## 2022-04-23 RX ADMIN — DEXAMETHASONE SODIUM PHOSPHATE 5 MG: 10 INJECTION, EMULSION INTRAMUSCULAR; INTRAVENOUS at 12:25

## 2022-04-23 RX ADMIN — SODIUM CHLORIDE: 9 INJECTION, SOLUTION INTRAVENOUS at 14:41

## 2022-04-23 RX ADMIN — SODIUM CHLORIDE: 9 INJECTION, SOLUTION INTRAVENOUS at 12:18

## 2022-04-23 RX ADMIN — SODIUM CHLORIDE, PRESERVATIVE FREE 10 ML: 5 INJECTION INTRAVENOUS at 07:44

## 2022-04-23 RX ADMIN — INSULIN LISPRO 2 UNITS: 100 INJECTION, SOLUTION INTRAVENOUS; SUBCUTANEOUS at 21:23

## 2022-04-23 RX ADMIN — Medication 60 MG: at 12:22

## 2022-04-23 RX ADMIN — MIDAZOLAM 2 MG: 1 INJECTION INTRAMUSCULAR; INTRAVENOUS at 12:18

## 2022-04-23 RX ADMIN — PIPERACILLIN AND TAZOBACTAM 3375 MG: 3; .375 INJECTION, POWDER, LYOPHILIZED, FOR SOLUTION INTRAVENOUS at 14:01

## 2022-04-23 ASSESSMENT — PULMONARY FUNCTION TESTS
PIF_VALUE: 26
PIF_VALUE: 14
PIF_VALUE: 26
PIF_VALUE: 13
PIF_VALUE: 14
PIF_VALUE: 1
PIF_VALUE: 14
PIF_VALUE: 15
PIF_VALUE: 14
PIF_VALUE: 1
PIF_VALUE: 14
PIF_VALUE: 1
PIF_VALUE: 14
PIF_VALUE: 2
PIF_VALUE: 14
PIF_VALUE: 6
PIF_VALUE: 14

## 2022-04-23 NOTE — ANESTHESIA POSTPROCEDURE EVALUATION
Department of Anesthesiology  Postprocedure Note    Patient: Eula Medina  MRN: [id number]  YOB: 1955  Date of evaluation: 4/23/2022  Time:  3:52 PM     Procedure Summary     Date: 04/23/22 Room / Location: [address] NICOLE Salcedo    Anesthesia Start: 1218 Anesthesia Stop:     Procedure: RIGHT FOOT WOUND DEBRIDEMENT WITH POSS RIGHT 5TH METATARSAL RESECTION (Right Toes) Diagnosis: (cellulitis)    Surgeons: Jatinder Snider DPM Responsible Provider: Aleksandr White DO    Anesthesia Type: general ASA Status: 2          Anesthesia Type: No value filed. Ciera Phase I: Ciera Score: 10    Ciera Phase II:      Last vitals: Reviewed and per EMR flowsheets.        Anesthesia Post Evaluation    Patient location during evaluation: PACU  Patient participation: complete - patient participated  Level of consciousness: awake and alert  Airway patency: patent  Nausea & Vomiting: no vomiting and no nausea  Complications: no  Cardiovascular status: hemodynamically stable  Respiratory status: acceptable  Hydration status: stable

## 2022-04-23 NOTE — PROGRESS NOTES
Hospitalist      Patient:  Shoshana Downey    Unit/Bed:6K-04/004-A  YOB: 1955  MRN: [id number]   Acct: [de-identified]     PCP: Jose Gonzalez MD  Date of Admission: 4/20/2022        Assessment and Plan:        1. Diabetic foot infection, right: MRI pending for possible osteomyelitis, will continue with IV antibiotics (Zosyn, vancomycin). Hemoglobin A1c is 7.4.  2. Non-insulin-dependent diabetes type 2: Hemoglobin A1c 7.4 currently on insulin sliding scale and a carb controlled diet, will check urine for microalbumin  3. Essential hypertension: We will continue home medication of lisinopril and Norvasc  4. Hyperlipidemia we will continue with Lipitor    CC: Foot infection    HPI: Per H&P \"This is a relatively healthy 61-year-old female who presented centers [address] [place] [address] emergency department on 4/20 due to foot pain in her right foot. Patient states that her foot started to hurt on 4/18 and describes the pain as constant and dull pain. Patient rates the pain currently 4 out of 10. Denies any alleviating or aggravating factors. Patient states she noticed a corn on the outside of her foot and pulled it off on 4/18 as well. Patient states that her eyesight is not very good and she cannot see her feet well so today her daughter came to look at her feet due to the pain. Daughter saw redness and swelling to her right foot. Patient denies nausea, vomiting, fever, chills, shortness of breath, chest pain. Patient does not look toxic at this time. Of note, patient was recently diagnosed with diabetes by her PCP about 3 months ago. Patient has been taking her diabetes medications as prescribed.     In the ED, patient was found to have elevated white blood cell count 20.2 elevated CRP 5. 84. X-ray of right foot did not show osteomyelitis. Patient was given IV antibiotics. Hospitalist were asked to admit patient for podiatry and infectious disease consult and further work-up. \"    4.22.2022 patient seen this a.m. states that she is having a \"debridement\" tomorrow, also discussed possible osteomyelitis and treatment. Patient states that she thinks that the wound has been there for possible 1 month but over the last week became more painful. Infectious disease and podiatry are on the case    4. patient seen this a.m. states she is to have surgery today. She is not sure of what they plan on doing. Eitel signs stable patient's afebrile, slowly decreasing WBC    ROS (14 point review of systems completed. Pertinent positives noted. Otherwise ROS is negative) : Foot wound    PMH:  Per HPI and       Diagnosis Date    Diabetes mellitus ([facility] [address]     Hypertension     Psoriasis      SHX:  History reviewed. No pertinent surgical history. FHX:       Problem Relation Age of Onset    Other Mother         thyroidectomy    Diabetes Mother     Heart Disease Mother     Heart Disease Father      SOCHX:   Social History     Socioeconomic History    Marital status: Single     Spouse name: None    Number of children: 1    Years of education: None    Highest education level: None   Occupational History    None   Tobacco Use    Smoking status: Former Smoker     Packs/day: 0.50     Years: 15.00     Pack years: 7.50     Types: Cigarettes     Quit date: 2011     Years since quittin.3    Smokeless tobacco: Never Used   Substance and Sexual Activity    Alcohol use: Not Currently     Comment: social    Drug use: Never    Sexual activity: None   Other Topics Concern    None   Social History Narrative    None     Social Determinants of Health     Financial Resource Strain: Low Risk     Difficulty of Paying Living Expenses: Not hard at all   Food Insecurity: No Food Insecurity    Worried About Running Out of Food in the Last Year: Never true    Samara of Food in the Last Year: Never true   Transportation Needs:     Lack of Transportation (Medical): Not on file    Lack of Transportation (Non-Medical):  Not on file   Physical Activity:     Days of Exercise per Week: Not on file    Minutes of Exercise per Session: Not on file   Stress:     Feeling of Stress : Not on file   Social Connections:     Frequency of Communication with Friends and Family: Not on file    Frequency of Social Gatherings with Friends and Family: Not on file    Attends [employer] Services: Not on file    Active Member of [address] [place] [address] or Organizations: Not on file    Attends Club or Organization Meetings: Not on file    Marital Status: Not on file   Intimate Partner Violence:     Fear of Current or Ex-Partner: Not on file    Emotionally Abused: Not on file    Physically Abused: Not on file    Sexually Abused: Not on file   Housing Stability:     Unable to Pay for Housing in the Last Year: Not on file    Number of [place] in the Last Year: Not on file    Unstable Housing in the Last Year: Not on file      Allergies: Patient has no known allergies. Medications:     sodium chloride        amLODIPine  10 mg Oral Daily    atorvastatin  40 mg Oral Nightly    lisinopril  20 mg Oral Nightly    sodium chloride flush  10 mL IntraVENous 2 times per day    piperacillin-tazobactam  3,375 mg IntraVENous Q8H     Prior to Admission medications    Medication Sig Start Date End Date Taking?  Authorizing Provider   dapagliflozin (FARXIGA) 10 MG tablet take 1 tablet by mouth IN THE MORNING 4/19/22   Aydee Tillman MD   amLODIPine (NORVASC) 10 MG tablet Take 1 tablet by mouth daily 4/19/22   Aydee Tillman MD   atorvastatin (LIPITOR) 40 MG tablet Take 1 tablet by mouth nightly 4/19/22   Aydee Tillman MD   lisinopril (PRINIVIL;ZESTRIL) 10 MG tablet Take 2 tablets by mouth nightly 4/19/22   Aydee Tillman MD   aspirin 81 MG chewable tablet Take 81 mg by mouth daily    Historical Provider, MD      PHYSICAL EXAM:    BP (!) 147/63   Pulse 67   Temp 98.1 °F (36.7 °C) (Oral)   Resp 16   Ht 5' 9.5\" (1.765 m)   Wt 206 lb 9.6 oz (93.7 kg)   SpO2 99% BMI 30.07 kg/m²     General appearance:  No apparent distress, appears stated age and cooperative. HEENT:  Normal cephalic, atraumatic without obvious deformity. Pupils equal, round, and reactive to light. Extra ocular muscles intact. Conjunctivae/corneas clear. Neck: Supple, with full range of motion. no jugular venous distention. Trachea midline. no carotid bruits  Respiratory:  Normal respiratory effort. Clear to auscultation, bilaterally without Rales/Wheezes/Rhonchi. Breath sounds equal bilaterally  Cardiovascular:  Regular rate and rhythm with normal S1/S2 without murmurs, rubs or gallops. PMI non displaced  Abdomen: Soft, non-tender, non-distended with normal bowel sounds. No guarding, rebound. Musculoskeletal: Edema right foot, erythema, warmth. Skin: Wound right foot approximately fifth metatarsal head erythematous, discharge present. Neurologic:  Neurovascularly intact without any focal sensory/motor deficits. Cranial nerves: II-XII intact, grossly non-focal.  Psychiatric:  Alert and oriented, thought content appropriate, normal insight  Capillary Refill: Brisk,< 2 seconds   Peripheral Pulses: +2 palpable, equal bilaterally upper and lower extremities  Lymphatics: no lymphadenopathy    Data: (All radiographs, tracings, PFTs, and imaging are personally viewed and interpreted unless otherwise noted).    Recent Labs     04/21/22  0456 04/22/22  0503 04/23/22  0505   WBC 20.8* 19.6* 18.8*   HGB 9.7* 9.7* 9.8*   HCT 30.5* 30.7* 30.8*    304 361     Recent Labs     04/21/22  0456 04/22/22  0503 04/23/22  0505    135 134*   K 4.1 4.0 4.0    106 103   CO2 19* 17* 19*   BUN 21 23* 23*   CREATININE 1.1 1.2 1.3*   CALCIUM 8.6 8.5 8.7     No results for input(s): AST, ALT, BILIDIR, BILITOT, ALKPHOS in the last 72 hours. Recent Labs     04/21/22  0456   INR 1.04     No results for input(s): Gabe Breeze in the last 72 hours.     Radiology reports-   XR FOOT RIGHT (2 VIEWS)    Result Date: 4/20/2022  PROCEDURE: XR FOOT RIGHT (2 VIEWS) CLINICAL INFORMATION: Non-healing wound COMPARISON: None TECHNIQUE: 2 views of the right foot FINDINGS: A soft tissue wound is seen overlying the fifth metatarsal. Plantar calcaneal and retrocalcaneal spurs. Mild degenerative changes of the right foot. No bony destruction, erosion or periostitis to suggest osteomyelitis. No acute fracture or dislocation. Bone mineralization is unremarkable. No significant soft tissue abnormality. 1. I do not see radiographic evidence of bony destruction, bony erosion, or periostitis to reflect osteomyelitis at this time. Early osteomyelitis may be occult on radiographs. Clinical correlation is recommended. 2. A soft tissue wound/ulcer is seen overlying the fifth metatarsal **This report has been created using voice recognition software. It may contain minor errors which are inherent in voice recognition technology. ** Final report electronically signed by Dr Mari Shelby on 4/20/2022 2:15 PM      Electronically signed by Sonia Mari DO on 4/23/2022 at 9:36 AM

## 2022-04-23 NOTE — PLAN OF CARE
Problem: Discharge Planning  Goal: Discharge to home or other facility with appropriate resources  Outcome: Progressing  Flowsheets (Taken 4/23/2022 0251)  Discharge to home or other facility with appropriate resources:   Identify barriers to discharge with patient and caregiver   Identify discharge learning needs (meds, wound care, etc)   Refer to discharge planning if patient needs post-hospital services based on physician order or complex needs related to functional status, cognitive ability or social support system   Arrange for needed discharge resources and transportation as appropriate  Note: Pt likely able to return home with help of family     Problem: Safety - Adult  Goal: Free from fall injury  Outcome: Progressing  Note: Call light within reach. Side rails up x2. Bed alarm on. Non skid slippers available. Problem: ABCDS Injury Assessment  Goal: Absence of physical injury  Outcome: Progressing  Note: No injuries observed this shift       Problem: Skin/Tissue Integrity  Goal: Absence of new skin breakdown  Description: 1.   Monitor for areas of redness and/or skin breakdown  Outcome: Progressing  Note: No new signs or symptoms of skin breakdown noted this shift, encouraging patient to turn and reposition self in bed q2h       Problem: Pain  Goal: Verbalizes/displays adequate comfort level or baseline comfort level  Outcome: Progressing  Flowsheets (Taken 4/23/2022 0251)  Verbalizes/displays adequate comfort level or baseline comfort level: Assess pain using appropriate pain scale  Note: Pt denies pain at this time

## 2022-04-23 NOTE — PROGRESS NOTES
Patient arrived to preoperative holding area with daughter at bedside. Temperature 97.1, warming blanket applied. Nares swabbed with alcohol. All jewelery excluding one necklace that could not be removed given to daughter along with glasses.

## 2022-04-23 NOTE — H&P
[address] [address]  History and Physical Update    Pt Name: Eula Medina  MRN: [id number]  YOB: 1955  Date of evaluation: 4/23/2022    [x] I have examined the patient and reviewed the H&P/Consult and there are no changes to the patient or plans.     [] I have examined the patient and reviewed the H&P/Consult and have noted the following changes:        Jatinder Snider DPM DPM  Electronically signed 4/23/2022 at 12:17 PM

## 2022-04-23 NOTE — BRIEF OP NOTE
Brief Postoperative Note      Patient: Nicolette Crandall  YOB: 1955  MRN: [id number]    Date of Procedure: 4/23/2022    Pre-Op Diagnosis: cellulitis    Post-Op Diagnosis: Same       Procedure(s):  RIGHT FOOT WOUND DEBRIDEMENT WITH POSS RIGHT 5TH METATARSAL RESECTION    Surgeon(s):  Ge Garcia DPM    Assistant:  Resident: Hamilton Jennings DPM   Student: Deidra Marcus MS3    Anesthesia: General    Estimated Blood Loss (mL): Minimal    Complications: None    Hemostasis: ankle tourniquet at 250mmHg    Injectables: 22 cc of 0.5% Marcaine plain    Materials:  Iodoform packing      Specimens:   ID Type Source Tests Collected by Time Destination   A : Right 5th Metatarsal Bone Toe SURGICAL PATHOLOGY, CULTURE, ANAEROBIC AND AEROBIC Lamont Bullock DPM 4/23/2022 1240        Implants:  * No implants in log *      Drains: * No LDAs found *    Findings: Same as above    Electronically signed by Hamilton Jennings DPM on 4/23/2022 at 1:10 PM

## 2022-04-23 NOTE — OP NOTE
Operative Note      Patient: Eugenio Sanz  YOB: 1955  MRN: [id number]    Date of Procedure: 4/23/2022    Pre-Op Diagnosis: cellulitis    Post-Op Diagnosis: Same       Procedure(s):  RIGHT FOOT WOUND DEBRIDEMENT WITH POSS RIGHT 5TH METATARSAL RESECTION    Surgeon(s):  Curry Bocanegra DPM    Assistant:   Resident: Haritha Harry DPM   Medical Student: Gonzalez Adam MS3    Anesthesia: General    Estimated Blood Loss (mL): Minimal    Complications: None    Hemostasis: ankle tourniquet at 250mmHg     Injectables: 22 cc of 0.5% Marcaine plain     Materials: Iodoform packing      Specimens:   ID Type Source Tests Collected by Time Destination   A : Right 5th Metatarsal Bone Toe SURGICAL PATHOLOGY, CULTURE, ANAEROBIC AND AEROBIC Curry Bocanegra DPM 4/23/2022 1240        Implants:  * No implants in log *      Drains: * No LDAs found *    Findings: Same as above    Detailed Description of Procedure: Indications: Patient is a 73 y.o. female with a chief complaint of right foot wound with cellulitis who is being seen by Dr. Joan Martínez and being treated for right foot wound with cellulitis. At this time all conservative options have been exhausted and surgical intervention is necessary. The procedure has been explained to the patient and they understand the risks, benefits and possible complications including recurrence, infection, loss of foot, loss of limb, loss of life. No promises have been made as to surgical outcome. Procedure: The patient was transported from the pre-op holding to the operating room and placed in a supine position. A pneumatic ankle tourniquet was applied to the right ankle. The right foot was then prepped and draped in the normal aspetic manner. The right foot was then exsanguinated with an esmark and the tourniquet was inflated to 250 mmHg.     Attention was directed to the dorsal lateral aspect of the right foot over the 5th metatarsal.  A skin marker was used to make an initial incision site over the area. A 15 scalpel blade was then used to make a full thickness incision. All necrotic, non-viable soft tissue was removed using a rongeur, forceps and 15 scalpel. A sagittal saw was used to cut just proximal to the 5th metatarsal head. Forceps and a 15 blade were used to excise the 5th metatarsal head from soft tissue attachments. Any remaining  necrotic, non-viable soft tissue was removed using a rongeur. Copious amounts of Irresept was used to flush the incisional site. The pneumatic ankle tourniquet was then deflated and an immediate hyperemic response was noted to all digits of the right foot. It is important to note that all small bleeding vessels were cauterized at this point in time. The incisional site was left open and 1/2\" Iodoform packing was placed in the area. A post-op injection of 22 cc's of 05% Marcaine plain was injected. The incision was dressed with 4x4's, ABD, kerlix, stockingette, ACE wrap. The patient was transported to the PACU with VSS and NVS intact to all digits of the right foot. No complications were noted throughout the procedure. The patient is to be discharged per PACU protocol.   The patient is to follow-up with Dr. Socorro Toney in the office after time of discharge from the hospital.    AGUSTIN Stephens DPM, PGY1  Foot and Ankle Surgical Resident  4/23/2022  1:21 PM      Electronically signed by Michele Henriquez DPM on 4/23/2022 at 1:17 PM

## 2022-04-23 NOTE — PROGRESS NOTES
1306- pt to pacu, awake, resp easy, unlabored. Vss. Pt denies pain, nausea. Reports feeling good. Pt appears in no acute distress.     1310-pt resting in bed eyes closed, resp easy, unlabored, vss.     1333- report called to Doctor Jai 91- pt meets criteria for discharge from pacu, awaiting transport to     1345-Transport here to return pt to [facility]

## 2022-04-23 NOTE — ANESTHESIA PRE PROCEDURE
Department of Anesthesiology  Preprocedure Note       Name:  Nico Flores   Age:  73 y.o.  :  1955                                          MRN:  [id number]         Date:  2022      Surgeon: Odalis Bethea):  Ge Grant DPM    Procedure: Procedure(s):  RIGHT FOOT WOUND DEBRIDEMENT WITH POSS RIGHT 5TH METATARSAL RESECTION    Medications prior to admission:   Prior to Admission medications    Medication Sig Start Date End Date Taking?  Authorizing Provider   dapagliflozin (FARXIGA) 10 MG tablet take 1 tablet by mouth IN THE MORNING 22   Molly Rucker MD   amLODIPine (NORVASC) 10 MG tablet Take 1 tablet by mouth daily 22   Molly Rucker MD   atorvastatin (LIPITOR) 40 MG tablet Take 1 tablet by mouth nightly 22   Molly Rucker MD   lisinopril (PRINIVIL;ZESTRIL) 10 MG tablet Take 2 tablets by mouth nightly 22   Molly Rucker MD   aspirin 81 MG chewable tablet Take 81 mg by mouth daily    Historical Provider, MD       Current medications:    Current Facility-Administered Medications   Medication Dose Route Frequency Provider Last Rate Last Admin    glucose (GLUTOSE) 40 % oral gel 15 g  15 g Oral PRN Arya Sox, DO        dextrose 50 % IV solution  12.5 g IntraVENous PRN Butler Sox, DO        glucagon (rDNA) injection 1 mg  1 mg IntraMUSCular PRN Butler Sox, DO        dextrose 5 % solution  100 mL/hr IntraVENous PRN Butler Sox, DO        insulin lispro (HUMALOG) injection vial 0-6 Units  0-6 Units SubCUTAneous TID  Rachid Gottlieb, DO        insulin lispro (HUMALOG) injection vial 0-3 Units  0-3 Units SubCUTAneous Nightly Rachid Gottlieb, DO        amLODIPine (NORVASC) tablet 10 mg  10 mg Oral Daily Diogenes Alarcon   10 mg at 22 0744    atorvastatin (LIPITOR) tablet 40 mg  40 mg Oral Nightly Krysten Suggs [place]   40 mg at 22    lisinopril (PRINIVIL;ZESTRIL) tablet 20 mg  20 mg Oral Nightly Krysten Estrella [place]   20 mg at 22    sodium chloride flush 0.9 % injection 10 mL  10 mL IntraVENous 2 times per day [employer]   10 mL at 22 0744    sodium chloride flush 0.9 % injection 10 mL  10 mL IntraVENous PRN HOWARD Alarcon        0.9 % sodium chloride infusion   IntraVENous PRN HOWARD Alarcon        ondansetron (ZOFRAN-ODT) disintegrating tablet 4 mg  4 mg Oral Q8H PRN HOWARD Alarcon        Or    ondansetron (ZOFRAN) injection 4 mg  4 mg IntraVENous Q6H PRN HOWARD Alarcon        polyethylene glycol (GLYCOLAX) packet 17 g  17 g Oral Daily PRN [employer]        acetaminophen (TYLENOL) tablet 650 mg  650 mg Oral Q6H PRN HOWARD Alarcon   650 mg at 22 3126    Or    acetaminophen (TYLENOL) suppository 650 mg  650 mg Rectal Q6H PRN HOWARD Alarcon        piperacillin-tazobactam (ZOSYN) 3,375 mg in dextrose 5 % 50 mL IVPB extended infusion (mini-bag)  3,375 mg IntraVENous Q8H [employer]   Stopped at 22 0240       Allergies:  No Known Allergies    Problem List:    Patient Active Problem List   Diagnosis Code    Accelerated essential hypertension I10    Hypertensive emergency I16.1    Primary hypertension I10    Hyperlipidemia E78.5     borderline Abnormal nuclear stress test- NO angina or angina equiv- med RX R94.39    Cellulitis L03.90       Past Medical History:        Diagnosis Date    Diabetes mellitus ([facility] [address]     Hypertension     Psoriasis        Past Surgical History:  History reviewed. No pertinent surgical history.     Social History:    Social History     Tobacco Use    Smoking status: Former Smoker     Packs/day: 0.50     Years: 15.00     Pack years: 7.50     Types: Cigarettes     Quit date: 2011     Years since quittin.3    Smokeless tobacco: Never Used   Substance Use Topics    Alcohol use: Not Currently     Comment: social                                Counseling given: Not Answered      Vital Signs (Current):   Vitals:    22 2345 Airway: Mallampati: I  TM distance: >3 FB   Neck ROM: full  Mouth opening: > = 3 FB Dental:          Pulmonary:normal exam        (-) COPD and asthma                           Cardiovascular:Negative CV ROS  Exercise tolerance: good (>4 METS),   (+) hypertension:, hyperlipidemia    (-) past MI and CAD                Neuro/Psych:      (-) seizures and CVA           GI/Hepatic/Renal:        (-) GERD, liver disease and no renal disease       Endo/Other:    (+) DiabetesType II DM, , .    (-) hypothyroidism, hyperthyroidism               Abdominal:   (+) obese,           Vascular:     - DVT. Other Findings:             Anesthesia Plan      general     ASA 2     (LMA. PIV. Additional access can be obtained after induction if needed. Standard ASA monitors. IV/PO opioids and other adjuncts as needed for pain control. PACU post op for recovery. Possible anesthetics complications were discussed with the patient, including but not limited to: PONV, damage to the airway and surrounding structures (teeth, lips, gums, tongue, etc.), adverse reactions to medicine, cardiac complications (MI, CHF, arrhythmias, etc.), respiratory complications (post-op ventilation, respiratory failure, etc.), neurologic complications (nerve damage, stroke, seizure), and death. The patient was given the opportunity to ask questions and all questions were answered to the patient's satisfaction. The patient is in agreement with the anesthetic plan.  )  Induction: intravenous. Anesthetic plan and risks discussed with patient and child/children. Plan discussed with CRNA.                   Sherrine Koyanagi, DO   4/23/2022

## 2022-04-23 NOTE — PLAN OF CARE
Problem: Safety - Adult  Goal: Free from fall injury  4/23/2022 1052 by Iris Ro RN  Outcome: Progressing  Bed alarm on, call light within reach, hourly rounds     Problem: ABCDS Injury Assessment  Goal: Absence of physical injury  4/23/2022 1052 by Iris Ro RN  Outcome: Progressing   Bed alarm on, call light within reach, hourly round  Problem: Skin/Tissue Integrity  Goal: Absence of new skin breakdown  Description: 1. Monitor for areas of redness and/or skin breakdown  2. Assess vascular access sites hourly  3. Every 4-6 hours minimum:  Change oxygen saturation probe site  4. Every 4-6 hours:  If on nasal continuous positive airway pressure, respiratory therapy assess nares and determine need for appliance change or resting period.   4/23/2022 1052 by Iris Ro RN  Outcome: Progressing

## 2022-04-24 LAB
ANION GAP SERPL CALCULATED.3IONS-SCNC: 13 MEQ/L (ref 8–16)
BASOPHILS # BLD: 0.3 %
BASOPHILS ABSOLUTE: 0 THOU/MM3 (ref 0–0.1)
BUN BLDV-MCNC: 25 MG/DL (ref 7–22)
CALCIUM SERPL-MCNC: 8.6 MG/DL (ref 8.5–10.5)
CHLORIDE BLD-SCNC: 109 MEQ/L (ref 98–111)
CO2: 16 MEQ/L (ref 23–33)
CREAT SERPL-MCNC: 1.3 MG/DL (ref 0.4–1.2)
EOSINOPHIL # BLD: 0.1 %
EOSINOPHILS ABSOLUTE: 0 THOU/MM3 (ref 0–0.4)
ERYTHROCYTE [DISTWIDTH] IN BLOOD BY AUTOMATED COUNT: 11.7 % (ref 11.5–14.5)
ERYTHROCYTE [DISTWIDTH] IN BLOOD BY AUTOMATED COUNT: 36.9 FL (ref 35–45)
GFR SERPL CREATININE-BSD FRML MDRD: 41 ML/MIN/1.73M2
GLUCOSE BLD-MCNC: 196 MG/DL (ref 70–108)
GLUCOSE BLD-MCNC: 212 MG/DL (ref 70–108)
GLUCOSE BLD-MCNC: 270 MG/DL (ref 70–108)
GLUCOSE BLD-MCNC: 276 MG/DL (ref 70–108)
GLUCOSE BLD-MCNC: 279 MG/DL (ref 70–108)
HCT VFR BLD CALC: 32.4 % (ref 37–47)
HEMOGLOBIN: 10.1 GM/DL (ref 12–16)
IMMATURE GRANS (ABS): 0.11 THOU/MM3 (ref 0–0.07)
IMMATURE GRANULOCYTES: 0.8 %
LYMPHOCYTES # BLD: 22.3 %
LYMPHOCYTES ABSOLUTE: 3.3 THOU/MM3 (ref 1–4.8)
MCH RBC QN AUTO: 26.9 PG (ref 26–33)
MCHC RBC AUTO-ENTMCNC: 31.2 GM/DL (ref 32.2–35.5)
MCV RBC AUTO: 86.4 FL (ref 81–99)
MONOCYTES # BLD: 5.1 %
MONOCYTES ABSOLUTE: 0.7 THOU/MM3 (ref 0.4–1.3)
NUCLEATED RED BLOOD CELLS: 0 /100 WBC
PLATELET # BLD: 367 THOU/MM3 (ref 130–400)
PMV BLD AUTO: 9.7 FL (ref 9.4–12.4)
POTASSIUM SERPL-SCNC: 4.4 MEQ/L (ref 3.5–5.2)
RBC # BLD: 3.75 MILL/MM3 (ref 4.2–5.4)
SEG NEUTROPHILS: 71.4 %
SEGMENTED NEUTROPHILS ABSOLUTE COUNT: 10.4 THOU/MM3 (ref 1.8–7.7)
SODIUM BLD-SCNC: 138 MEQ/L (ref 135–145)
WBC # BLD: 14.6 THOU/MM3 (ref 4.8–10.8)

## 2022-04-24 PROCEDURE — 6360000002 HC RX W HCPCS

## 2022-04-24 PROCEDURE — 1200000003 HC TELEMETRY R&B

## 2022-04-24 PROCEDURE — 85025 COMPLETE CBC W/AUTO DIFF WBC: CPT

## 2022-04-24 PROCEDURE — 6370000000 HC RX 637 (ALT 250 FOR IP)

## 2022-04-24 PROCEDURE — 2580000003 HC RX 258

## 2022-04-24 PROCEDURE — 36415 COLL VENOUS BLD VENIPUNCTURE: CPT

## 2022-04-24 PROCEDURE — 82948 REAGENT STRIP/BLOOD GLUCOSE: CPT

## 2022-04-24 PROCEDURE — 99233 SBSQ HOSP IP/OBS HIGH 50: CPT | Performed by: INTERNAL MEDICINE

## 2022-04-24 PROCEDURE — 80048 BASIC METABOLIC PNL TOTAL CA: CPT

## 2022-04-24 RX ADMIN — ATORVASTATIN CALCIUM 40 MG: 40 TABLET, FILM COATED ORAL at 20:55

## 2022-04-24 RX ADMIN — SODIUM CHLORIDE, PRESERVATIVE FREE 10 ML: 5 INJECTION INTRAVENOUS at 08:10

## 2022-04-24 RX ADMIN — PIPERACILLIN AND TAZOBACTAM 3375 MG: 3; .375 INJECTION, POWDER, LYOPHILIZED, FOR SOLUTION INTRAVENOUS at 05:05

## 2022-04-24 RX ADMIN — INSULIN LISPRO 3 UNITS: 100 INJECTION, SOLUTION INTRAVENOUS; SUBCUTANEOUS at 16:20

## 2022-04-24 RX ADMIN — SODIUM CHLORIDE: 9 INJECTION, SOLUTION INTRAVENOUS at 00:44

## 2022-04-24 RX ADMIN — SODIUM CHLORIDE, PRESERVATIVE FREE 10 ML: 5 INJECTION INTRAVENOUS at 20:55

## 2022-04-24 RX ADMIN — PIPERACILLIN AND TAZOBACTAM 3375 MG: 3; .375 INJECTION, POWDER, LYOPHILIZED, FOR SOLUTION INTRAVENOUS at 12:04

## 2022-04-24 RX ADMIN — LISINOPRIL 20 MG: 20 TABLET ORAL at 20:55

## 2022-04-24 RX ADMIN — INSULIN LISPRO 2 UNITS: 100 INJECTION, SOLUTION INTRAVENOUS; SUBCUTANEOUS at 20:54

## 2022-04-24 RX ADMIN — AMLODIPINE BESYLATE 10 MG: 10 TABLET ORAL at 08:10

## 2022-04-24 RX ADMIN — OXYCODONE 5 MG: 5 TABLET ORAL at 00:32

## 2022-04-24 RX ADMIN — INSULIN LISPRO 3 UNITS: 100 INJECTION, SOLUTION INTRAVENOUS; SUBCUTANEOUS at 12:00

## 2022-04-24 RX ADMIN — PIPERACILLIN AND TAZOBACTAM 3375 MG: 3; .375 INJECTION, POWDER, LYOPHILIZED, FOR SOLUTION INTRAVENOUS at 21:03

## 2022-04-24 RX ADMIN — INSULIN LISPRO 1 UNITS: 100 INJECTION, SOLUTION INTRAVENOUS; SUBCUTANEOUS at 08:10

## 2022-04-24 ASSESSMENT — PAIN SCALES - GENERAL
PAINLEVEL_OUTOF10: 5
PAINLEVEL_OUTOF10: 0
PAINLEVEL_OUTOF10: 0

## 2022-04-24 ASSESSMENT — PAIN DESCRIPTION - ORIENTATION: ORIENTATION: RIGHT

## 2022-04-24 ASSESSMENT — PAIN DESCRIPTION - LOCATION: LOCATION: LEG

## 2022-04-24 ASSESSMENT — PAIN DESCRIPTION - DESCRIPTORS: DESCRIPTORS: ACHING

## 2022-04-24 NOTE — CONSULTS
Podiatric Surgery Progress Note    4/24: The patient is seen today at bedside on behalf of Dr. Rich Kaur. The patient is 1 day s/p RIGHT FOOT WOUND DEBRIDEMENT WITH  RIGHT 5TH METATARSAL HEAD RESECTION (D.O.S. 4/23/22). The patient states that she has no pain to her right foot. She denies any N/V/F/C, SOB or chest pain. The patient's nurse notified me about the left foot having 2 skin fissures to digits 1 and 3 of the left foot. The patient denies any other pedal complaints. 4/21: HPI:                The patient is a 73 y.o. female with significant past medical history of diabetes mellitus, hypertension, psoriasis who is being seen at bedside on behalf of Dr. Rich Kaur with complaints of a right foot wound. Patient states that the right foot wound apparently came on in the past 3 days. She noticed a corn on her right foot and peeled it away. She has had increasing redness and swelling to her foot since then. Patient was admitted yesterday from the emergency department for this condition. Patient states that she does not have any pain to her right foot. She is a newly diagnosed diabetic and believes she has some degree of neuropathy. She denies any nausea, vomiting, fever, chills, chest pain, shortness of breath. No other pedal complaints. ADDENDUM: Patient seen at bedside today alongside Dr. Rich Kaur. Reviewed MRI findings with patient; discussed with the patient that she needs to have the wound debrided, and that she may have an underlying bone infection. Discussed with patient that we will schedule her for debridement with possible right fifth metatarsal head resection for this Saturday, 4/23/2022 at 10 or 11 AM.  Patient still denies any pain to bilateral LE. She also denies any B/F/C/SOB/CP or bilateral calf tenderness. No new pedal complaints at this time.     Past Medical History:        Diagnosis Date    Diabetes mellitus ([facility] [address]     Hypertension     Psoriasis      Past Surgical History: History reviewed. No pertinent surgical history. Current Medications:    Current Facility-Administered Medications: glucose (GLUTOSE) 40 % oral gel 15 g, 15 g, Oral, PRN  dextrose 50 % IV solution, 12.5 g, IntraVENous, PRN  glucagon (rDNA) injection 1 mg, 1 mg, IntraMUSCular, PRN  dextrose 5 % solution, 100 mL/hr, IntraVENous, PRN  insulin lispro (HUMALOG) injection vial 0-6 Units, 0-6 Units, SubCUTAneous, TID WC  insulin lispro (HUMALOG) injection vial 0-3 Units, 0-3 Units, SubCUTAneous, Nightly  0.9 % sodium chloride infusion, , IntraVENous, Continuous  sodium chloride flush 0.9 % injection 5-40 mL, 5-40 mL, IntraVENous, 2 times per day  sodium chloride flush 0.9 % injection 5-40 mL, 5-40 mL, IntraVENous, PRN  0.9 % sodium chloride infusion, , IntraVENous, PRN  oxyCODONE (ROXICODONE) immediate release tablet 5 mg, 5 mg, Oral, Q4H PRN **OR** oxyCODONE (ROXICODONE) immediate release tablet 10 mg, 10 mg, Oral, Q4H PRN  morphine (PF) injection 2 mg, 2 mg, IntraVENous, Q2H PRN **OR** morphine injection 4 mg, 4 mg, IntraVENous, Q2H PRN  amLODIPine (NORVASC) tablet 10 mg, 10 mg, Oral, Daily  atorvastatin (LIPITOR) tablet 40 mg, 40 mg, Oral, Nightly  lisinopril (PRINIVIL;ZESTRIL) tablet 20 mg, 20 mg, Oral, Nightly  ondansetron (ZOFRAN-ODT) disintegrating tablet 4 mg, 4 mg, Oral, Q8H PRN **OR** ondansetron (ZOFRAN) injection 4 mg, 4 mg, IntraVENous, Q6H PRN  polyethylene glycol (GLYCOLAX) packet 17 g, 17 g, Oral, Daily PRN  acetaminophen (TYLENOL) tablet 650 mg, 650 mg, Oral, Q6H PRN **OR** acetaminophen (TYLENOL) suppository 650 mg, 650 mg, Rectal, Q6H PRN  piperacillin-tazobactam (ZOSYN) 3,375 mg in dextrose 5 % 50 mL IVPB extended infusion (mini-bag), 3,375 mg, IntraVENous, Q8H  Allergies:  Patient has no known allergies. Social History:    TOBACCO:   reports that she quit smoking about 11 years ago. Her smoking use included cigarettes. She has a 7.50 pack-year smoking history.  She has never used smokeless tobacco.  ETOH:   reports previous alcohol use. DRUGS:   reports no history of drug use. Family History:       Problem Relation Age of Onset    Other Mother         thyroidectomy    Diabetes Mother     Heart Disease Mother     Heart Disease Father      REVIEW OF SYSTEMS:    Pertinent ROS in HPI  PHYSICAL EXAM:      Vitals:    BP (!) 136/56   Pulse 62   Temp 97.3 °F (36.3 °C) (Oral)   Resp 16   Ht 5' 9.5\" (1.765 m)   Wt 206 lb 9.6 oz (93.7 kg)   SpO2 98%   BMI 30.07 kg/m²     Exam:   Dressing intact and well maintained. No apparent strike through noted. Vascular: Dorsalis pedis and posterior tibial pulses are palpable bilaterally. Skin temperature is warm to hot from proximal tibial tuberosity to distal digits of right foot. CFT brisk to exposed digits. Minimal edema present on right foot lateral aspect, secondary to post-op state. Hair growth negative. Quality of skin diminished. Dermatologic: Nails 1-5 bilaterally are thickened, elongated and dystrophic, with presence of subungual debris. Webspaces 1-4 bilaterally are clean, dry and intact. Patient has a open wound to the plantar lateral aspect of her right foot with granular tissue present. Wound has the distal aspect of the 5th metatarsal shaft exposed. There is no purulent drainage. No malodor or necrosis noted. Wound edges are viable. There is erythema and edema. Neurovascular:  Light touch sensation grossly intact at the digits bilaterally. Musculoskeletal: Muscle strength testing deferred. Right foot dorsiflexes to 10 degrees with knee extended, increases with knee flexed. Otherwise rectus foot and ankle. XRAY:XR FOOT RIGHT (2 VIEWS)    Result Date: 4/20/2022  PROCEDURE: XR FOOT RIGHT (2 VIEWS) CLINICAL INFORMATION: Non-healing wound COMPARISON: None TECHNIQUE: 2 views of the right foot FINDINGS: A soft tissue wound is seen overlying the fifth metatarsal. Plantar calcaneal and retrocalcaneal spurs.  Mild degenerative changes of the right foot. No bony destruction, erosion or periostitis to suggest osteomyelitis. No acute fracture or dislocation. Bone mineralization is unremarkable. No significant soft tissue abnormality. 1. I do not see radiographic evidence of bony destruction, bony erosion, or periostitis to reflect osteomyelitis at this time. Early osteomyelitis may be occult on radiographs. Clinical correlation is recommended. 2. A soft tissue wound/ulcer is seen overlying the fifth metatarsal **This report has been created using voice recognition software. It may contain minor errors which are inherent in voice recognition technology. ** Final report electronically signed by Dr Britany Maguire on 4/20/2022 2:15 PM    MRI, right foot  Impression       1. Abnormal signal intensity in the head and neck of the fifth metatarsal extending to the distal shaft consistent with osteomyelitis. There is abnormal soft tissue signal intensity adjacent to the head of the fifth metatarsal consistent with edema    and/or cellulitis. 2. Degenerative changes. 3. Areas of abnormal signal intensity at the bases of the third and fourth metatarsals. These may represent small cystic changes. 4. Abnormal soft tissue signal intensity surrounding the second, third and fourth digits consistent with edema and/or cellulitis. 5. Atrophy involving the foot muscles.                   **This report has been created using voice recognition software. It may contain minor errors which are inherent in voice recognition technology. **       Final report electronically signed by DR Yazan Bear on 4/21/2022 2:37 PM         LABS:    Recent Labs     04/23/22  0505 04/24/22  0548   WBC 18.8* 14.6*   HGB 9.8* 10.1*   HCT 30.8* 32.4*    367        Recent Labs     04/24/22  0548      K 4.4      CO2 16*   BUN 25*   CREATININE 1.3*        No results for input(s): PROT, INR, APTT in the last 72 hours.    No results for input(s): CKTOTAL, CKMB, Saeedca Males in the last 72 hours. Assessment  Principle  1) Diabetic foot infection, right foot    Plan  -Patient initially examined and evaluated  -Labs reviewed, white blood cell count 14.6, hemoglobin 10.1  - Patient is afebrile  - Culture reviewed, many gram-positive cocci occurring singly and in pairs, many gram-negative bacilli  - Continue IV antibiotics per internal medicine; ID consulted- patient continuing on Zosyn, Vancomycin stopped per ID.   -Wound dressed with Iodoform packing, light betadine to skin edges, gauze, ABD, Kerlix, Ace bandage.    -  MRI reviewed; see report above  -Patient had surgery for debridement of the right foot wound with  right fifth metatarsal head resection on 4/23/2022  - Surgical shoe ordered right foot  - Partial heel weightbearing as tolerated in surgical shoe right foot  - Discussed with patient that podiatry will do daily dressing changes with the plan to schedule surgery for closure of the right foot wound this week. -Podiatry will continue to follow patient    DISPO: Pending response to IV antibiotics; Daily dressing changes; Plan to schedule surgery for closure of the right foot wound this week. Diogenes Robertson, thank you for the consultation, allowing podiatry to assist in the medical welfare of this patient. Podiatry will continue to follow this patient throughout the duration of hospitalization.    Clyde Sun DPM DPM  4/24/2022 10:03 AM

## 2022-04-24 NOTE — PROGRESS NOTES
Hospitalist      Patient:  Irvin Pulse    Unit/Bed:6K-04/004-A  YOB: 1955  MRN: [id number]   Acct: [de-identified]     PCP: Dedrick Mack MD  Date of Admission: 4/20/2022        Assessment and Plan:        1. Diabetic foot infection, right: MRI pending for possible osteomyelitis, will continue with IV antibiotics (Zosyn, vancomycin). Hemoglobin A1c is 7.4.  2. Non-insulin-dependent diabetes type 2: Hemoglobin A1c 7.4 currently on insulin sliding scale and a carb controlled diet, will check urine for microalbumin  3. Essential hypertension: We will continue home medication of lisinopril and Norvasc  4. Hyperlipidemia we will continue with Lipitor    CC: Foot infection    HPI: Per H&P \"This is a relatively healthy 71-year-old female who presented centers [address] [place] [address] emergency department on 4/20 due to foot pain in her right foot. Patient states that her foot started to hurt on 4/18 and describes the pain as constant and dull pain. Patient rates the pain currently 4 out of 10. Denies any alleviating or aggravating factors. Patient states she noticed a corn on the outside of her foot and pulled it off on 4/18 as well. Patient states that her eyesight is not very good and she cannot see her feet well so today her daughter came to look at her feet due to the pain. Daughter saw redness and swelling to her right foot. Patient denies nausea, vomiting, fever, chills, shortness of breath, chest pain. Patient does not look toxic at this time. Of note, patient was recently diagnosed with diabetes by her PCP about 3 months ago. Patient has been taking her diabetes medications as prescribed.     In the ED, patient was found to have elevated white blood cell count 20.2 elevated CRP 5. 84. X-ray of right foot did not show osteomyelitis. Patient was given IV antibiotics. Hospitalist were asked to admit patient for podiatry and infectious disease consult and further work-up. \"    4.22.2022 patient seen this a.m. states that she is having a \"debridement\" tomorrow, also discussed possible osteomyelitis and treatment. Patient states that she thinks that the wound has been there for possible 1 month but over the last week became more painful. Infectious disease and podiatry are on the case    2022 patient seen this a.m. states she is to have surgery today. She is not sure of what they plan on doing. Eitel signs stable patient's afebrile, slowly decreasing WBC    2022 patient seen this a.m., not quite sure what they did and surgery. It appears from the surgical note the wound was debrided with a partial resection of the fifth metatarsal.  Patient denies any pain at this time. Discharge planning will be decided during podiatry rounds. ROS (14 point review of systems completed. Pertinent positives noted. Otherwise ROS is negative) : Foot wound    PMH:  Per HPI and       Diagnosis Date    Diabetes mellitus ([facility] [address]     Hypertension     Psoriasis      SHX:  History reviewed. No pertinent surgical history.   FHX:       Problem Relation Age of Onset    Other Mother         thyroidectomy    Diabetes Mother     Heart Disease Mother     Heart Disease Father      SOCHX:   Social History     Socioeconomic History    Marital status: Single     Spouse name: None    Number of children: 1    Years of education: None    Highest education level: None   Occupational History    None   Tobacco Use    Smoking status: Former Smoker     Packs/day: 0.50     Years: 15.00     Pack years: 7.50     Types: Cigarettes     Quit date: 2011     Years since quittin.3    Smokeless tobacco: Never Used   Substance and Sexual Activity    Alcohol use: Not Currently     Comment: social    Drug use: Never    Sexual activity: None   Other Topics Concern    None   Social History Narrative    None     Social Determinants of Health     Financial Resource Strain: Low Risk     Difficulty of Paying Living Expenses: Not hard at all   Food Insecurity: No Food Insecurity    Worried About [facility] in the Last Year: Never true    Samara of Food in the Last Year: Never true   Transportation Needs:     Lack of Transportation (Medical): Not on file    Lack of Transportation (Non-Medical): Not on file   Physical Activity:     Days of Exercise per Week: Not on file    Minutes of Exercise per Session: Not on file   Stress:     Feeling of Stress : Not on file   Social Connections:     Frequency of Communication with Friends and Family: Not on file    Frequency of Social Gatherings with Friends and Family: Not on file    Attends Yazdanism Services: Not on file    Active Member of [address] [place] [address] or Organizations: Not on file    Attends Club or Organization Meetings: Not on file    Marital Status: Not on file   Intimate Partner Violence:     Fear of Current or Ex-Partner: Not on file    Emotionally Abused: Not on file    Physically Abused: Not on file    Sexually Abused: Not on file   Housing Stability:     Unable to Pay for Housing in the Last Year: Not on file    Number of [place] in the Last Year: Not on file    Unstable Housing in the Last Year: Not on file      Allergies: Patient has no known allergies. Medications:     dextrose      sodium chloride 125 mL/hr at 04/24/22 0044    sodium chloride        insulin lispro  0-6 Units SubCUTAneous TID WC    insulin lispro  0-3 Units SubCUTAneous Nightly    sodium chloride flush  5-40 mL IntraVENous 2 times per day    amLODIPine  10 mg Oral Daily    atorvastatin  40 mg Oral Nightly    lisinopril  20 mg Oral Nightly    piperacillin-tazobactam  3,375 mg IntraVENous Q8H     Prior to Admission medications    Medication Sig Start Date End Date Taking?  Authorizing Provider   dapagliflozin (FARXIGA) 10 MG tablet take 1 tablet by mouth IN THE MORNING 4/19/22   Kaila Mccall MD   amLODIPine (NORVASC) 10 MG tablet Take 1 tablet by mouth daily 4/19/22   Kaila Mccall MD atorvastatin (LIPITOR) 40 MG tablet Take 1 tablet by mouth nightly 4/19/22   Dalia Pringle MD   lisinopril (PRINIVIL;ZESTRIL) 10 MG tablet Take 2 tablets by mouth nightly 4/19/22   Dalia Pringle MD   aspirin 81 MG chewable tablet Take 81 mg by mouth daily    Historical Provider, MD      PHYSICAL EXAM:    BP (!) 136/56   Pulse 62   Temp 97.3 °F (36.3 °C) (Oral)   Resp 16   Ht 5' 9.5\" (1.765 m)   Wt 206 lb 9.6 oz (93.7 kg)   SpO2 98%   BMI 30.07 kg/m²     General appearance:  No apparent distress, appears stated age and cooperative. HEENT:  Normal cephalic, atraumatic without obvious deformity. Pupils equal, round, and reactive to light. Extra ocular muscles intact. Conjunctivae/corneas clear. Neck: Supple, with full range of motion. no jugular venous distention. Trachea midline. no carotid bruits  Respiratory:  Normal respiratory effort. Clear to auscultation, bilaterally without Rales/Wheezes/Rhonchi. Breath sounds equal bilaterally  Cardiovascular:  Regular rate and rhythm with normal S1/S2 without murmurs, rubs or gallops. PMI non displaced  Abdomen: Soft, non-tender, non-distended with normal bowel sounds. No guarding, rebound. Musculoskeletal: Edema right foot, erythema, warmth. Skin: Wound right foot approximately fifth metatarsal head erythematous, discharge present. Neurologic:  Neurovascularly intact without any focal sensory/motor deficits. Cranial nerves: II-XII intact, grossly non-focal.  Psychiatric:  Alert and oriented, thought content appropriate, normal insight  Capillary Refill: Brisk,< 2 seconds   Peripheral Pulses: +2 palpable, equal bilaterally upper and lower extremities  Lymphatics: no lymphadenopathy    Data: (All radiographs, tracings, PFTs, and imaging are personally viewed and interpreted unless otherwise noted).       Recent Labs     04/22/22  0503 04/23/22  0505 04/24/22  0548   WBC 19.6* 18.8* 14.6*   HGB 9.7* 9.8* 10.1*   HCT 30.7* 30.8* 32.4*    361 367     Recent Labs     04/22/22  0503 04/23/22  0505 04/24/22  0548    134* 138   K 4.0 4.0 4.4    103 109   CO2 17* 19* 16*   BUN 23* 23* 25*   CREATININE 1.2 1.3* 1.3*   CALCIUM 8.5 8.7 8.6     No results for input(s): AST, ALT, BILIDIR, BILITOT, ALKPHOS in the last 72 hours. No results for input(s): INR in the last 72 hours. No results for input(s): Sridevi Beat in the last 72 hours. Radiology reports-   XR FOOT RIGHT (2 VIEWS)    Result Date: 4/20/2022  PROCEDURE: XR FOOT RIGHT (2 VIEWS) CLINICAL INFORMATION: Non-healing wound COMPARISON: None TECHNIQUE: 2 views of the right foot FINDINGS: A soft tissue wound is seen overlying the fifth metatarsal. Plantar calcaneal and retrocalcaneal spurs. Mild degenerative changes of the right foot. No bony destruction, erosion or periostitis to suggest osteomyelitis. No acute fracture or dislocation. Bone mineralization is unremarkable. No significant soft tissue abnormality. 1. I do not see radiographic evidence of bony destruction, bony erosion, or periostitis to reflect osteomyelitis at this time. Early osteomyelitis may be occult on radiographs. Clinical correlation is recommended. 2. A soft tissue wound/ulcer is seen overlying the fifth metatarsal **This report has been created using voice recognition software. It may contain minor errors which are inherent in voice recognition technology. ** Final report electronically signed by Dr Alexandria Skelton on 4/20/2022 2:15 PM      Electronically signed by Arya Carranza DO on 4/24/2022 at 9:17 AM

## 2022-04-24 NOTE — PLAN OF CARE
Problem: Safety - Adult  Goal: Free from fall injury  Outcome: Progressing   Call light within reach, bed alarm on, hourly rounding, patient free from falls     Problem: Skin/Tissue Integrity  Goal: Absence of new skin breakdown  Description: 1. Monitor for areas of redness and/or skin breakdown  2. Assess vascular access sites hourly  3. Every 4-6 hours minimum:  Change oxygen saturation probe site  4. Every 4-6 hours:  If on nasal continuous positive airway pressure, respiratory therapy assess nares and determine need for appliance change or resting period.   Outcome: Progressing   Dressing changes per Podiatry    Problem: Pain  Goal: Verbalizes/displays adequate comfort level or baseline comfort level  Outcome: Progressing  Patient denied any pain

## 2022-04-25 LAB
ANION GAP SERPL CALCULATED.3IONS-SCNC: 9 MEQ/L (ref 8–16)
ATYPICAL LYMPHOCYTES: ABNORMAL %
BASOPHILS # BLD: 0.6 %
BASOPHILS ABSOLUTE: 0.1 THOU/MM3 (ref 0–0.1)
BUN BLDV-MCNC: 25 MG/DL (ref 7–22)
CALCIUM SERPL-MCNC: 8.4 MG/DL (ref 8.5–10.5)
CHLORIDE BLD-SCNC: 107 MEQ/L (ref 98–111)
CO2: 20 MEQ/L (ref 23–33)
CREAT SERPL-MCNC: 1.4 MG/DL (ref 0.4–1.2)
DIFFERENTIAL TYPE: ABNORMAL
EOSINOPHIL # BLD: 2.4 %
EOSINOPHILS ABSOLUTE: 0.5 THOU/MM3 (ref 0–0.4)
ERYTHROCYTE [DISTWIDTH] IN BLOOD BY AUTOMATED COUNT: 11.9 % (ref 11.5–14.5)
ERYTHROCYTE [DISTWIDTH] IN BLOOD BY AUTOMATED COUNT: 38.1 FL (ref 35–45)
GFR SERPL CREATININE-BSD FRML MDRD: 38 ML/MIN/1.73M2
GLUCOSE BLD-MCNC: 191 MG/DL (ref 70–108)
GLUCOSE BLD-MCNC: 193 MG/DL (ref 70–108)
GLUCOSE BLD-MCNC: 204 MG/DL (ref 70–108)
GLUCOSE BLD-MCNC: 222 MG/DL (ref 70–108)
GLUCOSE BLD-MCNC: 231 MG/DL (ref 70–108)
HCT VFR BLD CALC: 29.2 % (ref 37–47)
HEMOGLOBIN: 9 GM/DL (ref 12–16)
IMMATURE GRANS (ABS): 0.1 THOU/MM3 (ref 0–0.07)
IMMATURE GRANULOCYTES: 0.5 %
LYMPHOCYTES # BLD: 52.2 %
LYMPHOCYTES ABSOLUTE: 10.2 THOU/MM3 (ref 1–4.8)
MCH RBC QN AUTO: 26.9 PG (ref 26–33)
MCHC RBC AUTO-ENTMCNC: 30.8 GM/DL (ref 32.2–35.5)
MCV RBC AUTO: 87.4 FL (ref 81–99)
MONOCYTES # BLD: 5.5 %
MONOCYTES ABSOLUTE: 1.1 THOU/MM3 (ref 0.4–1.3)
NUCLEATED RED BLOOD CELLS: 0 /100 WBC
PATHOLOGIST REVIEW: ABNORMAL
PLATELET # BLD: 359 THOU/MM3 (ref 130–400)
PLATELET ESTIMATE: ADEQUATE
PMV BLD AUTO: 9.7 FL (ref 9.4–12.4)
POTASSIUM SERPL-SCNC: 4.2 MEQ/L (ref 3.5–5.2)
RBC # BLD: 3.34 MILL/MM3 (ref 4.2–5.4)
SCAN OF BLOOD SMEAR: NORMAL
SEG NEUTROPHILS: 38.8 %
SEGMENTED NEUTROPHILS ABSOLUTE COUNT: 7.6 THOU/MM3 (ref 1.8–7.7)
SODIUM BLD-SCNC: 136 MEQ/L (ref 135–145)
WBC # BLD: 19.6 THOU/MM3 (ref 4.8–10.8)

## 2022-04-25 PROCEDURE — 6370000000 HC RX 637 (ALT 250 FOR IP)

## 2022-04-25 PROCEDURE — 97116 GAIT TRAINING THERAPY: CPT

## 2022-04-25 PROCEDURE — 2580000003 HC RX 258

## 2022-04-25 PROCEDURE — 85025 COMPLETE CBC W/AUTO DIFF WBC: CPT

## 2022-04-25 PROCEDURE — 99233 SBSQ HOSP IP/OBS HIGH 50: CPT | Performed by: INTERNAL MEDICINE

## 2022-04-25 PROCEDURE — 97162 PT EVAL MOD COMPLEX 30 MIN: CPT

## 2022-04-25 PROCEDURE — 80048 BASIC METABOLIC PNL TOTAL CA: CPT

## 2022-04-25 PROCEDURE — 1200000003 HC TELEMETRY R&B

## 2022-04-25 PROCEDURE — 36415 COLL VENOUS BLD VENIPUNCTURE: CPT

## 2022-04-25 PROCEDURE — 6360000002 HC RX W HCPCS

## 2022-04-25 PROCEDURE — 82948 REAGENT STRIP/BLOOD GLUCOSE: CPT

## 2022-04-25 RX ADMIN — PIPERACILLIN AND TAZOBACTAM 3375 MG: 3; .375 INJECTION, POWDER, LYOPHILIZED, FOR SOLUTION INTRAVENOUS at 04:33

## 2022-04-25 RX ADMIN — INSULIN LISPRO 1 UNITS: 100 INJECTION, SOLUTION INTRAVENOUS; SUBCUTANEOUS at 08:16

## 2022-04-25 RX ADMIN — AMLODIPINE BESYLATE 10 MG: 10 TABLET ORAL at 11:54

## 2022-04-25 RX ADMIN — PIPERACILLIN AND TAZOBACTAM 3375 MG: 3; .375 INJECTION, POWDER, LYOPHILIZED, FOR SOLUTION INTRAVENOUS at 12:00

## 2022-04-25 RX ADMIN — INSULIN LISPRO 1 UNITS: 100 INJECTION, SOLUTION INTRAVENOUS; SUBCUTANEOUS at 16:52

## 2022-04-25 RX ADMIN — INSULIN LISPRO 1 UNITS: 100 INJECTION, SOLUTION INTRAVENOUS; SUBCUTANEOUS at 20:17

## 2022-04-25 RX ADMIN — INSULIN LISPRO 2 UNITS: 100 INJECTION, SOLUTION INTRAVENOUS; SUBCUTANEOUS at 11:53

## 2022-04-25 RX ADMIN — ATORVASTATIN CALCIUM 40 MG: 40 TABLET, FILM COATED ORAL at 20:16

## 2022-04-25 ASSESSMENT — PAIN SCALES - GENERAL: PAINLEVEL_OUTOF10: 0

## 2022-04-25 NOTE — CARE COORDINATION
4/25/22, 8:22 AM EDT    DISCHARGE ON GOING EVALUATION    [facility] day: 5  Location: -04/004-A Reason for admit: Cellulitis [L03.90]  Cellulitis of right foot [V76.785]  Diabetic ulcer of right foot associated with type 2 diabetes mellitus, with muscle involvement without evidence of necrosis, unspecified part of foot ([address] [O07.573, L97.515]   Procedure:   4/20 Right foot XR I do not see radiographic evidence of bony destruction, bony erosion, or periostitis to reflect osteomyelitis at this time. Early osteomyelitis may be occult on radiographs. Clinical correlation is recommended. A soft tissue wound/ulcer is seen overlying the fifth metatarsal  4/21 MRI right foot Abnormal signal intensity in the head and neck of the fifth metatarsal extending to the distal shaft consistent with osteomyelitis. There is abnormal soft tissue signal intensity adjacent to the head of the fifth metatarsal consistent with edema   and/or cellulitis. Degenerative changes. Areas of abnormal signal intensity at the bases of the third and fourth metatarsals. These may represent small cystic changes. Abnormal soft tissue signal intensity surrounding the second, third and fourth digits consistent with edema and/or cellulitis. Atrophy involving the foot muscles  4/23 Right foot wound debridement with partial resection of fifth metatarsal.   Barriers to Discharge:   ID following. , GFR 38, WBC 19.6, RBC 3.34, H&H 9 & 29.2, BUN 25, Creatine 1.4 and Calcium 8.4. IV fluids, Zosyn and Zofran. PCP: Bety Matihs MD  Readmission Risk Score: 10.3 ( )%  Patient Goals/Plan/Treatment Preferences:   Open to Merged with [facility], upon returning home with family.

## 2022-04-25 NOTE — PROGRESS NOTES
Hospitalist      Patient:  Elaina Ruiz    Unit/Bed:6K-04/004-A  YOB: 1955  MRN: [id number]   Acct: [de-identified]     PCP: Romana Peal, MD  Date of Admission: 4/20/2022        Assessment and Plan:        1. Diabetic foot infection, right: MRI pending for possible osteomyelitis, will continue with IV antibiotics (Zosyn, vancomycin). Hemoglobin A1c is 7.4.  2. Non-insulin-dependent diabetes type 2: Hemoglobin A1c 7.4 currently on insulin sliding scale and a carb controlled diet, will check urine for microalbumin  3. Essential hypertension: We will continue home medication of lisinopril and Norvasc  4. Hyperlipidemia we will continue with Lipitor    CC: Foot infection    HPI: Per H&P \"This is a relatively healthy 72-year-old female who presented centers [address] [place] [address] emergency department on 4/20 due to foot pain in her right foot. Patient states that her foot started to hurt on 4/18 and describes the pain as constant and dull pain. Patient rates the pain currently 4 out of 10. Denies any alleviating or aggravating factors. Patient states she noticed a corn on the outside of her foot and pulled it off on 4/18 as well. Patient states that her eyesight is not very good and she cannot see her feet well so today her daughter came to look at her feet due to the pain. Daughter saw redness and swelling to her right foot. Patient denies nausea, vomiting, fever, chills, shortness of breath, chest pain. Patient does not look toxic at this time. Of note, patient was recently diagnosed with diabetes by her PCP about 3 months ago. Patient has been taking her diabetes medications as prescribed.     In the ED, patient was found to have elevated white blood cell count 20.2 elevated CRP 5. 84. X-ray of right foot did not show osteomyelitis. Patient was given IV antibiotics. Hospitalist were asked to admit patient for podiatry and infectious disease consult and further work-up. \"    4.22.2022 patient seen this a.m. states that she is having a \"debridement\" tomorrow, also discussed possible osteomyelitis and treatment. Patient states that she thinks that the wound has been there for possible 1 month but over the last week became more painful. Infectious disease and podiatry are on the case    4.23.2022 patient seen this a.m. states she is to have surgery today. She is not sure of what they plan on doing. Eitel signs stable patient's afebrile, slowly decreasing WBC    4.24.2022 patient seen this a.m., not quite sure what they did and surgery. It appears from the surgical note the wound was debrided with a partial resection of the fifth metatarsal.  Patient denies any pain at this time. Discharge planning will be decided during podiatry rounds. 4.25.2022 patient seen this a.m., medically stable will discharge home when okay with podiatry. To be transition to oral antibiotics at that time. WBC slightly increased to 19.6, platelets stable hemoglobin stable, slight bump in creatinine will hold ACE inhibitor and monitor BP    ROS (14 point review of systems completed. Pertinent positives noted. Otherwise ROS is negative) :  Foot wound    PMH:  Per HPI and       Diagnosis Date    Diabetes mellitus ([facility] [address]     Hypertension     Psoriasis      SHX:        Procedure Laterality Date    TOE AMPUTATION Right 4/23/2022    RIGHT FOOT WOUND DEBRIDEMENT WITH POSS RIGHT 5TH METATARSAL RESECTION performed by Maryjane Mcduffie DPM at [address] [place] [address]:       Problem Relation Age of Onset    Other Mother         thyroidectomy    Diabetes Mother     Heart Disease Mother     Heart Disease Father      Kalina Quarry:   Social History     Socioeconomic History    Marital status: Single     Spouse name: None    Number of children: 1    Years of education: None    Highest education level: None   Occupational History    None   Tobacco Use    Smoking status: Former Smoker     Packs/day: 0.50     Years: 15.00     Pack years: 7.50 Types: Cigarettes     Quit date: 2011     Years since quittin.3    Smokeless tobacco: Never Used   Substance and Sexual Activity    Alcohol use: Not Currently     Comment: social    Drug use: Never    Sexual activity: None   Other Topics Concern    None   Social History Narrative    None     Social Determinants of Health     Financial Resource Strain: Low Risk     Difficulty of Paying Living Expenses: Not hard at all   Food Insecurity: No Food Insecurity    Worried About Running Out of Food in the Last Year: Never true    Samara of Food in the Last Year: Never true   Transportation Needs:     Lack of Transportation (Medical): Not on file    Lack of Transportation (Non-Medical): Not on file   Physical Activity:     Days of Exercise per Week: Not on file    Minutes of Exercise per Session: Not on file   Stress:     Feeling of Stress : Not on file   Social Connections:     Frequency of Communication with Friends and Family: Not on file    Frequency of Social Gatherings with Friends and Family: Not on file    Attends Druze Services: Not on file    Active Member of [address] [place] [address] or Organizations: Not on file    Attends Club or Organization Meetings: Not on file    Marital Status: Not on file   Intimate Partner Violence:     Fear of Current or Ex-Partner: Not on file    Emotionally Abused: Not on file    Physically Abused: Not on file    Sexually Abused: Not on file   Housing Stability:     Unable to Pay for Housing in the Last Year: Not on file    Number of [place] in the Last Year: Not on file    Unstable Housing in the Last Year: Not on file      Allergies: Patient has no known allergies.   Medications:     dextrose      sodium chloride 125 mL/hr at 22 0044    sodium chloride        insulin lispro  0-6 Units SubCUTAneous TID WC    insulin lispro  0-3 Units SubCUTAneous Nightly    sodium chloride flush  5-40 mL IntraVENous 2 times per day    amLODIPine  10 mg Oral Daily  atorvastatin  40 mg Oral Nightly    lisinopril  20 mg Oral Nightly    piperacillin-tazobactam  3,375 mg IntraVENous Q8H     Prior to Admission medications    Medication Sig Start Date End Date Taking? Authorizing Provider   dapagliflozin (FARXIGA) 10 MG tablet take 1 tablet by mouth IN THE MORNING 4/19/22   Karma Ricardo MD   amLODIPine (NORVASC) 10 MG tablet Take 1 tablet by mouth daily 4/19/22   Karma Ricardo MD   atorvastatin (LIPITOR) 40 MG tablet Take 1 tablet by mouth nightly 4/19/22   Karma Ricardo MD   lisinopril (PRINIVIL;ZESTRIL) 10 MG tablet Take 2 tablets by mouth nightly 4/19/22   Karma Ricardo MD   aspirin 81 MG chewable tablet Take 81 mg by mouth daily    Historical Provider, MD      PHYSICAL EXAM:    /62   Pulse 57   Temp 97.5 °F (36.4 °C) (Oral)   Resp 18   Ht 5' 9.5\" (1.765 m)   Wt 209 lb 7 oz (95 kg)   SpO2 100%   BMI 30.49 kg/m²     General appearance:  No apparent distress, appears stated age and cooperative. HEENT:  Normal cephalic, atraumatic without obvious deformity. Pupils equal, round, and reactive to light. Extra ocular muscles intact. Conjunctivae/corneas clear. Neck: Supple, with full range of motion. no jugular venous distention. Trachea midline. no carotid bruits  Respiratory:  Normal respiratory effort. Clear to auscultation, bilaterally without Rales/Wheezes/Rhonchi. Breath sounds equal bilaterally  Cardiovascular:  Regular rate and rhythm with normal S1/S2 without murmurs, rubs or gallops. PMI non displaced  Abdomen: Soft, non-tender, non-distended with normal bowel sounds. No guarding, rebound. Musculoskeletal: Edema right foot, erythema, warmth. Skin: Wound right foot approximately fifth metatarsal head erythematous, discharge present. Neurologic:  Neurovascularly intact without any focal sensory/motor deficits.  Cranial nerves: II-XII intact, grossly non-focal.  Psychiatric:  Alert and oriented, thought content appropriate, normal insight  Capillary Refill: Brisk,< 2 seconds   Peripheral Pulses: +2 palpable, equal bilaterally upper and lower extremities  Lymphatics: no lymphadenopathy    Data: (All radiographs, tracings, PFTs, and imaging are personally viewed and interpreted unless otherwise noted).    Recent Labs     04/23/22  0505 04/24/22  0548 04/25/22  0513   WBC 18.8* 14.6* 19.6*   HGB 9.8* 10.1* 9.0*   HCT 30.8* 32.4* 29.2*    367 359     Recent Labs     04/23/22  0505 04/24/22  0548 04/25/22  0513   * 138 136   K 4.0 4.4 4.2    109 107   CO2 19* 16* 20*   BUN 23* 25* 25*   CREATININE 1.3* 1.3* 1.4*   CALCIUM 8.7 8.6 8.4*     No results for input(s): AST, ALT, BILIDIR, BILITOT, ALKPHOS in the last 72 hours. No results for input(s): INR in the last 72 hours. No results for input(s): Gabe Breeze in the last 72 hours. Radiology reports-   XR FOOT RIGHT (2 VIEWS)    Result Date: 4/20/2022  PROCEDURE: XR FOOT RIGHT (2 VIEWS) CLINICAL INFORMATION: Non-healing wound COMPARISON: None TECHNIQUE: 2 views of the right foot FINDINGS: A soft tissue wound is seen overlying the fifth metatarsal. Plantar calcaneal and retrocalcaneal spurs. Mild degenerative changes of the right foot. No bony destruction, erosion or periostitis to suggest osteomyelitis. No acute fracture or dislocation. Bone mineralization is unremarkable. No significant soft tissue abnormality. 1. I do not see radiographic evidence of bony destruction, bony erosion, or periostitis to reflect osteomyelitis at this time. Early osteomyelitis may be occult on radiographs. Clinical correlation is recommended. 2. A soft tissue wound/ulcer is seen overlying the fifth metatarsal **This report has been created using voice recognition software. It may contain minor errors which are inherent in voice recognition technology. ** Final report electronically signed by Dr Sahra Rene on 4/20/2022 2:15 PM      Electronically signed by Natasha Carter DO on 4/25/2022 at 1:38 PM

## 2022-04-25 NOTE — PLAN OF CARE
Problem: Discharge Planning  Goal: Discharge to home or other facility with appropriate resources  Outcome: Progressing  Note: Feedback readiness for discharge. Promoting inclusion for discharge planning. Problem: Safety - Adult  Goal: Free from fall injury  4/25/2022 0037 by Loy Damon RN  Outcome: Progressing  Note: Bed in low position  Call light in reach  Bed wheel lock  Teaching to use call light for assistance. Problem: ABCDS Injury Assessment  Goal: Absence of physical injury  Outcome: Progressing  Note: Bed in low position  Call light in reach  Bed wheel lock  Teaching to use call light for assistance. Problem: Skin/Tissue Integrity  Goal: Absence of new skin breakdown  Description: 1. Monitor for areas of redness and/or skin breakdown  2. Assess vascular access sites hourly  3. Every 4-6 hours minimum:  Change oxygen saturation probe site  4. Every 4-6 hours:  If on nasal continuous positive airway pressure, respiratory therapy assess nares and determine need for appliance change or resting period. 4/25/2022 0037 by Loy Damon RN  Outcome: Progressing  Note: Encouraging good fluid and diet intake. Encourage hygiene  Increase movement and encourage turns. Problem: Pain  Goal: Verbalizes/displays adequate comfort level or baseline comfort level  4/25/2022 0037 by Loy Damon RN  Outcome: Progressing  Note: Educate patient on pain control. Educate patient on acceptable pain level with chronic pain. Talk to patient about non-pharmaceutical pain interventions. Problem: Chronic Conditions and Co-morbidities  Goal: Patient's chronic conditions and co-morbidity symptoms are monitored and maintained or improved  Outcome: Progressing     Pain Assessment: 0-10  Pain Level: 0   Patient's Stated Pain Goal: 0 - No pain   Is pain goal met at this time? Yes    Care plan reviewed with patient.   Patient verbalizes understanding of the plan of care and contributes to goal setting.

## 2022-04-25 NOTE — PROGRESS NOTES
[facility]  INPATIENT PHYSICAL THERAPY  EVALUATION  [facility] RENAL TELEMETRY 6K - 6K-04/004-A    Time In: 0908  Time Out: 4578  Timed Code Treatment Minutes: 13 Minutes  Minutes: 21          Date: 2022  Patient Name: Lissette Ricardo,  Gender:  female        MRN: [id number]  : 1955  (73 y.o.)      Referring Practitioner: Maulik Poole DPM  Diagnosis: Cellulitis  Additional Pertinent Hx: This is a relatively healthy 58-year-old female who presented centers [address] [place] [address] emergency department on  due to foot pain in her right foot. Patient states that her foot started to hurt on  and describes the pain as constant and dull pain. Patient rates the pain currently 4 out of 10. Denies any alleviating or aggravating factors. Patient states she noticed a corn on the outside of her foot and pulled it off on  as well. Patient states that her eyesight is not very good and she cannot see her feet well so today her daughter came to look at her feet due to the pain. Daughter saw redness and swelling to her right foot. Patient denies nausea, vomiting, fever, chills, shortness of breath, chest pain. Patient does not look toxic at this time. Of note, patient was recently diagnosed with diabetes by her PCP about 3 months ago. Patient has been taking her diabetes medications as prescribed. In the ED, patient was found to have elevated white blood cell count 20.2 elevated CRP 5. 84. X-ray of right foot did not show osteomyelitis. Patient was given IV antibiotics. Hospitalist were asked to admit patient for podiatry and infectious disease consult and further work-up.  RIGHT FOOT WOUND DEBRIDEMENT WITH POSS RIGHT 5TH METATARSAL RESECTION on      Restrictions/Precautions:  Restrictions/Precautions: Weight Bearing,General Precautions,Fall Risk  Right Lower Extremity Weight Bearing: Partial Weight Bearing  Partial Weight Bearing Percentage Or Pounds: Partial heel weightbearing as tolerated right foot in surgical shoe. Patient is not to ambulate outside of surgical shoe. Partial heel weightbearing as tolerated right foot in surgical shoe. Patient is not to ambulate outside of surgical shoe. Required Braces or Orthoses  Right Lower Extremity Brace:  (SURGICAL SHOE)    Subjective:  Chart Reviewed: Yes  Patient assessed for rehabilitation services?: Yes  Family / Caregiver Present: No  Subjective: RN approved session. PT pleasant and agreeable to therapy. no complaints, reports doing well. General:            Hearing: Within functional limits         Pain: 0/10: denies pain at this time    Vitals: Vitals not assessed per clinical judgement, see nursing flowsheet    Social/Functional History:    Lives With: Alone  Type of Home: Apartment  Home Layout: One level  Home Access: Level entry  Home Equipment: Cane     Bathroom Shower/Tub: Tub/Shower unit  Bathroom Toilet: Standard    Receives Help From: Family  ADL Assistance: Independent  Homemaking Assistance: Independent  Ambulation Assistance: Independent  Transfer Assistance: Independent    Active : No  Mode of Transportation: Car  Occupation: Retired       OBJECTIVE:  Range of Motion:  Bilateral Lower Extremity: eyeQ [place]    Strength:  Bilateral Lower Extremity: eyeQ [place]    Balance:  Static Sitting Balance:  Stand By Assistance  Static Standing Balance: Stand By Assistance    Bed Mobility:  Not Tested    Transfers:  Sit to Stand: Stand By Assistance  Stand to Sit:Stand By Assistance    Ambulation:  Stand By Assistance  Distance: 60'   Surface: Level Tile  Device:Rolling Walker  Gait Deviations:  Slow Magdalena, Decreased Weight Shift Right, Decreased Gait Speed and Decreased Heel Strike on Left      Functional Outcome Measures: Completed  AM-PAC Inpatient Mobility without Stair Climbing Raw Score : 15  AM-PAC Inpatient without Stair Climbing T-Scale Score : 43.03    ASSESSMENT:  Activity Tolerance:  Patient tolerance of  treatment: good.        Treatment Initiated: Treatment and education initiated within context of evaluation. Evaluation time included review of current medical information, gathering information related to past medical, social and functional history, completion of standardized testing, formal and informal observation of tasks, assessment of data and development of plan of care and goals. Treatment time included skilled education and facilitation of tasks to increase safety and independence with functional mobility for improved independence and quality of life. Assessment: Body Structures, Functions, Activity Limitations Requiring Skilled Therapeutic Intervention: Decreased functional mobility ,Decreased posture,Decreased strength,Decreased balance,Decreased endurance  Assessment: Pt demonstrates a decrease in baseline by way of bed mobility, transfers and ambulation secondary to decreased activity tolerance, strength, fatigue, and balance deficits. Pt will benefit from skilled PT services throughout admission and beyond hospital discharge for improvements in functional mobility and in order to decrease fall risk and return pt to [facility]. Therapy Prognosis: Good    Requires PT Follow-Up: Yes    Discharge Recommendations:  Discharge Recommendations: Home with assist PRN    Patient Education:   .     Patient Education  Education Given To: Patient  Education Provided: Role of Therapy,Plan of Care,Precautions  Education Method: Verbal  Barriers to Learning: None  Education Outcome: Verbalized understanding      Equipment Recommendations:  Equipment Needed: No    Plan:  Current Treatment Recommendations: Strengthening,Gait training,Transfer training,Endurance training,Safety education & training  Plan:  (3-5x O)    Goals:  Patient goals : none stated  Short Term Goals  Time Frame for Short term goals: by discharge  Short term goal 1: bed mobility with HOB flat, no rails, mod I for increased functional ind  Short term goal 2: sit<>stand from various surfaces with LRD mod I for safe transfers  Short term goal 3: ambulate 200' with LRD mod I for safe household distances  Long Term Goals  Time Frame for Long term goals : NA d/t short ELOS    Following session, patient left in safe position with all fall risk precautions in place.     Agnes Mccormack PT, DPT

## 2022-04-25 NOTE — PROGRESS NOTES
Podiatric Surgery Progress Note    4/25/2022  Patient seen this morning on behalf of Dr. Glendy Gibbons. Patient is 2 days s/p right foot wound debridement with right fifth metatarsal head resection (DOS 4/23/2022). Patient denies any pain to bilateral lower extremities. She also currently denies any N/V/F/C/SOB/CP or bilateral calf tenderness. Patient has no new pedal complaints at this time. 4/24: The patient is seen today at bedside on behalf of Dr. Glendy Gibbons. The patient is 1 day s/p RIGHT FOOT WOUND DEBRIDEMENT WITH  RIGHT 5TH METATARSAL HEAD RESECTION (D.O.S. 4/23/22). The patient states that she has no pain to her right foot. She denies any N/V/F/C, SOB or chest pain. The patient's nurse notified me about the left foot having 2 skin fissures to digits 1 and 3 of the left foot. The patient denies any other pedal complaints. 4/21: HPI:                The patient is a 73 y.o. female with significant past medical history of diabetes mellitus, hypertension, psoriasis who is being seen at bedside on behalf of Dr. Glendy Gibbons with complaints of a right foot wound. Patient states that the right foot wound apparently came on in the past 3 days. She noticed a corn on her right foot and peeled it away. She has had increasing redness and swelling to her foot since then. Patient was admitted yesterday from the emergency department for this condition. Patient states that she does not have any pain to her right foot. She is a newly diagnosed diabetic and believes she has some degree of neuropathy. She denies any nausea, vomiting, fever, chills, chest pain, shortness of breath. No other pedal complaints. ADDENDUM: Patient seen at bedside today alongside Dr. Glendy Gibbons. Reviewed MRI findings with patient; discussed with the patient that she needs to have the wound debrided, and that she may have an underlying bone infection.   Discussed with patient that we will schedule her for debridement with possible right fifth metatarsal head resection for this Saturday, 4/23/2022 at 10 or 11 AM.  Patient still denies any pain to bilateral LE. She also denies any B/F/C/SOB/CP or bilateral calf tenderness. No new pedal complaints at this time.     Past Medical History:        Diagnosis Date    Diabetes mellitus ([facility] [address]     Hypertension     Psoriasis      Past Surgical History:        Procedure Laterality Date    TOE AMPUTATION Right 4/23/2022    RIGHT FOOT WOUND DEBRIDEMENT WITH POSS RIGHT 5TH METATARSAL RESECTION performed by Rae Kent DPM at [facility]     Current Medications:    Current Facility-Administered Medications: glucose (GLUTOSE) 40 % oral gel 15 g, 15 g, Oral, PRN  dextrose 50 % IV solution, 12.5 g, IntraVENous, PRN  glucagon (rDNA) injection 1 mg, 1 mg, IntraMUSCular, PRN  dextrose 5 % solution, 100 mL/hr, IntraVENous, PRN  insulin lispro (HUMALOG) injection vial 0-6 Units, 0-6 Units, SubCUTAneous, TID WC  insulin lispro (HUMALOG) injection vial 0-3 Units, 0-3 Units, SubCUTAneous, Nightly  0.9 % sodium chloride infusion, , IntraVENous, Continuous  sodium chloride flush 0.9 % injection 5-40 mL, 5-40 mL, IntraVENous, 2 times per day  sodium chloride flush 0.9 % injection 5-40 mL, 5-40 mL, IntraVENous, PRN  0.9 % sodium chloride infusion, , IntraVENous, PRN  oxyCODONE (ROXICODONE) immediate release tablet 5 mg, 5 mg, Oral, Q4H PRN **OR** oxyCODONE (ROXICODONE) immediate release tablet 10 mg, 10 mg, Oral, Q4H PRN  morphine (PF) injection 2 mg, 2 mg, IntraVENous, Q2H PRN **OR** morphine injection 4 mg, 4 mg, IntraVENous, Q2H PRN  amLODIPine (NORVASC) tablet 10 mg, 10 mg, Oral, Daily  atorvastatin (LIPITOR) tablet 40 mg, 40 mg, Oral, Nightly  lisinopril (PRINIVIL;ZESTRIL) tablet 20 mg, 20 mg, Oral, Nightly  ondansetron (ZOFRAN-ODT) disintegrating tablet 4 mg, 4 mg, Oral, Q8H PRN **OR** ondansetron (ZOFRAN) injection 4 mg, 4 mg, IntraVENous, Q6H PRN  polyethylene glycol (GLYCOLAX) packet 17 g, 17 g, Oral, Daily PRN  acetaminophen (TYLENOL) tablet 650 mg, 650 mg, Oral, Q6H PRN **OR** acetaminophen (TYLENOL) suppository 650 mg, 650 mg, Rectal, Q6H PRN  piperacillin-tazobactam (ZOSYN) 3,375 mg in dextrose 5 % 50 mL IVPB extended infusion (mini-bag), 3,375 mg, IntraVENous, Q8H  Allergies:  Patient has no known allergies. Social History:    TOBACCO:   reports that she quit smoking about 11 years ago. Her smoking use included cigarettes. She has a 7.50 pack-year smoking history. She has never used smokeless tobacco.  ETOH:   reports previous alcohol use. DRUGS:   reports no history of drug use. Family History:       Problem Relation Age of Onset    Other Mother         thyroidectomy    Diabetes Mother     Heart Disease Mother     Heart Disease Father      REVIEW OF SYSTEMS:    Pertinent ROS in HPI  PHYSICAL EXAM:      Vitals:    BP (!) 105/50   Pulse 76   Temp 98.2 °F (36.8 °C) (Oral)   Resp 16   Ht 5' 9.5\" (1.765 m)   Wt 209 lb 7 oz (95 kg)   SpO2 100%   BMI 30.49 kg/m²     Exam:   Dressing intact and well maintained. No apparent strike through noted. Vascular: Dorsalis pedis and posterior tibial pulses are palpable bilaterally. Skin temperature is warm to warm from proximal tibial tuberosity to distal digits of right foot. CFT brisk to exposed digits. Minimal edema present on right foot lateral aspect, secondary to post-op state. Hair growth negative. Quality of skin diminished. Dermatologic: Full-thickness wound to the level of underlying bone noted to the lateral aspect of the right foot. There is a fibrogranular tissue present at base. No undermining noted. Scant serous drainage noted. No malodor or purulence noted. Some slight tissue infarction noted to plantar aspect of wound. There is surrounding mild erythema and edema consistent with postop status noted. Nails 1-5 bilaterally are thickened, elongated and dystrophic, with presence of subungual debris.  Webspaces 1-4 bilaterally are clean, dry and intact. Neurovascular:  Light touch sensation grossly intact at the digits bilaterally. Musculoskeletal: Muscle strength testing deferred. Right foot dorsiflexes to 10 degrees with knee extended, increases with knee flexed. Otherwise rectus foot and ankle. XRAY:XR FOOT RIGHT (2 VIEWS)    Result Date: 4/20/2022  PROCEDURE: XR FOOT RIGHT (2 VIEWS) CLINICAL INFORMATION: Non-healing wound COMPARISON: None TECHNIQUE: 2 views of the right foot FINDINGS: A soft tissue wound is seen overlying the fifth metatarsal. Plantar calcaneal and retrocalcaneal spurs. Mild degenerative changes of the right foot. No bony destruction, erosion or periostitis to suggest osteomyelitis. No acute fracture or dislocation. Bone mineralization is unremarkable. No significant soft tissue abnormality. 1. I do not see radiographic evidence of bony destruction, bony erosion, or periostitis to reflect osteomyelitis at this time. Early osteomyelitis may be occult on radiographs. Clinical correlation is recommended. 2. A soft tissue wound/ulcer is seen overlying the fifth metatarsal **This report has been created using voice recognition software. It may contain minor errors which are inherent in voice recognition technology. ** Final report electronically signed by Dr Daphine Snellen on 4/20/2022 2:15 PM    MRI, right foot  Impression       1. Abnormal signal intensity in the head and neck of the fifth metatarsal extending to the distal shaft consistent with osteomyelitis. There is abnormal soft tissue signal intensity adjacent to the head of the fifth metatarsal consistent with edema    and/or cellulitis. 2. Degenerative changes. 3. Areas of abnormal signal intensity at the bases of the third and fourth metatarsals. These may represent small cystic changes. 4. Abnormal soft tissue signal intensity surrounding the second, third and fourth digits consistent with edema and/or cellulitis.    5. Atrophy involving the foot muscles.                   **This report has been created using voice recognition software. It may contain minor errors which are inherent in voice recognition technology. **       Final report electronically signed by DR Home Durham on 4/21/2022 2:37 PM         LABS:    Recent Labs     04/24/22  0548 04/25/22  0513   WBC 14.6* 19.6*   HGB 10.1* 9.0*   HCT 32.4* 29.2*    359        Recent Labs     04/25/22  0513      K 4.2      CO2 20*   BUN 25*   CREATININE 1.4*        No results for input(s): PROT, INR, APTT in the last 72 hours. No results for input(s): CKTOTAL, CKMB, CKMBINDEX, TROPONINI in the last 72 hours. Assessment  Principle  1) Diabetic foot infection, right foot    Plan  -Patient examined and evaluated  -Labs reviewed, white blood cell count 19.6, hemoglobin 10.1  -Patient is afebrile  -Culture reviewed, many gram-positive cocci occurring singly and in pairs, many gram-negative bacilli; Staphylococcus aureus (MSSA) identified on cultures  -Continue IV antibiotics per internal medicine; ID consulted- patient continuing on Zosyn  -Wound packed with Iodoform, light betadine to skin edges, gauze, ABD, Kerlix, Ace bandage. -MRI reviewed; see report above  -Patient had surgery for debridement of the right foot wound with  right fifth metatarsal head resection on 4/23/2022  - Surgical shoe ordered right foot  - Partial heel weightbearing as tolerated in surgical shoe right foot  - Discussed with patient that podiatry will do daily dressing changes with the plan to schedule surgery for closure of the right foot wound this week. -Podiatry will continue to follow patient    DISPO: Pending response to IV antibiotics; Daily dressing changes; Plan to schedule surgery for closure of the right foot wound this week.        Shira Mike DPM PGY-2  4/25/2022 9:12 AM

## 2022-04-25 NOTE — PROGRESS NOTES
Progress note: Infectious diseases    Patient - Tracy Kuhn,  Age - 73 y.o.    - 1955      Room Number - 6K-04/004-A   MRN -  [id number]   [facility]  Date of Admission -  2022 12:44 PM    SUBJECTIVE:   No new issues. She had debridement of the wound. .  OBJECTIVE   VITALS    height is 5' 9.5\" (1.765 m) and weight is 209 lb 7 oz (95 kg). Her oral temperature is 97.5 °F (36.4 °C). Her blood pressure is 135/62 and her pulse is 57. Her respiration is 18 and oxygen saturation is 100%. Wt Readings from Last 3 Encounters:   22 209 lb 7 oz (95 kg)   22 211 lb (95.7 kg)   22 211 lb 3.2 oz (95.8 kg)       I/O (24 Hours)    Intake/Output Summary (Last 24 hours) at 2022 1246  Last data filed at 2022 1058  Gross per 24 hour   Intake 420 ml   Output 1100 ml   Net -680 ml       General Appearance  Awake, alert, oriented,  not  In acute distress  HEENT - normocephalic, atraumatic, pink conjunctiva,  anicteric sclera  Neck - Supple, no mass  Lungs -  Bilateral good air entry, no rhonchi, no wheeze  Cardiovascular - Heart sounds are normal.     Abdomen - soft, not distended, nontender,   Neurologic -oriented  Skin - No bruising or bleeding  Extremities - dressed right foot wound.         MEDICATIONS:      insulin lispro  0-6 Units SubCUTAneous TID     insulin lispro  0-3 Units SubCUTAneous Nightly    sodium chloride flush  5-40 mL IntraVENous 2 times per day    amLODIPine  10 mg Oral Daily    atorvastatin  40 mg Oral Nightly    lisinopril  20 mg Oral Nightly    piperacillin-tazobactam  3,375 mg IntraVENous Q8H      dextrose      sodium chloride 125 mL/hr at 22    sodium chloride       glucose, dextrose, glucagon (rDNA), dextrose, sodium chloride flush, sodium chloride, oxyCODONE **OR** oxyCODONE, morphine **OR** morphine, ondansetron **OR** ondansetron, polyethylene glycol, acetaminophen **OR** acetaminophen      LABS:     CBC:   Recent Labs     04/23/22  0505 04/24/22  0548 04/25/22  0513   WBC 18.8* 14.6* 19.6*   HGB 9.8* 10.1* 9.0*    367 359     BMP:    Recent Labs     04/23/22  0505 04/24/22  0548 04/25/22  0513   * 138 136   K 4.0 4.4 4.2    109 107   CO2 19* 16* 20*   BUN 23* 25* 25*   CREATININE 1.3* 1.3* 1.4*   GLUCOSE 142* 212* 204*     Calcium:  Recent Labs     04/25/22  0513   CALCIUM 8.4*      Recent Labs     04/24/22 2004 04/25/22  0731 04/25/22  1047   POCGLU 276* 191* 231*     HgbA1C:   Recent Labs     04/23/22  1044   LABA1C 7.2*        Problem list of patient:     Patient Active Problem List   Diagnosis Code    Accelerated essential hypertension I10    Hypertensive emergency I16.1    Primary hypertension I10    Hyperlipidemia E78.5     borderline Abnormal nuclear stress test- NO angina or angina equiv- med RX R94.39    Cellulitis L03.90         ASSESSMENT/PLAN   Diabetic foot ulcer with underlying OM/cellulites: she had resection of the 5th metatarsal head. Continue iv zosyn  HTN.    will transition to oral antibioitc on discharge    Larna Galeazzi, MD, MD, [address] 4/25/2022 12:46 PM

## 2022-04-26 LAB
AEROBIC CULTURE: ABNORMAL
ANAEROBIC CULTURE: ABNORMAL
ANION GAP SERPL CALCULATED.3IONS-SCNC: 11 MEQ/L (ref 8–16)
ATYPICAL LYMPHOCYTES: ABNORMAL %
BASOPHILS # BLD: 0.6 %
BASOPHILS ABSOLUTE: 0.1 THOU/MM3 (ref 0–0.1)
BLOOD CULTURE, ROUTINE: NORMAL
BLOOD CULTURE, ROUTINE: NORMAL
BUN BLDV-MCNC: 22 MG/DL (ref 7–22)
CALCIUM SERPL-MCNC: 8.7 MG/DL (ref 8.5–10.5)
CHLORIDE BLD-SCNC: 107 MEQ/L (ref 98–111)
CO2: 19 MEQ/L (ref 23–33)
CREAT SERPL-MCNC: 1.2 MG/DL (ref 0.4–1.2)
EOSINOPHIL # BLD: 2.7 %
EOSINOPHILS ABSOLUTE: 0.5 THOU/MM3 (ref 0–0.4)
ERYTHROCYTE [DISTWIDTH] IN BLOOD BY AUTOMATED COUNT: 11.9 % (ref 11.5–14.5)
ERYTHROCYTE [DISTWIDTH] IN BLOOD BY AUTOMATED COUNT: 37.8 FL (ref 35–45)
GFR SERPL CREATININE-BSD FRML MDRD: 45 ML/MIN/1.73M2
GLUCOSE BLD-MCNC: 158 MG/DL (ref 70–108)
GLUCOSE BLD-MCNC: 160 MG/DL (ref 70–108)
GLUCOSE BLD-MCNC: 204 MG/DL (ref 70–108)
GLUCOSE BLD-MCNC: 222 MG/DL (ref 70–108)
GLUCOSE BLD-MCNC: 275 MG/DL (ref 70–108)
GRAM STAIN RESULT: ABNORMAL
HCT VFR BLD CALC: 31.9 % (ref 37–47)
HEMOGLOBIN: 9.9 GM/DL (ref 12–16)
IMMATURE GRANS (ABS): 0.09 THOU/MM3 (ref 0–0.07)
IMMATURE GRANULOCYTES: 0.5 %
LYMPHOCYTES # BLD: 49.2 %
LYMPHOCYTES ABSOLUTE: 9.7 THOU/MM3 (ref 1–4.8)
MCH RBC QN AUTO: 27 PG (ref 26–33)
MCHC RBC AUTO-ENTMCNC: 31 GM/DL (ref 32.2–35.5)
MCV RBC AUTO: 86.9 FL (ref 81–99)
MONOCYTES # BLD: 6.3 %
MONOCYTES ABSOLUTE: 1.2 THOU/MM3 (ref 0.4–1.3)
NUCLEATED RED BLOOD CELLS: 0 /100 WBC
ORGANISM: ABNORMAL
PLATELET # BLD: 413 THOU/MM3 (ref 130–400)
PLATELET ESTIMATE: ABNORMAL
PMV BLD AUTO: 9.7 FL (ref 9.4–12.4)
POTASSIUM SERPL-SCNC: 4.1 MEQ/L (ref 3.5–5.2)
RBC # BLD: 3.67 MILL/MM3 (ref 4.2–5.4)
SCAN OF BLOOD SMEAR: NORMAL
SEG NEUTROPHILS: 40.7 %
SEGMENTED NEUTROPHILS ABSOLUTE COUNT: 8 THOU/MM3 (ref 1.8–7.7)
SODIUM BLD-SCNC: 137 MEQ/L (ref 135–145)
WBC # BLD: 19.7 THOU/MM3 (ref 4.8–10.8)

## 2022-04-26 PROCEDURE — 6370000000 HC RX 637 (ALT 250 FOR IP)

## 2022-04-26 PROCEDURE — 2500000003 HC RX 250 WO HCPCS: Performed by: INTERNAL MEDICINE

## 2022-04-26 PROCEDURE — 36415 COLL VENOUS BLD VENIPUNCTURE: CPT

## 2022-04-26 PROCEDURE — 85025 COMPLETE CBC W/AUTO DIFF WBC: CPT

## 2022-04-26 PROCEDURE — 2580000003 HC RX 258: Performed by: INTERNAL MEDICINE

## 2022-04-26 PROCEDURE — 80048 BASIC METABOLIC PNL TOTAL CA: CPT

## 2022-04-26 PROCEDURE — 6360000002 HC RX W HCPCS: Performed by: INTERNAL MEDICINE

## 2022-04-26 PROCEDURE — 1200000003 HC TELEMETRY R&B

## 2022-04-26 PROCEDURE — 99233 SBSQ HOSP IP/OBS HIGH 50: CPT | Performed by: INTERNAL MEDICINE

## 2022-04-26 PROCEDURE — 82948 REAGENT STRIP/BLOOD GLUCOSE: CPT

## 2022-04-26 RX ADMIN — INSULIN LISPRO 3 UNITS: 100 INJECTION, SOLUTION INTRAVENOUS; SUBCUTANEOUS at 16:15

## 2022-04-26 RX ADMIN — POLYETHYLENE GLYCOL 3350 17 G: 17 POWDER, FOR SOLUTION ORAL at 10:31

## 2022-04-26 RX ADMIN — ATORVASTATIN CALCIUM 40 MG: 40 TABLET, FILM COATED ORAL at 20:50

## 2022-04-26 RX ADMIN — INSULIN LISPRO 1 UNITS: 100 INJECTION, SOLUTION INTRAVENOUS; SUBCUTANEOUS at 08:45

## 2022-04-26 RX ADMIN — ACETAMINOPHEN 650 MG: 325 TABLET ORAL at 10:32

## 2022-04-26 RX ADMIN — INSULIN LISPRO 2 UNITS: 100 INJECTION, SOLUTION INTRAVENOUS; SUBCUTANEOUS at 11:38

## 2022-04-26 RX ADMIN — AMLODIPINE BESYLATE 10 MG: 10 TABLET ORAL at 08:39

## 2022-04-26 RX ADMIN — CEFAZOLIN 1000 MG: 10 INJECTION, POWDER, FOR SOLUTION INTRAVENOUS at 20:50

## 2022-04-26 RX ADMIN — SODIUM CHLORIDE: 9 INJECTION, SOLUTION INTRAVENOUS at 19:10

## 2022-04-26 RX ADMIN — SODIUM CHLORIDE: 9 INJECTION, SOLUTION INTRAVENOUS at 05:45

## 2022-04-26 RX ADMIN — INSULIN LISPRO 1 UNITS: 100 INJECTION, SOLUTION INTRAVENOUS; SUBCUTANEOUS at 20:50

## 2022-04-26 ASSESSMENT — PAIN DESCRIPTION - LOCATION
LOCATION: FOOT
LOCATION: FOOT

## 2022-04-26 ASSESSMENT — PAIN - FUNCTIONAL ASSESSMENT: PAIN_FUNCTIONAL_ASSESSMENT: ACTIVITIES ARE NOT PREVENTED

## 2022-04-26 ASSESSMENT — PAIN SCALES - GENERAL
PAINLEVEL_OUTOF10: 2
PAINLEVEL_OUTOF10: 1
PAINLEVEL_OUTOF10: 0

## 2022-04-26 ASSESSMENT — PAIN DESCRIPTION - DESCRIPTORS
DESCRIPTORS: ACHING
DESCRIPTORS: ACHING

## 2022-04-26 ASSESSMENT — PAIN DESCRIPTION - ORIENTATION: ORIENTATION: RIGHT

## 2022-04-26 NOTE — ACP (ADVANCE CARE PLANNING)
Advance Care Planning     Advance Care Planning Inpatient Note  Spiritual Care Department    Today's Date: 4/26/2022  Unit: [facility]    Received request from rounding. Upon review of chart and communication with care team, patient's decision making abilities are not in question. . Patient was/were present in the room during visit. Goals of ACP Conversation:  Discuss advance care planning documents  Facilitate a discussion related to patient's goals of care as they align with the patient's values and beliefs. Provide emotional and spiritual support to patients and their famillies. Health Care Decision Makers:       Primary Decision Maker:  Bell Lawrence Child - [phone]    Secondary Decision Maker: Nabeelsaloni Montgomeryhaja - Brother/Sister - [phone]    Summary:  Completed [address]      Advance Care Planning Documents (Patient Wishes):  Healthcare Power of /Advance Directive Appointment of Health Care Agent     Assessment:   rounding on unit. Engaged patient in her room. Noted she had good understanding of Next of Kin decision makers, but encouraged establishing a POA. Patient alert and oriented x 4, aware of process and ready to interact around documents. Patient definitively named her daughter as her primary agent, and her brother as her secondary agent. While patient did discuss care preferences, patient definitively chose not to identify these in this conversation,   but preferred to communicate with her agents and to depend on her agents to know and if need be communicate her wishes.  engaged in support of patient and found her well supported by family, living in a good supportive communal setting, and well supported by her pal and Christianity. Patient accepted an offer of prayer and expressed appreciation when completed.      Interventions:  Provided education on documents for clarity and greater understanding  Discussed and provided education on state decision maker hierarchy  Assisted in the completion of documents according to patient's wishes at this time  Encouraged ongoing ACP conversation with future decision makers and loved ones    Care Preferences Communicated:   No  Patient deferred to identify these choices in this document. Outcomes/Plan:  New advance directive completed. Returned original document(s) to patient, as well as copies for distribution to appointed agents  Copy of advance directive given to staff to scan into medical record.     Electronically signed by Chaplain Terrie on 4/26/2022 at 3:36 PM

## 2022-04-26 NOTE — PLAN OF CARE
Problem: Discharge Planning  Goal: Discharge to home or other facility with appropriate resources  Outcome: Progressing  Note: Patient plans to be discharged home with  when medically stable. Problem: Skin/Tissue Integrity  Goal: Absence of new skin breakdown  Description: 1. Monitor for areas of redness and/or skin breakdown  2. Assess vascular access sites hourly  3. Every 4-6 hours minimum:  Change oxygen saturation probe site  4. Every 4-6 hours:  If on nasal continuous positive airway pressure, respiratory therapy assess nares and determine need for appliance change or resting period. 4/26/2022 1146 by Denisse Steele RN  Outcome: Progressing  Note: Patient free from skin breakdown. Patient turns self and makes frequent positional changes. Will continue to monitor. Patient also has R foot wound that was debrided 4/23, Podiatry and ID following, podiatry plans to close foot wood or place wound vac 4/27, dressing changed this shift per podiatry, will continue to monitor. Problem: Pain  Goal: Verbalizes/displays adequate comfort level or baseline comfort level  4/26/2022 1146 by Denisse Steele RN  Outcome: Progressing  Flowsheets (Taken 4/26/2022 1146)  Verbalizes/displays adequate comfort level or baseline comfort level: Assess pain using appropriate pain scale  Note: Patient c/o R foot ache of 2 on 0-10 scale, tylenol given per [facility], patient also has oxycodone PRN, will continue to monitor. Care plan reviewed with patient. Patient verbalize understanding of the plan of care and contribute to goal setting.

## 2022-04-26 NOTE — PROGRESS NOTES
Hospitalist      Patient:  Mayte Ventura    Unit/Bed:6K-04/004-A  YOB: 1955  MRN: [id number]   Acct: [de-identified]     PCP: Sameera Dale MD  Date of Admission: 4/20/2022        Assessment and Plan:        1. Diabetic foot infection, right: MRI pending for possible osteomyelitis, will continue with IV antibiotics (Zosyn, vancomycin). Hemoglobin A1c is 7.4.  2. Non-insulin-dependent diabetes type 2: Hemoglobin A1c 7.4 currently on insulin sliding scale and a carb controlled diet, will check urine for microalbumin  3. Essential hypertension: We will continue home medication of lisinopril and Norvasc  4. Hyperlipidemia we will continue with Lipitor    CC: Foot infection    HPI: Per H&P \"This is a relatively healthy 27-year-old female who presented centers [address] [place] [address] emergency department on 4/20 due to foot pain in her right foot. Patient states that her foot started to hurt on 4/18 and describes the pain as constant and dull pain. Patient rates the pain currently 4 out of 10. Denies any alleviating or aggravating factors. Patient states she noticed a corn on the outside of her foot and pulled it off on 4/18 as well. Patient states that her eyesight is not very good and she cannot see her feet well so today her daughter came to look at her feet due to the pain. Daughter saw redness and swelling to her right foot. Patient denies nausea, vomiting, fever, chills, shortness of breath, chest pain. Patient does not look toxic at this time. Of note, patient was recently diagnosed with diabetes by her PCP about 3 months ago. Patient has been taking her diabetes medications as prescribed.     In the ED, patient was found to have elevated white blood cell count 20.2 elevated CRP 5. 84. X-ray of right foot did not show osteomyelitis. Patient was given IV antibiotics. Hospitalist were asked to admit patient for podiatry and infectious disease consult and further work-up. \"    4.22.2022 patient seen this a.m. states that she is having a \"debridement\" tomorrow, also discussed possible osteomyelitis and treatment. Patient states that she thinks that the wound has been there for possible 1 month but over the last week became more painful. Infectious disease and podiatry are on the case    4.23.2022 patient seen this a.m. states she is to have surgery today. She is not sure of what they plan on doing. Eitel signs stable patient's afebrile, slowly decreasing WBC    4.24.2022 patient seen this a.m., not quite sure what they did and surgery. It appears from the surgical note the wound was debrided with a partial resection of the fifth metatarsal.  Patient denies any pain at this time. Discharge planning will be decided during podiatry rounds. 4.25.2022 patient seen this a.m., medically stable will discharge home when okay with podiatry. To be transition to oral antibiotics at that time. WBC slightly increased to 19.6, platelets stable hemoglobin stable, slight bump in creatinine will hold ACE inhibitor and monitor BP    4.26.2022 patient seen this a.m. along with podiatry present. Podiatry states that they will take her back to surgery for further wound debridement possible closure or wound VAC. Surgery is scheduled for Wednesday, April 27, 2022 with possible discharge on April 28, 2022. WBC is increasing, hemoglobin is stable, renal function is stable    ROS (14 point review of systems completed. Pertinent positives noted. Otherwise ROS is negative) :  Foot wound    PMH:  Per HPI and       Diagnosis Date    Diabetes mellitus ([facility] [address]     Hypertension     Psoriasis      SHX:        Procedure Laterality Date    TOE AMPUTATION Right 4/23/2022    RIGHT FOOT WOUND DEBRIDEMENT WITH POSS RIGHT 5TH METATARSAL RESECTION performed by Kevin Carney DPM at [address] [place] [address]:       Problem Relation Age of Onset    Other Mother         thyroidectomy    Diabetes Mother     Heart Disease Mother     Heart Disease Father      Chantal Duane:   Social History     Socioeconomic History    Marital status: Single     Spouse name: None    Number of children: 1    Years of education: None    Highest education level: None   Occupational History    None   Tobacco Use    Smoking status: Former Smoker     Packs/day: 0.50     Years: 15.00     Pack years: 7.50     Types: Cigarettes     Quit date: 2011     Years since quittin.3    Smokeless tobacco: Never Used   Substance and Sexual Activity    Alcohol use: Not Currently     Comment: social    Drug use: Never    Sexual activity: None   Other Topics Concern    None   Social History Narrative    None     Social Determinants of Health     Financial Resource Strain: Low Risk     Difficulty of Paying Living Expenses: Not hard at all   Food Insecurity: No Food Insecurity    Worried About Running Out of Food in the Last Year: Never true    Samara of Food in the Last Year: Never true   Transportation Needs:     Lack of Transportation (Medical): Not on file    Lack of Transportation (Non-Medical):  Not on file   Physical Activity:     Days of Exercise per Week: Not on file    Minutes of Exercise per Session: Not on file   Stress:     Feeling of Stress : Not on file   Social Connections:     Frequency of Communication with Friends and Family: Not on file    Frequency of Social Gatherings with Friends and Family: Not on file    Attends Mosque Services: Not on file    Active Member of [address] [place] [address] or Organizations: Not on file    Attends Club or Organization Meetings: Not on file    Marital Status: Not on file   Intimate Partner Violence:     Fear of Current or Ex-Partner: Not on file    Emotionally Abused: Not on file    Physically Abused: Not on file    Sexually Abused: Not on file   Housing Stability:     Unable to Pay for Housing in the Last Year: Not on file    Number of [place] in the Last Year: Not on file    Unstable Housing in the Last Year: Not on file Allergies: Patient has no known allergies. Medications:     dextrose      sodium chloride 75 mL/hr at 04/26/22 0545    sodium chloride        insulin lispro  0-6 Units SubCUTAneous TID     insulin lispro  0-3 Units SubCUTAneous Nightly    sodium chloride flush  5-40 mL IntraVENous 2 times per day    amLODIPine  10 mg Oral Daily    atorvastatin  40 mg Oral Nightly     Prior to Admission medications    Medication Sig Start Date End Date Taking? Authorizing Provider   dapagliflozin (FARXIGA) 10 MG tablet take 1 tablet by mouth IN THE MORNING 4/19/22   Reyna Cobos MD   amLODIPine (NORVASC) 10 MG tablet Take 1 tablet by mouth daily 4/19/22   Reyna Cobos MD   atorvastatin (LIPITOR) 40 MG tablet Take 1 tablet by mouth nightly 4/19/22   Reyna Cobos MD   lisinopril (PRINIVIL;ZESTRIL) 10 MG tablet Take 2 tablets by mouth nightly 4/19/22   Reyna Cobos MD   aspirin 81 MG chewable tablet Take 81 mg by mouth daily    Historical Provider, MD      PHYSICAL EXAM:    BP (!) 151/71   Pulse 66   Temp 97.9 °F (36.6 °C) (Oral)   Resp 16   Ht 5' 9.5\" (1.765 m)   Wt 209 lb 7 oz (95 kg)   SpO2 97%   BMI 30.49 kg/m²     General appearance:  No apparent distress, appears stated age and cooperative. HEENT:  Normal cephalic, atraumatic without obvious deformity. Pupils equal, round, and reactive to light. Extra ocular muscles intact. Conjunctivae/corneas clear. Neck: Supple, with full range of motion. no jugular venous distention. Trachea midline. no carotid bruits  Respiratory:  Normal respiratory effort. Clear to auscultation, bilaterally without Rales/Wheezes/Rhonchi. Breath sounds equal bilaterally  Cardiovascular:  Regular rate and rhythm with normal S1/S2 without murmurs, rubs or gallops. PMI non displaced  Abdomen: Soft, non-tender, non-distended with normal bowel sounds. No guarding, rebound. Musculoskeletal: Edema right foot, erythema, warmth.   Skin: Wound right foot approximately fifth metatarsal head erythematous, discharge present. Neurologic:  Neurovascularly intact without any focal sensory/motor deficits. Cranial nerves: II-XII intact, grossly non-focal.  Psychiatric:  Alert and oriented, thought content appropriate, normal insight  Capillary Refill: Brisk,< 2 seconds   Peripheral Pulses: +2 palpable, equal bilaterally upper and lower extremities  Lymphatics: no lymphadenopathy    Data: (All radiographs, tracings, PFTs, and imaging are personally viewed and interpreted unless otherwise noted).    Recent Labs     04/24/22 0548 04/25/22 0513 04/26/22 0513   WBC 14.6* 19.6* 19.7*   HGB 10.1* 9.0* 9.9*   HCT 32.4* 29.2* 31.9*    359 413*     Recent Labs     04/24/22 0548 04/25/22 0513 04/26/22 0513    136 137   K 4.4 4.2 4.1    107 107   CO2 16* 20* 19*   BUN 25* 25* 22   CREATININE 1.3* 1.4* 1.2   CALCIUM 8.6 8.4* 8.7     No results for input(s): AST, ALT, BILIDIR, BILITOT, ALKPHOS in the last 72 hours. No results for input(s): INR in the last 72 hours. No results for input(s): Jadene Moh in the last 72 hours. Radiology reports-   XR FOOT RIGHT (2 VIEWS)    Result Date: 4/20/2022  PROCEDURE: XR FOOT RIGHT (2 VIEWS) CLINICAL INFORMATION: Non-healing wound COMPARISON: None TECHNIQUE: 2 views of the right foot FINDINGS: A soft tissue wound is seen overlying the fifth metatarsal. Plantar calcaneal and retrocalcaneal spurs. Mild degenerative changes of the right foot. No bony destruction, erosion or periostitis to suggest osteomyelitis. No acute fracture or dislocation. Bone mineralization is unremarkable. No significant soft tissue abnormality. 1. I do not see radiographic evidence of bony destruction, bony erosion, or periostitis to reflect osteomyelitis at this time. Early osteomyelitis may be occult on radiographs. Clinical correlation is recommended.  2. A soft tissue wound/ulcer is seen overlying the fifth metatarsal **This report has been created using voice recognition software. It may contain minor errors which are inherent in voice recognition technology. ** Final report electronically signed by Dr Nicole Mesa on 4/20/2022 2:15 PM      Electronically signed by Jennifer Telles DO on 4/26/2022 at 11:24 AM

## 2022-04-26 NOTE — PROGRESS NOTES
Podiatric Surgery Progress Note    4/26/2022  Patient seen at bedside this morning on behalf of Dr. Emma Gleason. Patient is 3 days s/p right foot wound debridement with right fifth metatarsal head resection (DOS 4/23/2022). Patient is very pleasant, that is alert and oriented. Patient currently denies any pain to bilateral LE. She denies any N/V/F/C/SOB/CP or bilateral calf tenderness. She has no new pedal complaints at this time. Patient would like to know when her next surgery will be, and when she is likely to get discharged. 4/25/2022  Patient seen this morning on behalf of Dr. Emma Gleason. Patient is 2 days s/p right foot wound debridement with right fifth metatarsal head resection (DOS 4/23/2022). Patient denies any pain to bilateral lower extremities. She also currently denies any N/V/F/C/SOB/CP or bilateral calf tenderness. Patient has no new pedal complaints at this time. 4/24: The patient is seen today at bedside on behalf of Dr. Emma Gleason. The patient is 1 day s/p RIGHT FOOT WOUND DEBRIDEMENT WITH  RIGHT 5TH METATARSAL HEAD RESECTION (D.O.S. 4/23/22). The patient states that she has no pain to her right foot. She denies any N/V/F/C, SOB or chest pain. The patient's nurse notified me about the left foot having 2 skin fissures to digits 1 and 3 of the left foot. The patient denies any other pedal complaints. 4/21: HPI:                The patient is a 73 y.o. female with significant past medical history of diabetes mellitus, hypertension, psoriasis who is being seen at bedside on behalf of Dr. Emma Gleason with complaints of a right foot wound. Patient states that the right foot wound apparently came on in the past 3 days. She noticed a corn on her right foot and peeled it away. She has had increasing redness and swelling to her foot since then. Patient was admitted yesterday from the emergency department for this condition. Patient states that she does not have any pain to her right foot.   She is a newly diagnosed diabetic and believes she has some degree of neuropathy. She denies any nausea, vomiting, fever, chills, chest pain, shortness of breath. No other pedal complaints. ADDENDUM: Patient seen at bedside today alongside Dr. Thee Delgado. Reviewed MRI findings with patient; discussed with the patient that she needs to have the wound debrided, and that she may have an underlying bone infection. Discussed with patient that we will schedule her for debridement with possible right fifth metatarsal head resection for this Saturday, 4/23/2022 at 10 or 11 AM.  Patient still denies any pain to bilateral LE. She also denies any B/F/C/SOB/CP or bilateral calf tenderness. No new pedal complaints at this time.     Past Medical History:        Diagnosis Date    Diabetes mellitus ([facility] [address]     Hypertension     Psoriasis      Past Surgical History:        Procedure Laterality Date    TOE AMPUTATION Right 4/23/2022    RIGHT FOOT WOUND DEBRIDEMENT WITH POSS RIGHT 5TH METATARSAL RESECTION performed by Nora Bustos DPM at [place] [facility]     Current Medications:    Current Facility-Administered Medications: glucose (GLUTOSE) 40 % oral gel 15 g, 15 g, Oral, PRN  dextrose 50 % IV solution, 12.5 g, IntraVENous, PRN  glucagon (rDNA) injection 1 mg, 1 mg, IntraMUSCular, PRN  dextrose 5 % solution, 100 mL/hr, IntraVENous, PRN  insulin lispro (HUMALOG) injection vial 0-6 Units, 0-6 Units, SubCUTAneous, TID WC  insulin lispro (HUMALOG) injection vial 0-3 Units, 0-3 Units, SubCUTAneous, Nightly  0.9 % sodium chloride infusion, , IntraVENous, Continuous  sodium chloride flush 0.9 % injection 5-40 mL, 5-40 mL, IntraVENous, 2 times per day  sodium chloride flush 0.9 % injection 5-40 mL, 5-40 mL, IntraVENous, PRN  0.9 % sodium chloride infusion, , IntraVENous, PRN  oxyCODONE (ROXICODONE) immediate release tablet 5 mg, 5 mg, Oral, Q4H PRN **OR** oxyCODONE (ROXICODONE) immediate release tablet 10 mg, 10 mg, Oral, Q4H PRN  morphine (PF) injection 2 mg, 2 mg, IntraVENous, Q2H PRN **OR** morphine injection 4 mg, 4 mg, IntraVENous, Q2H PRN  amLODIPine (NORVASC) tablet 10 mg, 10 mg, Oral, Daily  atorvastatin (LIPITOR) tablet 40 mg, 40 mg, Oral, Nightly  ondansetron (ZOFRAN-ODT) disintegrating tablet 4 mg, 4 mg, Oral, Q8H PRN **OR** ondansetron (ZOFRAN) injection 4 mg, 4 mg, IntraVENous, Q6H PRN  polyethylene glycol (GLYCOLAX) packet 17 g, 17 g, Oral, Daily PRN  acetaminophen (TYLENOL) tablet 650 mg, 650 mg, Oral, Q6H PRN **OR** acetaminophen (TYLENOL) suppository 650 mg, 650 mg, Rectal, Q6H PRN  Allergies:  Patient has no known allergies. Social History:    TOBACCO:   reports that she quit smoking about 11 years ago. Her smoking use included cigarettes. She has a 7.50 pack-year smoking history. She has never used smokeless tobacco.  ETOH:   reports previous alcohol use. DRUGS:   reports no history of drug use. Family History:       Problem Relation Age of Onset    Other Mother         thyroidectomy    Diabetes Mother     Heart Disease Mother     Heart Disease Father      REVIEW OF SYSTEMS:    Pertinent ROS in HPI  PHYSICAL EXAM:      Vitals:    BP (!) 131/54   Pulse 65   Temp 97.9 °F (36.6 °C) (Oral)   Resp 16   Ht 5' 9.5\" (1.765 m)   Wt 209 lb 7 oz (95 kg)   SpO2 100%   BMI 30.49 kg/m²     Exam:   Dressing intact and well maintained. No apparent strike through noted. Vascular: Dorsalis pedis and posterior tibial pulses are palpable bilaterally. Skin temperature is warm to warm from proximal tibial tuberosity to distal digits of right foot. CFT brisk to exposed digits. Minimal edema present on right foot lateral aspect, secondary to post-op state. Hair growth absent to BLE. Quality of skin diminished. Dermatologic: Full-thickness wound to the level of underlying bone noted to the lateral aspect of the right foot. There is fibrogranular tissue present at base.   Some small areas of tissue infarction noted to the distal and plantar portions of the wound bed. No undermining noted. Scant serous drainage noted. No malodor or purulence noted. There is surrounding mild erythema and edema consistent with postop status noted. Nails 1-5 bilaterally are thickened, elongated and dystrophic, with presence of subungual debris. Webspaces 1-4 bilaterally are clean, dry and intact. Neurovascular:  Light touch sensation grossly intact at the digits bilaterally. Musculoskeletal: Muscle strength testing deferred. Right foot dorsiflexes to 10 degrees with knee extended, increases with knee flexed. Otherwise rectus foot and ankle. XRAY:XR FOOT RIGHT (2 VIEWS)    Result Date: 4/20/2022  PROCEDURE: XR FOOT RIGHT (2 VIEWS) CLINICAL INFORMATION: Non-healing wound COMPARISON: None TECHNIQUE: 2 views of the right foot FINDINGS: A soft tissue wound is seen overlying the fifth metatarsal. Plantar calcaneal and retrocalcaneal spurs. Mild degenerative changes of the right foot. No bony destruction, erosion or periostitis to suggest osteomyelitis. No acute fracture or dislocation. Bone mineralization is unremarkable. No significant soft tissue abnormality. 1. I do not see radiographic evidence of bony destruction, bony erosion, or periostitis to reflect osteomyelitis at this time. Early osteomyelitis may be occult on radiographs. Clinical correlation is recommended. 2. A soft tissue wound/ulcer is seen overlying the fifth metatarsal **This report has been created using voice recognition software. It may contain minor errors which are inherent in voice recognition technology. ** Final report electronically signed by Dr Adelia Hager on 4/20/2022 2:15 PM    MRI, right foot  Impression       1. Abnormal signal intensity in the head and neck of the fifth metatarsal extending to the distal shaft consistent with osteomyelitis.  There is abnormal soft tissue signal intensity adjacent to the head of the fifth metatarsal consistent with edema and/or cellulitis. 2. Degenerative changes. 3. Areas of abnormal signal intensity at the bases of the third and fourth metatarsals. These may represent small cystic changes. 4. Abnormal soft tissue signal intensity surrounding the second, third and fourth digits consistent with edema and/or cellulitis. 5. Atrophy involving the foot muscles.                   **This report has been created using voice recognition software. It may contain minor errors which are inherent in voice recognition technology. **       Final report electronically signed by DR Patricia Napoles on 4/21/2022 2:37 PM         LABS:    Recent Labs     04/25/22 0513 04/26/22 0513   WBC 19.6* 19.7*   HGB 9.0* 9.9*   HCT 29.2* 31.9*    413*        Recent Labs     04/26/22 0513      K 4.1      CO2 19*   BUN 22   CREATININE 1.2        No results for input(s): PROT, INR, APTT in the last 72 hours. No results for input(s): CKTOTAL, CKMB, CKMBINDEX, TROPONINI in the last 72 hours. Assessment  Principle  1) Diabetic foot infection, right foot    Plan  -Patient examined and evaluated  -Labs reviewed, white blood cell count 19.7; hemoglobin 10.1  -Patient is afebrile  -Culture reviewed, many gram-positive cocci occurring singly and in pairs, many gram-negative bacilli; Staphylococcus aureus (MSSA) identified on cultures  -Continue IV Zosyn per infectious diseases  -Changed dressings; wound packed with Mesalt, betadine to skin edges, gauze, ABD, Kerlix, Ace bandage. -MRI reviewed; see report above  -Patient had surgery for debridement of the right foot wound with  right fifth metatarsal head resection on 4/23/2022; discussed with patient that she will have second surgery on Wednesday, 4/27/2022 with either primary closure or wound VAC application if primary closure cannot be performed.   - Surgical shoe ordered right foot  - Partial heel weightbearing as tolerated in surgical shoe right foot  - Discussed with patient that podiatry will do daily dressing changes with the plan to schedule surgery for closure of the right foot wound this week. -Podiatry will continue to follow patient    DISPO: Pending response to IV antibiotics; Daily dressing changes; Plan for closure of the right foot wound Wednesday, 4/26/2022.        Radha Blanco DPM PGY-2  4/26/2022 9:01 AM

## 2022-04-26 NOTE — FLOWSHEET NOTE
[facility] PROGRESS NOTE      Patient: Phani Scott  Room #: 6K-04/004-A            YOB: 1955  Age: 73 y.o. Gender: female            Admit Date & Time: 4/20/2022 12:44 PM    Assessment:   Interventions:   Outcomes:          rounding on unit. Engaged patient in her room. Noted she had good understanding of Next of Kin decision makers, but encouraged establishing a POA. Patient alert and oriented x 4, aware of process and ready to interact around documents. Patient definitively named her daughter as her primary agent, and her brother as her secondary agent. While patient did discuss care preferences, patient definitively chose not to identify these in this conversation,   but preferred to communicate with her agents and to depend on her agents to know and if need be communicate her wishes.  engaged in support of patient and found her well supported by family, living in a good supportive communal setting, and well supported by her pal and Confucianist. Patient accepted an offer of prayer and expressed appreciation when completed. Power of  document/s completed and charted. See ACP Note of same date      Plan:    1. Continue to visit and support patieint in on-going care toward discharge. Electronically signed by Urban Guido on 4/26/2022 at 3:49 PM.  Geovani Reich  [phone]               04/26/22 8430   Encounter Summary   Encounter Overview/Reason  Spiritual/Emotional Needs; Advance Care Planning   Service Provided For: Patient   Referral/Consult From: [address] [place] [address] Children;Family members; [employer]/pal community   Last Encounter  04/26/22   Complexity of Encounter Low   Begin Time 1420   Encounter    Type Pre-Procedural   Spiritual/Emotional needs   Type Spiritual Support   Advance Care Planning   Type ACP conversation; Completed AD/ACP document(s)   Assessment/Intervention/Outcome   Assessment Calm;Coping; Hopeful;Peaceful   Intervention Active listening;Empowerment;Nurtured Hope;Prayer (assurance of)/[place];Sustaining Presence/Ministry of presence   Outcome Acceptance;Comfort; Connection/Belonging;Encouraged;Engaged in conversation; Optimistic

## 2022-04-26 NOTE — PLAN OF CARE
Problem: Safety - Adult  Goal: Free from fall injury  Outcome: Progressing  Note: Call light within reach. Side rails up x2. Bed alarm on. Non skid slippers available. Problem: ABCDS Injury Assessment  Goal: Absence of physical injury  Outcome: Progressing  Note: No injuries observed this shift     Problem: Skin/Tissue Integrity  Goal: Absence of new skin breakdown  Description: 1. Monitor for areas of redness and/or skin breakdown  2.   Assess vascular access sites hourly    Outcome: Progressing  Note: No new signs or symptoms of skin breakdown noted this shift, encouraging patient to turn and reposition self in bed q2h       Problem: Pain  Goal: Verbalizes/displays adequate comfort level or baseline comfort level  Outcome: Progressing  Note: Pt denies any pain at this time

## 2022-04-26 NOTE — PROGRESS NOTES
Progress note: Infectious diseases    Patient - Deanna Loyola,  Age - 73 y.o.    - 1955      Room Number - 6K-04/004-A   MRN -  [id number]   Acct # - [de-identified]  Date of Admission -  2022 12:44 PM    SUBJECTIVE:   No new complaints. plan for surgery tomorrow. OBJECTIVE   VITALS    height is 5' 9.5\" (1.765 m) and weight is 209 lb 7 oz (95 kg). Her oral temperature is 97.9 °F (36.6 °C). Her blood pressure is 143/64 (abnormal) and her pulse is 65. Her respiration is 16 and oxygen saturation is 98%. Wt Readings from Last 3 Encounters:   22 209 lb 7 oz (95 kg)   22 211 lb (95.7 kg)   22 211 lb 3.2 oz (95.8 kg)       I/O (24 Hours)    Intake/Output Summary (Last 24 hours) at 2022 1810  Last data filed at 2022 1105  Gross per 24 hour   Intake 2143.31 ml   Output 1750 ml   Net 393.31 ml       General Appearance  Awake, alert, oriented,  not  In acute distress  HEENT - normocephalic, atraumatic, pink conjunctiva,  anicteric sclera  Neck - Supple, no mass  Lungs -  Bilateral good air entry, no rhonchi, no wheeze  Cardiovascular - Heart sounds are normal.     Abdomen - soft, not distended, nontender,   Neurologic -oriented  Skin - No bruising or bleeding  Extremities - dressed right foot wound.          MEDICATIONS:      insulin lispro  0-6 Units SubCUTAneous TID WC    insulin lispro  0-3 Units SubCUTAneous Nightly    sodium chloride flush  5-40 mL IntraVENous 2 times per day    amLODIPine  10 mg Oral Daily    atorvastatin  40 mg Oral Nightly      dextrose      sodium chloride 75 mL/hr at 22 0545    sodium chloride       glucose, dextrose, glucagon (rDNA), dextrose, sodium chloride flush, sodium chloride, oxyCODONE **OR** oxyCODONE, morphine **OR** morphine, ondansetron **OR** ondansetron, polyethylene glycol, acetaminophen **OR** acetaminophen      LABS:     CBC:   Recent Labs     04/24/22  0548 04/25/22  0513 04/26/22  0513   WBC 14.6* 19.6* 19.7*   HGB 10.1* 9.0* 9.9*    359 413*     BMP:    Recent Labs     04/24/22  0548 04/25/22  0513 04/26/22  0513    136 137   K 4.4 4.2 4.1    107 107   CO2 16* 20* 19*   BUN 25* 25* 22   CREATININE 1.3* 1.4* 1.2   GLUCOSE 212* 204* 160*     Calcium:  Recent Labs     04/26/22  0513   CALCIUM 8.7      Recent Labs     04/26/22  0600 04/26/22  1104 04/26/22  1602   POCGLU 158* 204* 275*     HgbA1C:   No results for input(s): LABA1C in the last 72 hours. Problem list of patient:     Patient Active Problem List   Diagnosis Code    Accelerated essential hypertension I10    Hypertensive emergency I16.1    Primary hypertension I10    Hyperlipidemia E78.5     borderline Abnormal nuclear stress test- NO angina or angina equiv- med RX R94.39    Cellulitis L03.90         ASSESSMENT/PLAN   Diabetic foot ulcer with underlying OM/cellulites: she had resection of the 5th metatarsal head.   Will narrow antibiotic to ancef   HTN.   plan to transition to oral antibiotic on discharge    Corey Schroeder MD, MD, FACP 4/26/2022 6:10 PM

## 2022-04-27 ENCOUNTER — ANESTHESIA EVENT (OUTPATIENT)
Dept: OPERATING ROOM | Age: 67
DRG: 982 | End: 2022-04-27
Payer: MEDICARE

## 2022-04-27 ENCOUNTER — ANESTHESIA (OUTPATIENT)
Dept: OPERATING ROOM | Age: 67
DRG: 982 | End: 2022-04-27
Payer: MEDICARE

## 2022-04-27 VITALS — DIASTOLIC BLOOD PRESSURE: 58 MMHG | SYSTOLIC BLOOD PRESSURE: 119 MMHG | OXYGEN SATURATION: 98 % | TEMPERATURE: 98.6 F

## 2022-04-27 LAB
ANION GAP SERPL CALCULATED.3IONS-SCNC: 10 MEQ/L (ref 8–16)
ATYPICAL LYMPHOCYTES: ABNORMAL %
BASOPHILS # BLD: 0.5 %
BASOPHILS ABSOLUTE: 0.1 THOU/MM3 (ref 0–0.1)
BUN BLDV-MCNC: 19 MG/DL (ref 7–22)
CALCIUM SERPL-MCNC: 8.5 MG/DL (ref 8.5–10.5)
CHLORIDE BLD-SCNC: 108 MEQ/L (ref 98–111)
CO2: 20 MEQ/L (ref 23–33)
CREAT SERPL-MCNC: 1 MG/DL (ref 0.4–1.2)
EOSINOPHIL # BLD: 2.7 %
EOSINOPHILS ABSOLUTE: 0.6 THOU/MM3 (ref 0–0.4)
ERYTHROCYTE [DISTWIDTH] IN BLOOD BY AUTOMATED COUNT: 11.9 % (ref 11.5–14.5)
ERYTHROCYTE [DISTWIDTH] IN BLOOD BY AUTOMATED COUNT: 37.6 FL (ref 35–45)
GFR SERPL CREATININE-BSD FRML MDRD: 56 ML/MIN/1.73M2
GLUCOSE BLD-MCNC: 137 MG/DL (ref 70–108)
GLUCOSE BLD-MCNC: 191 MG/DL (ref 70–108)
GLUCOSE BLD-MCNC: 206 MG/DL (ref 70–108)
GLUCOSE BLD-MCNC: 271 MG/DL (ref 70–108)
HCT VFR BLD CALC: 31.6 % (ref 37–47)
HEMOGLOBIN: 9.7 GM/DL (ref 12–16)
IMMATURE GRANS (ABS): 0.12 THOU/MM3 (ref 0–0.07)
IMMATURE GRANULOCYTES: 0.6 %
LYMPHOCYTES # BLD: 52.2 %
LYMPHOCYTES ABSOLUTE: 10.6 THOU/MM3 (ref 1–4.8)
MCH RBC QN AUTO: 26.7 PG (ref 26–33)
MCHC RBC AUTO-ENTMCNC: 30.7 GM/DL (ref 32.2–35.5)
MCV RBC AUTO: 87.1 FL (ref 81–99)
MONOCYTES # BLD: 6 %
MONOCYTES ABSOLUTE: 1.2 THOU/MM3 (ref 0.4–1.3)
NUCLEATED RED BLOOD CELLS: 0 /100 WBC
PLATELET # BLD: 404 THOU/MM3 (ref 130–400)
PLATELET ESTIMATE: ABNORMAL
PMV BLD AUTO: 10 FL (ref 9.4–12.4)
POTASSIUM SERPL-SCNC: 4.2 MEQ/L (ref 3.5–5.2)
RBC # BLD: 3.63 MILL/MM3 (ref 4.2–5.4)
SCAN OF BLOOD SMEAR: NORMAL
SEG NEUTROPHILS: 38 %
SEGMENTED NEUTROPHILS ABSOLUTE COUNT: 7.8 THOU/MM3 (ref 1.8–7.7)
SODIUM BLD-SCNC: 138 MEQ/L (ref 135–145)
WBC # BLD: 20.4 THOU/MM3 (ref 4.8–10.8)

## 2022-04-27 PROCEDURE — 2500000003 HC RX 250 WO HCPCS: Performed by: PODIATRIST

## 2022-04-27 PROCEDURE — 3700000001 HC ADD 15 MINUTES (ANESTHESIA): Performed by: PODIATRIST

## 2022-04-27 PROCEDURE — 82948 REAGENT STRIP/BLOOD GLUCOSE: CPT

## 2022-04-27 PROCEDURE — 7100000000 HC PACU RECOVERY - FIRST 15 MIN: Performed by: PODIATRIST

## 2022-04-27 PROCEDURE — 2500000003 HC RX 250 WO HCPCS: Performed by: INTERNAL MEDICINE

## 2022-04-27 PROCEDURE — 2580000003 HC RX 258: Performed by: INTERNAL MEDICINE

## 2022-04-27 PROCEDURE — 2709999900 HC NON-CHARGEABLE SUPPLY: Performed by: PODIATRIST

## 2022-04-27 PROCEDURE — 6370000000 HC RX 637 (ALT 250 FOR IP)

## 2022-04-27 PROCEDURE — 3700000000 HC ANESTHESIA ATTENDED CARE: Performed by: PODIATRIST

## 2022-04-27 PROCEDURE — 2500000003 HC RX 250 WO HCPCS

## 2022-04-27 PROCEDURE — 1200000003 HC TELEMETRY R&B

## 2022-04-27 PROCEDURE — 99233 SBSQ HOSP IP/OBS HIGH 50: CPT | Performed by: INTERNAL MEDICINE

## 2022-04-27 PROCEDURE — 80048 BASIC METABOLIC PNL TOTAL CA: CPT

## 2022-04-27 PROCEDURE — 6360000002 HC RX W HCPCS: Performed by: STUDENT IN AN ORGANIZED HEALTH CARE EDUCATION/TRAINING PROGRAM

## 2022-04-27 PROCEDURE — 3600000002 HC SURGERY LEVEL 2 BASE: Performed by: PODIATRIST

## 2022-04-27 PROCEDURE — 6370000000 HC RX 637 (ALT 250 FOR IP): Performed by: STUDENT IN AN ORGANIZED HEALTH CARE EDUCATION/TRAINING PROGRAM

## 2022-04-27 PROCEDURE — 36415 COLL VENOUS BLD VENIPUNCTURE: CPT

## 2022-04-27 PROCEDURE — 0YQM0ZZ REPAIR RIGHT FOOT, OPEN APPROACH: ICD-10-PCS | Performed by: STUDENT IN AN ORGANIZED HEALTH CARE EDUCATION/TRAINING PROGRAM

## 2022-04-27 PROCEDURE — 85025 COMPLETE CBC W/AUTO DIFF WBC: CPT

## 2022-04-27 PROCEDURE — 6360000002 HC RX W HCPCS: Performed by: INTERNAL MEDICINE

## 2022-04-27 PROCEDURE — 2500000003 HC RX 250 WO HCPCS: Performed by: STUDENT IN AN ORGANIZED HEALTH CARE EDUCATION/TRAINING PROGRAM

## 2022-04-27 PROCEDURE — 7100000001 HC PACU RECOVERY - ADDTL 15 MIN: Performed by: PODIATRIST

## 2022-04-27 PROCEDURE — 3600000012 HC SURGERY LEVEL 2 ADDTL 15MIN: Performed by: PODIATRIST

## 2022-04-27 PROCEDURE — 6360000002 HC RX W HCPCS

## 2022-04-27 RX ORDER — ONDANSETRON 2 MG/ML
4 INJECTION INTRAMUSCULAR; INTRAVENOUS EVERY 6 HOURS PRN
Status: DISCONTINUED | OUTPATIENT
Start: 2022-04-27 | End: 2022-04-30 | Stop reason: HOSPADM

## 2022-04-27 RX ORDER — SODIUM CHLORIDE 0.9 % (FLUSH) 0.9 %
5-40 SYRINGE (ML) INJECTION PRN
Status: DISCONTINUED | OUTPATIENT
Start: 2022-04-27 | End: 2022-04-27 | Stop reason: HOSPADM

## 2022-04-27 RX ORDER — LABETALOL 20 MG/4 ML (5 MG/ML) INTRAVENOUS SYRINGE
10
Status: DISCONTINUED | OUTPATIENT
Start: 2022-04-27 | End: 2022-04-27 | Stop reason: HOSPADM

## 2022-04-27 RX ORDER — SODIUM CHLORIDE 9 MG/ML
INJECTION, SOLUTION INTRAVENOUS CONTINUOUS
Status: DISCONTINUED | OUTPATIENT
Start: 2022-04-27 | End: 2022-04-30

## 2022-04-27 RX ORDER — OXYCODONE HYDROCHLORIDE 5 MG/1
5 TABLET ORAL EVERY 4 HOURS PRN
Status: DISCONTINUED | OUTPATIENT
Start: 2022-04-27 | End: 2022-04-30 | Stop reason: HOSPADM

## 2022-04-27 RX ORDER — MORPHINE SULFATE 2 MG/ML
2 INJECTION, SOLUTION INTRAMUSCULAR; INTRAVENOUS EVERY 5 MIN PRN
Status: DISCONTINUED | OUTPATIENT
Start: 2022-04-27 | End: 2022-04-27 | Stop reason: HOSPADM

## 2022-04-27 RX ORDER — ONDANSETRON 2 MG/ML
INJECTION INTRAMUSCULAR; INTRAVENOUS PRN
Status: DISCONTINUED | OUTPATIENT
Start: 2022-04-27 | End: 2022-04-27 | Stop reason: SDUPTHER

## 2022-04-27 RX ORDER — FENTANYL CITRATE 50 UG/ML
50 INJECTION, SOLUTION INTRAMUSCULAR; INTRAVENOUS EVERY 5 MIN PRN
Status: DISCONTINUED | OUTPATIENT
Start: 2022-04-27 | End: 2022-04-27 | Stop reason: HOSPADM

## 2022-04-27 RX ORDER — SODIUM CHLORIDE 9 MG/ML
INJECTION, SOLUTION INTRAVENOUS PRN
Status: DISCONTINUED | OUTPATIENT
Start: 2022-04-27 | End: 2022-04-30 | Stop reason: HOSPADM

## 2022-04-27 RX ORDER — LIDOCAINE HCL/PF 100 MG/5ML
SYRINGE (ML) INJECTION PRN
Status: DISCONTINUED | OUTPATIENT
Start: 2022-04-27 | End: 2022-04-27 | Stop reason: SDUPTHER

## 2022-04-27 RX ORDER — OXYCODONE HYDROCHLORIDE 5 MG/1
10 TABLET ORAL EVERY 4 HOURS PRN
Status: DISCONTINUED | OUTPATIENT
Start: 2022-04-27 | End: 2022-04-30 | Stop reason: HOSPADM

## 2022-04-27 RX ORDER — PROPOFOL 10 MG/ML
INJECTION, EMULSION INTRAVENOUS PRN
Status: DISCONTINUED | OUTPATIENT
Start: 2022-04-27 | End: 2022-04-27 | Stop reason: SDUPTHER

## 2022-04-27 RX ORDER — FENTANYL CITRATE 50 UG/ML
INJECTION, SOLUTION INTRAMUSCULAR; INTRAVENOUS PRN
Status: DISCONTINUED | OUTPATIENT
Start: 2022-04-27 | End: 2022-04-27 | Stop reason: SDUPTHER

## 2022-04-27 RX ORDER — SODIUM CHLORIDE 9 MG/ML
INJECTION, SOLUTION INTRAVENOUS PRN
Status: DISCONTINUED | OUTPATIENT
Start: 2022-04-27 | End: 2022-04-27 | Stop reason: HOSPADM

## 2022-04-27 RX ORDER — ONDANSETRON 4 MG/1
4 TABLET, ORALLY DISINTEGRATING ORAL EVERY 8 HOURS PRN
Status: DISCONTINUED | OUTPATIENT
Start: 2022-04-27 | End: 2022-04-30 | Stop reason: HOSPADM

## 2022-04-27 RX ORDER — BUPIVACAINE HYDROCHLORIDE 5 MG/ML
INJECTION, SOLUTION EPIDURAL; INTRACAUDAL PRN
Status: DISCONTINUED | OUTPATIENT
Start: 2022-04-27 | End: 2022-04-27 | Stop reason: ALTCHOICE

## 2022-04-27 RX ORDER — SODIUM CHLORIDE 0.9 % (FLUSH) 0.9 %
5-40 SYRINGE (ML) INJECTION EVERY 12 HOURS SCHEDULED
Status: DISCONTINUED | OUTPATIENT
Start: 2022-04-27 | End: 2022-04-30 | Stop reason: HOSPADM

## 2022-04-27 RX ORDER — SODIUM CHLORIDE 0.9 % (FLUSH) 0.9 %
5-40 SYRINGE (ML) INJECTION EVERY 12 HOURS SCHEDULED
Status: DISCONTINUED | OUTPATIENT
Start: 2022-04-27 | End: 2022-04-27 | Stop reason: HOSPADM

## 2022-04-27 RX ORDER — SODIUM CHLORIDE 0.9 % (FLUSH) 0.9 %
5-40 SYRINGE (ML) INJECTION PRN
Status: DISCONTINUED | OUTPATIENT
Start: 2022-04-27 | End: 2022-04-30 | Stop reason: HOSPADM

## 2022-04-27 RX ORDER — DEXAMETHASONE SODIUM PHOSPHATE 10 MG/ML
INJECTION, EMULSION INTRAMUSCULAR; INTRAVENOUS PRN
Status: DISCONTINUED | OUTPATIENT
Start: 2022-04-27 | End: 2022-04-27 | Stop reason: SDUPTHER

## 2022-04-27 RX ORDER — LIDOCAINE HYDROCHLORIDE AND EPINEPHRINE 10; 10 MG/ML; UG/ML
INJECTION, SOLUTION INFILTRATION; PERINEURAL PRN
Status: DISCONTINUED | OUTPATIENT
Start: 2022-04-27 | End: 2022-04-27 | Stop reason: ALTCHOICE

## 2022-04-27 RX ADMIN — FENTANYL CITRATE 25 MCG: 50 INJECTION, SOLUTION INTRAMUSCULAR; INTRAVENOUS at 15:40

## 2022-04-27 RX ADMIN — CEFAZOLIN 1000 MG: 10 INJECTION, POWDER, FOR SOLUTION INTRAVENOUS at 04:09

## 2022-04-27 RX ADMIN — CEFAZOLIN 1000 MG: 10 INJECTION, POWDER, FOR SOLUTION INTRAVENOUS at 12:00

## 2022-04-27 RX ADMIN — CEFAZOLIN 1000 MG: 10 INJECTION, POWDER, FOR SOLUTION INTRAVENOUS at 20:19

## 2022-04-27 RX ADMIN — ONDANSETRON 4 MG: 2 INJECTION INTRAMUSCULAR; INTRAVENOUS at 15:40

## 2022-04-27 RX ADMIN — INSULIN LISPRO 1 UNITS: 100 INJECTION, SOLUTION INTRAVENOUS; SUBCUTANEOUS at 08:31

## 2022-04-27 RX ADMIN — DEXAMETHASONE SODIUM PHOSPHATE 5 MG: 10 INJECTION, EMULSION INTRAMUSCULAR; INTRAVENOUS at 15:40

## 2022-04-27 RX ADMIN — INSULIN LISPRO 2 UNITS: 100 INJECTION, SOLUTION INTRAVENOUS; SUBCUTANEOUS at 20:21

## 2022-04-27 RX ADMIN — ATORVASTATIN CALCIUM 40 MG: 40 TABLET, FILM COATED ORAL at 20:19

## 2022-04-27 RX ADMIN — AMLODIPINE BESYLATE 10 MG: 10 TABLET ORAL at 08:29

## 2022-04-27 RX ADMIN — ACETAMINOPHEN 650 MG: 325 TABLET ORAL at 20:55

## 2022-04-27 RX ADMIN — SODIUM CHLORIDE: 9 INJECTION, SOLUTION INTRAVENOUS at 08:31

## 2022-04-27 RX ADMIN — Medication 100 MG: at 15:34

## 2022-04-27 RX ADMIN — PROPOFOL 200 MG: 10 INJECTION, EMULSION INTRAVENOUS at 15:34

## 2022-04-27 ASSESSMENT — PULMONARY FUNCTION TESTS
PIF_VALUE: 9
PIF_VALUE: 5
PIF_VALUE: 6
PIF_VALUE: 6
PIF_VALUE: 1
PIF_VALUE: 5
PIF_VALUE: 8
PIF_VALUE: 6
PIF_VALUE: 6
PIF_VALUE: 5
PIF_VALUE: 8
PIF_VALUE: 1
PIF_VALUE: 5
PIF_VALUE: 1
PIF_VALUE: 6
PIF_VALUE: 5
PIF_VALUE: 6
PIF_VALUE: 17
PIF_VALUE: 5
PIF_VALUE: 5
PIF_VALUE: 6
PIF_VALUE: 8
PIF_VALUE: 8
PIF_VALUE: 6
PIF_VALUE: 6
PIF_VALUE: 3
PIF_VALUE: 8
PIF_VALUE: 15
PIF_VALUE: 6
PIF_VALUE: 1
PIF_VALUE: 1
PIF_VALUE: 5
PIF_VALUE: 5
PIF_VALUE: 1
PIF_VALUE: 8
PIF_VALUE: 5
PIF_VALUE: 1
PIF_VALUE: 5
PIF_VALUE: 7
PIF_VALUE: 6
PIF_VALUE: 6
PIF_VALUE: 5
PIF_VALUE: 6
PIF_VALUE: 5
PIF_VALUE: 5
PIF_VALUE: 6
PIF_VALUE: 6

## 2022-04-27 ASSESSMENT — PAIN SCALES - GENERAL
PAINLEVEL_OUTOF10: 0
PAINLEVEL_OUTOF10: 2
PAINLEVEL_OUTOF10: 0

## 2022-04-27 ASSESSMENT — PAIN DESCRIPTION - FREQUENCY: FREQUENCY: INTERMITTENT

## 2022-04-27 ASSESSMENT — PAIN DESCRIPTION - ORIENTATION: ORIENTATION: RIGHT

## 2022-04-27 ASSESSMENT — PAIN DESCRIPTION - DESCRIPTORS: DESCRIPTORS: ACHING

## 2022-04-27 ASSESSMENT — PAIN DESCRIPTION - LOCATION: LOCATION: FOOT

## 2022-04-27 ASSESSMENT — PAIN DESCRIPTION - PAIN TYPE: TYPE: SURGICAL PAIN

## 2022-04-27 ASSESSMENT — PAIN DESCRIPTION - ONSET: ONSET: GRADUAL

## 2022-04-27 NOTE — PLAN OF CARE
Problem: Nutrition Deficit:  Goal: Optimize nutritional status  Outcome: Progressing   Nutrition Problem #1: Increased nutrient needs  Intervention: Food and/or Nutrient Delivery:  (When diet resumed after surgery today, plan ONS start)

## 2022-04-27 NOTE — PROGRESS NOTES
In OR room, tele, glasses, hair band removed, placed in belongings bag, labeled. Patient has gold necklace on, per patient unable to remove.

## 2022-04-27 NOTE — PROGRESS NOTES
Progress note: Infectious diseases    Patient - Jaimie Álvarez,  Age - 73 y.o.    - 1955      Room Number - 6K-04/004-A   MRN -  [id number]   Acct # - [de-identified]  Date of Admission -  2022 12:44 PM    SUBJECTIVE:   No new complaints. Operative note read. OBJECTIVE   VITALS    height is 5' 9.5\" (1.765 m) and weight is 209 lb 7 oz (95 kg). Her oral temperature is 98.2 °F (36.8 °C). Her blood pressure is 151/68 (abnormal) and her pulse is 67. Her respiration is 16 and oxygen saturation is 95%.        Wt Readings from Last 3 Encounters:   22 209 lb 7 oz (95 kg)   22 211 lb (95.7 kg)   22 211 lb 3.2 oz (95.8 kg)       I/O (24 Hours)    Intake/Output Summary (Last 24 hours) at 2022 1826  Last data filed at 2022 1621  Gross per 24 hour   Intake 1980.34 ml   Output 1310 ml   Net 670.34 ml       General Appearance  Awake, alert, oriented,  not  In acute distress  HEENT - normocephalic, atraumatic, pink conjunctiva,  anicteric sclera  Neck - Supple, no mass  Lungs -  Bilateral good air entry, no rhonchi, no wheeze  Cardiovascular - Heart sounds are normal.     Abdomen - soft, not distended, nontender,   Neurologic -oriented  Skin - No bruising or bleeding  Extremities - dressed right foot wound( post surgery)         MEDICATIONS:      sodium chloride flush  5-40 mL IntraVENous 2 times per day    ceFAZolin  1,000 mg IntraVENous Q8H    insulin lispro  0-6 Units SubCUTAneous TID     insulin lispro  0-3 Units SubCUTAneous Nightly    amLODIPine  10 mg Oral Daily    atorvastatin  40 mg Oral Nightly      sodium chloride      sodium chloride      dextrose       sodium chloride flush, sodium chloride, ondansetron **OR** ondansetron, oxyCODONE **OR** oxyCODONE, glucose, dextrose, glucagon (rDNA), dextrose, morphine **OR** morphine, polyethylene glycol, acetaminophen **OR** acetaminophen      LABS:     CBC:   Recent Labs     04/25/22  0513 04/26/22  0513 04/27/22  0329   WBC 19.6* 19.7* 20.4*   HGB 9.0* 9.9* 9.7*    413* 404*     BMP:    Recent Labs     04/25/22  0513 04/26/22  0513 04/27/22  0329    137 138   K 4.2 4.1 4.2    107 108   CO2 20* 19* 20*   BUN 25* 22 19   CREATININE 1.4* 1.2 1.0   GLUCOSE 204* 160* 206*     Calcium:  Recent Labs     04/27/22  0329   CALCIUM 8.5      Recent Labs     04/26/22 2022 04/27/22  0610 04/27/22  1054   POCGLU 222* 191* 137*     HgbA1C:   No results for input(s): LABA1C in the last 72 hours. Problem list of patient:     Patient Active Problem List   Diagnosis Code    Accelerated essential hypertension I10    Hypertensive emergency I16.1    Primary hypertension I10    Hyperlipidemia E78.5     borderline Abnormal nuclear stress test- NO angina or angina equiv- med RX R94.39    Cellulitis L03.90         ASSESSMENT/PLAN   Diabetic foot ulcer with underlying OM/cellulites: she had resection of the 5th metatarsal head. She had delayed closure of the wound. Continue ancef. HTN. Guerrero to transition to oral antibiotic on discharge.     Hai Richardson MD, MD, FACP 4/27/2022 6:26 PM

## 2022-04-27 NOTE — H&P
[address] [id number]  History and Physical Update    Pt Name: Sheri Zuniga  MRN: [id number]  YOB: 1955  Date of evaluation: 4/27/2022    [x] I have examined the patient and reviewed the H&P/Consult and there are no changes to the patient or plans.     [] I have examined the patient and reviewed the H&P/Consult and have noted the following changes:        Karma Tolbert DPM DPM  Electronically signed 4/27/2022 at 7:20 AM

## 2022-04-27 NOTE — PROGRESS NOTES
1625- Pt arrived to PACU alert with no s/s of distress upon arrival. VSS. 1648- Handoff called to [facility] at this time. 1701- Pt transferred to [facility] at this time in stable condition.

## 2022-04-27 NOTE — OP NOTE
Operative Note      Patient: Micheal Steen  YOB: 1955  MRN: [id number]    Date of Procedure: 4/27/2022    Pre-Op Diagnosis: RIGHT FOOT WOUND, CELLULITIS, PAIN    Post-Op Diagnosis: Same       Procedure(s):  RIGHT FOOT DEBRIDEMENT/WASH OUT WITH PRIMARY CLOSURE    Surgeon(s):  Ge Mac DPM    Assistant:  Resident: Aura Olguin DPM; Yolanda Leon DPM  Student: Radha Aguayo MS3    Anesthesia: General    Injectables: 20cc of 1:1 mix of lidocaine with epinephrine and 0.5% marcaine plain    Estimated Blood Loss (mL): less than 50     Complications: None    Specimens:   * No specimens in log *    Implants:  * No implants in log *      Drains: * No LDAs found *    Findings: Consistent with diagnosis     Detailed Description of Procedure: Indications: Patient is a 66y. o. female with a chief complaint of wound to right lateral foot who is being seen by Dr. Frida Avery and being treated for wound to right lateral foot. At this time all conservative options have been exhausted and surgical intervention is necessary. The procedure has been explained to the patient and they understand the risks, benefits and possible complications including surgical site dehiscence, surgical site infection, blood clot formation, nerve pain, residual deformity, need for additional surgery, loss of limb, or loss of life. No promises have been made as to surgical outcome. Procedure: The patient was transported from the pre-op holding to the operating room and placed in a supine position. A pneumatic ankle tourniquet was applied to the right ankle. The right foot was then prepped and draped in the normal aspetic manner. The right foot was then exsanguinated with an esmark and the tourniquet was inflated to 250 mmHg. Attention was directed to the lateral aspect of the right forefoot. A #15 scalpel blade was used to scrape away nonviable tissue from the skin edges around the wound periphery.   A rongeur was used to remove any nonviable appearing tissues from the wound bed. Next, a pair Metzenbaum scissors was used to undermine the skin from the underlying subcutaneous tissues on the proximal, distal, dorsal, and plantar aspects of the wound. Next, the wound was irrigated with copious amounts of Irrisept solution. The skin was closed using 3-0 Prolene and 2-0 Prolene. A post-op injection of 20 cc's of one-to-one mix of 1% lidocaine with epinephrine and 0.5% Marcaine plain was injected. The incision was dressed with adaptic, 4x4's, Kerlix rolls, and an Ace bandage. The pneumatic ankle tourniquet was then deflated and an immediate hyperemic response was noted to all digits of the right foot. A postop shoe was then applied. The patient was transported to the PACU with VSS and NVS intact to all digits of the right foot. No complications were noted throughout the procedure. The patient is to be discharged per PACU protocol. The patient is to follow-up with Dr. Jessee Allison in the office. AGUSTIN Smalls DPM PGY-2  Foot and Ankle Surgical Resident    Electronically signed by Maury Hanley DPM on 4/27/2022 at 4:38 PM

## 2022-04-27 NOTE — CARE COORDINATION
4/27/22, 8:11 AM EDT    [address] day: 7  Location: -04/004-A Reason for admit: Cellulitis [L03.90]  Cellulitis of right foot [T93.164]  Diabetic ulcer of right foot associated with type 2 diabetes mellitus, with muscle involvement without evidence of necrosis, unspecified part of foot ([address] [D94.628, L97.515]   Procedure:   4/20 Right foot XR I do not see radiographic evidence of bony destruction, bony erosion, or periostitis to reflect osteomyelitis at this time. Early osteomyelitis may be occult on radiographs. Clinical correlation is recommended. A soft tissue wound/ulcer is seen overlying the fifth metatarsal  4/21 MRI right foot Abnormal signal intensity in the head and neck of the fifth metatarsal extending to the distal shaft consistent with osteomyelitis. There is abnormal soft tissue signal intensity adjacent to the head of the fifth metatarsal consistent with edema   and/or cellulitis. Degenerative changes. Areas of abnormal signal intensity at the bases of the third and fourth metatarsals. These may represent small cystic changes. Abnormal soft tissue signal intensity surrounding the second, third and fourth digits consistent with edema and/or cellulitis. Atrophy involving the foot muscles  4/23 Right foot wound debridement with partial resection of fifth metatarsal.   4/27 - Right foot debridement/wash out  Barriers to Discharge:   ID following. Bed alarm. , GFR 56, WBC 20.4, RBC 3.63, H&H 9.7 & 31.6. IV fluids, ancef and Zofran. PCP: Lupillo May MD  Readmission Risk Score: 12.6 ( )%  Patient Goals/Plan/Treatment Preferences:   Open to [facility], upon returning home with family.

## 2022-04-27 NOTE — PROGRESS NOTES
Comprehensive Nutrition Assessment    Type and Reason for Visit:  Initial,Wound,RD Nutrition Re-Screen/LOS    Nutrition Recommendations/Plan:   1. Consider MVI to assist in wound healing efforts. 2. Plan wound healing ONS, Dustin BID, when diet resumed post-op. Malnutrition Assessment:  Malnutrition Status:  No malnutrition (04/27/22 1552)    Context:  Acute Illness     Findings of the 6 clinical characteristics of malnutrition:  Energy Intake:  No significant decrease in energy intake  Weight Loss:  No significant weight loss     Body Fat Loss:  No significant body fat loss     Muscle Mass Loss:  No significant muscle mass loss    Fluid Accumulation:  Unable to assess     Strength:  Not Performed    Nutrition Assessment:      Pt. nutritionally compromised AEB wounds. At risk for further nutrition compromise r/t increased nutrient needs for wound healing, admit with cellulitis of diabetic right foot ulcer, s/p 4/23/22: right foot wound debridment with partial resection of fifth metatarsal, s/p 4/27/22: right foot debridement/wash out, and underlying medical condition (hx: HTN, psoriasis, DB). Nutrition Related Findings:      Wound Type:  (right foot/pinky toe area, s/p 4/23/22: right foot wound debridment with partial resection of fifth metatarsal, s/p 4/27/22: right foot debridement/wash out)     Pt. Report/Treatments/Miscellaneous: Patient seen this afternoon before she went to surgery. Reports good appetite/intake prior to admit and now. Agreeable to wound healing ONS when diet resumed post-op. Denied any diet questions at present. GI Status: BM 4/72/22  Pertinent Labs: 4/23/22: HgbA1c 7.2%/156 average glucose. 4/27/22: Glucose 206, chemstick 137  Pertinent Meds: lipitor, ancef, humalog, 0.9NaCl at 75, glycolax prn-last given 4/26/22    Current Nutrition Intake & Therapies:    Average Meal Intake: %     Diet NPO  ADULT ORAL NUTRITION SUPPLEMENT; Breakfast, Dinner;  Wound Healing Oral Supplement when diet resumed post-op    Anthropometric Measures:  Height: 5' 9.5\" (176.5 cm)  Ideal Body Weight (IBW): 148 lbs (67 kg)    Admission Body Weight: 205 lb 14.6 oz (93.4 kg) (4/22/22 no edema)  Current Body Weight: 209 lb 7 oz (95 kg) (4/25/22 bedscale, no edema),   IBW. Current BMI (kg/m2): 30.5  Usual Body Weight:  (~ 200-211# per pt. Per EMR: 7/26/21: 392#81.9ZF, 1/5/22: 211#3. 2oz, 3/24/22: 211#)     Weight Adjustment For: No Adjustment                 BMI Categories: Obese Class 1 (BMI 30.0-34. 9)    Estimated Daily Nutrient Needs:  Energy Requirements Based On: Kcal/kg     Energy (kcal/day): 5375-5731 kcals (18-20 kcals/kg/day)     Protein (g/day):  grams (1.2-2 grams/kg IBW/day)       Nutrition Diagnosis:   · Increased nutrient needs related to increase demand for energy/nutrients as evidenced by wounds      Nutrition Interventions:   Food and/or Nutrient Delivery:  (When diet resumed after surgery today, plan ONS start)  Nutrition Education/Counseling: Education initiated (Encouraged oral intake, good protein sources, blood sugar control to assist in wound healing efforts)  Coordination of Nutrition Care: Continue to monitor while inpatient       Goals:  Previous Goal Met:  (Goal initiated)  Goals: PO intake 75% or greater,by next RD assessment       Nutrition Monitoring and Evaluation:   Behavioral-Environmental Outcomes: None Identified  Food/Nutrient Intake Outcomes: Diet Advancement/Tolerance,Food and Nutrient Intake,Supplement Intake  Physical Signs/Symptoms Outcomes: Biochemical Data,GI Status,Fluid Status or Edema,Nutrition Focused Physical Findings,Skin,Weight    Discharge Planning:     Too soon to determine     Greg Salicdo RD, LD  Contact: [phone]

## 2022-04-27 NOTE — ANESTHESIA PRE PROCEDURE
Department of Anesthesiology  Preprocedure Note       Name:  Manuela Trujillo   Age:  73 y.o.  :  1955                                          MRN:  [id number]         Date:  2022      Surgeon: Tonny Quinn):  Ge Jacobs DPM    Procedure: Procedure(s):  RIGHT FOOT DEBRIDEMENT/WASH OUT, POSS WOUND VAC    Medications prior to admission:   Prior to Admission medications    Medication Sig Start Date End Date Taking?  Authorizing Provider   dapagliflozin (FARXIGA) 10 MG tablet take 1 tablet by mouth IN THE MORNING 22   Claudene Stabs, MD   amLODIPine (NORVASC) 10 MG tablet Take 1 tablet by mouth daily 22   Claudene Stabs, MD   atorvastatin (LIPITOR) 40 MG tablet Take 1 tablet by mouth nightly 22   Claudene Stabs, MD   lisinopril (PRINIVIL;ZESTRIL) 10 MG tablet Take 2 tablets by mouth nightly 22   Claudene Stabs, MD   aspirin 81 MG chewable tablet Take 81 mg by mouth daily    Historical Provider, MD       Current medications:    Current Facility-Administered Medications   Medication Dose Route Frequency Provider Last Rate Last Admin    ceFAZolin (ANCEF) 1000 mg in sterile water 10 mL IV syringe  1,000 mg IntraVENous Q8H Kevin Tinajero MD   1,000 mg at 22 1200    glucose (GLUTOSE) 40 % oral gel 15 g  15 g Oral PRN Aleene Safford, DPM        dextrose 50 % IV solution  12.5 g IntraVENous PRN Aleene Safford, DPM        glucagon (rDNA) injection 1 mg  1 mg IntraMUSCular PRN Aleene Safford, DPM        dextrose 5 % solution  100 mL/hr IntraVENous PRN Sarmad Alejo, DPM        insulin lispro (HUMALOG) injection vial 0-6 Units  0-6 Units SubCUTAneous TID WC Sarmad Alejo, DPM   1 Units at 22 0831    insulin lispro (HUMALOG) injection vial 0-3 Units  0-3 Units SubCUTAneous Nightly Sarmad Safford, DPM   1 Units at 22 205    0.9 % sodium chloride infusion   IntraVENous Continuous Rachid Gottlieb, DO 75 mL/hr at 22 1510 NoRateChange at 22 1510    sodium chloride flush 0.9 % injection 5-40 mL  5-40 mL IntraVENous 2 times per day Sherrine Amanda, DPM   10 mL at 04/24/22 2055    sodium chloride flush 0.9 % injection 5-40 mL  5-40 mL IntraVENous PRN Sherrine Amanda, DPM        0.9 % sodium chloride infusion   IntraVENous PRN Sherrine Amanda, DPM        oxyCODONE (ROXICODONE) immediate release tablet 5 mg  5 mg Oral Q4H PRN Sherrine Amanda, DPM   5 mg at 04/24/22 0032    Or    oxyCODONE (ROXICODONE) immediate release tablet 10 mg  10 mg Oral Q4H PRN Sherrine Amanda, DPM        morphine (PF) injection 2 mg  2 mg IntraVENous Q2H PRN Sherrine Amanda, DPM        Or    morphine injection 4 mg  4 mg IntraVENous Q2H PRN Sherrine Amanda, DPM        amLODIPine (NORVASC) tablet 10 mg  10 mg Oral Daily Sherrine Amanda, DPM   10 mg at 04/27/22 0829    atorvastatin (LIPITOR) tablet 40 mg  40 mg Oral Nightly Sherrine Amanda, DPM   40 mg at 04/26/22 2050    ondansetron (ZOFRAN-ODT) disintegrating tablet 4 mg  4 mg Oral Q8H PRN Sherrine Amanda, DPM        Or    ondansetron (ZOFRAN) injection 4 mg  4 mg IntraVENous Q6H PRN Sherrine Amanda, DPM        polyethylene glycol (GLYCOLAX) packet 17 g  17 g Oral Daily PRN Sherrine Amanda, DPM   17 g at 04/26/22 1031    acetaminophen (TYLENOL) tablet 650 mg  650 mg Oral Q6H PRN Sherrine Amanda, DPM   650 mg at 04/26/22 1032    Or    acetaminophen (TYLENOL) suppository 650 mg  650 mg Rectal Q6H PRN Sherrine Amanda, DPM           Allergies:  No Known Allergies    Problem List:    Patient Active Problem List   Diagnosis Code    Accelerated essential hypertension I10    Hypertensive emergency I16.1    Primary hypertension I10    Hyperlipidemia E78.5     borderline Abnormal nuclear stress test- NO angina or angina equiv- med RX R94.39    Cellulitis L03.90       Past Medical History:        Diagnosis Date    Diabetes mellitus ([facility] [address]     Hypertension     Psoriasis        Past Surgical History:        Procedure Laterality Date    TOE AMPUTATION Right 4/23/2022    RIGHT FOOT WOUND DEBRIDEMENT WITH POSS RIGHT 5TH METATARSAL RESECTION performed by Migdalia Bahena DPM at [address] [place] [address] History:    Social History     Tobacco Use    Smoking status: Former Smoker     Packs/day: 0.50     Years: 15.00     Pack years: 7.50     Types: Cigarettes     Quit date: 2011     Years since quittin.3    Smokeless tobacco: Never Used   Substance Use Topics    Alcohol use: Not Currently     Comment: social                                Counseling given: Not Answered      Vital Signs (Current):   Vitals:    22 1947 22 2331 22 0347 22 1056   BP: (!) 141/67 (!) 157/69 (!) 149/75 (!) 162/72   Pulse: 65 68 64 62   Resp: 18 18 16 16   Temp: 97.2 °F (36.2 °C) 98.3 °F (36.8 °C) 98.2 °F (36.8 °C) 98.5 °F (36.9 °C)   TempSrc: Oral Oral Axillary Oral   SpO2: 99% 99% 99% 100%   Weight:       Height:                                                  BP Readings from Last 3 Encounters:   22 (!) 162/72   22 (!) 111/58   22 127/65       NPO Status:                                                                                 BMI:   Wt Readings from Last 3 Encounters:   22 209 lb 7 oz (95 kg)   22 211 lb (95.7 kg)   22 211 lb 3.2 oz (95.8 kg)     Body mass index is 30.49 kg/m².     CBC:   Lab Results   Component Value Date    WBC 20.4 2022    RBC 3.63 2022    HGB 9.7 2022    HCT 31.6 2022    MCV 87.1 2022     2022       CMP:   Lab Results   Component Value Date     2022    K 4.2 2022    K 4.1 2022     2022    CO2 20 2022    BUN 19 2022    CREATININE 1.0 2022    LABGLOM 56 2022    GLUCOSE 206 2022    PROT 6.3 2021    CALCIUM 8.5 2022    BILITOT 0.4 2021    ALKPHOS 80 2021    AST 11 2021    ALT 8 2021       POC Tests:   Recent Labs     22  1054   POCGLU 137*       Coags:   Lab Results Component Value Date    INR 1.04 04/21/2022    APTT 34.4 07/30/2021       HCG (If Applicable): No results found for: PREGTESTUR, PREGSERUM, HCG, HCGQUANT     ABGs: No results found for: PHART, PO2ART, ELV9QKD, KKV5SIK, BEART, U8WYIQJJ     Type & Screen (If Applicable):  Lab Results   Component Value Date    LABRH POS 07/30/2021       Drug/Infectious Status (If Applicable):  No results found for: HIV, HEPCAB    COVID-19 Screening (If Applicable): No results found for: COVID19        Anesthesia Evaluation    Airway: Mallampati: II  TM distance: >3 FB   Neck ROM: full  Mouth opening: > = 3 FB Dental:          Pulmonary: breath sounds clear to auscultation                             Cardiovascular:    (+) hypertension:,         Rhythm: regular                      Neuro/Psych:               GI/Hepatic/Renal:             Endo/Other:    (+) Diabetes, . Abdominal:   (+) obese,           Vascular: Other Findings:             Anesthesia Plan      general     ASA 2       Induction: intravenous. MIPS: Postoperative opioids intended and Prophylactic antiemetics administered. Anesthetic plan and risks discussed with patient. Plan discussed with CRNA.                 Sam Hillman MD   4/27/2022

## 2022-04-27 NOTE — BRIEF OP NOTE
Brief Postoperative Note      Patient: Bogdan Koenig  YOB: 1955  MRN: [id number]    Date of Procedure: 4/27/2022    Pre-Op Diagnosis: RIGHT FOOT WOUND, CELLULITIS, PAIN    Post-Op Diagnosis: Same       Procedure(s):  RIGHT FOOT DEBRIDEMENT/WASH OUT WITH PRIMARY CLOSURE    Surgeon(s):  Ge Aguilar DPM    Assistant:  Resident: Veronica Nesbitt DPM; Bin Carrillo DPM  Student: Alen Ghosh MS3    Anesthesia: General    Injectables: 20cc of 1:1 mix of lidocaine with epinephrine and 0.5% marcaine plain    Estimated Blood Loss (mL): less than 50     Complications: None    Specimens:   * No specimens in log *    Implants:  * No implants in log *      Drains: * No LDAs found *    Findings: Consistent with diagnosis    Electronically signed by Veronica Nesbitt DPM on 4/27/2022 at 4:37 PM

## 2022-04-27 NOTE — PROGRESS NOTES
Hospitalist Progress Note      Patient:  Nico Flores    Unit/Bed:[facility] OR (General) POOL R*  YOB: 1955  MRN: [id number]   Acct: [de-identified]   PCP: Louisa Guadalupe MD  Date of Admission: 4/20/2022    Assessment/Plan:    1. Diabetic Foot ulcer with cellulitis and OM   - ID following. On IV abx, duration to be determined   - Podiatry plan on taking patient to OR today    2. DM II - controlled   - Continue home meds. Monitor BG and will adjust accordingly. 3. Essential HTN - stable   - Continue home meds    4. HLD    Chief Complaint: Foot infection    Initial H and P:-      This is a relatively healthy 43-year-old female who presented centers [address] emergency department on 4/20 due to foot pain in her right foot.  Patient states that her foot started to hurt on 4/18 and describes the pain as constant and dull pain.  Patient rates the pain currently 4 out of 10.  Denies any alleviating or aggravating factors.  Patient states she noticed a corn on the outside of her foot and pulled it off on 4/18 as well.  Patient states that her eyesight is not very good and she cannot see her feet well so today her daughter came to look at her feet due to the pain.  Daughter saw redness and swelling to her right foot.  Patient denies nausea, vomiting, fever, chills, shortness of breath, chest pain.  Patient does not look toxic at this time.  Of note, patient was recently diagnosed with diabetes by her PCP about 3 months ago. Marvin Dolan has been taking her diabetes medications as prescribed.     In the ED, patient was found to have elevated white blood cell count 20.2 elevated CRP 5.84.  X-ray of right foot did not show osteomyelitis.  Patient was given IV antibiotics.  Hospitalist were asked to admit patient for podiatry and infectious disease consult and further work-up    Subjective (past 24 hours):   Feels about the same. No fevers or chills. No NVD.  No other complaints. Past medical history, family history, social history and allergies reviewed again and is unchanged since admission. ROS (All review of systems completed. Pertinent positives noted. Otherwise All other systems reviewed and negative.)     Medications:  Reviewed    Infusion Medications    sodium chloride      dextrose      sodium chloride 75 mL/hr at 04/27/22 1527    sodium chloride       Scheduled Medications    sodium chloride flush  5-40 mL IntraVENous 2 times per day    ceFAZolin  1,000 mg IntraVENous Q8H    insulin lispro  0-6 Units SubCUTAneous TID WC    insulin lispro  0-3 Units SubCUTAneous Nightly    sodium chloride flush  5-40 mL IntraVENous 2 times per day    amLODIPine  10 mg Oral Daily    atorvastatin  40 mg Oral Nightly     PRN Meds: sodium chloride flush, sodium chloride, morphine, fentanNYL, labetalol, glucose, dextrose, glucagon (rDNA), dextrose, sodium chloride flush, sodium chloride, oxyCODONE **OR** oxyCODONE, morphine **OR** morphine, ondansetron **OR** ondansetron, polyethylene glycol, acetaminophen **OR** acetaminophen      Intake/Output Summary (Last 24 hours) at 4/27/2022 1535  Last data filed at 4/27/2022 0650  Gross per 24 hour   Intake 1980.34 ml   Output 1300 ml   Net 680.34 ml       Diet:  Diet NPO    Exam:  BP (!) 162/72   Pulse 62   Temp 98.5 °F (36.9 °C) (Oral)   Resp 16   Ht 5' 9.5\" (1.765 m)   Wt 209 lb 7 oz (95 kg)   SpO2 100%   BMI 30.49 kg/m²   General appearance: No apparent distress, appears stated age and cooperative. HEENT: Pupils equal, round, and reactive to light. Conjunctivae/corneas clear. Neck: Supple, with full range of motion. No jugular venous distention. Trachea midline. Respiratory:  Normal respiratory effort. Clear to auscultation, bilaterally without Rales/Wheezes/Rhonchi. Cardiovascular: Regular rate and rhythm with normal S1/S2 without murmurs, rubs or gallops.   Abdomen: Soft, non-tender, non-distended with normal bowel sounds. Musculoskeletal: RLE covered in dressing. Skin: Skin color, texture, turgor normal.  No rashes or lesions. Neurologic:  Neurovascularly intact without any focal sensory/motor deficits. Cranial nerves: II-XII intact, grossly non-focal.  Psychiatric: Alert and oriented, thought content appropriate, normal insight  Capillary Refill: Brisk,< 3 seconds   Peripheral Pulses: +2 palpable, equal bilaterally     Labs:   Recent Labs     04/25/22 0513 04/26/22 0513 04/27/22 0329   WBC 19.6* 19.7* 20.4*   HGB 9.0* 9.9* 9.7*   HCT 29.2* 31.9* 31.6*    413* 404*     Recent Labs     04/25/22 0513 04/26/22 0513 04/27/22 0329    137 138   K 4.2 4.1 4.2    107 108   CO2 20* 19* 20*   BUN 25* 22 19   CREATININE 1.4* 1.2 1.0   CALCIUM 8.4* 8.7 8.5     No results for input(s): AST, ALT, BILIDIR, BILITOT, ALKPHOS in the last 72 hours. No results for input(s): INR in the last 72 hours. No results for input(s): [place] Comment in the last 72 hours. Microbiology:    Blood culture #1:   Lab Results   Component Value Date    BC No growth-preliminary No growth  04/20/2022       Blood culture #2:No results found for: Almyra Carrel    Organism:  Lab Results   Component Value Date    ORG Staphylococcus aureus 04/23/2022         Lab Results   Component Value Date    LABGRAM  04/23/2022     Rare segmented neutrophils observed. No epithelial cells observed. Rare gram positive cocci occurring singly and in pairs. MRSA culture only:No results found for: [facility]    Urine culture:   Lab Results   Component Value Date    LABURIN  07/28/2021     Mixed growth. The mixture of organisms present represents both organisms that may cause urinary tract infections and organisms that are not a common cause of urinary tract infections and are possibly skin loreta or distal urethral loreta.         Respiratory culture: No results found for: CULTRESP    Aerobic and Anaerobic :  Lab Results   Component Value Date LABAERO  04/23/2022     moderate growth In the treatment of gram positive infections, GENTAMICIN should be CONSIDERED a SYNERGYSTIC agent ONLY. Ciprofloxacin and Levofloxacin, regardless of in vitro sensitivity, should not be used for staphylococcal infections other than uncomplicated lower UTIs. Moxifloxacin, regardless of in vitro sensitivity, should not be used for staphylococcal infections. Lab Results   Component Value Date    LABANAE No anaerobes isolated  04/23/2022       Urinalysis:      Lab Results   Component Value Date    NITRU NEGATIVE 04/23/2022    WBCUA 25-50 W/CLUMPS 04/23/2022    BACTERIA NONE SEEN 04/23/2022    RBCUA 5-10 04/23/2022    BLOODU MODERATE 04/23/2022    SPECGRAV 1.020 04/23/2022       Radiology:  MRI FOOT RIGHT WO CONTRAST   Final Result       1. Abnormal signal intensity in the head and neck of the fifth metatarsal extending to the distal shaft consistent with osteomyelitis. There is abnormal soft tissue signal intensity adjacent to the head of the fifth metatarsal consistent with edema    and/or cellulitis. 2. Degenerative changes. 3. Areas of abnormal signal intensity at the bases of the third and fourth metatarsals. These may represent small cystic changes. 4. Abnormal soft tissue signal intensity surrounding the second, third and fourth digits consistent with edema and/or cellulitis. 5. Atrophy involving the foot muscles. **This report has been created using voice recognition software. It may contain minor errors which are inherent in voice recognition technology. **      Final report electronically signed by DR Robby Lopez on 4/21/2022 2:37 PM      XR FOOT RIGHT (2 VIEWS)   Final Result   1. I do not see radiographic evidence of bony destruction, bony erosion, or periostitis to reflect osteomyelitis at this time. Early osteomyelitis may be occult on radiographs. Clinical correlation is recommended.       2. A soft tissue wound/ulcer is seen overlying the fifth metatarsal            **This report has been created using voice recognition software. It may contain minor errors which are inherent in voice recognition technology. **      Final report electronically signed by Dr Moniak Bauer on 4/20/2022 2:15 PM        XR FOOT RIGHT (2 VIEWS)    Result Date: 4/20/2022  PROCEDURE: XR FOOT RIGHT (2 VIEWS) CLINICAL INFORMATION: Non-healing wound COMPARISON: None TECHNIQUE: 2 views of the right foot FINDINGS: A soft tissue wound is seen overlying the fifth metatarsal. Plantar calcaneal and retrocalcaneal spurs. Mild degenerative changes of the right foot. No bony destruction, erosion or periostitis to suggest osteomyelitis. No acute fracture or dislocation. Bone mineralization is unremarkable. No significant soft tissue abnormality. 1. I do not see radiographic evidence of bony destruction, bony erosion, or periostitis to reflect osteomyelitis at this time. Early osteomyelitis may be occult on radiographs. Clinical correlation is recommended. 2. A soft tissue wound/ulcer is seen overlying the fifth metatarsal **This report has been created using voice recognition software. It may contain minor errors which are inherent in voice recognition technology. ** Final report electronically signed by Dr Monika Bauer on 4/20/2022 2:15 PM    MRI FOOT RIGHT WO CONTRAST    Result Date: 4/21/2022  PROCEDURE: MRI FOOT RIGHT WO CONTRAST CLINICAL INFORMATION Wound right 5th metatarsal head, probes to bone, r/o osteomyelitis. COMPARISON: Plain radiographs dated 20 April 2022. TECHNIQUE: A noncontrast MRI scan was carried out through the right foot. FINDINGS: There is abnormal signal intensity in the head, neck of the fifth metatarsal extending into the distal shaft. These findings are consistent with involvement by osteomyelitis. There is soft tissue swelling adjacent to the head of the fifth metatarsal consistent with edema and/or cellulitis. There are degenerative changes.  There are small areas of abnormal signal intensity at the bases of the third and fourth metatarsals. These findings may represent small cystic changes. There is no fracture or other bony abnormality noted. There is abnormal signal intensity in the soft tissues surrounding the second, third and fourth digits consistent with edema and/or cellulitis. There is no drainable fluid collection. The flexor and extensor tendons appear to be intact. There is atrophy in the muscles in the foot. The remaining soft tissues are unremarkable. .      1. Abnormal signal intensity in the head and neck of the fifth metatarsal extending to the distal shaft consistent with osteomyelitis. There is abnormal soft tissue signal intensity adjacent to the head of the fifth metatarsal consistent with edema and/or cellulitis. 2. Degenerative changes. 3. Areas of abnormal signal intensity at the bases of the third and fourth metatarsals. These may represent small cystic changes. 4. Abnormal soft tissue signal intensity surrounding the second, third and fourth digits consistent with edema and/or cellulitis. 5. Atrophy involving the foot muscles. **This report has been created using voice recognition software. It may contain minor errors which are inherent in voice recognition technology. ** Final report electronically signed by DR Salome Linda on 4/21/2022 2:37 PM      Electronically signed by Shahid Zarate MD on 4/27/2022 at 3:35 PM

## 2022-04-27 NOTE — FLOWSHEET NOTE
Pt was anointed     04/27/22 1652   Encounter Summary   Service Provided For: Patient   Referral/Consult From: [address] Family members   Last Encounter  04/27/22   Complexity of Encounter Moderate   Begin Time 1423   End Time  1432   Total Time Calculated 9 min   Encounter    Type Follow up   Spiritual/Emotional needs   Type Spiritual Support   Rituals, Rites and Sacraments   Type Anointing

## 2022-04-28 LAB
ANION GAP SERPL CALCULATED.3IONS-SCNC: 11 MEQ/L (ref 8–16)
BASOPHILS # BLD: 0.3 %
BASOPHILS ABSOLUTE: 0 THOU/MM3 (ref 0–0.1)
BUN BLDV-MCNC: 21 MG/DL (ref 7–22)
CALCIUM SERPL-MCNC: 8.3 MG/DL (ref 8.5–10.5)
CHLORIDE BLD-SCNC: 105 MEQ/L (ref 98–111)
CO2: 19 MEQ/L (ref 23–33)
CREAT SERPL-MCNC: 1 MG/DL (ref 0.4–1.2)
EOSINOPHIL # BLD: 0.1 %
EOSINOPHILS ABSOLUTE: 0 THOU/MM3 (ref 0–0.4)
ERYTHROCYTE [DISTWIDTH] IN BLOOD BY AUTOMATED COUNT: 11.9 % (ref 11.5–14.5)
ERYTHROCYTE [DISTWIDTH] IN BLOOD BY AUTOMATED COUNT: 36 FL (ref 35–45)
GFR SERPL CREATININE-BSD FRML MDRD: 56 ML/MIN/1.73M2
GLUCOSE BLD-MCNC: 226 MG/DL (ref 70–108)
GLUCOSE BLD-MCNC: 242 MG/DL (ref 70–108)
GLUCOSE BLD-MCNC: 259 MG/DL (ref 70–108)
GLUCOSE BLD-MCNC: 288 MG/DL (ref 70–108)
GLUCOSE BLD-MCNC: 321 MG/DL (ref 70–108)
HCT VFR BLD CALC: 31.6 % (ref 37–47)
HEMOGLOBIN: 10 GM/DL (ref 12–16)
IMMATURE GRANS (ABS): 0.17 THOU/MM3 (ref 0–0.07)
IMMATURE GRANULOCYTES: 1.1 %
LYMPHOCYTES # BLD: 17.1 %
LYMPHOCYTES ABSOLUTE: 2.7 THOU/MM3 (ref 1–4.8)
MCH RBC QN AUTO: 26.9 PG (ref 26–33)
MCHC RBC AUTO-ENTMCNC: 31.6 GM/DL (ref 32.2–35.5)
MCV RBC AUTO: 84.9 FL (ref 81–99)
MONOCYTES # BLD: 1.5 %
MONOCYTES ABSOLUTE: 0.2 THOU/MM3 (ref 0.4–1.3)
NUCLEATED RED BLOOD CELLS: 0 /100 WBC
PLATELET # BLD: 410 THOU/MM3 (ref 130–400)
PMV BLD AUTO: 9.5 FL (ref 9.4–12.4)
POTASSIUM SERPL-SCNC: 4.7 MEQ/L (ref 3.5–5.2)
RBC # BLD: 3.72 MILL/MM3 (ref 4.2–5.4)
SEG NEUTROPHILS: 79.9 %
SEGMENTED NEUTROPHILS ABSOLUTE COUNT: 12.5 THOU/MM3 (ref 1.8–7.7)
SODIUM BLD-SCNC: 135 MEQ/L (ref 135–145)
WBC # BLD: 15.6 THOU/MM3 (ref 4.8–10.8)

## 2022-04-28 PROCEDURE — 1200000003 HC TELEMETRY R&B

## 2022-04-28 PROCEDURE — 6370000000 HC RX 637 (ALT 250 FOR IP): Performed by: STUDENT IN AN ORGANIZED HEALTH CARE EDUCATION/TRAINING PROGRAM

## 2022-04-28 PROCEDURE — 99233 SBSQ HOSP IP/OBS HIGH 50: CPT | Performed by: INTERNAL MEDICINE

## 2022-04-28 PROCEDURE — 2500000003 HC RX 250 WO HCPCS: Performed by: STUDENT IN AN ORGANIZED HEALTH CARE EDUCATION/TRAINING PROGRAM

## 2022-04-28 PROCEDURE — 36415 COLL VENOUS BLD VENIPUNCTURE: CPT

## 2022-04-28 PROCEDURE — 80048 BASIC METABOLIC PNL TOTAL CA: CPT

## 2022-04-28 PROCEDURE — 85025 COMPLETE CBC W/AUTO DIFF WBC: CPT

## 2022-04-28 PROCEDURE — 6360000002 HC RX W HCPCS: Performed by: STUDENT IN AN ORGANIZED HEALTH CARE EDUCATION/TRAINING PROGRAM

## 2022-04-28 PROCEDURE — 6370000000 HC RX 637 (ALT 250 FOR IP): Performed by: INTERNAL MEDICINE

## 2022-04-28 PROCEDURE — 82948 REAGENT STRIP/BLOOD GLUCOSE: CPT

## 2022-04-28 PROCEDURE — 2580000003 HC RX 258: Performed by: STUDENT IN AN ORGANIZED HEALTH CARE EDUCATION/TRAINING PROGRAM

## 2022-04-28 RX ORDER — HYDROCORTISONE ACETATE 25 MG/1
25 SUPPOSITORY RECTAL 2 TIMES DAILY
Status: DISCONTINUED | OUTPATIENT
Start: 2022-04-29 | End: 2022-04-30 | Stop reason: HOSPADM

## 2022-04-28 RX ORDER — INSULIN LISPRO 100 [IU]/ML
0-6 INJECTION, SOLUTION INTRAVENOUS; SUBCUTANEOUS NIGHTLY
Status: DISCONTINUED | OUTPATIENT
Start: 2022-04-28 | End: 2022-04-30 | Stop reason: HOSPADM

## 2022-04-28 RX ORDER — INSULIN LISPRO 100 [IU]/ML
0-12 INJECTION, SOLUTION INTRAVENOUS; SUBCUTANEOUS
Status: DISCONTINUED | OUTPATIENT
Start: 2022-04-28 | End: 2022-04-30 | Stop reason: HOSPADM

## 2022-04-28 RX ADMIN — CEFAZOLIN 1000 MG: 10 INJECTION, POWDER, FOR SOLUTION INTRAVENOUS at 03:59

## 2022-04-28 RX ADMIN — INSULIN LISPRO 3 UNITS: 100 INJECTION, SOLUTION INTRAVENOUS; SUBCUTANEOUS at 20:38

## 2022-04-28 RX ADMIN — AMLODIPINE BESYLATE 10 MG: 10 TABLET ORAL at 09:14

## 2022-04-28 RX ADMIN — INSULIN LISPRO 4 UNITS: 100 INJECTION, SOLUTION INTRAVENOUS; SUBCUTANEOUS at 13:29

## 2022-04-28 RX ADMIN — ACETAMINOPHEN 650 MG: 325 TABLET ORAL at 03:56

## 2022-04-28 RX ADMIN — ACETAMINOPHEN 650 MG: 325 TABLET ORAL at 19:32

## 2022-04-28 RX ADMIN — ACETAMINOPHEN 650 MG: 325 TABLET ORAL at 09:14

## 2022-04-28 RX ADMIN — SODIUM CHLORIDE: 9 INJECTION, SOLUTION INTRAVENOUS at 17:52

## 2022-04-28 RX ADMIN — SODIUM CHLORIDE: 9 INJECTION, SOLUTION INTRAVENOUS at 01:52

## 2022-04-28 RX ADMIN — ATORVASTATIN CALCIUM 40 MG: 40 TABLET, FILM COATED ORAL at 20:37

## 2022-04-28 RX ADMIN — INSULIN LISPRO 6 UNITS: 100 INJECTION, SOLUTION INTRAVENOUS; SUBCUTANEOUS at 17:53

## 2022-04-28 RX ADMIN — CEFAZOLIN 1000 MG: 10 INJECTION, POWDER, FOR SOLUTION INTRAVENOUS at 20:35

## 2022-04-28 RX ADMIN — INSULIN LISPRO 2 UNITS: 100 INJECTION, SOLUTION INTRAVENOUS; SUBCUTANEOUS at 09:14

## 2022-04-28 RX ADMIN — CEFAZOLIN 1000 MG: 10 INJECTION, POWDER, FOR SOLUTION INTRAVENOUS at 12:00

## 2022-04-28 RX ADMIN — OXYCODONE 5 MG: 5 TABLET ORAL at 23:53

## 2022-04-28 ASSESSMENT — PAIN DESCRIPTION - DESCRIPTORS
DESCRIPTORS: ACHING
DESCRIPTORS: SHARP;ACHING
DESCRIPTORS: SPASM
DESCRIPTORS: THROBBING
DESCRIPTORS: SPASM
DESCRIPTORS: ACHING

## 2022-04-28 ASSESSMENT — PAIN SCALES - GENERAL
PAINLEVEL_OUTOF10: 1
PAINLEVEL_OUTOF10: 2
PAINLEVEL_OUTOF10: 3
PAINLEVEL_OUTOF10: 6
PAINLEVEL_OUTOF10: 2
PAINLEVEL_OUTOF10: 3
PAINLEVEL_OUTOF10: 2

## 2022-04-28 ASSESSMENT — PAIN DESCRIPTION - ORIENTATION
ORIENTATION: RIGHT

## 2022-04-28 ASSESSMENT — PAIN DESCRIPTION - LOCATION
LOCATION: FOOT

## 2022-04-28 ASSESSMENT — PAIN DESCRIPTION - PAIN TYPE
TYPE: SURGICAL PAIN
TYPE: ACUTE PAIN

## 2022-04-28 ASSESSMENT — PAIN DESCRIPTION - FREQUENCY
FREQUENCY: INTERMITTENT
FREQUENCY: INTERMITTENT

## 2022-04-28 ASSESSMENT — PAIN DESCRIPTION - ONSET: ONSET: GRADUAL

## 2022-04-28 ASSESSMENT — PAIN - FUNCTIONAL ASSESSMENT: PAIN_FUNCTIONAL_ASSESSMENT: ACTIVITIES ARE NOT PREVENTED

## 2022-04-28 NOTE — CARE COORDINATION
4/28/22, 3:02 PM EDT    DISCHARGE ON GOING EVALUATION    [facility] day: 8  Location: -04/004-A Reason for admit: Cellulitis [L03.90]  Cellulitis of right foot [T84.250]  Diabetic ulcer of right foot associated with type 2 diabetes mellitus, with muscle involvement without evidence of necrosis, unspecified part of foot ([facility] [address] [E11.621, L97.515]   Procedure:4/23 RIGHT FOOT WOUND DEBRIDEMENT WITH POSS RIGHT 5TH METATARSAL RESECTION   4/27 RIGHT FOOT DEBRIDEMENT/WASH OUT WITH PRIMARY CLOSURE  Barriers to Discharge:  WBC 15.6. IVF, IV Ancef, pain control. ID plans oral atb at discharge. Possibly tomorrow. PCP: Clare Harding MD  Readmission Risk Score: 13.4 ( )%  Patient Goals/Plan/Treatment Preferences:  Hardik Jeong lives in [facility], but frequently stays with her daughter in [email or web address]. Needs glucometer at discharge. Open to [facility] if needed for wound care.

## 2022-04-28 NOTE — PROGRESS NOTES
Hospitalist Progress Note      Patient:  Nicolette Crandall    Unit/Bed:6K-04/004-A  YOB: 1955  MRN: [id number]   Acct: [de-identified]   PCP: Ryan Trujillo MD  Date of Admission: 4/20/2022    Assessment/Plan:    1. Diabetic Foot ulcer with cellulitis and OM   - ID following. On IV abx, duration to be determined   - Podiatry plan on taking patient to OR today    4/28: s/p right foot debridement. Wound care per podiatry. Will await ID recommendations in regards to duration. 2. DM II   - Continue home meds. Monitor BG and will adjust accordingly. 4/28: Elevated this am. Will change to medium dose SS. Continue to monitor    3. Essential HTN - stable   - Continue home meds    4.  HLD    Chief Complaint: Foot infection    Initial H and P:-      This is a relatively healthy 50-year-old female who presented centers [address] [place] [address] emergency department on 4/20 due to foot pain in her right foot.  Patient states that her foot started to hurt on 4/18 and describes the pain as constant and dull pain.  Patient rates the pain currently 4 out of 10.  Denies any alleviating or aggravating factors.  Patient states she noticed a corn on the outside of her foot and pulled it off on 4/18 as well.  Patient states that her eyesight is not very good and she cannot see her feet well so today her daughter came to look at her feet due to the pain.  Daughter saw redness and swelling to her right foot.  Patient denies nausea, vomiting, fever, chills, shortness of breath, chest pain.  Patient does not look toxic at this time.  Of note, patient was recently diagnosed with diabetes by her PCP about 3 months ago. Augusto Rios has been taking her diabetes medications as prescribed.     In the ED, patient was found to have elevated white blood cell count 20.2 elevated CRP 5.84.  X-ray of right foot did not show osteomyelitis.  Patient was given IV antibiotics.  Hospitalist were asked to admit patient for podiatry and infectious disease consult and further work-up    Subjective (past 24 hours):   Feels about the same. No fevers or chills. No NVD. No other complaints. Past medical history, family history, social history and allergies reviewed again and is unchanged since admission. ROS (All review of systems completed. Pertinent positives noted. Otherwise All other systems reviewed and negative.)     Medications:  Reviewed    Infusion Medications    sodium chloride 125 mL/hr at 04/28/22 0152    sodium chloride      dextrose       Scheduled Medications    insulin lispro  0-12 Units SubCUTAneous TID WC    insulin lispro  0-6 Units SubCUTAneous Nightly    sodium chloride flush  5-40 mL IntraVENous 2 times per day    ceFAZolin  1,000 mg IntraVENous Q8H    amLODIPine  10 mg Oral Daily    atorvastatin  40 mg Oral Nightly     PRN Meds: sodium chloride flush, sodium chloride, ondansetron **OR** ondansetron, oxyCODONE **OR** oxyCODONE, glucose, dextrose, glucagon (rDNA), dextrose, morphine **OR** morphine, polyethylene glycol, acetaminophen **OR** acetaminophen      Intake/Output Summary (Last 24 hours) at 4/28/2022 1537  Last data filed at 4/28/2022 1400  Gross per 24 hour   Intake 2909.1 ml   Output 1435 ml   Net 1474.1 ml       Diet:  ADULT ORAL NUTRITION SUPPLEMENT; Breakfast, Dinner; Wound Healing Oral Supplement  ADULT DIET; Regular    Exam:  BP (!) 138/59   Pulse 69   Temp 97.5 °F (36.4 °C) (Oral)   Resp 16   Ht 5' 9.5\" (1.765 m)   Wt 209 lb 7 oz (95 kg)   SpO2 100%   BMI 30.49 kg/m²   General appearance: No apparent distress, appears stated age and cooperative. HEENT: Pupils equal, round, and reactive to light. Conjunctivae/corneas clear. Neck: Supple, with full range of motion. No jugular venous distention. Trachea midline. Respiratory:  Normal respiratory effort. Clear to auscultation, bilaterally without Rales/Wheezes/Rhonchi.   Cardiovascular: Regular rate and rhythm with normal S1/S2 without murmurs, rubs or gallops. Abdomen: Soft, non-tender, non-distended with normal bowel sounds. Musculoskeletal: RLE covered in dressing. Skin: Skin color, texture, turgor normal.  No rashes or lesions. Neurologic:  Neurovascularly intact without any focal sensory/motor deficits. Cranial nerves: II-XII intact, grossly non-focal.  Psychiatric: Alert and oriented, thought content appropriate, normal insight  Capillary Refill: Brisk,< 3 seconds   Peripheral Pulses: +2 palpable, equal bilaterally     Labs:   Recent Labs     04/26/22 0513 04/27/22 0329 04/28/22 0332   WBC 19.7* 20.4* 15.6*   HGB 9.9* 9.7* 10.0*   HCT 31.9* 31.6* 31.6*   * 404* 410*     Recent Labs     04/26/22 0513 04/27/22 0329 04/28/22 0332    138 135   K 4.1 4.2 4.7    108 105   CO2 19* 20* 19*   BUN 22 19 21   CREATININE 1.2 1.0 1.0   CALCIUM 8.7 8.5 8.3*     No results for input(s): AST, ALT, BILIDIR, BILITOT, ALKPHOS in the last 72 hours. No results for input(s): INR in the last 72 hours. No results for input(s): Brennon Lager in the last 72 hours. Microbiology:    Blood culture #1:   Lab Results   Component Value Date    BC No growth-preliminary No growth  04/20/2022       Blood culture #2:No results found for: Aleksandr Cartwright    Organism:  Lab Results   Component Value Date    ORG Staphylococcus aureus 04/23/2022         Lab Results   Component Value Date    LABGRAM  04/23/2022     Rare segmented neutrophils observed. No epithelial cells observed. Rare gram positive cocci occurring singly and in pairs. MRSA culture only:No results found for: [facility]    Urine culture:   Lab Results   Component Value Date    LABURIN  07/28/2021     Mixed growth. The mixture of organisms present represents both organisms that may cause urinary tract infections and organisms that are not a common cause of urinary tract infections and are possibly skin loreta or distal urethral loreta.         Respiratory culture: No results found for: CULTRESP    Aerobic and Anaerobic :  Lab Results   Component Value Date    LABAERO  04/23/2022     moderate growth In the treatment of gram positive infections, GENTAMICIN should be CONSIDERED a SYNERGYSTIC agent ONLY. Ciprofloxacin and Levofloxacin, regardless of in vitro sensitivity, should not be used for staphylococcal infections other than uncomplicated lower UTIs. Moxifloxacin, regardless of in vitro sensitivity, should not be used for staphylococcal infections. Lab Results   Component Value Date    LABANAE No anaerobes isolated  04/23/2022       Urinalysis:      Lab Results   Component Value Date    NITRU NEGATIVE 04/23/2022    WBCUA 25-50 W/CLUMPS 04/23/2022    BACTERIA NONE SEEN 04/23/2022    RBCUA 5-10 04/23/2022    BLOODU MODERATE 04/23/2022    SPECGRAV 1.020 04/23/2022       Radiology:  MRI FOOT RIGHT WO CONTRAST   Final Result       1. Abnormal signal intensity in the head and neck of the fifth metatarsal extending to the distal shaft consistent with osteomyelitis. There is abnormal soft tissue signal intensity adjacent to the head of the fifth metatarsal consistent with edema    and/or cellulitis. 2. Degenerative changes. 3. Areas of abnormal signal intensity at the bases of the third and fourth metatarsals. These may represent small cystic changes. 4. Abnormal soft tissue signal intensity surrounding the second, third and fourth digits consistent with edema and/or cellulitis. 5. Atrophy involving the foot muscles. **This report has been created using voice recognition software. It may contain minor errors which are inherent in voice recognition technology. **      Final report electronically signed by DR Tawny Moreno on 4/21/2022 2:37 PM      XR FOOT RIGHT (2 VIEWS)   Final Result   1. I do not see radiographic evidence of bony destruction, bony erosion, or periostitis to reflect osteomyelitis at this time.  Early osteomyelitis may be occult on radiographs. Clinical correlation is recommended. 2. A soft tissue wound/ulcer is seen overlying the fifth metatarsal            **This report has been created using voice recognition software. It may contain minor errors which are inherent in voice recognition technology. **      Final report electronically signed by Dr Sahra Rene on 4/20/2022 2:15 PM        XR FOOT RIGHT (2 VIEWS)    Result Date: 4/20/2022  PROCEDURE: XR FOOT RIGHT (2 VIEWS) CLINICAL INFORMATION: Non-healing wound COMPARISON: None TECHNIQUE: 2 views of the right foot FINDINGS: A soft tissue wound is seen overlying the fifth metatarsal. Plantar calcaneal and retrocalcaneal spurs. Mild degenerative changes of the right foot. No bony destruction, erosion or periostitis to suggest osteomyelitis. No acute fracture or dislocation. Bone mineralization is unremarkable. No significant soft tissue abnormality. 1. I do not see radiographic evidence of bony destruction, bony erosion, or periostitis to reflect osteomyelitis at this time. Early osteomyelitis may be occult on radiographs. Clinical correlation is recommended. 2. A soft tissue wound/ulcer is seen overlying the fifth metatarsal **This report has been created using voice recognition software. It may contain minor errors which are inherent in voice recognition technology. ** Final report electronically signed by Dr Sahra Rene on 4/20/2022 2:15 PM    MRI FOOT RIGHT WO CONTRAST    Result Date: 4/21/2022  PROCEDURE: MRI FOOT RIGHT WO CONTRAST CLINICAL INFORMATION Wound right 5th metatarsal head, probes to bone, r/o osteomyelitis. COMPARISON: Plain radiographs dated 20 April 2022. TECHNIQUE: A noncontrast MRI scan was carried out through the right foot. FINDINGS: There is abnormal signal intensity in the head, neck of the fifth metatarsal extending into the distal shaft. These findings are consistent with involvement by osteomyelitis.  There is soft tissue swelling adjacent to the head of the fifth metatarsal consistent with edema and/or cellulitis. There are degenerative changes. There are small areas of abnormal signal intensity at the bases of the third and fourth metatarsals. These findings may represent small cystic changes. There is no fracture or other bony abnormality noted. There is abnormal signal intensity in the soft tissues surrounding the second, third and fourth digits consistent with edema and/or cellulitis. There is no drainable fluid collection. The flexor and extensor tendons appear to be intact. There is atrophy in the muscles in the foot. The remaining soft tissues are unremarkable. .      1. Abnormal signal intensity in the head and neck of the fifth metatarsal extending to the distal shaft consistent with osteomyelitis. There is abnormal soft tissue signal intensity adjacent to the head of the fifth metatarsal consistent with edema and/or cellulitis. 2. Degenerative changes. 3. Areas of abnormal signal intensity at the bases of the third and fourth metatarsals. These may represent small cystic changes. 4. Abnormal soft tissue signal intensity surrounding the second, third and fourth digits consistent with edema and/or cellulitis. 5. Atrophy involving the foot muscles. **This report has been created using voice recognition software. It may contain minor errors which are inherent in voice recognition technology. ** Final report electronically signed by DR Yazan Bear on 4/21/2022 2:37 PM      Electronically signed by Lenita Phoenix, MD on 4/28/2022 at 3:37 PM

## 2022-04-28 NOTE — PROGRESS NOTES
Podiatric Surgery Progress Note    4/28/2022  Patient seen at bedside today on behalf of Dr. Tiffany Cunha. Patient is 1 days s/p right foot wound debridement with primary closure DOS 4/27/2022). Patient is alert and oriented, and is very pleasant. She currently denies any pain to the right lower extremity. She also denies any N/V/F/C/SOB/CP or bilateral calf tenderness. Patient would like to know if she can work with physical therapy and Occupational Therapy as she feels unsteady on the walker that was provided to her, and would like to explore other options for ambulating around the house. She has no other pedal complaints at this time. 4/26/2022  Patient seen at bedside this morning on behalf of Dr. Tiffany Cunha. Patient is 3 days s/p right foot wound debridement with right fifth metatarsal head resection (DOS 4/23/2022). Patient is very pleasant, that is alert and oriented. Patient currently denies any pain to bilateral LE. She denies any N/V/F/C/SOB/CP or bilateral calf tenderness. She has no new pedal complaints at this time. Patient would like to know when her next surgery will be, and when she is likely to get discharged. 4/25/2022  Patient seen this morning on behalf of Dr. Tiffany Cunha. Patient is 2 days s/p right foot wound debridement with right fifth metatarsal head resection (DOS 4/23/2022). Patient denies any pain to bilateral lower extremities. She also currently denies any N/V/F/C/SOB/CP or bilateral calf tenderness. Patient has no new pedal complaints at this time. 4/24: The patient is seen today at bedside on behalf of Dr. Tiffany Cunha. The patient is 1 day s/p RIGHT FOOT WOUND DEBRIDEMENT WITH  RIGHT 5TH METATARSAL HEAD RESECTION (D.O.S. 4/23/22). The patient states that she has no pain to her right foot. She denies any N/V/F/C, SOB or chest pain. The patient's nurse notified me about the left foot having 2 skin fissures to digits 1 and 3 of the left foot.  The patient denies any other pedal complaints. 4/21: HPI:                The patient is a 73 y.o. female with significant past medical history of diabetes mellitus, hypertension, psoriasis who is being seen at bedside on behalf of Dr. Cleo Coello with complaints of a right foot wound. Patient states that the right foot wound apparently came on in the past 3 days. She noticed a corn on her right foot and peeled it away. She has had increasing redness and swelling to her foot since then. Patient was admitted yesterday from the emergency department for this condition. Patient states that she does not have any pain to her right foot. She is a newly diagnosed diabetic and believes she has some degree of neuropathy. She denies any nausea, vomiting, fever, chills, chest pain, shortness of breath. No other pedal complaints. ADDENDUM: Patient seen at bedside today alongside Dr. Cleo Coello. Reviewed MRI findings with patient; discussed with the patient that she needs to have the wound debrided, and that she may have an underlying bone infection. Discussed with patient that we will schedule her for debridement with possible right fifth metatarsal head resection for this Saturday, 4/23/2022 at 10 or 11 AM.  Patient still denies any pain to bilateral LE. She also denies any B/F/C/SOB/CP or bilateral calf tenderness. No new pedal complaints at this time.     Past Medical History:        Diagnosis Date    Diabetes mellitus ([address]     Hypertension     Psoriasis      Past Surgical History:        Procedure Laterality Date    FOOT DEBRIDEMENT Right 4/27/2022    RIGHT FOOT DEBRIDEMENT/WASH OUT performed by Rae Kent DPM at [place] [id number] Right 4/23/2022    RIGHT FOOT WOUND DEBRIDEMENT WITH POSS RIGHT 5TH METATARSAL RESECTION performed by Rae Kent DPM at [facility]     Current Medications:    Current Facility-Administered Medications: 0.9 % sodium chloride infusion, , IntraVENous, Continuous  sodium chloride flush 0.9 % injection 5-40 mL, 5-40 mL, IntraVENous, 2 times per day  sodium chloride flush 0.9 % injection 5-40 mL, 5-40 mL, IntraVENous, PRN  0.9 % sodium chloride infusion, , IntraVENous, PRN  ondansetron (ZOFRAN-ODT) disintegrating tablet 4 mg, 4 mg, Oral, Q8H PRN **OR** ondansetron (ZOFRAN) injection 4 mg, 4 mg, IntraVENous, Q6H PRN  oxyCODONE (ROXICODONE) immediate release tablet 5 mg, 5 mg, Oral, Q4H PRN **OR** oxyCODONE (ROXICODONE) immediate release tablet 10 mg, 10 mg, Oral, Q4H PRN  ceFAZolin (ANCEF) 1000 mg in sterile water 10 mL IV syringe, 1,000 mg, IntraVENous, Q8H  glucose (GLUTOSE) 40 % oral gel 15 g, 15 g, Oral, PRN  dextrose 50 % IV solution, 12.5 g, IntraVENous, PRN  glucagon (rDNA) injection 1 mg, 1 mg, IntraMUSCular, PRN  dextrose 5 % solution, 100 mL/hr, IntraVENous, PRN  insulin lispro (HUMALOG) injection vial 0-6 Units, 0-6 Units, SubCUTAneous, TID WC  insulin lispro (HUMALOG) injection vial 0-3 Units, 0-3 Units, SubCUTAneous, Nightly  morphine (PF) injection 2 mg, 2 mg, IntraVENous, Q2H PRN **OR** morphine injection 4 mg, 4 mg, IntraVENous, Q2H PRN  amLODIPine (NORVASC) tablet 10 mg, 10 mg, Oral, Daily  atorvastatin (LIPITOR) tablet 40 mg, 40 mg, Oral, Nightly  polyethylene glycol (GLYCOLAX) packet 17 g, 17 g, Oral, Daily PRN  acetaminophen (TYLENOL) tablet 650 mg, 650 mg, Oral, Q6H PRN **OR** acetaminophen (TYLENOL) suppository 650 mg, 650 mg, Rectal, Q6H PRN  Allergies:  Patient has no known allergies. Social History:    TOBACCO:   reports that she quit smoking about 11 years ago. Her smoking use included cigarettes. She has a 7.50 pack-year smoking history. She has never used smokeless tobacco.  ETOH:   reports previous alcohol use. DRUGS:   reports no history of drug use.   Family History:       Problem Relation Age of Onset    Other Mother         thyroidectomy    Diabetes Mother     Heart Disease Mother     Heart Disease Father      REVIEW OF SYSTEMS:    Pertinent ROS in HPI  PHYSICAL EXAM:      Vitals:    BP (!) 153/63   Pulse 70   Temp 97.5 °F (36.4 °C) (Axillary)   Resp 18   Ht 5' 9.5\" (1.765 m)   Wt 209 lb 7 oz (95 kg)   SpO2 100%   BMI 30.49 kg/m²     Exam:   Dressing intact and well maintained. No apparent strike through noted. Vascular: Dorsalis pedis and posterior tibial pulses are palpable bilaterally. Skin temperature is warm to warm from proximal tibial tuberosity to distal digits of right foot. CFT brisk to exposed digits. Minimal edema present on right foot lateral aspect, secondary to post-op state. Hair growth absent to BLE. Quality of skin diminished. Dermatologic: Full-thickness wound to the level of underlying bone noted to the lateral aspect of the right foot. There is fibrogranular tissue present at base. Some small areas of tissue infarction noted to the distal and plantar portions of the wound bed. No undermining noted. Scant serous drainage noted. No malodor or purulence noted. There is surrounding mild erythema and edema consistent with postop status noted. Nails 1-5 bilaterally are thickened, elongated and dystrophic, with presence of subungual debris. Webspaces 1-4 bilaterally are clean, dry and intact. Neurovascular:  Light touch sensation grossly intact at the digits bilaterally. Musculoskeletal: Muscle strength testing deferred. Right foot dorsiflexes to 10 degrees with knee extended, increases with knee flexed. Otherwise rectus foot and ankle. XRAY:XR FOOT RIGHT (2 VIEWS)    Result Date: 4/20/2022  PROCEDURE: XR FOOT RIGHT (2 VIEWS) CLINICAL INFORMATION: Non-healing wound COMPARISON: None TECHNIQUE: 2 views of the right foot FINDINGS: A soft tissue wound is seen overlying the fifth metatarsal. Plantar calcaneal and retrocalcaneal spurs. Mild degenerative changes of the right foot. No bony destruction, erosion or periostitis to suggest osteomyelitis. No acute fracture or dislocation. Bone mineralization is unremarkable.  No significant soft tissue abnormality. 1. I do not see radiographic evidence of bony destruction, bony erosion, or periostitis to reflect osteomyelitis at this time. Early osteomyelitis may be occult on radiographs. Clinical correlation is recommended. 2. A soft tissue wound/ulcer is seen overlying the fifth metatarsal **This report has been created using voice recognition software. It may contain minor errors which are inherent in voice recognition technology. ** Final report electronically signed by Dr Antelmo Pope on 4/20/2022 2:15 PM    MRI, right foot  Impression       1. Abnormal signal intensity in the head and neck of the fifth metatarsal extending to the distal shaft consistent with osteomyelitis. There is abnormal soft tissue signal intensity adjacent to the head of the fifth metatarsal consistent with edema    and/or cellulitis. 2. Degenerative changes. 3. Areas of abnormal signal intensity at the bases of the third and fourth metatarsals. These may represent small cystic changes. 4. Abnormal soft tissue signal intensity surrounding the second, third and fourth digits consistent with edema and/or cellulitis. 5. Atrophy involving the foot muscles.                   **This report has been created using voice recognition software. It may contain minor errors which are inherent in voice recognition technology. **       Final report electronically signed by DR Brenda Lynn on 4/21/2022 2:37 PM         LABS:    Recent Labs     04/27/22  0329 04/28/22  0332   WBC 20.4* 15.6*   HGB 9.7* 10.0*   HCT 31.6* 31.6*   * 410*        Recent Labs     04/28/22  0332      K 4.7      CO2 19*   BUN 21   CREATININE 1.0        No results for input(s): PROT, INR, APTT in the last 72 hours. No results for input(s): CKTOTAL, CKMB, CKMBINDEX, TROPONINI in the last 72 hours.     Assessment  Principle  1) Diabetic foot infection, right foot    Plan  -Patient examined and evaluated  -Labs reviewed, white blood cell count 15.6; hemoglobin 10.1  -Patient is afebrile  -Culture reviewed, many gram-positive cocci occurring singly and in pairs, many gram-negative bacilli; Staphylococcus aureus (MSSA) identified on cultures  -Continue IV Zosyn per infectious diseases  -Postop dressings left intact at this visit  -MRI reviewed; see report above  -Patient had surgery for debridement of the right foot wound with  right fifth metatarsal head resection on 4/23/2022; patient had second wound debridement with primary closure on 4/27/2022  -Placed consult for physical therapy and Occupational Therapy to see patient and evaluate and treat  -Partial heel weightbearing as tolerated in surgical shoe right foot  -Patient okay to discharge from podiatry's perspective    DISPO: Pending response to IV antibiotics; patient okay to discharge from podiatry's perspective    Ashly Álvarez DPM PGY-2  4/28/2022 3:04 PM

## 2022-04-28 NOTE — PROGRESS NOTES
Progress note: Infectious diseases    Patient - Irvin Serrano,  Age - 73 y.o.    - 1955      Room Number - 6K-04/004-A   MRN -  [id number]   Acct # - [de-identified]  Date of Admission -  2022 12:44 PM    SUBJECTIVE:   No new complaints. OBJECTIVE   VITALS    height is 5' 9.5\" (1.765 m) and weight is 209 lb 7 oz (95 kg). Her oral temperature is 97.5 °F (36.4 °C). Her blood pressure is 138/59 (abnormal) and her pulse is 69. Her respiration is 16 and oxygen saturation is 100%.        Wt Readings from Last 3 Encounters:   22 209 lb 7 oz (95 kg)   22 211 lb (95.7 kg)   22 211 lb 3.2 oz (95.8 kg)       I/O (24 Hours)    Intake/Output Summary (Last 24 hours) at 2022 1628  Last data filed at 2022 1400  Gross per 24 hour   Intake 2909.1 ml   Output 1425 ml   Net 1484.1 ml       General Appearance  Awake, alert, oriented,  not  In acute distress  HEENT - normocephalic, atraumatic, pink conjunctiva,  anicteric sclera  Neck - Supple, no mass  Lungs -  Bilateral good air entry, no rhonchi, no wheeze  Cardiovascular - Heart sounds are normal.     Abdomen - soft, not distended, nontender,   Neurologic -oriented  Skin - No bruising or bleeding  Extremities - dressed right foot wound( post surgery)         MEDICATIONS:      insulin lispro  0-12 Units SubCUTAneous TID     insulin lispro  0-6 Units SubCUTAneous Nightly    sodium chloride flush  5-40 mL IntraVENous 2 times per day    ceFAZolin  1,000 mg IntraVENous Q8H    amLODIPine  10 mg Oral Daily    atorvastatin  40 mg Oral Nightly      sodium chloride 125 mL/hr at 22 0152    sodium chloride      dextrose       sodium chloride flush, sodium chloride, ondansetron **OR** ondansetron, oxyCODONE **OR** oxyCODONE, glucose, dextrose, glucagon (rDNA), dextrose, morphine **OR** morphine, polyethylene glycol, acetaminophen **OR** acetaminophen      LABS:     CBC:   Recent Labs     04/26/22  0513 04/27/22  0329 04/28/22  0332   WBC 19.7* 20.4* 15.6*   HGB 9.9* 9.7* 10.0*   * 404* 410*     BMP:    Recent Labs     04/26/22  0513 04/27/22  0329 04/28/22  0332    138 135   K 4.1 4.2 4.7    108 105   CO2 19* 20* 19*   BUN 22 19 21   CREATININE 1.2 1.0 1.0   GLUCOSE 160* 206* 242*     Calcium:  Recent Labs     04/28/22  0332   CALCIUM 8.3*      Recent Labs     04/28/22  0603 04/28/22  1046 04/28/22  1533   POCGLU 226* 321* 288*     HgbA1C:   No results for input(s): LABA1C in the last 72 hours. Problem list of patient:     Patient Active Problem List   Diagnosis Code    Accelerated essential hypertension I10    Hypertensive emergency I16.1    Primary hypertension I10    Hyperlipidemia E78.5     borderline Abnormal nuclear stress test- NO angina or angina equiv- med RX R94.39    Cellulitis L03.90         ASSESSMENT/PLAN   Diabetic foot ulcer with underlying OM/cellulites: she had resection of the 5th metatarsal head. She had delayed closure of the wound. On  ancef. HTN. discharge plan in 1-2 days.     Sheila Koenig MD, MD, FACP 4/28/2022 4:28 PM

## 2022-04-29 ENCOUNTER — APPOINTMENT (OUTPATIENT)
Dept: INTERVENTIONAL RADIOLOGY/VASCULAR | Age: 67
DRG: 982 | End: 2022-04-29
Payer: MEDICARE

## 2022-04-29 LAB
ANION GAP SERPL CALCULATED.3IONS-SCNC: 11 MEQ/L (ref 8–16)
BUN BLDV-MCNC: 33 MG/DL (ref 7–22)
CALCIUM SERPL-MCNC: 8.4 MG/DL (ref 8.5–10.5)
CHLORIDE BLD-SCNC: 112 MEQ/L (ref 98–111)
CO2: 18 MEQ/L (ref 23–33)
CREAT SERPL-MCNC: 1.2 MG/DL (ref 0.4–1.2)
ERYTHROCYTE [DISTWIDTH] IN BLOOD BY AUTOMATED COUNT: 12.3 % (ref 11.5–14.5)
ERYTHROCYTE [DISTWIDTH] IN BLOOD BY AUTOMATED COUNT: 38.5 FL (ref 35–45)
GFR SERPL CREATININE-BSD FRML MDRD: 45 ML/MIN/1.73M2
GLUCOSE BLD-MCNC: 188 MG/DL (ref 70–108)
GLUCOSE BLD-MCNC: 205 MG/DL (ref 70–108)
GLUCOSE BLD-MCNC: 212 MG/DL (ref 70–108)
GLUCOSE BLD-MCNC: 275 MG/DL (ref 70–108)
GLUCOSE BLD-MCNC: 286 MG/DL (ref 70–108)
HCT VFR BLD CALC: 29.8 % (ref 37–47)
HEMOGLOBIN: 9.2 GM/DL (ref 12–16)
MCH RBC QN AUTO: 27 PG (ref 26–33)
MCHC RBC AUTO-ENTMCNC: 30.9 GM/DL (ref 32.2–35.5)
MCV RBC AUTO: 87.4 FL (ref 81–99)
PLATELET # BLD: 377 THOU/MM3 (ref 130–400)
PMV BLD AUTO: 9.6 FL (ref 9.4–12.4)
POTASSIUM SERPL-SCNC: 4.8 MEQ/L (ref 3.5–5.2)
RBC # BLD: 3.41 MILL/MM3 (ref 4.2–5.4)
SODIUM BLD-SCNC: 141 MEQ/L (ref 135–145)
WBC # BLD: 21.4 THOU/MM3 (ref 4.8–10.8)

## 2022-04-29 PROCEDURE — 6370000000 HC RX 637 (ALT 250 FOR IP): Performed by: INTERNAL MEDICINE

## 2022-04-29 PROCEDURE — 2500000003 HC RX 250 WO HCPCS: Performed by: STUDENT IN AN ORGANIZED HEALTH CARE EDUCATION/TRAINING PROGRAM

## 2022-04-29 PROCEDURE — 93971 EXTREMITY STUDY: CPT

## 2022-04-29 PROCEDURE — 85027 COMPLETE CBC AUTOMATED: CPT

## 2022-04-29 PROCEDURE — 80048 BASIC METABOLIC PNL TOTAL CA: CPT

## 2022-04-29 PROCEDURE — 6370000000 HC RX 637 (ALT 250 FOR IP): Performed by: STUDENT IN AN ORGANIZED HEALTH CARE EDUCATION/TRAINING PROGRAM

## 2022-04-29 PROCEDURE — 6360000002 HC RX W HCPCS: Performed by: STUDENT IN AN ORGANIZED HEALTH CARE EDUCATION/TRAINING PROGRAM

## 2022-04-29 PROCEDURE — 1200000003 HC TELEMETRY R&B

## 2022-04-29 PROCEDURE — 36415 COLL VENOUS BLD VENIPUNCTURE: CPT

## 2022-04-29 PROCEDURE — 82948 REAGENT STRIP/BLOOD GLUCOSE: CPT

## 2022-04-29 PROCEDURE — 94760 N-INVAS EAR/PLS OXIMETRY 1: CPT

## 2022-04-29 PROCEDURE — 2580000003 HC RX 258: Performed by: STUDENT IN AN ORGANIZED HEALTH CARE EDUCATION/TRAINING PROGRAM

## 2022-04-29 PROCEDURE — 99233 SBSQ HOSP IP/OBS HIGH 50: CPT | Performed by: INTERNAL MEDICINE

## 2022-04-29 RX ORDER — CEPHALEXIN 250 MG/1
250 CAPSULE ORAL EVERY 8 HOURS SCHEDULED
Status: DISCONTINUED | OUTPATIENT
Start: 2022-04-29 | End: 2022-04-30 | Stop reason: HOSPADM

## 2022-04-29 RX ADMIN — INSULIN LISPRO 3 UNITS: 100 INJECTION, SOLUTION INTRAVENOUS; SUBCUTANEOUS at 20:07

## 2022-04-29 RX ADMIN — ACETAMINOPHEN 650 MG: 325 TABLET ORAL at 20:05

## 2022-04-29 RX ADMIN — INSULIN LISPRO 4 UNITS: 100 INJECTION, SOLUTION INTRAVENOUS; SUBCUTANEOUS at 10:38

## 2022-04-29 RX ADMIN — CEPHALEXIN 250 MG: 250 CAPSULE ORAL at 23:31

## 2022-04-29 RX ADMIN — INSULIN LISPRO 6 UNITS: 100 INJECTION, SOLUTION INTRAVENOUS; SUBCUTANEOUS at 17:56

## 2022-04-29 RX ADMIN — HYDROCORTISONE ACETATE 25 MG: 25 SUPPOSITORY RECTAL at 00:56

## 2022-04-29 RX ADMIN — INSULIN LISPRO 1 UNITS: 100 INJECTION, SOLUTION INTRAVENOUS; SUBCUTANEOUS at 13:22

## 2022-04-29 RX ADMIN — SODIUM CHLORIDE, PRESERVATIVE FREE 10 ML: 5 INJECTION INTRAVENOUS at 10:35

## 2022-04-29 RX ADMIN — AMLODIPINE BESYLATE 10 MG: 10 TABLET ORAL at 10:35

## 2022-04-29 RX ADMIN — CEFAZOLIN 1000 MG: 10 INJECTION, POWDER, FOR SOLUTION INTRAVENOUS at 03:51

## 2022-04-29 RX ADMIN — SODIUM CHLORIDE: 9 INJECTION, SOLUTION INTRAVENOUS at 01:44

## 2022-04-29 RX ADMIN — ATORVASTATIN CALCIUM 40 MG: 40 TABLET, FILM COATED ORAL at 20:05

## 2022-04-29 ASSESSMENT — PAIN DESCRIPTION - LOCATION: LOCATION: ANKLE

## 2022-04-29 ASSESSMENT — PAIN DESCRIPTION - ORIENTATION: ORIENTATION: RIGHT

## 2022-04-29 ASSESSMENT — PAIN DESCRIPTION - DESCRIPTORS: DESCRIPTORS: ACHING

## 2022-04-29 ASSESSMENT — PAIN SCALES - GENERAL: PAINLEVEL_OUTOF10: 3

## 2022-04-29 NOTE — PROGRESS NOTES
Progress note: Infectious diseases    Patient - Kali Kennedy,  Age - 73 y.o.    - 1955      Room Number - 6K-04/004-A   MRN -  [id number]   Acct # - [de-identified]  Date of Admission -  2022 12:44 PM    SUBJECTIVE:   Unable to get iv line, denies any fever or chills. OBJECTIVE   VITALS    height is 5' 9.5\" (1.765 m) and weight is 209 lb 7 oz (95 kg). Her axillary temperature is 97.2 °F (36.2 °C). Her blood pressure is 171/68 (abnormal) and her pulse is 65. Her respiration is 18 and oxygen saturation is 99%.        Wt Readings from Last 3 Encounters:   22 209 lb 7 oz (95 kg)   22 211 lb (95.7 kg)   22 211 lb 3.2 oz (95.8 kg)       I/O (24 Hours)    Intake/Output Summary (Last 24 hours) at 2022 1511  Last data filed at 2022 9499  Gross per 24 hour   Intake 2935.27 ml   Output 0 ml   Net 2935.27 ml       General Appearance  Awake, alert, oriented,  not  In acute distress  HEENT - normocephalic, atraumatic, pink conjunctiva,  anicteric sclera  Neck - Supple, no mass  Lungs -  Bilateral good air entry, no rhonchi, no wheeze  Cardiovascular - Heart sounds are normal.     Abdomen - soft, not distended, nontender,   Neurologic -oriented  Skin - No bruising or bleeding  Extremities - dressed right foot wound( post surgery)         MEDICATIONS:      insulin lispro  0-12 Units SubCUTAneous TID     insulin lispro  0-6 Units SubCUTAneous Nightly    hydrocortisone  25 mg Rectal BID    sodium chloride flush  5-40 mL IntraVENous 2 times per day    ceFAZolin  1,000 mg IntraVENous Q8H    amLODIPine  10 mg Oral Daily    atorvastatin  40 mg Oral Nightly      sodium chloride 125 mL/hr at 22 0628    sodium chloride      dextrose       sodium chloride flush, sodium chloride, ondansetron **OR** ondansetron, oxyCODONE **OR** oxyCODONE, glucose, dextrose, glucagon (rDNA), dextrose, morphine **OR** morphine, polyethylene glycol, acetaminophen **OR** acetaminophen      LABS:     CBC:   Recent Labs     04/27/22  0329 04/28/22  0332 04/29/22  0555   WBC 20.4* 15.6* 21.4*   HGB 9.7* 10.0* 9.2*   * 410* 377     BMP:    Recent Labs     04/27/22  0329 04/28/22  0332 04/29/22  0555    135 141   K 4.2 4.7 4.8    105 112*   CO2 20* 19* 18*   BUN 19 21 33*   CREATININE 1.0 1.0 1.2   GLUCOSE 206* 242* 205*     Calcium:  Recent Labs     04/29/22  0555   CALCIUM 8.4*      Recent Labs     04/28/22 2006 04/29/22  0642 04/29/22  1158   POCGLU 259* 212* 188*     HgbA1C:   No results for input(s): LABA1C in the last 72 hours. Problem list of patient:     Patient Active Problem List   Diagnosis Code    Accelerated essential hypertension I10    Hypertensive emergency I16.1    Primary hypertension I10    Hyperlipidemia E78.5     borderline Abnormal nuclear stress test- NO angina or angina equiv- med RX R94.39    Cellulitis L03.90         ASSESSMENT/PLAN   Diabetic foot ulcer with underlying OM/cellulites: she had resection of the 5th metatarsal head. She had delayed closure of the wound. Will place her on Keflex.   Mono Almonte MD, MD, FACP 4/29/2022 3:11 PM

## 2022-04-29 NOTE — PLAN OF CARE
Problem: Discharge Planning  Goal: Discharge to home or other facility with appropriate resources  Outcome: Progressing     Problem: Safety - Adult  Goal: Free from fall injury  Outcome: Progressing     Problem: ABCDS Injury Assessment  Goal: Absence of physical injury  Outcome: Progressing     Problem: Skin/Tissue Integrity  Goal: Absence of new skin breakdown  Description: 1. Monitor for areas of redness and/or skin breakdown  2. Assess vascular access sites hourly  3. Every 4-6 hours minimum:  Change oxygen saturation probe site  4. Every 4-6 hours:  If on nasal continuous positive airway pressure, respiratory therapy assess nares and determine need for appliance change or resting period.   Outcome: Progressing     Problem: Pain  Goal: Verbalizes/displays adequate comfort level or baseline comfort level  Outcome: Progressing     Problem: Chronic Conditions and Co-morbidities  Goal: Patient's chronic conditions and co-morbidity symptoms are monitored and maintained or improved  Outcome: Progressing     Problem: Nutrition Deficit:  Goal: Optimize nutritional status  Outcome: Progressing

## 2022-04-29 NOTE — CARE COORDINATION
4/29/22, 12:21 PM EDT    DISCHARGE ON GOING EVALUATION    [facility] day: 9  Location: -04/004-A Reason for admit: Cellulitis [L03.90]  Cellulitis of right foot [P85.524]  Diabetic ulcer of right foot associated with type 2 diabetes mellitus, with muscle involvement without evidence of necrosis, unspecified part of foot ([address] [O02.658, L97.515]   Procedure: 4/23 RIGHT FOOT WOUND DEBRIDEMENT WITH POSS RIGHT 5TH METATARSAL RESECTION   4/27 RIGHT FOOT DEBRIDEMENT/WASH OUT WITH PRIMARY CLOSURE  Barriers to Discharge: Swelling noted to ALEX, [address] ordered to r/o DVT. WBC increased to 21.4. Plan discharge today if US negative and okay with Dr. Racheal Everett. PCP: Tyler Valentine MD  Readmission Risk Score: 12.4 ( )%  Patient Goals/Plan/Treatment Preferences: Jocelynn plans to stay with her daughter in [facility] for a while. She would like  if dressing changes are needed (AnyLeaf message sent to Dr. Chely Domingo, await response). State mental health facility list provided, she has no preference as long as her insurance is accepted. *Update:  Dr. Malik Draper replied that dressing changes are not needed as her dressing should remain intact until her podiatry follow-up appointment. Jessa Mckenna, she verbalized understanding.   Electronically signed by Maurilio Hicks RN on 4/29/2022 at 1:18 PM

## 2022-04-29 NOTE — PROGRESS NOTES
Hospitalist      Patient:  Neymar Allen    Unit/Bed:6K-04/004-A  YOB: 1955  MRN: [id number]   Acct: [de-identified]     PCP: Elise Spears MD  Date of Admission: 4/20/2022        Assessment and Plan:        1. Diabetic foot infection, right: MRI pending for possible osteomyelitis, will continue with IV antibiotics (Zosyn, vancomycin). Hemoglobin A1c is 7.4.  2. Non-insulin-dependent diabetes type 2: Hemoglobin A1c 7.4 currently on insulin sliding scale and a carb controlled diet, will check urine for microalbumin  3. Essential hypertension: We will continue home medication of lisinopril and Norvasc  4. Hyperlipidemia we will continue with Lipitor    CC: Foot infection    HPI: Per H&P \"This is a relatively healthy 62-year-old female who presented centers [address] [place] [address] emergency department on 4/20 due to foot pain in her right foot. Patient states that her foot started to hurt on 4/18 and describes the pain as constant and dull pain. Patient rates the pain currently 4 out of 10. Denies any alleviating or aggravating factors. Patient states she noticed a corn on the outside of her foot and pulled it off on 4/18 as well. Patient states that her eyesight is not very good and she cannot see her feet well so today her daughter came to look at her feet due to the pain. Daughter saw redness and swelling to her right foot. Patient denies nausea, vomiting, fever, chills, shortness of breath, chest pain. Patient does not look toxic at this time. Of note, patient was recently diagnosed with diabetes by her PCP about 3 months ago. Patient has been taking her diabetes medications as prescribed.     In the ED, patient was found to have elevated white blood cell count 20.2 elevated CRP 5. 84. X-ray of right foot did not show osteomyelitis. Patient was given IV antibiotics. Hospitalist were asked to admit patient for podiatry and infectious disease consult and further work-up. \"    4.22.2022 patient seen this a.m. states that she is having a \"debridement\" tomorrow, also discussed possible osteomyelitis and treatment. Patient states that she thinks that the wound has been there for possible 1 month but over the last week became more painful. Infectious disease and podiatry are on the case    4.23.2022 patient seen this a.m. states she is to have surgery today. She is not sure of what they plan on doing. Eitel signs stable patient's afebrile, slowly decreasing WBC    4.24.2022 patient seen this a.m., not quite sure what they did and surgery. It appears from the surgical note the wound was debrided with a partial resection of the fifth metatarsal.  Patient denies any pain at this time. Discharge planning will be decided during podiatry rounds. 4.25.2022 patient seen this a.m., medically stable will discharge home when okay with podiatry. To be transition to oral antibiotics at that time. WBC slightly increased to 19.6, platelets stable hemoglobin stable, slight bump in creatinine will hold ACE inhibitor and monitor BP    4.26.2022 patient seen this a.m. along with podiatry present. Podiatry states that they will take her back to surgery for further wound debridement possible closure or wound VAC. Surgery is scheduled for Wednesday, April 27, 2022 with possible discharge on April 28, 2022. WBC is increasing, hemoglobin is stable, renal function is stable    4.29.2022 seen this a.m. complains of right arm being swollen nursing states IV infiltrated, increasing WBC, will rule out acute DVT will obtain right upper extremity ultrasound. .  Infectious disease on the case will follow recommendations of the increased WBC. Patient underwent wound debridement with primary closure. ROS (14 point review of systems completed. Pertinent positives noted. Otherwise ROS is negative) :  Foot wound    PMH:  Per HPI and       Diagnosis Date    Diabetes mellitus ([facility] [address]     Hypertension     Psoriasis      SHX: Procedure Laterality Date    FOOT DEBRIDEMENT Right 2022    RIGHT FOOT DEBRIDEMENT/WASH OUT performed by Larry Bledsoe DPM at [address] Right 2022    RIGHT FOOT WOUND DEBRIDEMENT WITH POSS RIGHT 5TH METATARSAL RESECTION performed by Larry Bledsoe DPM at [facility]:       Problem Relation Age of Onset    Other Mother         thyroidectomy    Diabetes Mother     Heart Disease Mother     Heart Disease Father      SOCHX:   Social History     Socioeconomic History    Marital status: Single     Spouse name: None    Number of children: 1    Years of education: None    Highest education level: None   Occupational History    None   Tobacco Use    Smoking status: Former Smoker     Packs/day: 0.50     Years: 15.00     Pack years: 7.50     Types: Cigarettes     Quit date: 2011     Years since quittin.3    Smokeless tobacco: Never Used   Substance and Sexual Activity    Alcohol use: Not Currently     Comment: social    Drug use: Never    Sexual activity: None   Other Topics Concern    None   Social History Narrative    None     Social Determinants of Health     Financial Resource Strain: Low Risk     Difficulty of Paying Living Expenses: Not hard at all   Food Insecurity: No Food Insecurity    Worried About Running Out of Food in the Last Year: Never true    Samara of Food in the Last Year: Never true   Transportation Needs:     Lack of Transportation (Medical): Not on file    Lack of Transportation (Non-Medical):  Not on file   Physical Activity:     Days of Exercise per Week: Not on file    Minutes of Exercise per Session: Not on file   Stress:     Feeling of Stress : Not on file   Social Connections:     Frequency of Communication with Friends and Family: Not on file    Frequency of Social Gatherings with Friends and Family: Not on file    Attends Zoroastrian Services: Not on file    Active Member of Clubs or Organizations: Not on file    Attends Club or Organization Meetings: Not on file    Marital Status: Not on file   Intimate Partner Violence:     Fear of Current or Ex-Partner: Not on file    Emotionally Abused: Not on file    Physically Abused: Not on file    Sexually Abused: Not on file   Housing Stability:     Unable to Pay for Housing in the Last Year: Not on file    Number of [place] in the Last Year: Not on file    Unstable Housing in the Last Year: Not on file      Allergies: Patient has no known allergies. Medications:     sodium chloride 125 mL/hr at 04/29/22 2755    sodium chloride      dextrose        insulin lispro  0-12 Units SubCUTAneous TID WC    insulin lispro  0-6 Units SubCUTAneous Nightly    hydrocortisone  25 mg Rectal BID    sodium chloride flush  5-40 mL IntraVENous 2 times per day    ceFAZolin  1,000 mg IntraVENous Q8H    amLODIPine  10 mg Oral Daily    atorvastatin  40 mg Oral Nightly     Prior to Admission medications    Medication Sig Start Date End Date Taking? Authorizing Provider   dapagliflozin (FARXIGA) 10 MG tablet take 1 tablet by mouth IN THE MORNING 4/19/22   Yaakov Burch MD   amLODIPine (NORVASC) 10 MG tablet Take 1 tablet by mouth daily 4/19/22   Yaakov Burch MD   atorvastatin (LIPITOR) 40 MG tablet Take 1 tablet by mouth nightly 4/19/22   Yaakov Burch MD   lisinopril (PRINIVIL;ZESTRIL) 10 MG tablet Take 2 tablets by mouth nightly 4/19/22   Yaakov Burch MD   aspirin 81 MG chewable tablet Take 81 mg by mouth daily    Historical Provider, MD      PHYSICAL EXAM:    BP (!) 140/98   Pulse 65   Temp 98.9 °F (37.2 °C) (Oral)   Resp 16   Ht 5' 9.5\" (1.765 m)   Wt 209 lb 7 oz (95 kg)   SpO2 99%   BMI 30.49 kg/m²     General appearance:  No apparent distress, appears stated age and cooperative. HEENT:  Normal cephalic, atraumatic without obvious deformity. Pupils equal, round, and reactive to light. Extra ocular muscles intact. Conjunctivae/corneas clear.   Neck: Supple, with full range of motion. no jugular venous distention. Trachea midline. no carotid bruits  Respiratory:  Normal respiratory effort. Clear to auscultation, bilaterally without Rales/Wheezes/Rhonchi. Breath sounds equal bilaterally  Cardiovascular:  Regular rate and rhythm with normal S1/S2 without murmurs, rubs or gallops. PMI non displaced  Abdomen: Soft, non-tender, non-distended with normal bowel sounds. No guarding, rebound. Musculoskeletal: Edema right foot, erythema, warmth. Skin: Wound right foot approximately fifth metatarsal head erythematous, discharge present. Neurologic:  Neurovascularly intact without any focal sensory/motor deficits. Cranial nerves: II-XII intact, grossly non-focal.  Psychiatric:  Alert and oriented, thought content appropriate, normal insight  Capillary Refill: Brisk,< 2 seconds   Peripheral Pulses: +2 palpable, equal bilaterally upper and lower extremities  Lymphatics: no lymphadenopathy    Data: (All radiographs, tracings, PFTs, and imaging are personally viewed and interpreted unless otherwise noted).    Recent Labs     04/27/22 0329 04/28/22 0332 04/29/22  0555   WBC 20.4* 15.6* 21.4*   HGB 9.7* 10.0* 9.2*   HCT 31.6* 31.6* 29.8*   * 410* 377     Recent Labs     04/27/22 0329 04/28/22 0332 04/29/22  0555    135 141   K 4.2 4.7 4.8    105 112*   CO2 20* 19* 18*   BUN 19 21 33*   CREATININE 1.0 1.0 1.2   CALCIUM 8.5 8.3* 8.4*     No results for input(s): AST, ALT, BILIDIR, BILITOT, ALKPHOS in the last 72 hours. No results for input(s): INR in the last 72 hours. No results for input(s): Towana Ball in the last 72 hours. Radiology reports-   XR FOOT RIGHT (2 VIEWS)    Result Date: 4/20/2022  PROCEDURE: XR FOOT RIGHT (2 VIEWS) CLINICAL INFORMATION: Non-healing wound COMPARISON: None TECHNIQUE: 2 views of the right foot FINDINGS: A soft tissue wound is seen overlying the fifth metatarsal. Plantar calcaneal and retrocalcaneal spurs.  Mild degenerative changes of the right foot. No bony destruction, erosion or periostitis to suggest osteomyelitis. No acute fracture or dislocation. Bone mineralization is unremarkable. No significant soft tissue abnormality. 1. I do not see radiographic evidence of bony destruction, bony erosion, or periostitis to reflect osteomyelitis at this time. Early osteomyelitis may be occult on radiographs. Clinical correlation is recommended. 2. A soft tissue wound/ulcer is seen overlying the fifth metatarsal **This report has been created using voice recognition software. It may contain minor errors which are inherent in voice recognition technology. ** Final report electronically signed by Dr Baltazar Bowers on 4/20/2022 2:15 PM      Electronically signed by Vandana Paredes DO on 4/29/2022 at 10:31 AM

## 2022-04-30 VITALS
WEIGHT: 229.06 LBS | OXYGEN SATURATION: 100 % | TEMPERATURE: 98.2 F | SYSTOLIC BLOOD PRESSURE: 159 MMHG | RESPIRATION RATE: 18 BRPM | HEIGHT: 70 IN | BODY MASS INDEX: 32.79 KG/M2 | DIASTOLIC BLOOD PRESSURE: 72 MMHG | HEART RATE: 66 BPM

## 2022-04-30 LAB
ANION GAP SERPL CALCULATED.3IONS-SCNC: 9 MEQ/L (ref 8–16)
BUN BLDV-MCNC: 30 MG/DL (ref 7–22)
CALCIUM SERPL-MCNC: 8.6 MG/DL (ref 8.5–10.5)
CHLORIDE BLD-SCNC: 109 MEQ/L (ref 98–111)
CO2: 20 MEQ/L (ref 23–33)
CREAT SERPL-MCNC: 1.1 MG/DL (ref 0.4–1.2)
ERYTHROCYTE [DISTWIDTH] IN BLOOD BY AUTOMATED COUNT: 12.4 % (ref 11.5–14.5)
ERYTHROCYTE [DISTWIDTH] IN BLOOD BY AUTOMATED COUNT: 38.8 FL (ref 35–45)
GFR SERPL CREATININE-BSD FRML MDRD: 50 ML/MIN/1.73M2
GLUCOSE BLD-MCNC: 167 MG/DL (ref 70–108)
GLUCOSE BLD-MCNC: 177 MG/DL (ref 70–108)
GLUCOSE BLD-MCNC: 186 MG/DL (ref 70–108)
HCT VFR BLD CALC: 31.1 % (ref 37–47)
HEMOGLOBIN: 9.5 GM/DL (ref 12–16)
MCH RBC QN AUTO: 26.5 PG (ref 26–33)
MCHC RBC AUTO-ENTMCNC: 30.5 GM/DL (ref 32.2–35.5)
MCV RBC AUTO: 86.6 FL (ref 81–99)
PLATELET # BLD: 366 THOU/MM3 (ref 130–400)
PMV BLD AUTO: 9.2 FL (ref 9.4–12.4)
POTASSIUM SERPL-SCNC: 4.5 MEQ/L (ref 3.5–5.2)
RBC # BLD: 3.59 MILL/MM3 (ref 4.2–5.4)
SODIUM BLD-SCNC: 138 MEQ/L (ref 135–145)
WBC # BLD: 20.5 THOU/MM3 (ref 4.8–10.8)

## 2022-04-30 PROCEDURE — 6370000000 HC RX 637 (ALT 250 FOR IP): Performed by: STUDENT IN AN ORGANIZED HEALTH CARE EDUCATION/TRAINING PROGRAM

## 2022-04-30 PROCEDURE — 85027 COMPLETE CBC AUTOMATED: CPT

## 2022-04-30 PROCEDURE — 80048 BASIC METABOLIC PNL TOTAL CA: CPT

## 2022-04-30 PROCEDURE — 6370000000 HC RX 637 (ALT 250 FOR IP): Performed by: INTERNAL MEDICINE

## 2022-04-30 PROCEDURE — 99233 SBSQ HOSP IP/OBS HIGH 50: CPT | Performed by: INTERNAL MEDICINE

## 2022-04-30 PROCEDURE — 36415 COLL VENOUS BLD VENIPUNCTURE: CPT

## 2022-04-30 PROCEDURE — 82948 REAGENT STRIP/BLOOD GLUCOSE: CPT

## 2022-04-30 RX ORDER — HYDROCORTISONE ACETATE 25 MG/1
25 SUPPOSITORY RECTAL 2 TIMES DAILY
Qty: 12 SUPPOSITORY | Refills: 0 | Status: SHIPPED | OUTPATIENT
Start: 2022-04-30

## 2022-04-30 RX ORDER — CEPHALEXIN 250 MG/1
250 CAPSULE ORAL EVERY 8 HOURS SCHEDULED
Qty: 19 CAPSULE | Refills: 0 | Status: SHIPPED | OUTPATIENT
Start: 2022-04-30 | End: 2022-05-07

## 2022-04-30 RX ADMIN — CEPHALEXIN 250 MG: 250 CAPSULE ORAL at 12:36

## 2022-04-30 RX ADMIN — INSULIN LISPRO 2 UNITS: 100 INJECTION, SOLUTION INTRAVENOUS; SUBCUTANEOUS at 08:14

## 2022-04-30 RX ADMIN — CEPHALEXIN 250 MG: 250 CAPSULE ORAL at 08:12

## 2022-04-30 RX ADMIN — INSULIN LISPRO 2 UNITS: 100 INJECTION, SOLUTION INTRAVENOUS; SUBCUTANEOUS at 11:47

## 2022-04-30 RX ADMIN — AMLODIPINE BESYLATE 10 MG: 10 TABLET ORAL at 08:12

## 2022-04-30 ASSESSMENT — PAIN SCALES - GENERAL: PAINLEVEL_OUTOF10: 3

## 2022-04-30 NOTE — PROGRESS NOTES
Hospitalist      Patient:  Phani Scott    Unit/Bed:6K-04/004-A  YOB: 1955  MRN: [id number]   Acct: [de-identified]     PCP: Donna Hooks MD  Date of Admission: 4/20/2022        Assessment and Plan:        1. Diabetic foot infection, right: MRI pending for possible osteomyelitis, will continue with IV antibiotics (Zosyn, vancomycin). Hemoglobin A1c is 7.4.  2. Non-insulin-dependent diabetes type 2: Hemoglobin A1c 7.4 currently on insulin sliding scale and a carb controlled diet, will check urine for microalbumin  3. Essential hypertension: We will continue home medication of lisinopril and Norvasc  4. Hyperlipidemia we will continue with Lipitor    CC: Foot infection    HPI: Per H&P \"This is a relatively healthy 78-year-old female who presented centers [address] [place] [address] emergency department on 4/20 due to foot pain in her right foot. Patient states that her foot started to hurt on 4/18 and describes the pain as constant and dull pain. Patient rates the pain currently 4 out of 10. Denies any alleviating or aggravating factors. Patient states she noticed a corn on the outside of her foot and pulled it off on 4/18 as well. Patient states that her eyesight is not very good and she cannot see her feet well so today her daughter came to look at her feet due to the pain. Daughter saw redness and swelling to her right foot. Patient denies nausea, vomiting, fever, chills, shortness of breath, chest pain. Patient does not look toxic at this time. Of note, patient was recently diagnosed with diabetes by her PCP about 3 months ago. Patient has been taking her diabetes medications as prescribed.     In the ED, patient was found to have elevated white blood cell count 20.2 elevated CRP 5. 84. X-ray of right foot did not show osteomyelitis. Patient was given IV antibiotics. Hospitalist were asked to admit patient for podiatry and infectious disease consult and further work-up. \"    4.22.2022 patient seen this a.m. states that she is having a \"debridement\" tomorrow, also discussed possible osteomyelitis and treatment. Patient states that she thinks that the wound has been there for possible 1 month but over the last week became more painful. Infectious disease and podiatry are on the case    4.23.2022 patient seen this a.m. states she is to have surgery today. She is not sure of what they plan on doing. Eitel signs stable patient's afebrile, slowly decreasing WBC    4.24.2022 patient seen this a.m., not quite sure what they did and surgery. It appears from the surgical note the wound was debrided with a partial resection of the fifth metatarsal.  Patient denies any pain at this time. Discharge planning will be decided during podiatry rounds. 4.25.2022 patient seen this a.m., medically stable will discharge home when okay with podiatry. To be transition to oral antibiotics at that time. WBC slightly increased to 19.6, platelets stable hemoglobin stable, slight bump in creatinine will hold ACE inhibitor and monitor BP    4.26.2022 patient seen this a.m. along with podiatry present. Podiatry states that they will take her back to surgery for further wound debridement possible closure or wound VAC. Surgery is scheduled for Wednesday, April 27, 2022 with possible discharge on April 28, 2022. WBC is increasing, hemoglobin is stable, renal function is stable    4.29.2022 seen this a.m. complains of right arm being swollen nursing states IV infiltrated, increasing WBC, will rule out acute DVT will obtain right upper extremity ultrasound. .  Infectious disease on the case will follow recommendations of the increased WBC. Patient underwent wound debridement with primary closure. 4.30.2022 patient seen this a.m. superficial thrombophlebitis involving the cephalic vein with no evidence of DVT. Infectious disease placed the patient on Keflex 250 mg every 8 hours.   Possible discharge today if okay with podiatry    ROS (14 point review of systems completed. Pertinent positives noted. Otherwise ROS is negative) : Foot wound    PMH:  Per HPI and       Diagnosis Date    Diabetes mellitus ([address]     Hypertension     Psoriasis      SHX:        Procedure Laterality Date    FOOT DEBRIDEMENT Right 2022    RIGHT FOOT DEBRIDEMENT/WASH OUT performed by Salvador [facility] DPM at [facility] Right 2022    RIGHT FOOT WOUND DEBRIDEMENT WITH POSS RIGHT 5TH METATARSAL RESECTION performed by Salvador Rodriguez DPM at [address] [place] [address]:       Problem Relation Age of Onset    Other Mother         thyroidectomy    Diabetes Mother     Heart Disease Mother     Heart Disease Father      SOCHX:   Social History     Socioeconomic History    Marital status: Single     Spouse name: None    Number of children: 1    Years of education: None    Highest education level: None   Occupational History    None   Tobacco Use    Smoking status: Former Smoker     Packs/day: 0.50     Years: 15.00     Pack years: 7.50     Types: Cigarettes     Quit date: 2011     Years since quittin.3    Smokeless tobacco: Never Used   Substance and Sexual Activity    Alcohol use: Not Currently     Comment: social    Drug use: Never    Sexual activity: None   Other Topics Concern    None   Social History Narrative    None     Social Determinants of Health     Financial Resource Strain: Low Risk     Difficulty of Paying Living Expenses: Not hard at all   Food Insecurity: No Food Insecurity    Worried About Running Out of Food in the Last Year: Never true    Samaar of Food in the Last Year: Never true   Transportation Needs:     Lack of Transportation (Medical): Not on file    Lack of Transportation (Non-Medical):  Not on file   Physical Activity:     Days of Exercise per Week: Not on file    Minutes of Exercise per Session: Not on file   Stress:     Feeling of Stress : Not on file   Social Connections:     Frequency of Communication with Friends and Family: Not on file    Frequency of Social Gatherings with Friends and Family: Not on file    Attends Anabaptism Services: Not on file    Active Member of Clubs or Organizations: Not on file    Attends Club or Organization Meetings: Not on file    Marital Status: Not on file   Intimate Partner Violence:     Fear of Current or Ex-Partner: Not on file    Emotionally Abused: Not on file    Physically Abused: Not on file    Sexually Abused: Not on file   Housing Stability:     Unable to Pay for Housing in the Last Year: Not on file    Number of [place] in the Last Year: Not on file    Unstable Housing in the Last Year: Not on file      Allergies: Patient has no known allergies. Medications:     sodium chloride      dextrose        cephALEXin  250 mg Oral 3 times per day    insulin lispro  0-12 Units SubCUTAneous TID WC    insulin lispro  0-6 Units SubCUTAneous Nightly    hydrocortisone  25 mg Rectal BID    sodium chloride flush  5-40 mL IntraVENous 2 times per day    amLODIPine  10 mg Oral Daily    atorvastatin  40 mg Oral Nightly     Prior to Admission medications    Medication Sig Start Date End Date Taking?  Authorizing Provider   dapagliflozin (FARXIGA) 10 MG tablet take 1 tablet by mouth IN THE MORNING 4/19/22   Aly Caro MD   amLODIPine (NORVASC) 10 MG tablet Take 1 tablet by mouth daily 4/19/22   Aly Caro MD   atorvastatin (LIPITOR) 40 MG tablet Take 1 tablet by mouth nightly 4/19/22   Aly Caro MD   lisinopril (PRINIVIL;ZESTRIL) 10 MG tablet Take 2 tablets by mouth nightly 4/19/22   Aly Caro MD   aspirin 81 MG chewable tablet Take 81 mg by mouth daily    Historical Provider, MD      PHYSICAL EXAM:    BP (!) 157/73   Pulse 62   Temp 98.4 °F (36.9 °C) (Oral)   Resp 18   Ht 5' 9.5\" (1.765 m)   Wt 229 lb 0.9 oz (103.9 kg)   SpO2 99%   BMI 33.34 kg/m²     General appearance:  No apparent distress, appears stated age and cooperative. HEENT:  Normal cephalic, atraumatic without obvious deformity. Pupils equal, round, and reactive to light. Extra ocular muscles intact. Conjunctivae/corneas clear. Neck: Supple, with full range of motion. no jugular venous distention. Trachea midline. no carotid bruits  Respiratory:  Normal respiratory effort. Clear to auscultation, bilaterally without Rales/Wheezes/Rhonchi. Breath sounds equal bilaterally  Cardiovascular:  Regular rate and rhythm with normal S1/S2 without murmurs, rubs or gallops. PMI non displaced  Abdomen: Soft, non-tender, non-distended with normal bowel sounds. No guarding, rebound. Musculoskeletal: Edema right foot, erythema, warmth. Skin: Wound right foot approximately fifth metatarsal head erythematous, discharge present. Neurologic:  Neurovascularly intact without any focal sensory/motor deficits. Cranial nerves: II-XII intact, grossly non-focal.  Psychiatric:  Alert and oriented, thought content appropriate, normal insight  Capillary Refill: Brisk,< 2 seconds   Peripheral Pulses: +2 palpable, equal bilaterally upper and lower extremities  Lymphatics: no lymphadenopathy    Data: (All radiographs, tracings, PFTs, and imaging are personally viewed and interpreted unless otherwise noted).    Recent Labs     04/28/22  0332 04/29/22  0555 04/30/22  0822   WBC 15.6* 21.4* 20.5*   HGB 10.0* 9.2* 9.5*   HCT 31.6* 29.8* 31.1*   * 377 366     Recent Labs     04/28/22  0332 04/29/22  0555 04/30/22  0822    141 138   K 4.7 4.8 4.5    112* 109   CO2 19* 18* 20*   BUN 21 33* 30*   CREATININE 1.0 1.2 1.1   CALCIUM 8.3* 8.4* 8.6     No results for input(s): AST, ALT, BILIDIR, BILITOT, ALKPHOS in the last 72 hours. No results for input(s): INR in the last 72 hours. No results for input(s): Selma Comment in the last 72 hours.     Radiology reports-   XR FOOT RIGHT (2 VIEWS)    Result Date: 4/20/2022  PROCEDURE: XR FOOT RIGHT (2 VIEWS) CLINICAL INFORMATION: Non-healing wound COMPARISON: None TECHNIQUE: 2 views of the right foot FINDINGS: A soft tissue wound is seen overlying the fifth metatarsal. Plantar calcaneal and retrocalcaneal spurs. Mild degenerative changes of the right foot. No bony destruction, erosion or periostitis to suggest osteomyelitis. No acute fracture or dislocation. Bone mineralization is unremarkable. No significant soft tissue abnormality. 1. I do not see radiographic evidence of bony destruction, bony erosion, or periostitis to reflect osteomyelitis at this time. Early osteomyelitis may be occult on radiographs. Clinical correlation is recommended. 2. A soft tissue wound/ulcer is seen overlying the fifth metatarsal **This report has been created using voice recognition software. It may contain minor errors which are inherent in voice recognition technology. ** Final report electronically signed by Dr Britany Maguire on 4/20/2022 2:15 PM      Electronically signed by Anderson Cyr DO on 4/30/2022 at 8:57 AM

## 2022-04-30 NOTE — PROGRESS NOTES
Discharge teaching and instructions for diagnosis/procedure of Infection / Antibiotic Therapy completed with patient using teachback method. AVS reviewed. Prescriptions sent to pharmacy. Patient voiced understanding regarding prescriptions, follow up appointments, and care of self at home. Discharged in a wheelchair to  home with support per self .

## 2022-04-30 NOTE — CARE COORDINATION
4/30/22, 10:59 AM EDT    Home with daughter. Denies needs, declines HH. Patient goals/plan/ treatment preferences discussed by  and . Patient goals/plan/ treatment preferences reviewed with patient/ family. Patient/ family verbalize understanding of discharge plan and are in agreement with goal/plan/treatment preferences. Understanding was demonstrated using the teach back method. AVS provided by RN at time of discharge, which includes all necessary medical information pertaining to the patients current course of illness, treatment, post-discharge goals of care, and treatment preferences.      Services At/After Discharge: None       IMM Letter  IMM Letter given to Patient/Family/Significant other/Guardian/POA/by[de-identified] Nura Verdin CM  IMM Letter date given[de-identified] 04/29/22  IMM Letter time given[de-identified] 5860

## 2022-04-30 NOTE — PLAN OF CARE
Problem: Discharge Planning  Goal: Discharge to home or other facility with appropriate resources  Outcome: Progressing     Problem: Safety - Adult  Goal: Free from fall injury  Outcome: Progressing     Problem: ABCDS Injury Assessment  Goal: Absence of physical injury  Outcome: Progressing     Problem: Skin/Tissue Integrity  Goal: Absence of new skin breakdown  Description: 1. Monitor for areas of redness and/or skin breakdown  2. Assess vascular access sites hourly  3. Every 4-6 hours minimum:  Change oxygen saturation probe site  4. Every 4-6 hours:  If on nasal continuous positive airway pressure, respiratory therapy assess nares and determine need for appliance change or resting period.   Outcome: Progressing     Problem: Pain  Goal: Verbalizes/displays adequate comfort level or baseline comfort level  Outcome: Progressing     Problem: Chronic Conditions and Co-morbidities  Goal: Patient's chronic conditions and co-morbidity symptoms are monitored and maintained or improved  Outcome: Progressing     Problem: Nutrition Deficit:  Goal: Optimize nutritional status  Outcome: Progressing     Problem: Skin/Tissue Integrity - Adult  Goal: Skin integrity remains intact  Outcome: Progressing  Goal: Incisions, wounds, or drain sites healing without S/S of infection  Outcome: Progressing  Goal: Oral mucous membranes remain intact  Outcome: Progressing     Problem: Musculoskeletal - Adult  Goal: Return mobility to safest level of function  Outcome: Progressing  Goal: Maintain proper alignment of affected body part  Outcome: Progressing  Goal: Return ADL status to a safe level of function  Outcome: Progressing     Problem: Infection - Adult  Goal: Absence of infection at discharge  Outcome: Progressing  Goal: Absence of infection during hospitalization  Outcome: Progressing  Goal: Absence of fever/infection during anticipated neutropenic period  Outcome: Progressing     Problem: Metabolic/Fluid and Electrolytes - Adult  Goal: Electrolytes maintained within normal limits  Outcome: Progressing  Goal: Hemodynamic stability and optimal renal function maintained  Outcome: Progressing  Goal: Glucose maintained within prescribed range  Outcome: Progressing

## 2022-05-04 ENCOUNTER — OFFICE VISIT (OUTPATIENT)
Dept: INTERNAL MEDICINE CLINIC | Age: 67
End: 2022-05-04
Payer: MEDICARE

## 2022-05-04 VITALS
WEIGHT: 220 LBS | HEART RATE: 66 BPM | DIASTOLIC BLOOD PRESSURE: 70 MMHG | SYSTOLIC BLOOD PRESSURE: 140 MMHG | HEIGHT: 69 IN | TEMPERATURE: 97.2 F | BODY MASS INDEX: 32.58 KG/M2

## 2022-05-04 DIAGNOSIS — L03.031 CELLULITIS OF TOE OF RIGHT FOOT: ICD-10-CM

## 2022-05-04 DIAGNOSIS — E08.00 DIABETES MELLITUS DUE TO UNDERLYING CONDITION WITH HYPEROSMOLARITY WITHOUT COMA, WITHOUT LONG-TERM CURRENT USE OF INSULIN (HCC): ICD-10-CM

## 2022-05-04 DIAGNOSIS — I10 ESSENTIAL HYPERTENSION: Primary | ICD-10-CM

## 2022-05-04 PROCEDURE — 99214 OFFICE O/P EST MOD 30 MIN: CPT | Performed by: STUDENT IN AN ORGANIZED HEALTH CARE EDUCATION/TRAINING PROGRAM

## 2022-05-04 RX ORDER — LISINOPRIL 10 MG/1
40 TABLET ORAL NIGHTLY
Qty: 90 TABLET | Refills: 2 | Status: SHIPPED | OUTPATIENT
Start: 2022-05-04 | End: 2022-06-29

## 2022-05-04 ASSESSMENT — ENCOUNTER SYMPTOMS
NAUSEA: 0
SHORTNESS OF BREATH: 0
CHEST TIGHTNESS: 0
ABDOMINAL PAIN: 0
DIARRHEA: 0
VOMITING: 0
COUGH: 0
WHEEZING: 0

## 2022-05-04 NOTE — PROGRESS NOTES
Micheal Steen (:  1955) is a 73 y.o. female,Established patient, here for evaluation of the following chief complaint(s):  Diabetes, Other (rt foot wound/cellulitis-follows with dr Del Castillo [facility] debridement 22), and Other (has a follow up with dr Daron Riojas next week)         ASSESSMENT/PLAN:  1. Essential hypertension  -     lisinopril (PRINIVIL;ZESTRIL) 10 MG tablet; Take 4 tablets by mouth nightly, Disp-90 tablet, R-2Normal  2. Diabetes mellitus due to underlying condition with hyperosmolarity without coma, without long-term current use of insulin (HCC)  -     lisinopril (PRINIVIL;ZESTRIL) 10 MG tablet; Take 4 tablets by mouth nightly, Disp-90 tablet, R-2Normal  3. Cellulitis of toe of right foot      Will increase Lisinopril from 20 mg nightly to 40 mg nightly. Continue other medications at current doses. Encouraged to check BP more often, keep a log, and bring to next office visit. Encouraged to make lifestyle changes. Reminded to keep checking her feet for new wounds or worsening of current wound. Patient seen and plan discussed with Dr. Guera Kelly. Return in about 8 weeks (around 2022), or if symptoms worsen or fail to improve. Subjective   SUBJECTIVE/OBJECTIVE:  74 yo female with history of IDDMII, HTN, psoriasis, and recent hospitalization for diabetic right 5th digit ulcer with underlying OM/cellulitis- s/p debridement. Initial plan was for metatarsal head resection but was cancelled. S/p - 2022. Followed up with Podiatry outpatient (2022). Here for routine follow up. Last office visit with Dr. Roselyn Carreon. Guera Kelly 2022. Essential HTN: Continues to have BP ranging in 140s. Reports no associated symptoms of chest pain, dyspnea, dizziness/lightheadedness, headaches. Reports compliance to medications. Checking BP 1-2x weekly.  On Lisinopril 20 mg nightly.      IDDMII: Last HgA1c 7.2 on 2022 from 7.0 (2022) - suboptimally controlled, on Cher Scott. Patient not checking BS often. FSBS ranges 170s per last hospital data, see media section for specific numbers (if patient brought to visit today). Most recent eye exam recently 12/2021, follows with Dr. Gene Whittington. Reportedly was told to have retinopathy but mostly due to uncontrolled HTN. Patient reminded to have eye exam done yearly. Normal renal function, eGFR 50 (04/30/2022), normal microalbumin 203. Alb/Cr 3951  (09/11/2021). Patient denies foot lesions besides 5th digit wound. Last foot exam on during hospitalization. Reports mild neuropathy in BLE but has noticed it recently since the hospitalization. LDL 63 on 01/15/2022. Patient is on an Aspirin, Ace Inhibitor, and statin. HbA1c 7.3 on 09/11/2021, previously 7.2 (07/30/2021).    Psoriasis: Diagnosed clinically. Never worked up for it. Has never seen a Rheumatologist or Dermatologist. Usually, has had psoriatic lesions in hands and elbows.  No associated joint pain or known arthritis. Improvement in lesions noted.      Visual disturbance: Continues to have blurry vision. Seen by Dr. Wale Morrell. Getting \"eye shots for retinal problem\". Last visit 12/03/2021. Did not go for appt with Dermatology due to her busy schedule.     Anxiety: Reports improvement. Have been staying in [facility] with daughter and son-in-law. Review of Systems   Constitutional: Negative for diaphoresis, fatigue and fever. Eyes: Negative for visual disturbance. Respiratory: Negative for cough, chest tightness, shortness of breath and wheezing. Cardiovascular: Negative for chest pain, palpitations and leg swelling. Gastrointestinal: Negative for abdominal pain, diarrhea, nausea and vomiting. Endocrine: Negative for polyuria. Genitourinary: Negative for dysuria, frequency and urgency. Musculoskeletal: Negative for myalgias. Skin: Positive for wound (recent right 5th digit wound). Negative for rash. All other systems reviewed and are negative. Objective    BP (!) 140/70 (Site: Left Upper Arm)   Pulse 66   Temp 97.2 °F (36.2 °C)   Ht 5' 9.49\" (1.765 m)   Wt 220 lb (99.8 kg)   BMI 32.03 kg/m²     Physical Exam  Vitals reviewed. Constitutional:       Appearance: She is obese. HENT:      Head: Normocephalic and atraumatic. Eyes:      Extraocular Movements: Extraocular movements intact. Cardiovascular:      Rate and Rhythm: Normal rate. Pulses: Normal pulses. Heart sounds: Normal heart sounds. No murmur heard. No gallop. Pulmonary:      Effort: Pulmonary effort is normal. No respiratory distress. Breath sounds: Normal breath sounds. No wheezing, rhonchi or rales. Abdominal:      General: There is no distension. Palpations: Abdomen is soft. Tenderness: There is no abdominal tenderness. Musculoskeletal:         General: Normal range of motion. Cervical back: Normal range of motion. Right lower leg: No edema. Left lower leg: No edema. Comments: Right 5th digit diabetic wound- wrapped in indira boot. No obvious bleeding or drainage noted   Skin:     General: Skin is warm and dry. Neurological:      General: No focal deficit present. Mental Status: She is alert and oriented to person, place, and time. Sensory: No sensory deficit. Motor: No weakness. Comments: Reduced sensation in BLE   Psychiatric:         Mood and Affect: Mood normal.         Behavior: Behavior normal.            On this date 5/4/2022 I have spent 35 minutes reviewing previous notes, test results and face to face with the patient discussing the diagnosis and importance of compliance with the treatment plan as well as documenting on the day of the visit. An electronic signature was used to authenticate this note. --Danielle Garvin MD   . [facility] [address] [facility]  [address]  Dept: [phone]  Dept Fax: [phone]  Loc: [phone]

## 2022-05-04 NOTE — TELEPHONE ENCOUNTER
Pharmacy questioning script for lisinopril 10 mg. 4 tab daily #90. I checked with Dr. Tg Hugo and she wants pts script to reflect  a total of 40 mg. Nightly. I called in script to nola at [facility] and advised her to discontinue previous script.

## 2022-05-11 ENCOUNTER — TELEPHONE (OUTPATIENT)
Dept: INTERNAL MEDICINE CLINIC | Age: 67
End: 2022-05-11

## 2022-05-11 NOTE — TELEPHONE ENCOUNTER
----- Message from Jimmy Betancourt sent at 5/4/2022  2:31 PM EDT -----  Subject: Message to Provider    QUESTIONS  Information for Provider? Patient was at [facility] 4/20-4/30 for   Cellulitis, she has recently declined post discharge assessments   ---------------------------------------------------------------------------  --------------  CALL BACK INFO  What is the best way for the office to contact you? OK to leave message on   voicemail  Preferred Call Back Phone Number? [phone]  ---------------------------------------------------------------------------  --------------  SCRIPT ANSWERS  Relationship to Patient? Third Party  Third Party Type? Insurance? Representative Name?  [address]

## 2022-05-16 RX ORDER — LISINOPRIL 40 MG/1
40 TABLET ORAL NIGHTLY
Qty: 90 TABLET | Refills: 0 | OUTPATIENT
Start: 2022-05-16 | End: 2022-06-29 | Stop reason: SDUPTHER

## 2022-05-24 NOTE — DISCHARGE SUMMARY
Hospital Medicine Discharge Summary      Patient Identification:   Sheri Zuniga   : 1955  MRN: [id number]   Account: [de-identified]      Patient's PCP: Daquan Arriaza MD    Admit Date: 2022     Discharge Date: 2022      Admitting Physician: Damian Richardson DO     Discharge Physician: Natasha Carter DO     Discharge Diagnoses: Active Hospital Problems    Diagnosis Date Noted    Cellulitis [L03.90] 2022     Priority: Medium       The patient was seen and examined on day of discharge and this discharge summary is in conjunction with any daily progress note from day of discharge. Hospital Course:   Sheri Zuniga is a 73 y.o. female admitted to [facility] on 2022 for DFU. Assessment and Plan:        1. Diabetic foot infection, right: MRI pending for possible osteomyelitis, will continue with IV antibiotics (Zosyn, vancomycin). Hemoglobin A1c is 7.4.  2. Non-insulin-dependent diabetes type 2: Hemoglobin A1c 7.4 currently on insulin sliding scale and a carb controlled diet, will check urine for microalbumin  3. Essential hypertension: We will continue home medication of lisinopril and Norvasc  4. Hyperlipidemia we will continue with Lipitor     CC:  Foot infection     HPI: Per H&P \"This is a relatively healthy 36-year-old female who presented centers [address] [place] [address] emergency department on  due to foot pain in her right foot.  Patient states that her foot started to hurt on  and describes the pain as constant and dull pain.  Patient rates the pain currently 4 out of 10.  Denies any alleviating or aggravating factors.  Patient states she noticed a corn on the outside of her foot and pulled it off on  as well.  Patient states that her eyesight is not very good and she cannot see her feet well so today her daughter came to look at her feet due to the pain.  Daughter saw redness and swelling to her right foot.  Patient denies nausea, vomiting, fever, chills, shortness of breath, chest pain.  Patient does not look toxic at this time.  Of note, patient was recently diagnosed with diabetes by her PCP about 3 months ago. Rg Andrade has been taking her diabetes medications as prescribed.     In the ED, patient was found to have elevated white blood cell count 20.2 elevated CRP 5.84.  X-ray of right foot did not show osteomyelitis.  Patient was given IV antibiotics.  Hospitalist were asked to admit patient for podiatry and infectious disease consult and further work-up. \"     4.22.2022 patient seen this a.m. states that she is having a \"debridement\" tomorrow, also discussed possible osteomyelitis and treatment. Patient states that she thinks that the wound has been there for possible 1 month but over the last week became more painful. Infectious disease and podiatry are on the case     4.23.2022 patient seen this a.m. states she is to have surgery today. She is not sure of what they plan on doing. Eitel signs stable patient's afebrile, slowly decreasing WBC     4.24.2022 patient seen this a.m., not quite sure what they did and surgery. It appears from the surgical note the wound was debrided with a partial resection of the fifth metatarsal.  Patient denies any pain at this time. Discharge planning will be decided during podiatry rounds.     4.25.2022 patient seen this a.m., medically stable will discharge home when okay with podiatry. To be transition to oral antibiotics at that time. WBC slightly increased to 19.6, platelets stable hemoglobin stable, slight bump in creatinine will hold ACE inhibitor and monitor BP     4.26.2022 patient seen this a.m. along with podiatry present. Podiatry states that they will take her back to surgery for further wound debridement possible closure or wound VAC. Surgery is scheduled for Wednesday, April 27, 2022 with possible discharge on April 28, 2022.   WBC is increasing, hemoglobin is stable, renal function is stable     4.29.2022 seen this a.m. complains of right arm being swollen nursing states IV infiltrated, increasing WBC, will rule out acute DVT will obtain right upper extremity ultrasound. .  Infectious disease on the case will follow recommendations of the increased WBC. Patient underwent wound debridement with primary closure.     4.30.2022 patient seen this a.m. superficial thrombophlebitis involving the cephalic vein with no evidence of DVT. Infectious disease placed the patient on Keflex 250 mg every 8 hours. Possible discharge today if okay with podiatry      Exam:     Vitals:  Vitals:    04/29/22 2253 04/30/22 0408 04/30/22 0735 04/30/22 1141   BP: 131/64 121/60 (!) 157/73 (!) 159/72   Pulse: 63 56 62 66   Resp: 17 18 18 18   Temp: 97.7 °F (36.5 °C) 97.2 °F (36.2 °C) 98.4 °F (36.9 °C) 98.2 °F (36.8 °C)   TempSrc: Oral Oral Oral Oral   SpO2: 98% 100% 99% 100%   Weight:  229 lb 0.9 oz (103.9 kg)     Height:         Weight: Weight: 229 lb 0.9 oz (103.9 kg)     24 hour intake/output:No intake or output data in the 24 hours ending 05/24/22 1219      General appearance:  No apparent distress, appears stated age and cooperative. HEENT:  Normal cephalic, atraumatic without obvious deformity. Pupils equal, round, and reactive to light. Extra ocular muscles intact. Conjunctivae/corneas clear. Neck: Supple, with full range of motion. No jugular venous distention. Trachea midline. Respiratory:  Normal respiratory effort. Clear to auscultation, bilaterally without Rales/Wheezes/Rhonchi. Cardiovascular:  Regular rate and rhythm with normal S1/S2 without murmurs, rubs or gallops. Abdomen: Soft, non-tender, non-distended with normal bowel sounds. Musculoskeletal:  No clubbing, cyanosis or edema bilaterally. Full range of motion without deformity. Skin: Skin color, texture, turgor normal.  No rashes or lesions. Neurologic:  Neurovascularly intact without any focal sensory/motor deficits.  Cranial nerves: II-XII intact, grossly non-focal.  Psychiatric:  Alert and oriented, thought content appropriate, normal insight  Capillary Refill: Brisk,< 3 seconds   Peripheral Pulses: +2 palpable, equal bilaterally       Labs: For convenience and continuity at follow-up the following most recent labs are provided:      CBC:    Lab Results   Component Value Date    WBC 20.5 04/30/2022    HGB 9.5 04/30/2022    HCT 31.1 04/30/2022     04/30/2022       Renal:    Lab Results   Component Value Date     04/30/2022    K 4.5 04/30/2022    K 4.1 04/21/2022     04/30/2022    CO2 20 04/30/2022    BUN 30 04/30/2022    CREATININE 1.1 04/30/2022    CALCIUM 8.6 04/30/2022         Significant Diagnostic Studies    Radiology:   VL DUP UPPER EXTREMITY VENOUS RIGHT   Final Result      1. Superficial thrombophlebitis involving the cephalic vein. 2. No evidence of a DVT. **This report has been created using voice recognition software. It may contain minor errors which are inherent in voice recognition technology. **      Final report electronically signed by Dr Rickie Guardado on 4/29/2022 4:22 PM      MRI FOOT RIGHT WO CONTRAST   Final Result       1. Abnormal signal intensity in the head and neck of the fifth metatarsal extending to the distal shaft consistent with osteomyelitis. There is abnormal soft tissue signal intensity adjacent to the head of the fifth metatarsal consistent with edema    and/or cellulitis. 2. Degenerative changes. 3. Areas of abnormal signal intensity at the bases of the third and fourth metatarsals. These may represent small cystic changes. 4. Abnormal soft tissue signal intensity surrounding the second, third and fourth digits consistent with edema and/or cellulitis. 5. Atrophy involving the foot muscles. **This report has been created using voice recognition software. It may contain minor errors which are inherent in voice recognition technology. **      Final report electronically signed by DR Angeline Wheeler on 4/21/2022 2:37 PM      XR FOOT RIGHT (2 VIEWS)   Final Result   1. I do not see radiographic evidence of bony destruction, bony erosion, or periostitis to reflect osteomyelitis at this time. Early osteomyelitis may be occult on radiographs. Clinical correlation is recommended. 2. A soft tissue wound/ulcer is seen overlying the fifth metatarsal            **This report has been created using voice recognition software. It may contain minor errors which are inherent in voice recognition technology. **      Final report electronically signed by Dr Monika Bauer on 4/20/2022 2:15 PM             Consults:     PHARMACY TO DOSE VANCOMYCIN  IP CONSULT TO INFECTIOUS DISEASES  PHARMACY TO DOSE MEDICATION  IP CONSULT TO PODIATRY    Disposition:    [x] Home       [] TCU       [] Rehab       [] Psych       [] SNF       [] [place]       [] Other-    Condition at Discharge: Stable    Code Status:  Prior     Patient Instructions:    Discharge lab work:    Activity: activity as tolerated  Diet: No diet orders on file      Follow-up visits:   AGUSTIN Avila [address] [facility] [place] [address]  [phone]    On 5/2/2022  Your Appt is at 9:30 AM, Please bring insurance card, Please arrive 15 minutes before    Juan Gray MD  [address] [place] [address]  [phone]    Schedule an appointment as soon as possible for a visit in 1 week           Discharge Medications:        Medication List      START taking these medications    hydrocortisone 25 MG suppository  Commonly known as: ANUSOL-HC  Place 1 suppository rectally 2 times daily        CONTINUE taking these medications    amLODIPine 10 MG tablet  Commonly known as: NORVASC  Take 1 tablet by mouth daily     aspirin 81 MG chewable tablet     atorvastatin 40 MG tablet  Commonly known as: LIPITOR  Take 1 tablet by mouth nightly     dapagliflozin 10 MG tablet  Commonly known as: [place]  take 1 tablet by mouth IN THE MORNING        STOP taking these medications    lisinopril 10 MG tablet  Commonly known as: PRINIVIL;ZESTRIL        ASK your doctor about these medications    cephALEXin 250 MG capsule  Commonly known as: KEFLEX  Take 1 capsule by mouth every 8 hours for 19 doses  Ask about: Should I take this medication? Where to Get Your Medications      These medications were sent to [address] [place]   [address] [place] [id number]    Phone: [phone]   · cephALEXin 250 MG capsule  · hydrocortisone 25 MG suppository         Time Spent on discharge is more than 45 minutes in the examination, evaluation, counseling and review of medications and discharge plan. Signed: Thank you Margaret Ladd MD for the opportunity to be involved in this patient's care.     Electronically signed by Nicole De Leon DO on 5/24/2022 at 12:19 PM

## 2022-06-01 NOTE — TELEPHONE ENCOUNTER
I called in to [facility] and spoke to nola. Res clinic pt you were last preceptor.   She has an appt end of month

## 2022-06-29 ENCOUNTER — OFFICE VISIT (OUTPATIENT)
Dept: INTERNAL MEDICINE CLINIC | Age: 67
End: 2022-06-29
Payer: MEDICARE

## 2022-06-29 VITALS
WEIGHT: 209.8 LBS | BODY MASS INDEX: 31.07 KG/M2 | TEMPERATURE: 97.2 F | SYSTOLIC BLOOD PRESSURE: 144 MMHG | HEART RATE: 70 BPM | DIASTOLIC BLOOD PRESSURE: 78 MMHG | HEIGHT: 69 IN

## 2022-06-29 DIAGNOSIS — E78.5 HYPERLIPIDEMIA, UNSPECIFIED HYPERLIPIDEMIA TYPE: ICD-10-CM

## 2022-06-29 DIAGNOSIS — E08.00 DIABETES MELLITUS DUE TO UNDERLYING CONDITION WITH HYPEROSMOLARITY WITHOUT COMA, WITHOUT LONG-TERM CURRENT USE OF INSULIN (HCC): ICD-10-CM

## 2022-06-29 DIAGNOSIS — I10 ESSENTIAL HYPERTENSION: ICD-10-CM

## 2022-06-29 DIAGNOSIS — E08.21 DIABETES MELLITUS DUE TO UNDERLYING CONDITION WITH DIABETIC NEPHROPATHY, WITHOUT LONG-TERM CURRENT USE OF INSULIN (HCC): Primary | ICD-10-CM

## 2022-06-29 PROCEDURE — 1124F ACP DISCUSS-NO DSCNMKR DOCD: CPT | Performed by: STUDENT IN AN ORGANIZED HEALTH CARE EDUCATION/TRAINING PROGRAM

## 2022-06-29 PROCEDURE — 99214 OFFICE O/P EST MOD 30 MIN: CPT | Performed by: STUDENT IN AN ORGANIZED HEALTH CARE EDUCATION/TRAINING PROGRAM

## 2022-06-29 RX ORDER — AMLODIPINE BESYLATE 10 MG/1
10 TABLET ORAL DAILY
Qty: 90 TABLET | Refills: 1 | Status: SHIPPED | OUTPATIENT
Start: 2022-06-29

## 2022-06-29 RX ORDER — LISINOPRIL 40 MG/1
60 TABLET ORAL NIGHTLY
Qty: 90 TABLET | Refills: 1 | Status: SHIPPED | OUTPATIENT
Start: 2022-06-29

## 2022-06-29 RX ORDER — ATORVASTATIN CALCIUM 40 MG/1
40 TABLET, FILM COATED ORAL NIGHTLY
Qty: 90 TABLET | Refills: 1 | Status: SHIPPED | OUTPATIENT
Start: 2022-06-29

## 2022-06-29 RX ORDER — FLASH GLUCOSE SENSOR
1 KIT MISCELLANEOUS
Qty: 2 EACH | Refills: 12 | Status: SHIPPED | OUTPATIENT
Start: 2022-06-29

## 2022-06-29 ASSESSMENT — ENCOUNTER SYMPTOMS
NAUSEA: 0
DIARRHEA: 0
COUGH: 0
CHEST TIGHTNESS: 0
VOMITING: 0
SHORTNESS OF BREATH: 0
WHEEZING: 0
BLOOD IN STOOL: 0
SORE THROAT: 0
CONSTIPATION: 0
ABDOMINAL PAIN: 0

## 2022-06-29 ASSESSMENT — PATIENT HEALTH QUESTIONNAIRE - PHQ9
SUM OF ALL RESPONSES TO PHQ9 QUESTIONS 1 & 2: 0
2. FEELING DOWN, DEPRESSED OR HOPELESS: 0
SUM OF ALL RESPONSES TO PHQ QUESTIONS 1-9: 0
1. LITTLE INTEREST OR PLEASURE IN DOING THINGS: 0

## 2022-06-29 NOTE — PATIENT INSTRUCTIONS
Increase Lisinopril from 40 mg daily to 60 mg daily   Check BP daily and keep a log. Bring log to next visit. Check blood sugar at least 3 times a day and keep a log. Bring log to next visit.   Anaya cedeno

## 2022-06-29 NOTE — PROGRESS NOTES
Neymar Allen (:  1955) is a 73 y.o. female,Established patient, here for evaluation of the following chief complaint(s):  Diabetes, Hypertension, and Cellulitis (rt llittle toe-followed by dr Yamilex Rabago)         ASSESSMENT/PLAN:   Diagnosis Orders   1. Diabetes mellitus due to underlying condition with diabetic nephropathy, without long-term current use of insulin (HCC)  Microalbumin / Creatinine Urine Ratio    Basic Metabolic Panel    Magnesium    Continuous Blood Gluc Sensor (FREESTYLE MILI 2 SENSOR) MISC    C-Peptide, Serum   2. Hyperlipidemia, unspecified hyperlipidemia type  atorvastatin (LIPITOR) 40 MG tablet   3. Essential hypertension  lisinopril (PRINIVIL;ZESTRIL) 40 MG tablet    amLODIPine (NORVASC) 10 MG tablet   4. Diabetes mellitus due to underlying condition with hyperosmolarity without coma, without long-term current use of insulin (HCC)  dapagliflozin (FARXIGA) 10 MG tablet     Increase Lisinopril from 40 mg to 60 mg daily. Advised to check BP daily or at least 3x weekly. Keep a log and bring to next visit. Will check labs as above prior to next visit. HbA1c due next visit. Encouraged to call and make appt with Dermatologist.     Patient seen and plan discussed with Dr. Ale Serna. Return in about 4 weeks (around 2022). Subjective   SUBJECTIVE/OBJECTIVE:  72 yo female with history of IDDMII, HTN, psoriasis. Here for routine follow up. Last office visit with myself and Dr. Dennie Cox.     Essential HTN: Continues to have BP ranging in 140s still. Checks once weekly. Reports no associated symptoms of chest pain, dyspnea, dizziness/lightheadedness, headaches. Reports compliance to medications. On Lisinopril 40 mg nightly.     IDDMII: Last HgA1c 7.2 on 2022 from 7.0 (2022) - suboptimally controlled, on Farxiga. HbA1c 7.3 on 2021, previously 7.2 (2021). Patient not checking BS often.  FSBS ranges 170s per last hospital data previously, see media section for specific numbers (if patient brought to visit today). Per son-in-law, she got a prescription for glucometer but has to figure out with insurance if able to get it.     Most recent eye exam recently 12/2021, follows with Dr. Gene Whittington. Reportedly was told to have retinopathy but mostly due to uncontrolled HTN. Patient reminded to have eye exam done yearly. Normal renal function, eGFR 50 (04/30/2022), normal microalbumin 203. Alb/Cr 3951  (09/11/2021). Patient denies foot lesions besides 5th digit wound. Last foot exam on during hospitalization. Reports mild neuropathy in BLE but has noticed it recently since the hospitalization. LDL 63 on 01/15/2022.      Patient is on an Aspirin, Ace Inhibitor, and statin.        Psoriasis: Diagnosed clinically. Never worked up for it. Has never seen a Rheumatologist or Dermatologist. Usually, has had psoriatic lesions in hands and elbows.  No associated joint pain or known arthritis. Improvement in lesions noted. Did not go for appt with Dermatology due to her busy schedule.      Visual disturbance: Continues to have blurry vision. Seen by Dr. Wale Morrell. Getting \"eye shots for retinal problem\". Last visit 12/03/2021.      Anxiety: Reports improvement. Have been staying in [facility] with daughter and son-in-law. Has short episodes of anxiety but better than prior. Right foot diabetic ulcer: recent hospitalization 04/20- 04/30/2022 for diabetic right 5th digit ulcer with underlying OM/cellulitis- s/p debridement. Initial plan was for metatarsal head resection but was cancelled. S/p hospitalization . Followed up with Podiatry outpatient. Right foot still in boot, NWB. Plan to follow up in a week for removal of boot. Review of Systems   Constitutional: Negative for appetite change, chills, diaphoresis, fatigue and fever. HENT: Negative for congestion and sore throat. Eyes: Positive for visual disturbance.    Respiratory: Negative for cough, chest tightness, shortness of breath and wheezing. Cardiovascular: Negative for chest pain, palpitations and leg swelling. Gastrointestinal: Negative for abdominal pain, blood in stool, constipation, diarrhea, nausea and vomiting. Endocrine: Negative for polyuria. Genitourinary: Negative for dysuria. Skin: Positive for wound. Negative for rash. Neurological: Negative for dizziness, syncope, light-headedness, numbness and headaches. Psychiatric/Behavioral: The patient is nervous/anxious. Intermittently- not present at this time   All other systems reviewed and are negative. Objective    BP (!) 144/78 (Site: Left Upper Arm)   Pulse 70   Temp 97.2 °F (36.2 °C)   Ht 5' 9.49\" (1.765 m)   Wt 209 lb 12.8 oz (95.2 kg)   BMI 30.55 kg/m²    Repeat 146/80    Physical Exam  Vitals reviewed. Constitutional:       Appearance: Normal appearance. HENT:      Head: Normocephalic and atraumatic. Mouth/Throat:      Mouth: Mucous membranes are moist.      Pharynx: Oropharynx is clear. Eyes:      Extraocular Movements: Extraocular movements intact. Conjunctiva/sclera: Conjunctivae normal.   Cardiovascular:      Rate and Rhythm: Normal rate and regular rhythm. Pulses: Normal pulses. Heart sounds: Normal heart sounds. No murmur heard. No gallop. Pulmonary:      Effort: Pulmonary effort is normal. No respiratory distress. Breath sounds: Normal breath sounds. No wheezing or rales. Abdominal:      General: Bowel sounds are normal. There is no distension. Palpations: Abdomen is soft. Tenderness: There is no abdominal tenderness. There is no guarding. Musculoskeletal:         General: No swelling or deformity. Normal range of motion. Cervical back: Normal range of motion and neck supple. Skin:     General: Skin is warm and dry. Coloration: Skin is not jaundiced. Findings: No bruising. Neurological:      General: No focal deficit present.       Mental Status: She is alert and oriented to person, place, and time. Psychiatric:         Mood and Affect: Mood normal.         Behavior: Behavior normal.                  An electronic signature was used to authenticate this note. --Shashank Cheng MD   . [facility]  INTERNAL MEDICINE  [address]  Dept: [phone]  Dept Fax: [phone] : [phone]

## 2022-07-01 ENCOUNTER — NURSE ONLY (OUTPATIENT)
Dept: LAB | Age: 67
End: 2022-07-01

## 2022-07-01 DIAGNOSIS — E08.21 DIABETES MELLITUS DUE TO UNDERLYING CONDITION WITH DIABETIC NEPHROPATHY, WITHOUT LONG-TERM CURRENT USE OF INSULIN (HCC): ICD-10-CM

## 2022-07-01 LAB
ANION GAP SERPL CALCULATED.3IONS-SCNC: 9 MEQ/L (ref 8–16)
BUN BLDV-MCNC: 36 MG/DL (ref 7–22)
CALCIUM SERPL-MCNC: 9.5 MG/DL (ref 8.5–10.5)
CHLORIDE BLD-SCNC: 104 MEQ/L (ref 98–111)
CO2: 23 MEQ/L (ref 23–33)
CREAT SERPL-MCNC: 1.5 MG/DL (ref 0.4–1.2)
CREATININE, URINE: 91.5 MG/DL
GFR SERPL CREATININE-BSD FRML MDRD: 35 ML/MIN/1.73M2
GLUCOSE BLD-MCNC: 213 MG/DL (ref 70–108)
MAGNESIUM: 2.2 MG/DL (ref 1.6–2.4)
MICROALBUMIN UR-MCNC: 56.97 MG/DL
MICROALBUMIN/CREAT UR-RTO: 623 MG/G (ref 0–30)
POTASSIUM SERPL-SCNC: 5.3 MEQ/L (ref 3.5–5.2)
SODIUM BLD-SCNC: 136 MEQ/L (ref 135–145)

## 2022-07-02 LAB — C-PEPTIDE: 7.8 NG/ML (ref 1.1–4.4)

## 2022-10-24 ENCOUNTER — OFFICE VISIT (OUTPATIENT)
Dept: CARDIOLOGY CLINIC | Age: 67
End: 2022-10-24
Payer: MEDICAID

## 2022-10-24 VITALS
BODY MASS INDEX: 30.46 KG/M2 | HEIGHT: 70 IN | HEART RATE: 60 BPM | DIASTOLIC BLOOD PRESSURE: 71 MMHG | SYSTOLIC BLOOD PRESSURE: 146 MMHG | WEIGHT: 212.8 LBS

## 2022-10-24 DIAGNOSIS — I10 PRIMARY HYPERTENSION: Primary | ICD-10-CM

## 2022-10-24 DIAGNOSIS — R94.39 ABNORMAL NUCLEAR STRESS TEST: ICD-10-CM

## 2022-10-24 DIAGNOSIS — E78.00 PURE HYPERCHOLESTEROLEMIA: ICD-10-CM

## 2022-10-24 PROBLEM — L03.90 CELLULITIS: Status: RESOLVED | Noted: 2022-04-20 | Resolved: 2022-10-24

## 2022-10-24 PROCEDURE — 1124F ACP DISCUSS-NO DSCNMKR DOCD: CPT | Performed by: INTERNAL MEDICINE

## 2022-10-24 PROCEDURE — 93000 ELECTROCARDIOGRAM COMPLETE: CPT | Performed by: INTERNAL MEDICINE

## 2022-10-24 PROCEDURE — 99214 OFFICE O/P EST MOD 30 MIN: CPT | Performed by: INTERNAL MEDICINE

## 2022-10-24 NOTE — PROGRESS NOTES
Chief Complaint   Patient presents with    6 Month Follow-Up    Check-Up   PROVIDER:     Dionna Thomas. Chelle Dougherty M.D.     Pepe [place]:  2021     REASON OF CONSULTATION:  Elevated troponin in a 71-year-old female  patient, admitted with accelerated hypertension.         Pt here for a 6 month f/u    EKG done today    Denied chest pain, sob or orthopnea    Still no angina or angina equivalent    Denied dizziness ,edema , palpitation or syncope    Nonsmoker    Walk with cane at home    [facility]  Father had CABg in his 63's  Mother had CABG I her 63's      Past Surgical History:   Procedure Laterality Date    FOOT DEBRIDEMENT Right 2022    RIGHT FOOT DEBRIDEMENT/WASH OUT performed by Jovanny Phoenix DPM at [place] [address] Right 2022    RIGHT FOOT WOUND DEBRIDEMENT WITH POSS RIGHT 5TH METATARSAL RESECTION performed by Jovanny Phoenix DPM at [facility]       No Known Allergies     Family History   Problem Relation Age of Onset    Other Mother         thyroidectomy    Diabetes Mother     Heart Disease Mother     Heart Disease Father         Social History     Socioeconomic History    Marital status: Single     Spouse name: Not on file    Number of children: 1    Years of education: Not on file    Highest education level: Not on file   Occupational History    Not on file   Tobacco Use    Smoking status: Former     Packs/day: 0.50     Years: 15.00     Pack years: 7.50     Types: Cigarettes     Quit date: 2011     Years since quittin.8    Smokeless tobacco: Never   Substance and Sexual Activity    Alcohol use: Not Currently     Comment: social    Drug use: Never    Sexual activity: Not on file   Other Topics Concern    Not on file   Social History Narrative    Not on file     Social Determinants of Health     Financial Resource Strain: Not on file   Food Insecurity: Not on file   Transportation Needs: Not on file   Physical Activity: Not on file   Stress: Not on file   Social Connections: Not on file Intimate Partner Violence: Not on file   Housing Stability: Not on file       Current Outpatient Medications   Medication Sig Dispense Refill    dapagliflozin (FARXIGA) 10 MG tablet take 1 tablet by mouth IN THE MORNING 90 tablet 1    lisinopril (PRINIVIL;ZESTRIL) 40 MG tablet Take 1.5 tablets by mouth nightly 90 tablet 1    atorvastatin (LIPITOR) 40 MG tablet Take 1 tablet by mouth nightly 90 tablet 1    amLODIPine (NORVASC) 10 MG tablet Take 1 tablet by mouth daily 90 tablet 1    Continuous Blood Gluc Sensor (FREESTYLE MILI 2 SENSOR) MISC 1 each by Does not apply route every 14 days 2 each 12    hydrocortisone (ANUSOL-HC) 25 MG suppository Place 1 suppository rectally 2 times daily 12 suppository 0    aspirin 81 MG chewable tablet Take 81 mg by mouth daily       No current facility-administered medications for this visit. Review of Systems -     General ROS: negative  Psychological ROS: negative  Hematological and Lymphatic ROS: No history of blood clots or bleeding disorder. Respiratory ROS: no cough,  or wheezing, the rest see HPI  Cardiovascular ROS: See HPI  Gastrointestinal ROS: negative  Genito-Urinary ROS: no dysuria, trouble voiding, or hematuria  Musculoskeletal ROS: negative  Neurological ROS: no TIA or stroke symptoms  Dermatological ROS: negative      Blood pressure (!) 146/71, pulse 60, height 5' 10\" (1.778 m), weight 212 lb 12.8 oz (96.5 kg).         Physical Examination:    General appearance - alert, well appearing, and in no distress  HEENT- Pink conjunctiva  , Non-icteri sclera,PERRLA  Mental status - alert, oriented to person, place, and time  Neck - supple, no significant adenopathy, no JVD, or carotid bruits  Chest - clear to auscultation, no wheezes, rales or rhonchi, symmetric air entry  Heart - normal rate, regular rhythm, normal S1, S2, no murmurs, rubs, clicks or gallops  Abdomen - soft, nontender, nondistended, no masses or organomegaly  JHON- no CVA or flank tenderness, no suprapubic tenderness  Neurological - alert, oriented, normal speech, no focal findings or movement disorder noted  Musculoskeletal/limbs - no joint tenderness, deformity or swelling   - peripheral pulses normal, no pedal edema, no clubbing or cyanosis  Skin - normal coloration and turgor, no rashes, no suspicious skin lesions noted  Psych- appropriate mood and affect    Lab  No results for input(s): CKTOTAL, CKMB, CKMBINDEX, TROPONINI in the last 72 hours.   CBC:   Lab Results   Component Value Date/Time    WBC 20.5 04/30/2022 08:22 AM    RBC 3.59 04/30/2022 08:22 AM    HGB 9.5 04/30/2022 08:22 AM    HCT 31.1 04/30/2022 08:22 AM    MCV 86.6 04/30/2022 08:22 AM    MCH 26.5 04/30/2022 08:22 AM    MCHC 30.5 04/30/2022 08:22 AM     04/30/2022 08:22 AM    MPV 9.2 04/30/2022 08:22 AM     BMP:    Lab Results   Component Value Date/Time     07/01/2022 11:09 AM    K 5.3 07/01/2022 11:09 AM    K 4.1 04/21/2022 04:56 AM     07/01/2022 11:09 AM    CO2 23 07/01/2022 11:09 AM    BUN 36 07/01/2022 11:09 AM    LABALBU 3.1 07/27/2021 03:35 AM    CREATININE 1.5 07/01/2022 11:09 AM    CALCIUM 9.5 07/01/2022 11:09 AM    LABGLOM 35 07/01/2022 11:09 AM    GLUCOSE 213 07/01/2022 11:09 AM     Hepatic Function Panel:    Lab Results   Component Value Date/Time    ALKPHOS 80 07/27/2021 03:35 AM    ALT 8 07/27/2021 03:35 AM    AST 11 07/27/2021 03:35 AM    PROT 6.3 07/27/2021 03:35 AM    BILITOT 0.4 07/27/2021 03:35 AM    BILIDIR <0.2 07/27/2021 03:35 AM    LABALBU 3.1 07/27/2021 03:35 AM     Magnesium:    Lab Results   Component Value Date/Time    MG 2.2 07/01/2022 11:09 AM     Warfarin PT/INR:  No components found for: PTPATWAR, PTINRWAR  HgBA1c:    Lab Results   Component Value Date/Time    LABA1C 7.2 04/23/2022 10:44 AM     FLP:    Lab Results   Component Value Date/Time    TRIG 186 01/15/2022 09:37 AM    HDL 40 01/15/2022 09:37 AM    LDLCALC 63 01/15/2022 09:37 AM     TSH:    Lab Results   Component Value Date/Time meds and labs reviewed      Continue the current treatment and with constant vigilance to changes in symptoms and also any potential side effects. Return for care or seek medical attention immediately if symptoms got worse and/or develop new symptoms. Hypertension, on medical treatment. Seems to be under good control. Patient is compliant with medical treatment. Hyperlipidemia: on statins, followed periodically. Patient need periodic lipid and liver profile. ESM best at aortic area 3/6    Borderline abn nuc -suggestive of ischemia Vs breast attenuation artifact. No angina or angina equivalent  Cont asa and statin  Advised to come back if get cp  For now continue with the med [facility] record reviewed    Alexandra Estefania is stable and doing well    D/w the pat the plan of care    Hx of ckd III  Does not follow with nephrology but under f/u with pcp  Hx of abn  BMP with K 5.3 ,increased Cr 1.5 July 2022 and will bring to attention of pcp to address and fiollow  Hydration  advised  Discussed use, benefit, and side effects of prescribed medications. All patient questions answered. Pt voiced understanding. Instructed to continue current medications, diet and exercise. Continue risk factor modification and medical management. Patient agreed with treatment plan. Follow up as directed.         RTC in 12 months or earlier if get symptomatic       Amber Saldivar, [facility] OF [facility]

## 2023-01-09 ENCOUNTER — HOSPITAL ENCOUNTER (EMERGENCY)
Age: 68
Discharge: HOME | End: 2023-01-09
Attending: STUDENT IN AN ORGANIZED HEALTH CARE EDUCATION/TRAINING PROGRAM
Payer: MEDICARE

## 2023-01-09 ENCOUNTER — APPOINTMENT (OUTPATIENT)
Dept: GENERAL RADIOLOGY | Age: 68
End: 2023-01-09
Payer: MEDICARE

## 2023-01-09 VITALS
HEIGHT: 69 IN | BODY MASS INDEX: 31.4 KG/M2 | DIASTOLIC BLOOD PRESSURE: 77 MMHG | WEIGHT: 212 LBS | RESPIRATION RATE: 12 BRPM | SYSTOLIC BLOOD PRESSURE: 172 MMHG | TEMPERATURE: 97.6 F | HEART RATE: 66 BPM | OXYGEN SATURATION: 96 %

## 2023-01-09 DIAGNOSIS — I10 ESSENTIAL HYPERTENSION: ICD-10-CM

## 2023-01-09 DIAGNOSIS — N39.0 URINARY TRACT INFECTION WITHOUT HEMATURIA, SITE UNSPECIFIED: Primary | ICD-10-CM

## 2023-01-09 LAB
ALBUMIN SERPL-MCNC: 3.7 G/DL (ref 3.5–5.1)
ALP BLD-CCNC: 109 U/L (ref 38–126)
ALT SERPL-CCNC: 11 U/L (ref 11–66)
ANION GAP SERPL CALCULATED.3IONS-SCNC: 9 MEQ/L (ref 8–16)
AST SERPL-CCNC: 13 U/L (ref 5–40)
ATYPICAL LYMPHOCYTES: ABNORMAL %
BACTERIA: ABNORMAL
BASOPHILS # BLD: 0.6 %
BASOPHILS ABSOLUTE: 0.1 THOU/MM3 (ref 0–0.1)
BILIRUB SERPL-MCNC: 1 MG/DL (ref 0.3–1.2)
BILIRUBIN URINE: NEGATIVE
BLOOD, URINE: ABNORMAL
BUN BLDV-MCNC: 31 MG/DL (ref 7–22)
CALCIUM SERPL-MCNC: 8.9 MG/DL (ref 8.5–10.5)
CASTS: ABNORMAL /LPF
CASTS: ABNORMAL /LPF
CHARACTER, URINE: ABNORMAL
CHLORIDE BLD-SCNC: 109 MEQ/L (ref 98–111)
CO2: 21 MEQ/L (ref 23–33)
COLOR: YELLOW
CREAT SERPL-MCNC: 1.2 MG/DL (ref 0.4–1.2)
CRYSTALS: ABNORMAL
EOSINOPHIL # BLD: 3.5 %
EOSINOPHILS ABSOLUTE: 0.5 THOU/MM3 (ref 0–0.4)
EPITHELIAL CELLS, UA: ABNORMAL /HPF
ERYTHROCYTE [DISTWIDTH] IN BLOOD BY AUTOMATED COUNT: 12.3 % (ref 11.5–14.5)
ERYTHROCYTE [DISTWIDTH] IN BLOOD BY AUTOMATED COUNT: 37.8 FL (ref 35–45)
GFR SERPL CREATININE-BSD FRML MDRD: 50 ML/MIN/1.73M2
GLUCOSE BLD-MCNC: 167 MG/DL (ref 70–108)
GLUCOSE, URINE: 100 MG/DL
HCT VFR BLD CALC: 40.5 % (ref 37–47)
HEMOGLOBIN: 13.1 GM/DL (ref 12–16)
IMMATURE GRANS (ABS): 0.06 THOU/MM3 (ref 0–0.07)
IMMATURE GRANULOCYTES: 0.4 %
KETONES, URINE: NEGATIVE
LEUKOCYTE ESTERASE, URINE: ABNORMAL
LYMPHOCYTES # BLD: 38.1 %
LYMPHOCYTES ABSOLUTE: 5.9 THOU/MM3 (ref 1–4.8)
MCH RBC QN AUTO: 27.5 PG (ref 26–33)
MCHC RBC AUTO-ENTMCNC: 32.3 GM/DL (ref 32.2–35.5)
MCV RBC AUTO: 85.1 FL (ref 81–99)
MISCELLANEOUS LAB TEST RESULT: ABNORMAL
MONOCYTES # BLD: 6.3 %
MONOCYTES ABSOLUTE: 1 THOU/MM3 (ref 0.4–1.3)
NITRITE, URINE: NEGATIVE
NUCLEATED RED BLOOD CELLS: 0 /100 WBC
OSMOLALITY CALCULATION: 287.9 MOSMOL/KG (ref 275–300)
PH UA: 5.5 (ref 5–9)
PLATELET # BLD: 285 THOU/MM3 (ref 130–400)
PLATELET ESTIMATE: ADEQUATE
PMV BLD AUTO: 9.8 FL (ref 9.4–12.4)
POTASSIUM REFLEX MAGNESIUM: 4.2 MEQ/L (ref 3.5–5.2)
PROTEIN UA: 300 MG/DL
RBC # BLD: 4.76 MILL/MM3 (ref 4.2–5.4)
RBC URINE: ABNORMAL /HPF
RENAL EPITHELIAL, UA: ABNORMAL
SCAN OF BLOOD SMEAR: NORMAL
SEG NEUTROPHILS: 51.1 %
SEGMENTED NEUTROPHILS ABSOLUTE COUNT: 7.9 THOU/MM3 (ref 1.8–7.7)
SODIUM BLD-SCNC: 139 MEQ/L (ref 135–145)
SPECIFIC GRAVITY UA: 1.01 (ref 1–1.03)
TOTAL PROTEIN: 7.4 G/DL (ref 6.1–8)
UROBILINOGEN, URINE: 0.2 EU/DL (ref 0–1)
WBC # BLD: 15.5 THOU/MM3 (ref 4.8–10.8)
WBC UA: > 200 /HPF
YEAST: ABNORMAL

## 2023-01-09 PROCEDURE — 36415 COLL VENOUS BLD VENIPUNCTURE: CPT

## 2023-01-09 PROCEDURE — 71046 X-RAY EXAM CHEST 2 VIEWS: CPT

## 2023-01-09 PROCEDURE — 80053 COMPREHEN METABOLIC PANEL: CPT

## 2023-01-09 PROCEDURE — 81001 URINALYSIS AUTO W/SCOPE: CPT

## 2023-01-09 PROCEDURE — 85025 COMPLETE CBC W/AUTO DIFF WBC: CPT

## 2023-01-09 PROCEDURE — 87186 SC STD MICRODIL/AGAR DIL: CPT

## 2023-01-09 PROCEDURE — 93005 ELECTROCARDIOGRAM TRACING: CPT | Performed by: NURSE PRACTITIONER

## 2023-01-09 PROCEDURE — 99285 EMERGENCY DEPT VISIT HI MDM: CPT

## 2023-01-09 PROCEDURE — 87086 URINE CULTURE/COLONY COUNT: CPT

## 2023-01-09 PROCEDURE — 87077 CULTURE AEROBIC IDENTIFY: CPT

## 2023-01-09 PROCEDURE — 6370000000 HC RX 637 (ALT 250 FOR IP): Performed by: NURSE PRACTITIONER

## 2023-01-09 RX ORDER — CEPHALEXIN 250 MG/1
500 CAPSULE ORAL ONCE
Status: COMPLETED | OUTPATIENT
Start: 2023-01-09 | End: 2023-01-09

## 2023-01-09 RX ORDER — LISINOPRIL 40 MG/1
40 TABLET ORAL ONCE
Status: COMPLETED | OUTPATIENT
Start: 2023-01-09 | End: 2023-01-09

## 2023-01-09 RX ORDER — AMLODIPINE BESYLATE 10 MG/1
10 TABLET ORAL DAILY
Status: DISCONTINUED | OUTPATIENT
Start: 2023-01-09 | End: 2023-01-09

## 2023-01-09 RX ORDER — CEPHALEXIN 500 MG/1
500 CAPSULE ORAL 2 TIMES DAILY
Qty: 20 CAPSULE | Refills: 0 | Status: SHIPPED | OUTPATIENT
Start: 2023-01-09 | End: 2023-01-19

## 2023-01-09 RX ADMIN — LISINOPRIL 40 MG: 40 TABLET ORAL at 18:21

## 2023-01-09 RX ADMIN — CEPHALEXIN 500 MG: 250 CAPSULE ORAL at 20:18

## 2023-01-09 ASSESSMENT — PAIN - FUNCTIONAL ASSESSMENT
PAIN_FUNCTIONAL_ASSESSMENT: NONE - DENIES PAIN
PAIN_FUNCTIONAL_ASSESSMENT: NONE - DENIES PAIN

## 2023-01-09 NOTE — ED TRIAGE NOTES
Pt presents to the ED from home with complaints of HTN. States she was at her eye doctors appt when they checked her vital signs her blood pressure was high and they told her to come here. Pt's BP upon arrival was 225/95. Pt denies any pain. Pt is alert and oriented x4 with respirations even and unlabored. Pt states she may have forgotten to take her BP meds this morning.

## 2023-01-09 NOTE — ED PROVIDER NOTES
[facility] [address] [address] EMERGENCY DEPT      EMERGENCY MEDICINE     Pt Name: Nadir Stephens  MRN: [id number]  [place] 1955  Date of evaluation: 1/9/2023  Provider: Faizan Orourke MD    CHIEF COMPLAINT       Chief Complaint   Patient presents with    Hypertension     HISTORY OF PRESENT ILLNESS   Nadir Stephens is a pleasant 78 y.o. female who presents to the emergency department from from home, by private vehicle for evaluation of hypertension. Pt stated she has appt with optometrist today and was told her blood pressure was high and to come to ED for evaluation. Pt had eyes dilated at opthamology appt. She thinks she might have forgotten to take morning medications. Pt denies chest pain, headache, shortness of breath or any symptoms at this time. [place]     PASTMEDICAL HISTORY     Past Medical History:   Diagnosis Date    Diabetes mellitus ([facility] [address]     Hypertension     Psoriasis        Patient Active Problem List   Diagnosis Code    Primary hypertension I10    Hyperlipidemia E78.5     borderline Abnormal nuclear stress test- NO angina or angina equiv- med RX R94.39     SURGICAL HISTORY       Past Surgical History:   Procedure Laterality Date    FOOT DEBRIDEMENT Right 4/27/2022    RIGHT FOOT DEBRIDEMENT/WASH OUT performed by Jorge Isidro DPM at [address] Right 4/23/2022    RIGHT FOOT WOUND DEBRIDEMENT WITH POSS RIGHT 5TH METATARSAL RESECTION performed by Jorge Isidro DPM at [address] [facility]       Discharge Medication List as of 1/9/2023  8:02 PM        CONTINUE these medications which have NOT CHANGED    Details   dapagliflozin (FARXIGA) 10 MG tablet take 1 tablet by mouth IN THE MORNING, Disp-90 tablet, R-1Normal      lisinopril (PRINIVIL;ZESTRIL) 40 MG tablet Take 1.5 tablets by mouth nightly, Disp-90 tablet, R-1Normal      atorvastatin (LIPITOR) 40 MG tablet Take 1 tablet by mouth nightly, Disp-90 tablet, R-1Normal      amLODIPine (NORVASC) 10 MG tablet Take 1 tablet by mouth daily, Disp-90 tablet, R-1Normal      Continuous Blood Gluc Sensor (FREESTYLE MILI 2 SENSOR) MISC 1 each by Does not apply route every 14 days, Disp-2 each, R-12Normal      hydrocortisone (ANUSOL-HC) 25 MG suppository Place 1 suppository rectally 2 times daily, Disp-12 suppository, R-0Normal      aspirin 81 MG chewable tablet Take 81 mg by mouth dailyHistorical Med             ALLERGIES     has No Known Allergies. FAMILY HISTORY     She indicated that her mother is . She indicated that her father is . SOCIAL HISTORY       Social History     Tobacco Use    Smoking status: Former     Packs/day: 0.50     Years: 15.00     Pack years: 7.50     Types: Cigarettes     Quit date: 2011     Years since quittin.0    Smokeless tobacco: Never   Substance Use Topics    Alcohol use: Not Currently     Comment: social    Drug use: Never       PHYSICAL EXAM       ED Triage Vitals [23 1703]   BP Temp Temp Source Heart Rate Resp SpO2 Height Weight   (!) 225/95 97.6 °F (36.4 °C) Oral 78 11 98 % 5' 9\" (1.753 m) 212 lb (96.2 kg)       Additional Vital Signs:  Vitals:    23 1931   BP: (!) 172/77   Pulse:    Resp:    Temp:    SpO2:      Physical Exam  Constitutional:       Appearance: Normal appearance. HENT:      Head: Normocephalic. Right Ear: External ear normal.      Left Ear: External ear normal.      Nose: Nose normal.      Mouth/Throat:      Mouth: Mucous membranes are moist.      Pharynx: Oropharynx is clear. Eyes:      Extraocular Movements: Extraocular movements intact. Conjunctiva/sclera: Conjunctivae normal.      Comments: Bilateral pupils dilated 4 mm    Cardiovascular:      Rate and Rhythm: Normal rate and regular rhythm. Pulses: Normal pulses. Heart sounds: Normal heart sounds. Pulmonary:      Effort: Pulmonary effort is normal.      Breath sounds: Normal breath sounds. Abdominal:      General: Bowel sounds are normal.      Palpations: Abdomen is soft. Musculoskeletal:         General: Normal range of motion. Cervical back: Normal range of motion and neck supple. Skin:     General: Skin is warm and dry. Capillary Refill: Capillary refill takes less than 2 seconds. Neurological:      General: No focal deficit present. Mental Status: She is alert and oriented to person, place, and time. Psychiatric:         Mood and Affect: Mood normal.         Behavior: Behavior normal.       FORMAL DIAGNOSTIC RESULTS     RADIOLOGY: Interpretation per the Radiologist below, if available at the time of this note (none if blank):    XR CHEST (2 VW)   Final Result   1. Atherosclerotic calcification aortic arch. 2. Mild thoracic spondylosis. Dextroscoliosis. 3. Otherwise negative chest x-ray. **This report has been created using voice recognition software. It may contain minor errors which are inherent in voice recognition technology. **      Final report electronically signed by DR Nabil Castaneda on 1/9/2023 6:52 PM          LABS: (none if blank)  Labs Reviewed   CULTURE, URINE - Abnormal; Notable for the following components:       Result Value    Organism Escherichia coli (*)     All other components within normal limits    Narrative:     Source: urine, clean catch       Site:           Current Antibiotics: not stated   CBC WITH AUTO DIFFERENTIAL - Abnormal; Notable for the following components:    WBC 15.5 (*)     Segs Absolute 7.9 (*)     Lymphocytes Absolute 5.9 (*)     Eosinophils Absolute 0.5 (*)     All other components within normal limits   COMPREHENSIVE METABOLIC PANEL W/ REFLEX TO MG FOR LOW K - Abnormal; Notable for the following components:    Glucose 167 (*)     BUN 31 (*)     CO2 21 (*)     All other components within normal limits   GLOMERULAR FILTRATION RATE, ESTIMATED - Abnormal; Notable for the following components:    Est, Glom Filt Rate 50 (*)     All other components within normal limits   URINALYSIS WITH MICROSCOPIC - Abnormal; Notable for the following components:    Glucose, Urine 100 (*)     Blood, Urine MODERATE (*)     Protein,  (*)     Leukocyte Esterase, Urine MODERATE (*)     Character, Urine CLOUDY (*)     All other components within normal limits   ANION GAP   OSMOLALITY   SCAN OF BLOOD SMEAR       (Any cultures that may have been sent were not resulted at the time of this patient visit)    [address] / ED COURSE:     1) Number and Complexity of Problems            Problem List This Visit:         Chief Complaint   Patient presents with    Hypertension            Differential Diagnosis includes (but not limited to):  HtN, HTN urgency, HTN emergency, dehydration, UTI , ACS           2)  Data Reviewed (none if left blank)      Labs:      positive for UTI and leukocytosis               External Documentation Reviewed:         Previous patient encounter documents & history available on EMR was reviewed              See Formal Diagnostic Results above for the lab and radiology tests and orders.     3)  Treatment and Disposition         ED Reassessment:  improvement and decreased blood pressure after po medications          Case discussed with consulting clinician:  notified her PCP          Shared Decision-Making was performed and disposition discussed with the        Patient/Family and questions answered              Code Status:  Full      Summary of Patient Presentation:      MDM  Number of Diagnoses or Management Options  Essential hypertension: established, worsening  Urinary tract infection without hematuria, site unspecified: new, needed workup     Amount and/or Complexity of Data Reviewed  Clinical lab tests: ordered and reviewed  Tests in the medicine section of CPT®: ordered and reviewed  Decide to obtain previous medical records or to obtain history from someone other than the patient: yes  Obtain history from someone other than the patient: yes  Review and summarize past medical records: yes  Discuss the patient with other providers: yes (Notified her PCP of visit)  Independent visualization of images, tracings, or specimens: yes    Risk of Complications, Morbidity, and/or Mortality  Presenting problems: moderate  Diagnostic procedures: moderate  Management options: moderate    Patient Progress  Patient progress: stable  /   Vitals Reviewed:    Vitals:    01/09/23 1821 01/09/23 1918 01/09/23 1928 01/09/23 1931   BP: (!) 201/88 (!) 174/67 (!) 172/77 (!) 172/77   Pulse: 69 68 66    Resp: 13 15 12    Temp:       TempSrc:       SpO2: 99% 98% 96%    Weight:       Height:         Pt is a 78 yr old female with PMHX of HTN who presents with compliant of HTN. Pt was unsure if took morning medications. Pt has no acute abnormality on exam. Pt medicated with improvement. Pt also noted with UTI will start antibiotics. Pt will be discharged home with follow up with PCP. Notified PCP of visit and that pt has 2 BP meds currently listed on MAR but is only taking one routinely. The results of pertinent diagnostic studies and exam findings were discussed. The patients provisional diagnosis and plan of care were discussed with the patient and present family who expressed understanding. Any medications were reviewed and indications and risks of medications were discussed with the patient /family present.  Strict verbal and written return precautions, instructions and appropriate follow-up provided to  the patient     ED Medications administered this visit:  (None if blank)  Medications   lisinopril (PRINIVIL;ZESTRIL) tablet 40 mg (40 mg Oral Given 1/9/23 1821)   cephALEXin (KEFLEX) capsule 500 mg (500 mg Oral Given 1/9/23 2018)             DISCHARGE PRESCRIPTIONS: (None if blank)  Discharge Medication List as of 1/9/2023  8:02 PM        START taking these medications    Details   cephALEXin (KEFLEX) 500 MG capsule Take 1 capsule by mouth 2 times daily for 10 days, Disp-20 capsule, R-0Normal             FINAL IMPRESSION      1. Urinary tract infection without hematuria, site unspecified    2.  Essential hypertension          DISPOSITION/PLAN   DISPOSITION Decision To Discharge 01/09/2023 08:00:27 PM      OUTPATIENT FOLLOW UP THE PATIENT:  Nichole Matute MD  [address] [place] [address] [phone]    Schedule an appointment as soon as possible for a visit in 1 day        MD Katya Flowers MD  Resident  01/12/23 4523

## 2023-01-10 LAB
EKG ATRIAL RATE: 76 BPM
EKG P AXIS: 65 DEGREES
EKG P-R INTERVAL: 132 MS
EKG Q-T INTERVAL: 452 MS
EKG QRS DURATION: 150 MS
EKG QTC CALCULATION (BAZETT): 508 MS
EKG R AXIS: 32 DEGREES
EKG T AXIS: 33 DEGREES
EKG VENTRICULAR RATE: 76 BPM

## 2023-01-10 PROCEDURE — 93010 ELECTROCARDIOGRAM REPORT: CPT | Performed by: NUCLEAR MEDICINE

## 2023-01-10 NOTE — ED NOTES
ED nurse-to-nurse bedside report    Chief Complaint   Patient presents with    Hypertension      LOC: alert and orientated to name, place, date  Vital signs   Vitals:    01/09/23 1703 01/09/23 1821   BP: (!) 225/95 (!) 201/88   Pulse: 78 69   Resp: 11 13   Temp: 97.6 °F (36.4 °C)    TempSrc: Oral    SpO2: 98% 99%   Weight: 212 lb (96.2 kg)    Height: 5' 9\" (1.753 m)       Pain:    Pain Interventions: NA  Pain Goal: 0  Oxygen: No    Current needs required RA   Telemetry: Yes  LDAs:    Continuous Infusions:   Mobility: Independent  [place] Fall Risk Score:    Fall Risk 1/5/2022   2 or more falls in past year? no   Fall with injury in past year? no     Fall Interventions: call light in reach, bed in low position with wheels locked  Report given to: LA Jaimes RN  01/09/23 9270

## 2023-01-11 LAB
ORGANISM: ABNORMAL
URINE CULTURE, ROUTINE: ABNORMAL

## 2023-01-18 ENCOUNTER — OFFICE VISIT (OUTPATIENT)
Dept: INTERNAL MEDICINE CLINIC | Age: 68
End: 2023-01-18
Payer: MEDICARE

## 2023-01-18 VITALS
OXYGEN SATURATION: 98 % | TEMPERATURE: 97.3 F | SYSTOLIC BLOOD PRESSURE: 142 MMHG | BODY MASS INDEX: 32.13 KG/M2 | RESPIRATION RATE: 18 BRPM | WEIGHT: 217.6 LBS | HEART RATE: 72 BPM | DIASTOLIC BLOOD PRESSURE: 80 MMHG

## 2023-01-18 DIAGNOSIS — E78.5 HYPERLIPIDEMIA, UNSPECIFIED HYPERLIPIDEMIA TYPE: ICD-10-CM

## 2023-01-18 DIAGNOSIS — I10 PRIMARY HYPERTENSION: Primary | ICD-10-CM

## 2023-01-18 DIAGNOSIS — L30.9 ECZEMA, UNSPECIFIED TYPE: ICD-10-CM

## 2023-01-18 DIAGNOSIS — E08.00 DIABETES MELLITUS DUE TO UNDERLYING CONDITION WITH HYPEROSMOLARITY WITHOUT COMA, WITHOUT LONG-TERM CURRENT USE OF INSULIN (HCC): ICD-10-CM

## 2023-01-18 DIAGNOSIS — I10 ESSENTIAL HYPERTENSION: ICD-10-CM

## 2023-01-18 DIAGNOSIS — N18.30 STAGE 3 CHRONIC KIDNEY DISEASE, UNSPECIFIED WHETHER STAGE 3A OR 3B CKD (HCC): ICD-10-CM

## 2023-01-18 PROCEDURE — 99214 OFFICE O/P EST MOD 30 MIN: CPT | Performed by: STUDENT IN AN ORGANIZED HEALTH CARE EDUCATION/TRAINING PROGRAM

## 2023-01-18 PROCEDURE — 3074F SYST BP LT 130 MM HG: CPT | Performed by: STUDENT IN AN ORGANIZED HEALTH CARE EDUCATION/TRAINING PROGRAM

## 2023-01-18 PROCEDURE — 1124F ACP DISCUSS-NO DSCNMKR DOCD: CPT | Performed by: STUDENT IN AN ORGANIZED HEALTH CARE EDUCATION/TRAINING PROGRAM

## 2023-01-18 PROCEDURE — 3078F DIAST BP <80 MM HG: CPT | Performed by: STUDENT IN AN ORGANIZED HEALTH CARE EDUCATION/TRAINING PROGRAM

## 2023-01-18 RX ORDER — AMLODIPINE BESYLATE 10 MG/1
10 TABLET ORAL DAILY
Qty: 90 TABLET | Refills: 3 | Status: SHIPPED | OUTPATIENT
Start: 2023-01-18

## 2023-01-18 RX ORDER — ATORVASTATIN CALCIUM 40 MG/1
40 TABLET, FILM COATED ORAL NIGHTLY
Qty: 90 TABLET | Refills: 3 | Status: SHIPPED | OUTPATIENT
Start: 2023-01-18

## 2023-01-18 RX ORDER — ASPIRIN 81 MG/1
81 TABLET, CHEWABLE ORAL DAILY
Qty: 30 TABLET | Refills: 3 | Status: SHIPPED | OUTPATIENT
Start: 2023-01-18

## 2023-01-18 RX ORDER — LISINOPRIL 40 MG/1
40 TABLET ORAL EVERY 12 HOURS
Qty: 90 TABLET | Refills: 2 | Status: SHIPPED | OUTPATIENT
Start: 2023-01-18

## 2023-01-18 SDOH — ECONOMIC STABILITY: FOOD INSECURITY: WITHIN THE PAST 12 MONTHS, YOU WORRIED THAT YOUR FOOD WOULD RUN OUT BEFORE YOU GOT MONEY TO BUY MORE.: NEVER TRUE

## 2023-01-18 SDOH — ECONOMIC STABILITY: FOOD INSECURITY: WITHIN THE PAST 12 MONTHS, THE FOOD YOU BOUGHT JUST DIDN'T LAST AND YOU DIDN'T HAVE MONEY TO GET MORE.: NEVER TRUE

## 2023-01-18 ASSESSMENT — PATIENT HEALTH QUESTIONNAIRE - PHQ9
SUM OF ALL RESPONSES TO PHQ QUESTIONS 1-9: 0
2. FEELING DOWN, DEPRESSED OR HOPELESS: 0
SUM OF ALL RESPONSES TO PHQ QUESTIONS 1-9: 0
SUM OF ALL RESPONSES TO PHQ QUESTIONS 1-9: 0
1. LITTLE INTEREST OR PLEASURE IN DOING THINGS: 0
SUM OF ALL RESPONSES TO PHQ9 QUESTIONS 1 & 2: 0
SUM OF ALL RESPONSES TO PHQ QUESTIONS 1-9: 0

## 2023-01-18 ASSESSMENT — ENCOUNTER SYMPTOMS
NAUSEA: 0
ABDOMINAL PAIN: 0
BLOOD IN STOOL: 0
SORE THROAT: 0
CHEST TIGHTNESS: 0
DIARRHEA: 0
COUGH: 0
CONSTIPATION: 0
VOMITING: 0
SHORTNESS OF BREATH: 0

## 2023-01-18 ASSESSMENT — SOCIAL DETERMINANTS OF HEALTH (SDOH): HOW HARD IS IT FOR YOU TO PAY FOR THE VERY BASICS LIKE FOOD, HOUSING, MEDICAL CARE, AND HEATING?: NOT HARD AT ALL

## 2023-01-18 NOTE — PATIENT INSTRUCTIONS
Increase Lisinopril 40 mg every 12 hours from 60 mg daily. Continue other meds a current doses. Check blood pressure daily 2-3 times a day and keep a log. Bring to next office visit. Check blood sugar least twice a day. Check labs before the next office visit.

## 2023-01-18 NOTE — PROGRESS NOTES
Jonh Moore (:  1955) is a 78 y.o. female,Established patient, here for evaluation of the following chief complaint(s):  Follow-up (ED-  HTN, UTI)         ASSESSMENT/PLAN:  1. Primary hypertension  -     Basic Metabolic Panel; Future  -     Magnesium; Future  -     TSH With Reflex Ft4; Future  -     Microalbumin / Creatinine Urine Ratio; Future  -     Urinalysis with Microscopic; Future  2. Diabetes mellitus due to underlying condition with hyperosmolarity without coma, without long-term current use of insulin ([facility])  -     Basic Metabolic Panel; Future  -     Magnesium; Future  -     TSH With Reflex Ft4; Future  -     Microalbumin / Creatinine Urine Ratio; Future  -     Urinalysis with Microscopic; Future  3. Stage 3 chronic kidney disease, unspecified whether stage 3a or 3b CKD ([facility] [address]  -     Basic Metabolic Panel; Future  -     Magnesium; Future  -     TSH With Reflex Ft4; Future  -     Microalbumin / Creatinine Urine Ratio; Future  -     Urinalysis with Microscopic; Future  4. Eczema, unspecified type  -     External Referral To Dermatology  5. Essential hypertension  -     lisinopril (PRINIVIL;ZESTRIL) 40 MG tablet; Take 1 tablet by mouth in the morning and 1 tablet in the evening., Disp-90 tablet, R-2Normal    Increase Lisinopril 40 mg every 12 hours from 60 mg daily. Continue other medications at current doses. Check blood pressure daily 2-3 times a day and keep a log. Bring to next office visit. Check BG least twice a day. Will have Dr. Gilberto Nicholson talk to patient about [facility]. Check labs before the next office visit. Encouraged to be compliant with medications. Voices understanding. Patient seen and plan discussed with Dr. Geri Montero. Return in about 6 weeks (around 3/1/2023), or if symptoms worsen or fail to improve. Subjective   SUBJECTIVE/OBJECTIVE:  72 yo female with history of IDDMII, HTN, psoriasis. Here for ED follow up.  Last office visit 2022 with myself and  Tristan. Patient was sent to ED for evaluation of hypertension noted at ophthalmology visit. On arrival to ED SBP 220s. During the ED visit, BP improved to 170s. Patient reports she forgot to take her lisinopril that morning. Asymptomatic. Does not check BP regularly due to her vision issues and not having a BP cuff. Review of Systems   Constitutional:  Negative for chills, diaphoresis, fatigue and fever. HENT:  Negative for sore throat. Respiratory:  Negative for cough, chest tightness and shortness of breath. Cardiovascular:  Negative for chest pain, palpitations and leg swelling. Gastrointestinal:  Negative for abdominal pain, blood in stool, constipation, diarrhea, nausea and vomiting. Genitourinary:  Negative for dysuria and hematuria. Neurological:  Negative for dizziness, syncope, weakness, light-headedness and headaches. Psychiatric/Behavioral:  Negative for confusion. Objective   BP (!) 142/80 (Site: Left Upper Arm, Position: Sitting, Cuff Size: Large Adult)   Pulse 72   Temp 97.3 °F (36.3 °C) (Temporal)   Resp 18   Wt 217 lb 9.6 oz (98.7 kg)   SpO2 98%   BMI 32.13 kg/m²   Repeat 138/80    Physical Exam  Vitals reviewed. Constitutional:       Appearance: Normal appearance. HENT:      Head: Normocephalic and atraumatic. Nose: Nose normal.      Mouth/Throat:      Mouth: Mucous membranes are moist.      Pharynx: Oropharynx is clear. Eyes:      Extraocular Movements: Extraocular movements intact. Conjunctiva/sclera: Conjunctivae normal.   Cardiovascular:      Rate and Rhythm: Normal rate and regular rhythm. Pulses: Normal pulses. Heart sounds: Normal heart sounds. Pulmonary:      Effort: Pulmonary effort is normal.      Breath sounds: Normal breath sounds. Abdominal:      General: Abdomen is flat. Palpations: Abdomen is soft. Musculoskeletal:         General: Normal range of motion.       Cervical back: Normal range of motion and neck supple. Skin:     General: Skin is warm and dry. Neurological:      General: No focal deficit present. Mental Status: She is alert and oriented to person, place, and time. Mental status is at baseline. Psychiatric:         Mood and Affect: Mood normal.         Behavior: Behavior normal.         Thought Content: Thought content normal.              An electronic signature was used to authenticate this note. --Theodora Hand MD   . [place] [address] INTERNAL MEDICINE  [address]  Dept: [phone]  Dept Fax: [phone] : [phone]

## 2023-01-19 NOTE — PROGRESS NOTES
Lawson Lua present for initiation of [facility] personal CGM  Referring provider: Dr. Miguel Coppola 2 sensor inserted on back of L Arm. Sensor initiated via [employer] phone kiet. Reviewed interstitial glucose versus capillary glucose. Instructed on  setting and set up the appropriate alarms. Time and date set on   70 for Low Alarm   240 for High Alarm. Explained the arrows and the meaning of their blood sugar. Patient instructed the system is water resistant and can shower. To reinforce sensor with tape as needed if it begins to peel away from the skin. Encouraged Lawson Lua to call [facility] or the [facility] with any questions. Jocelynn verbalizes, via teach back, the understanding of:  Difference between sensor glucose and blood glucose meter. Fingerstick confirmations required for calibrations. Blood sugar trend arrows. Site selection, rotation, preparation. Proper steps to sensor insertion. Starting the sensor. Navigation of status, set up, and alert screens. Scanning frequency.   Options for support     *Training performed by Sara Mason, Skyla, PGY-2 Pharmacy Resident    For Pharmacy Admin Tracking Only    Program: Medical Group  CPA in place:  No  Recommendation Provided To: Patient/Caregiver: 1 via In person  Intervention Detail: Device Training  Intervention Accepted By: Patient/Caregiver: 1  Gap Closed?: No   Time Spent (min): 430 Alma Lee, PharmD, [facility]  Internal Medicine Clinical Pharmacist  [phone]

## 2023-03-07 ENCOUNTER — TELEPHONE (OUTPATIENT)
Dept: INTERNAL MEDICINE CLINIC | Age: 68
End: 2023-03-07

## 2023-03-07 NOTE — TELEPHONE ENCOUNTER
Pt called and left message, she had to cancel her 3/8 appointment due to eye problems.  Is trying to get vision in left eye improved. She rescheduled for 3/15.

## 2023-03-11 ENCOUNTER — NURSE ONLY (OUTPATIENT)
Dept: LAB | Age: 68
End: 2023-03-11

## 2023-03-11 DIAGNOSIS — I10 PRIMARY HYPERTENSION: ICD-10-CM

## 2023-03-11 DIAGNOSIS — N18.30 STAGE 3 CHRONIC KIDNEY DISEASE, UNSPECIFIED WHETHER STAGE 3A OR 3B CKD (HCC): ICD-10-CM

## 2023-03-11 DIAGNOSIS — E08.00 DIABETES MELLITUS DUE TO UNDERLYING CONDITION WITH HYPEROSMOLARITY WITHOUT COMA, WITHOUT LONG-TERM CURRENT USE OF INSULIN (HCC): ICD-10-CM

## 2023-03-11 LAB
ANION GAP SERPL CALC-SCNC: 10 MEQ/L (ref 8–16)
BACTERIA: ABNORMAL
BILIRUB UR QL STRIP: NEGATIVE
BUN SERPL-MCNC: 24 MG/DL (ref 7–22)
CALCIUM SERPL-MCNC: 9.2 MG/DL (ref 8.5–10.5)
CASTS #/AREA URNS LPF: ABNORMAL /LPF
CASTS #/AREA URNS LPF: ABNORMAL /LPF
CHARACTER UR: ABNORMAL
CHARCOAL URNS QL MICRO: ABNORMAL
CHLORIDE SERPL-SCNC: 107 MEQ/L (ref 98–111)
CO2 SERPL-SCNC: 24 MEQ/L (ref 23–33)
COLOR UR: YELLOW
CREAT SERPL-MCNC: 1.3 MG/DL (ref 0.4–1.2)
CREAT UR-MCNC: 72 MG/DL
CRYSTALS URNS QL MICRO: ABNORMAL
DEPRECATED MEAN GLUCOSE BLD GHB EST-ACNC: 162 MG/DL (ref 70–126)
EPITHELIAL CELLS, UA: ABNORMAL /HPF
GFR SERPL CREATININE-BSD FRML MDRD: 45 ML/MIN/1.73M2
GLUCOSE SERPL-MCNC: 144 MG/DL (ref 70–108)
GLUCOSE UR QL STRIP.AUTO: >= 1000 MG/DL
HBA1C MFR BLD HPLC: 7.4 % (ref 4.4–6.4)
HGB UR QL STRIP.AUTO: ABNORMAL
KETONES UR QL STRIP.AUTO: NEGATIVE
LEUKOCYTE ESTERASE UR QL STRIP.AUTO: ABNORMAL
MAGNESIUM SERPL-MCNC: 2.3 MG/DL (ref 1.6–2.4)
MICROALBUMIN UR-MCNC: 36.54 MG/DL
MICROALBUMIN/CREAT RATIO PNL UR: 508 MG/G (ref 0–30)
NITRITE UR QL STRIP.AUTO: NEGATIVE
PH UR STRIP.AUTO: 5 [PH] (ref 5–9)
POTASSIUM SERPL-SCNC: 4.7 MEQ/L (ref 3.5–5.2)
PROT UR STRIP.AUTO-MCNC: 100 MG/DL
RBC #/AREA URNS HPF: ABNORMAL /HPF
RENAL EPI CELLS #/AREA URNS HPF: ABNORMAL /[HPF]
SODIUM SERPL-SCNC: 141 MEQ/L (ref 135–145)
SPECIFIC GRAVITY UA: 1.01 (ref 1–1.03)
TSH SERPL DL<=0.005 MIU/L-ACNC: 0.57 UIU/ML (ref 0.4–4.2)
UROBILINOGEN, URINE: 0.2 EU/DL (ref 0–1)
WBC #/AREA URNS HPF: > 200 /HPF
YEAST LIKE FUNGI URNS QL MICRO: ABNORMAL

## 2023-03-16 ENCOUNTER — OFFICE VISIT (OUTPATIENT)
Dept: INTERNAL MEDICINE CLINIC | Age: 68
End: 2023-03-16

## 2023-03-16 VITALS
HEART RATE: 70 BPM | DIASTOLIC BLOOD PRESSURE: 60 MMHG | BODY MASS INDEX: 31.04 KG/M2 | WEIGHT: 209.6 LBS | SYSTOLIC BLOOD PRESSURE: 140 MMHG | TEMPERATURE: 97.3 F | HEIGHT: 69 IN

## 2023-03-16 DIAGNOSIS — R80.1 PERSISTENT PROTEINURIA: ICD-10-CM

## 2023-03-16 DIAGNOSIS — E78.5 HYPERLIPIDEMIA, UNSPECIFIED HYPERLIPIDEMIA TYPE: ICD-10-CM

## 2023-03-16 DIAGNOSIS — E08.21 DIABETES MELLITUS DUE TO UNDERLYING CONDITION WITH DIABETIC NEPHROPATHY, WITHOUT LONG-TERM CURRENT USE OF INSULIN (HCC): Primary | ICD-10-CM

## 2023-03-16 DIAGNOSIS — I10 ESSENTIAL HYPERTENSION: ICD-10-CM

## 2023-03-16 PROBLEM — N18.32 HYPERTENSIVE HEART AND KIDNEY DISEASE WITHOUT HEART FAILURE AND WITH STAGE 3B CHRONIC KIDNEY DISEASE (HCC): Status: ACTIVE | Noted: 2022-02-23

## 2023-03-16 PROBLEM — H25.9 AGE-RELATED CATARACT OF BOTH EYES: Status: ACTIVE | Noted: 2021-07-26

## 2023-03-16 PROBLEM — E11.3499 SEVERE NONPROLIFERATIVE DIABETIC RETINOPATHY ASSOCIATED WITH TYPE 2 DIABETES MELLITUS (HCC): Status: ACTIVE | Noted: 2021-07-26

## 2023-03-16 PROBLEM — I13.10 HYPERTENSIVE HEART AND KIDNEY DISEASE WITHOUT HEART FAILURE AND WITH STAGE 3B CHRONIC KIDNEY DISEASE (HCC): Status: ACTIVE | Noted: 2022-02-23

## 2023-03-16 PROBLEM — E11.3519 PROLIFERATIVE RETINOPATHY WITH RETINAL EDEMA DUE TO TYPE 2 DIABETES MELLITUS (HCC): Status: ACTIVE | Noted: 2021-07-26

## 2023-03-16 PROBLEM — E11.42 DIABETIC POLYNEUROPATHY ASSOCIATED WITH TYPE 2 DIABETES MELLITUS (HCC): Status: ACTIVE | Noted: 2022-02-23

## 2023-03-16 RX ORDER — LISINOPRIL 40 MG/1
40 TABLET ORAL EVERY 12 HOURS
Qty: 90 TABLET | Refills: 2 | Status: SHIPPED | OUTPATIENT
Start: 2023-03-16

## 2023-03-16 RX ORDER — AMLODIPINE BESYLATE 10 MG/1
10 TABLET ORAL DAILY
Qty: 90 TABLET | Refills: 3 | Status: SHIPPED | OUTPATIENT
Start: 2023-03-16

## 2023-03-16 RX ORDER — ATORVASTATIN CALCIUM 40 MG/1
40 TABLET, FILM COATED ORAL NIGHTLY
Qty: 90 TABLET | Refills: 3 | Status: SHIPPED | OUTPATIENT
Start: 2023-03-16

## 2023-03-16 RX ORDER — ASPIRIN 81 MG/1
81 TABLET, CHEWABLE ORAL DAILY
Qty: 30 TABLET | Refills: 3 | Status: SHIPPED | OUTPATIENT
Start: 2023-03-16

## 2023-03-16 SDOH — ECONOMIC STABILITY: FOOD INSECURITY: WITHIN THE PAST 12 MONTHS, THE FOOD YOU BOUGHT JUST DIDN'T LAST AND YOU DIDN'T HAVE MONEY TO GET MORE.: NEVER TRUE

## 2023-03-16 SDOH — ECONOMIC STABILITY: FOOD INSECURITY: WITHIN THE PAST 12 MONTHS, YOU WORRIED THAT YOUR FOOD WOULD RUN OUT BEFORE YOU GOT MONEY TO BUY MORE.: NEVER TRUE

## 2023-03-16 SDOH — ECONOMIC STABILITY: INCOME INSECURITY: HOW HARD IS IT FOR YOU TO PAY FOR THE VERY BASICS LIKE FOOD, HOUSING, MEDICAL CARE, AND HEATING?: NOT HARD AT ALL

## 2023-03-16 SDOH — ECONOMIC STABILITY: HOUSING INSECURITY
IN THE LAST 12 MONTHS, WAS THERE A TIME WHEN YOU DID NOT HAVE A STEADY PLACE TO SLEEP OR SLEPT IN A SHELTER (INCLUDING NOW)?: NO

## 2023-03-16 ASSESSMENT — ENCOUNTER SYMPTOMS
NAUSEA: 0
WHEEZING: 0
COUGH: 0
VOMITING: 0
SHORTNESS OF BREATH: 0
CHEST TIGHTNESS: 0
CONSTIPATION: 0
ABDOMINAL PAIN: 0

## 2023-03-16 NOTE — PROGRESS NOTES
heart failure and with stage 3b chronic kidney disease ([facility] [address] 2022    Psoriasis        Past Surgical History:   Procedure Laterality Date    FOOT DEBRIDEMENT Right 2022    RIGHT FOOT DEBRIDEMENT/WASH OUT performed by Isabelle Davis DPM at [address] [place] [address] Right 2022    RIGHT FOOT WOUND DEBRIDEMENT WITH POSS RIGHT 5TH METATARSAL RESECTION performed by Isabelle Davis DPM at [facility]       Current Outpatient Medications   Medication Sig Dispense Refill    lisinopril (PRINIVIL;ZESTRIL) 40 MG tablet Take 1 tablet by mouth in the morning and 1 tablet in the evening. 90 tablet 2    dapagliflozin (FARXIGA) 10 MG tablet take 1 tablet by mouth IN THE MORNING 90 tablet 3    atorvastatin (LIPITOR) 40 MG tablet Take 1 tablet by mouth nightly 90 tablet 3    amLODIPine (NORVASC) 10 MG tablet Take 1 tablet by mouth daily 90 tablet 3    aspirin 81 MG chewable tablet Take 1 tablet by mouth daily 30 tablet 3    metFORMIN (GLUCOPHAGE) 500 MG tablet Take 1 tablet by mouth 2 times daily (with meals) 60 tablet 1    Continuous Blood Gluc Sensor (FREESTYLE MILI 2 SENSOR) MISC 1 each by Does not apply route every 14 days 2 each 12    hydrocortisone (ANUSOL-HC) 25 MG suppository Place 1 suppository rectally 2 times daily 12 suppository 0     No current facility-administered medications for this visit.        No Known Allergies    Social History     Socioeconomic History    Marital status: Single     Spouse name: Not on file    Number of children: 1    Years of education: Not on file    Highest education level: Not on file   Occupational History    Not on file   Tobacco Use    Smoking status: Former     Packs/day: 0.50     Years: 15.00     Pack years: 7.50     Types: Cigarettes     Quit date: 2011     Years since quittin.2    Smokeless tobacco: Never   Substance and Sexual Activity    Alcohol use: Not Currently     Comment: social    Drug use: Never    Sexual activity: Not on file   Other Topics Concern

## 2023-04-17 ENCOUNTER — TELEPHONE (OUTPATIENT)
Dept: INTERNAL MEDICINE CLINIC | Age: 68
End: 2023-04-17

## 2023-04-17 NOTE — TELEPHONE ENCOUNTER
Spoke with patient, she admits she is terrible at drinking water. Encouraged her to increase intake. BMP ordered and will mail it to her at her daughters residence in [facility] as she is staying there this week. States she is feeling fine now but understands to get bloodwork done immediately or go to UC/ED if she has any symptoms of chest pain, palpitations, shortness of breath, nausea, vomiting, abdominal pain.

## 2023-04-17 NOTE — TELEPHONE ENCOUNTER
----- Message from Alessandro Culver DO sent at 4/17/2023  7:27 AM EDT -----  Please call patient regarding her BMP results and tell her to drink plenty of water and order a repeat BMP in one week (4/24) for her. If she has any symptoms of chest pain, palpitations, shortness of breath, nausea, vomiting, abdominal pain please tell her to get her BMP done sooner or go to Urgent Care/Emergency Department.  Thank you.  ----- Message -----  From: Enrique Wasserman Incoming Lab Results From Soft  Sent: 4/15/2023  11:10 AM EDT  To: Alessandro Culver DO

## 2023-04-19 ENCOUNTER — OFFICE VISIT (OUTPATIENT)
Dept: INTERNAL MEDICINE CLINIC | Age: 68
End: 2023-04-19

## 2023-04-19 VITALS
HEIGHT: 69 IN | DIASTOLIC BLOOD PRESSURE: 68 MMHG | TEMPERATURE: 97.8 F | WEIGHT: 212.8 LBS | HEART RATE: 84 BPM | BODY MASS INDEX: 31.52 KG/M2 | SYSTOLIC BLOOD PRESSURE: 138 MMHG

## 2023-04-19 DIAGNOSIS — I10 PRIMARY HYPERTENSION: Primary | ICD-10-CM

## 2023-04-19 DIAGNOSIS — L40.9 PSORIASIS: ICD-10-CM

## 2023-04-19 DIAGNOSIS — F41.9 ANXIETY: ICD-10-CM

## 2023-04-19 DIAGNOSIS — E08.21 DIABETES MELLITUS DUE TO UNDERLYING CONDITION WITH DIABETIC NEPHROPATHY, WITHOUT LONG-TERM CURRENT USE OF INSULIN (HCC): ICD-10-CM

## 2023-04-19 RX ORDER — PAROXETINE 10 MG/1
10 TABLET, FILM COATED ORAL DAILY
Qty: 30 TABLET | Refills: 3 | Status: SHIPPED | OUTPATIENT
Start: 2023-04-19

## 2023-04-19 ASSESSMENT — ENCOUNTER SYMPTOMS
SHORTNESS OF BREATH: 0
CHEST TIGHTNESS: 0
SORE THROAT: 0
DIARRHEA: 0
ABDOMINAL PAIN: 0
NAUSEA: 0
VOMITING: 0
COUGH: 0

## 2023-04-19 NOTE — PROGRESS NOTES
numbers (if patient brought to visit today). Most recent eye exam recently 03/2023, follows with Dr. David Sheldon. Reportedly was told to have retinopathy but mostly due to uncontrolled HTN. Patient reminded to have eye exam done yearly. Normal renal function, eGFR 50 (04/15/2023), normal microalbumin 36.54 on 3/11/23. Alb/Cr 508 from 623 on 7/1/22 from 3951 (09/11/2021). Patient denies foot lesions besides 5th digit wound. Last foot exam on during hospitalization. Reports mild neuropathy in BLE but has noticed it recently since the hospitalization. LDL 63 on 01/15/2022. Patient is on an Aspirin, Ace Inhibitor, and statin. Psoriasis: Diagnosed clinically. Never worked up for it. Has never seen a Rheumatologist or Dermatologist. Usually, has had psoriatic lesions in hands and elbows. No associated joint pain or known arthritis. Improvement in lesions noted. Did not go for appt with Dermatology due to her busy schedule. Visual disturbance: Continues to have blurry vision. Seen by Dr. Kelly Andino. Getting \"eye shots for retinal problem\". Last visit 12/03/2021. Has follow up in a month. Anxiety: Reports feeling very anxious today. Have been staying in [facility] with daughter and son-in-law for sometime. Review of Systems   Constitutional:  Negative for activity change, appetite change, chills, fatigue and unexpected weight change. HENT:  Negative for sore throat. Eyes:  Positive for visual disturbance (chronic). Respiratory:  Negative for cough, chest tightness and shortness of breath. Cardiovascular:  Negative for chest pain, palpitations and leg swelling. Gastrointestinal:  Negative for abdominal pain, diarrhea, nausea and vomiting. Endocrine: Negative for polyuria. Genitourinary:  Negative for dysuria. Skin:  Negative for rash and wound. Neurological:  Negative for dizziness, tremors, syncope, weakness, light-headedness, numbness and headaches.    Psychiatric/Behavioral:  Positive

## 2023-04-21 DIAGNOSIS — E08.21 DIABETES MELLITUS DUE TO UNDERLYING CONDITION WITH DIABETIC NEPHROPATHY, WITHOUT LONG-TERM CURRENT USE OF INSULIN (HCC): ICD-10-CM

## 2023-05-17 ENCOUNTER — OFFICE VISIT (OUTPATIENT)
Dept: INTERNAL MEDICINE CLINIC | Age: 68
End: 2023-05-17
Payer: MEDICARE

## 2023-05-17 VITALS
SYSTOLIC BLOOD PRESSURE: 132 MMHG | WEIGHT: 210.2 LBS | DIASTOLIC BLOOD PRESSURE: 68 MMHG | HEART RATE: 74 BPM | HEIGHT: 69 IN | BODY MASS INDEX: 31.13 KG/M2

## 2023-05-17 DIAGNOSIS — I10 ESSENTIAL HYPERTENSION: Primary | ICD-10-CM

## 2023-05-17 DIAGNOSIS — L40.9 PSORIASIS: ICD-10-CM

## 2023-05-17 DIAGNOSIS — E08.21 DIABETES MELLITUS DUE TO UNDERLYING CONDITION WITH DIABETIC NEPHROPATHY, WITHOUT LONG-TERM CURRENT USE OF INSULIN (HCC): ICD-10-CM

## 2023-05-17 DIAGNOSIS — F41.9 ANXIETY: ICD-10-CM

## 2023-05-17 PROCEDURE — 3078F DIAST BP <80 MM HG: CPT | Performed by: STUDENT IN AN ORGANIZED HEALTH CARE EDUCATION/TRAINING PROGRAM

## 2023-05-17 PROCEDURE — 99214 OFFICE O/P EST MOD 30 MIN: CPT | Performed by: STUDENT IN AN ORGANIZED HEALTH CARE EDUCATION/TRAINING PROGRAM

## 2023-05-17 PROCEDURE — 1124F ACP DISCUSS-NO DSCNMKR DOCD: CPT | Performed by: STUDENT IN AN ORGANIZED HEALTH CARE EDUCATION/TRAINING PROGRAM

## 2023-05-17 PROCEDURE — 3075F SYST BP GE 130 - 139MM HG: CPT | Performed by: STUDENT IN AN ORGANIZED HEALTH CARE EDUCATION/TRAINING PROGRAM

## 2023-05-17 RX ORDER — ASPIRIN 81 MG/1
81 TABLET, CHEWABLE ORAL DAILY
Qty: 30 TABLET | Refills: 3 | Status: SHIPPED | OUTPATIENT
Start: 2023-05-17

## 2023-05-17 ASSESSMENT — ENCOUNTER SYMPTOMS
VOMITING: 0
SHORTNESS OF BREATH: 0
ABDOMINAL PAIN: 0
CHEST TIGHTNESS: 0
COUGH: 0
NAUSEA: 0
DIARRHEA: 0
SORE THROAT: 0

## 2023-05-17 NOTE — PROGRESS NOTES
Jimena Jackson (:  1955) is a 78 y.o. female,Established patient, here for evaluation of the following chief complaint(s):  Diabetes (4 weeks / A1c 7.4 on 3/11), Hypertension, and Anxiety         ASSESSMENT/PLAN:  1. Essential hypertension  -     aspirin 81 MG chewable tablet; Take 1 tablet by mouth daily, Disp-30 tablet, R-3Normal  2. Diabetes mellitus due to underlying condition with diabetic nephropathy, without long-term current use of insulin (HCC)  -     metFORMIN (GLUCOPHAGE) 500 MG tablet; Take 1 tablet by mouth 2 times daily (with meals), Disp-60 tablet, R-1Normal  -     aspirin 81 MG chewable tablet; Take 1 tablet by mouth daily, Disp-30 tablet, R-3Normal  -     HM DIABETES FOOT EXAM  -     External Referral To Podiatry  -     Dulaglutide 0.75 MG/0.5ML SOPN; Inject 0.75 mg into the skin once a week, Disp-4 Adjustable Dose Pre-filled Pen Syringe, R-1Normal  3. Anxiety  4. Psoriasis      Given patient's PAD history, will allow BP goal 130-150/70-80 for effective brain perfusion. Therefore, no changes in antihypertensives made. Encouraged to continue checking BP daily and keep a log. Advised to bring log to next office visit. Will discontinue [place]. Will start on Trulicity. Risks and benefits discussed with the patient. Voices understanding. Continue Metformin at current dose. Instructed to take half dose if hypoglycemia noted. Referred to Dr. Lashonda Diego, podiatry to evaluate and treat noted calluses and toe nails (long, thickened toe nails noted). Patient seen and plan discussed with Dr. Stephanie Perkins. Return in about 4 weeks (around 2023), or if symptoms worsen or fail to improve. Subjective   SUBJECTIVE/OBJECTIVE:  72 yo female with history of IDDMII, HTN, psoriasis. Here for ED follow up. Last office visit 2023 with myself and Dr. Stephanie Perkins. Essential HTN: Continues to have BP ranging in 140s-160.  Reports no associated symptoms of chest pain, dyspnea,

## 2023-05-17 NOTE — PATIENT INSTRUCTIONS
Check BP daily and keep a log. Consider buying a cover for yudith patch to keep it on. Available on [employer]. Continue Farxiga until starting Trulicity. Continue Metformin at current dose. If glucose run too low, then take half of Metformin dose.

## 2023-06-14 PROBLEM — L40.9 PSORIASIS: Status: ACTIVE | Noted: 2023-06-14

## 2023-10-16 ENCOUNTER — OFFICE VISIT (OUTPATIENT)
Dept: CARDIOLOGY CLINIC | Age: 68
End: 2023-10-16
Payer: MEDICARE

## 2023-10-16 VITALS
WEIGHT: 199.8 LBS | BODY MASS INDEX: 27.97 KG/M2 | HEIGHT: 71 IN | HEART RATE: 63 BPM | SYSTOLIC BLOOD PRESSURE: 154 MMHG | DIASTOLIC BLOOD PRESSURE: 71 MMHG

## 2023-10-16 DIAGNOSIS — E78.00 PURE HYPERCHOLESTEROLEMIA: ICD-10-CM

## 2023-10-16 DIAGNOSIS — I10 PRIMARY HYPERTENSION: Primary | ICD-10-CM

## 2023-10-16 DIAGNOSIS — R94.39 ABNORMAL NUCLEAR STRESS TEST: ICD-10-CM

## 2023-10-16 PROCEDURE — 99214 OFFICE O/P EST MOD 30 MIN: CPT | Performed by: INTERNAL MEDICINE

## 2023-10-16 PROCEDURE — 3077F SYST BP >= 140 MM HG: CPT | Performed by: INTERNAL MEDICINE

## 2023-10-16 PROCEDURE — 1124F ACP DISCUSS-NO DSCNMKR DOCD: CPT | Performed by: INTERNAL MEDICINE

## 2023-10-16 PROCEDURE — 3078F DIAST BP <80 MM HG: CPT | Performed by: INTERNAL MEDICINE

## 2023-10-16 NOTE — PROGRESS NOTES
Chief Complaint   Patient presents with    1 Year Follow Up   PROVIDER:     Tommy Chaudhari. Abram Strong M.D.     [facility]:  2021     REASON OF CONSULTATION:  Elevated troponin in a 52-year-old female  patient, admitted with accelerated hypertension.           Patient here for a 1 year follow up    EKG done 1/10/2023     Denied chest pain, sob, palpitation, dizziness or  edema    No Hx of  angina or angina equivalent    Denied  syncope    Nonsmoker    Walk with cane at home    [facility]  Father had CABg in his 63's  Mother had CABG I her 63's      Past Surgical History:   Procedure Laterality Date    FOOT DEBRIDEMENT Right 2022    RIGHT FOOT DEBRIDEMENT/WASH OUT performed by Brandy Robert DPM at [facility] Right 2022    RIGHT FOOT WOUND DEBRIDEMENT WITH POSS RIGHT 5TH METATARSAL RESECTION performed by Brandy Robert DPM at [address] [place] [address]       No Known Allergies     Family History   Problem Relation Age of Onset    Other Mother         thyroidectomy    Diabetes Mother     Heart Disease Mother     Heart Disease Father         Social History     Socioeconomic History    Marital status: Single     Spouse name: Not on file    Number of children: 1    Years of education: Not on file    Highest education level: Not on file   Occupational History    Not on file   Tobacco Use    Smoking status: Former     Packs/day: 0.50     Years: 15.00     Additional pack years: 0.00     Total pack years: 7.50     Types: Cigarettes     Quit date: 2011     Years since quittin.7    Smokeless tobacco: Never   Substance and Sexual Activity    Alcohol use: Not Currently     Comment: social    Drug use: Never    Sexual activity: Not on file   Other Topics Concern    Not on file   Social History Narrative    Not on file     Social Determinants of Health     Financial Resource Strain: Low Risk  (3/16/2023)    Overall Financial Resource Strain (CARDIA)     Difficulty of Paying Living Expenses: Not hard at all   Food

## 2023-10-21 ENCOUNTER — NURSE ONLY (OUTPATIENT)
Dept: LAB | Age: 68
End: 2023-10-21

## 2023-10-21 DIAGNOSIS — R94.39 ABNORMAL NUCLEAR STRESS TEST: ICD-10-CM

## 2023-10-21 DIAGNOSIS — I10 PRIMARY HYPERTENSION: ICD-10-CM

## 2023-10-21 DIAGNOSIS — E78.00 PURE HYPERCHOLESTEROLEMIA: ICD-10-CM

## 2023-10-21 LAB
ANION GAP SERPL CALC-SCNC: 10 MEQ/L (ref 8–16)
BUN SERPL-MCNC: 34 MG/DL (ref 7–22)
CALCIUM SERPL-MCNC: 9.4 MG/DL (ref 8.5–10.5)
CHLORIDE SERPL-SCNC: 108 MEQ/L (ref 98–111)
CO2 SERPL-SCNC: 24 MEQ/L (ref 23–33)
CREAT SERPL-MCNC: 1.1 MG/DL (ref 0.4–1.2)
GFR SERPL CREATININE-BSD FRML MDRD: 55 ML/MIN/1.73M2
GLUCOSE SERPL-MCNC: 138 MG/DL (ref 70–108)
MAGNESIUM SERPL-MCNC: 2.2 MG/DL (ref 1.6–2.4)
POTASSIUM SERPL-SCNC: 5.3 MEQ/L (ref 3.5–5.2)
SODIUM SERPL-SCNC: 142 MEQ/L (ref 135–145)

## 2023-11-12 DIAGNOSIS — E08.21 DIABETES MELLITUS DUE TO UNDERLYING CONDITION WITH DIABETIC NEPHROPATHY, WITHOUT LONG-TERM CURRENT USE OF INSULIN (HCC): ICD-10-CM

## 2023-11-13 RX ORDER — DULAGLUTIDE 1.5 MG/.5ML
1.5 INJECTION, SOLUTION SUBCUTANEOUS WEEKLY
Qty: 2 ML | OUTPATIENT
Start: 2023-11-13

## 2023-12-05 DIAGNOSIS — E08.21 DIABETES MELLITUS DUE TO UNDERLYING CONDITION WITH DIABETIC NEPHROPATHY, WITHOUT LONG-TERM CURRENT USE OF INSULIN (HCC): ICD-10-CM

## 2023-12-06 RX ORDER — DULAGLUTIDE 1.5 MG/.5ML
1.5 INJECTION, SOLUTION SUBCUTANEOUS WEEKLY
Qty: 2 ML | OUTPATIENT
Start: 2023-12-06

## 2023-12-08 DIAGNOSIS — E08.21 DIABETES MELLITUS DUE TO UNDERLYING CONDITION WITH DIABETIC NEPHROPATHY, WITHOUT LONG-TERM CURRENT USE OF INSULIN (HCC): ICD-10-CM

## 2023-12-08 DIAGNOSIS — I10 ESSENTIAL HYPERTENSION: ICD-10-CM

## 2023-12-08 RX ORDER — LISINOPRIL 40 MG/1
TABLET ORAL
Qty: 180 TABLET | Refills: 1 | OUTPATIENT
Start: 2023-12-08

## 2023-12-13 ENCOUNTER — TELEPHONE (OUTPATIENT)
Dept: INTERNAL MEDICINE CLINIC | Age: 68
End: 2023-12-13

## 2023-12-13 ENCOUNTER — HOSPITAL ENCOUNTER (EMERGENCY)
Age: 68
Discharge: HOME | End: 2023-12-13
Payer: MEDICARE

## 2023-12-13 VITALS
BODY MASS INDEX: 27.86 KG/M2 | SYSTOLIC BLOOD PRESSURE: 156 MMHG | OXYGEN SATURATION: 98 % | WEIGHT: 199 LBS | DIASTOLIC BLOOD PRESSURE: 79 MMHG | HEART RATE: 85 BPM | RESPIRATION RATE: 16 BRPM | HEIGHT: 71 IN | TEMPERATURE: 97.9 F

## 2023-12-13 DIAGNOSIS — U07.1 COVID-19: Primary | ICD-10-CM

## 2023-12-13 LAB — SARS-COV-2 RDRP RESP QL NAA+PROBE: DETECTED

## 2023-12-13 PROCEDURE — 87635 SARS-COV-2 COVID-19 AMP PRB: CPT

## 2023-12-13 PROCEDURE — 99212 OFFICE O/P EST SF 10 MIN: CPT

## 2023-12-13 PROCEDURE — 99213 OFFICE O/P EST LOW 20 MIN: CPT

## 2023-12-13 ASSESSMENT — PAIN - FUNCTIONAL ASSESSMENT: PAIN_FUNCTIONAL_ASSESSMENT: NONE - DENIES PAIN

## 2023-12-13 NOTE — ED PROVIDER NOTES
[facility]  Urgent Care Encounter       CHIEF COMPLAINT       Chief Complaint   Patient presents with    Covid Testing    Nasal Congestion    Cough       Nurses Notes reviewed and I agree except as noted in the HPI. HISTORY OF PRESENT ILLNESS   Donato Opitz is a 78 y.o. female who presents with complaints of cough, congestion, and runny nose. Patient reports that symptoms started this week. Patient reports that her daughter was here yesterday and tested positive. Patient reports that she lives with her daughter and is in close contact. The history is provided by the patient. REVIEW OF SYSTEMS     Review of Systems   Constitutional:  Negative for fatigue and fever. HENT:  Positive for congestion and rhinorrhea. Respiratory:  Positive for cough. All other systems reviewed and are negative. PAST MEDICAL HISTORY         Diagnosis Date    Diabetes mellitus ([address])     Diabetic polyneuropathy associated with type 2 diabetes mellitus ([address]) 2/23/2022    Hypertension     Hypertensive heart and kidney disease without heart failure and with stage 3b chronic kidney disease ([address]) 2/23/2022    Psoriasis        SURGICALHISTORY     Patient  has a past surgical history that includes Toe amputation (Right, 4/23/2022) and Foot Debridement (Right, 4/27/2022). CURRENT MEDICATIONS       Previous Medications    AMLODIPINE (NORVASC) 10 MG TABLET    Take 1 tablet by mouth daily    ASPIRIN 81 MG CHEWABLE TABLET    Take 1 tablet by mouth daily    ATORVASTATIN (LIPITOR) 40 MG TABLET    Take 1 tablet by mouth nightly    CONTINUOUS BLOOD GLUC SENSOR (FREESTYLE MILI 2 SENSOR) MISC    1 each by Does not apply route every 14 days    HYDROCORTISONE (ANUSOL-HC) 25 MG SUPPOSITORY    Place 1 suppository rectally 2 times daily    LISINOPRIL (PRINIVIL;ZESTRIL) 40 MG TABLET    Take 1 tablet by mouth in the morning and 1 tablet in the evening.     METFORMIN (GLUCOPHAGE) 500 MG TABLET    Take 1 tablet

## 2023-12-13 NOTE — DISCHARGE INSTRUCTIONS
Quarantine for 5 days  Otc cough and cold medication as needed  Follow up with PCP  ER for chest pain or shortness of breath

## 2024-01-11 ENCOUNTER — OFFICE VISIT (OUTPATIENT)
Dept: INTERNAL MEDICINE CLINIC | Age: 69
End: 2024-01-11
Payer: MEDICARE

## 2024-01-11 VITALS
SYSTOLIC BLOOD PRESSURE: 127 MMHG | BODY MASS INDEX: 28.05 KG/M2 | DIASTOLIC BLOOD PRESSURE: 55 MMHG | HEART RATE: 77 BPM | WEIGHT: 189.4 LBS | OXYGEN SATURATION: 98 % | HEIGHT: 69 IN

## 2024-01-11 DIAGNOSIS — R09.82 POST-NASAL DRIP: ICD-10-CM

## 2024-01-11 DIAGNOSIS — F41.9 ANXIETY: ICD-10-CM

## 2024-01-11 DIAGNOSIS — E78.5 HYPERLIPIDEMIA, UNSPECIFIED HYPERLIPIDEMIA TYPE: ICD-10-CM

## 2024-01-11 DIAGNOSIS — E08.21 DIABETES MELLITUS DUE TO UNDERLYING CONDITION WITH DIABETIC NEPHROPATHY, WITHOUT LONG-TERM CURRENT USE OF INSULIN (HCC): ICD-10-CM

## 2024-01-11 DIAGNOSIS — E08.00 DIABETES MELLITUS DUE TO UNDERLYING CONDITION WITH HYPEROSMOLARITY WITHOUT COMA, WITHOUT LONG-TERM CURRENT USE OF INSULIN (HCC): Primary | ICD-10-CM

## 2024-01-11 DIAGNOSIS — I10 ESSENTIAL HYPERTENSION: ICD-10-CM

## 2024-01-11 LAB — HBA1C MFR BLD: 7.4 % (ref 4.3–5.7)

## 2024-01-11 PROCEDURE — 3074F SYST BP LT 130 MM HG: CPT

## 2024-01-11 PROCEDURE — 1124F ACP DISCUSS-NO DSCNMKR DOCD: CPT

## 2024-01-11 PROCEDURE — 83036 HEMOGLOBIN GLYCOSYLATED A1C: CPT

## 2024-01-11 PROCEDURE — 99214 OFFICE O/P EST MOD 30 MIN: CPT

## 2024-01-11 PROCEDURE — 3078F DIAST BP <80 MM HG: CPT

## 2024-01-11 RX ORDER — ATORVASTATIN CALCIUM 40 MG/1
40 TABLET, FILM COATED ORAL NIGHTLY
Qty: 90 TABLET | Refills: 3 | Status: SHIPPED | OUTPATIENT
Start: 2024-01-11

## 2024-01-11 RX ORDER — LISINOPRIL 40 MG/1
40 TABLET ORAL EVERY 12 HOURS
Qty: 180 TABLET | Refills: 3 | Status: SHIPPED | OUTPATIENT
Start: 2024-01-11

## 2024-01-11 RX ORDER — ASPIRIN 81 MG/1
81 TABLET, CHEWABLE ORAL DAILY
Qty: 90 TABLET | Refills: 3 | Status: SHIPPED | OUTPATIENT
Start: 2024-01-11

## 2024-01-11 RX ORDER — FLUTICASONE PROPIONATE 50 MCG
2 SPRAY, SUSPENSION (ML) NASAL DAILY
Qty: 16 G | Refills: 0 | Status: SHIPPED | OUTPATIENT
Start: 2024-01-11

## 2024-01-11 RX ORDER — AMLODIPINE BESYLATE 10 MG/1
10 TABLET ORAL DAILY
Qty: 90 TABLET | Refills: 3 | Status: SHIPPED | OUTPATIENT
Start: 2024-01-11

## 2024-01-11 RX ORDER — METFORMIN HYDROCHLORIDE 500 MG/1
500 TABLET, EXTENDED RELEASE ORAL
Qty: 30 TABLET | Refills: 1 | Status: SHIPPED | OUTPATIENT
Start: 2024-01-11 | End: 2024-01-15 | Stop reason: SDUPTHER

## 2024-01-11 ASSESSMENT — PATIENT HEALTH QUESTIONNAIRE - PHQ9
SUM OF ALL RESPONSES TO PHQ QUESTIONS 1-9: 0
SUM OF ALL RESPONSES TO PHQ9 QUESTIONS 1 & 2: 0
SUM OF ALL RESPONSES TO PHQ QUESTIONS 1-9: 0
1. LITTLE INTEREST OR PLEASURE IN DOING THINGS: 0
SUM OF ALL RESPONSES TO PHQ QUESTIONS 1-9: 0
SUM OF ALL RESPONSES TO PHQ QUESTIONS 1-9: 0
2. FEELING DOWN, DEPRESSED OR HOPELESS: 0

## 2024-01-11 NOTE — PROGRESS NOTES
occur during the first week.  Metformin 500 mg XR, daily ordered.  Patient to follow-up in 1 month for reevaluation that time.  Discussed with patient if she has on controllable GI symptoms to call me can reevaluate and consider other medications at that time.  Patient and patient's daughter understand agree with this plan  -- Patient was found to previously have increased/elevated protein in her urine.  Will repeat micro albumin creatinine ratio.  This discussed with patient.  Further lab workup was ordered.    Essential hypertension: Well-controlled.  Blood pressure today 127/55  -Continue current regimen, amlodipine 10 mg, lisinopril 40 mg    Hyperlipidemia:  -Will continue patient's home regimen, Lipitor 40 mg, daily this time.  -Will discuss with patient getting lipid panel once yearly just statins as needed.    Postnasal drip: Patient complains of some congestion and nasal drip.  -Discussed with patient over-the-counter Flonase and prescription.  -Additionally discussed other over-the-counter medication for URI.    Patient to follow-up in 1 month for reevaluation.          Perez Hilton DO  IM Resident, PGY-2  [facility]    Staffed with: Dr. Jude Hudson DO    [facility] INTER[facility]  [address] [place] [address]  Dept: [phone]  Dept Fax: [phone]  Loc: [phone]

## 2024-01-15 DIAGNOSIS — E08.00 DIABETES MELLITUS DUE TO UNDERLYING CONDITION WITH HYPEROSMOLARITY WITHOUT COMA, WITHOUT LONG-TERM CURRENT USE OF INSULIN (HCC): ICD-10-CM

## 2024-01-15 RX ORDER — METFORMIN HYDROCHLORIDE 500 MG/1
500 TABLET, EXTENDED RELEASE ORAL
Qty: 90 TABLET | Refills: 1 | Status: SHIPPED | OUTPATIENT
Start: 2024-01-15

## 2024-02-15 DIAGNOSIS — E08.21 DIABETES MELLITUS DUE TO UNDERLYING CONDITION WITH DIABETIC NEPHROPATHY, WITHOUT LONG-TERM CURRENT USE OF INSULIN (HCC): ICD-10-CM

## 2024-02-15 RX ORDER — METFORMIN HYDROCHLORIDE 500 MG/1
500 TABLET, EXTENDED RELEASE ORAL
Qty: 30 TABLET | Refills: 4 | Status: SHIPPED | OUTPATIENT
Start: 2024-02-15

## 2024-04-04 ENCOUNTER — OFFICE VISIT (OUTPATIENT)
Dept: INTERNAL MEDICINE CLINIC | Age: 69
End: 2024-04-04
Payer: MEDICARE

## 2024-04-04 VITALS
DIASTOLIC BLOOD PRESSURE: 78 MMHG | HEART RATE: 76 BPM | TEMPERATURE: 98.2 F | WEIGHT: 196.8 LBS | BODY MASS INDEX: 27.55 KG/M2 | HEIGHT: 71 IN | SYSTOLIC BLOOD PRESSURE: 130 MMHG

## 2024-04-04 DIAGNOSIS — E78.00 PURE HYPERCHOLESTEROLEMIA: ICD-10-CM

## 2024-04-04 DIAGNOSIS — E08.21 DIABETES MELLITUS DUE TO UNDERLYING CONDITION WITH DIABETIC NEPHROPATHY, WITHOUT LONG-TERM CURRENT USE OF INSULIN (HCC): Primary | ICD-10-CM

## 2024-04-04 DIAGNOSIS — I10 ESSENTIAL HYPERTENSION: ICD-10-CM

## 2024-04-04 PROCEDURE — 99214 OFFICE O/P EST MOD 30 MIN: CPT

## 2024-04-04 PROCEDURE — 1124F ACP DISCUSS-NO DSCNMKR DOCD: CPT

## 2024-04-04 PROCEDURE — 3075F SYST BP GE 130 - 139MM HG: CPT

## 2024-04-04 PROCEDURE — 3078F DIAST BP <80 MM HG: CPT

## 2024-04-04 SDOH — ECONOMIC STABILITY: INCOME INSECURITY: HOW HARD IS IT FOR YOU TO PAY FOR THE VERY BASICS LIKE FOOD, HOUSING, MEDICAL CARE, AND HEATING?: NOT HARD AT ALL

## 2024-04-04 SDOH — ECONOMIC STABILITY: FOOD INSECURITY: WITHIN THE PAST 12 MONTHS, YOU WORRIED THAT YOUR FOOD WOULD RUN OUT BEFORE YOU GOT MONEY TO BUY MORE.: NEVER TRUE

## 2024-04-04 SDOH — ECONOMIC STABILITY: FOOD INSECURITY: WITHIN THE PAST 12 MONTHS, THE FOOD YOU BOUGHT JUST DIDN'T LAST AND YOU DIDN'T HAVE MONEY TO GET MORE.: NEVER TRUE

## 2024-04-04 NOTE — PROGRESS NOTES
13.2    Smokeless tobacco: Never   Substance and Sexual Activity    Alcohol use: Not Currently     Comment: social    Drug use: Never    Sexual activity: Not on file   Other Topics Concern    Not on file   Social History Narrative    Not on file     Social Determinants of Health     Financial Resource Strain: Low Risk  (4/4/2024)    Overall Financial Resource Strain (CARDIA)     Difficulty of Paying Living Expenses: Not hard at all   Food Insecurity: No Food Insecurity (4/4/2024)    Hunger Vital Sign     Worried About Running Out of Food in the Last Year: Never true     Ran Out of Food in the Last Year: Never true   Transportation Needs: Unknown (4/4/2024)    PRAPARE - Transportation     Lack of Transportation (Medical): Not on file     Lack of Transportation (Non-Medical): No   Physical Activity: Not on file   Stress: Not on file   Social Connections: Not on file   Intimate Partner Violence: Not on file   Housing Stability: Unknown (4/4/2024)    Housing Stability Vital Sign     Unable to Pay for Housing in the Last Year: Not on file     Number of Places Lived in the Last Year: Not on file     Unstable Housing in the Last Year: No     Family History   Problem Relation Age of Onset    Other Mother         thyroidectomy    Diabetes Mother     Heart Disease Mother     Heart Disease Father      Review of Systems - General ROS: negative for - chills or fever  Psychological ROS: negative for - anxiety and depression  Hematological and Lymphatic ROS: No history of blood clots or bleeding disorder.   Respiratory ROS: no cough, shortness of breath, or wheezing  Cardiovascular ROS: no chest pain or dyspnea on exertion, tolerates a flight of stairs, vacuuming  Gastrointestinal ROS: no abdominal pain, change in bowel habits, or black or bloody stools  Genito-Urinary ROS: no dysuria, trouble voiding, or hematuria  Musculoskeletal ROS: negative for - muscle pain or muscular weakness.  Neurological ROS: negative for - headaches,

## 2024-04-04 NOTE — PATIENT INSTRUCTIONS
Bathroom break every 2 hrs  Continue good hygiene  1 month supply of samples. Following this start jardiance prescription    Labs in 1 month    Follow up in 1 months

## 2024-05-13 ENCOUNTER — TELEPHONE (OUTPATIENT)
Dept: INTERNAL MEDICINE CLINIC | Age: 69
End: 2024-05-13

## 2024-05-13 DIAGNOSIS — L40.9 PSORIASIS: ICD-10-CM

## 2024-05-13 DIAGNOSIS — L30.9 ECZEMA, UNSPECIFIED TYPE: Primary | ICD-10-CM

## 2024-05-13 NOTE — TELEPHONE ENCOUNTER
Pt called in requesting a referral to dermatologist for the red scaly skin on her hands and feet. She is sure its Eczema but wants to see Derm. OK to refer?

## 2024-05-28 ENCOUNTER — TELEPHONE (OUTPATIENT)
Dept: INTERNAL MEDICINE CLINIC | Age: 69
End: 2024-05-28

## 2024-05-28 NOTE — TELEPHONE ENCOUNTER
----- Message from Janice Duff MD sent at 5/27/2024  2:58 PM EDT -----  Please call patient - make sure she took Jardiance 10 mg daily x a month then increase to 25 mg daily.  She is due for labs that we gave her at last visit 4/4/24 - was to have them done 5/4/24 - did she?  If not, please draw now fasting.  Her visit got moved out to 3 months instead of one month -why?  I wonder if she got nervous and didn't start the Jardiance?     Spoke with patient, she will get labs done but requested the orders be sent to \"[facility]\" she will call back with the address.

## 2024-08-17 ENCOUNTER — HOSPITAL ENCOUNTER (OUTPATIENT)
Dept: GENERAL RADIOLOGY | Age: 69
Discharge: HOME | End: 2024-08-17
Payer: MEDICARE

## 2024-08-17 ENCOUNTER — HOSPITAL ENCOUNTER (OUTPATIENT)
Age: 69
Discharge: HOME | End: 2024-08-17
Payer: MEDICARE

## 2024-08-17 DIAGNOSIS — Z01.818 PREOP EXAMINATION: ICD-10-CM

## 2024-08-17 LAB
ALBUMIN SERPL BCG-MCNC: 3.9 G/DL (ref 3.5–5.1)
ALP SERPL-CCNC: 101 U/L (ref 38–126)
ALT SERPL W/O P-5'-P-CCNC: 11 U/L (ref 11–66)
ANION GAP SERPL CALC-SCNC: 10 MEQ/L (ref 8–16)
AST SERPL-CCNC: 14 U/L (ref 5–40)
BILIRUB SERPL-MCNC: 0.8 MG/DL (ref 0.3–1.2)
BUN SERPL-MCNC: 31 MG/DL (ref 7–22)
CALCIUM SERPL-MCNC: 9.3 MG/DL (ref 8.5–10.5)
CHLORIDE SERPL-SCNC: 106 MEQ/L (ref 98–111)
CO2 SERPL-SCNC: 24 MEQ/L (ref 23–33)
CREAT SERPL-MCNC: 1.2 MG/DL (ref 0.4–1.2)
DEPRECATED MEAN GLUCOSE BLD GHB EST-ACNC: 144 MG/DL (ref 70–126)
DEPRECATED RDW RBC AUTO: 38.5 FL (ref 35–45)
EKG ATRIAL RATE: 58 BPM
EKG P AXIS: 59 DEGREES
EKG P-R INTERVAL: 136 MS
EKG Q-T INTERVAL: 456 MS
EKG QRS DURATION: 138 MS
EKG QTC CALCULATION (BAZETT): 447 MS
EKG R AXIS: 12 DEGREES
EKG T AXIS: 24 DEGREES
EKG VENTRICULAR RATE: 58 BPM
ERYTHROCYTE [DISTWIDTH] IN BLOOD BY AUTOMATED COUNT: 12.4 % (ref 11.5–14.5)
GFR SERPL CREATININE-BSD FRML MDRD: 49 ML/MIN/1.73M2
GLUCOSE SERPL-MCNC: 169 MG/DL (ref 70–108)
HBA1C MFR BLD HPLC: 6.8 % (ref 4.4–6.4)
HCT VFR BLD AUTO: 42.3 % (ref 37–47)
HGB BLD-MCNC: 13.4 GM/DL (ref 12–16)
MCH RBC QN AUTO: 27.1 PG (ref 26–33)
MCHC RBC AUTO-ENTMCNC: 31.7 GM/DL (ref 32.2–35.5)
MCV RBC AUTO: 85.6 FL (ref 81–99)
PLATELET # BLD AUTO: 282 THOU/MM3 (ref 130–400)
PMV BLD AUTO: 10.4 FL (ref 9.4–12.4)
POTASSIUM SERPL-SCNC: 5.2 MEQ/L (ref 3.5–5.2)
PROT SERPL-MCNC: 7.5 G/DL (ref 6.1–8)
RBC # BLD AUTO: 4.94 MILL/MM3 (ref 4.2–5.4)
SODIUM SERPL-SCNC: 140 MEQ/L (ref 135–145)
WBC # BLD AUTO: 12.1 THOU/MM3 (ref 4.8–10.8)

## 2024-08-17 PROCEDURE — 83036 HEMOGLOBIN GLYCOSYLATED A1C: CPT

## 2024-08-17 PROCEDURE — 85027 COMPLETE CBC AUTOMATED: CPT

## 2024-08-17 PROCEDURE — 71046 X-RAY EXAM CHEST 2 VIEWS: CPT

## 2024-08-17 PROCEDURE — 80053 COMPREHEN METABOLIC PANEL: CPT

## 2024-08-17 PROCEDURE — 36415 COLL VENOUS BLD VENIPUNCTURE: CPT

## 2024-08-19 LAB
EKG ATRIAL RATE: 58 BPM
EKG P AXIS: 59 DEGREES
EKG P-R INTERVAL: 136 MS
EKG Q-T INTERVAL: 456 MS
EKG QRS DURATION: 138 MS
EKG QTC CALCULATION (BAZETT): 447 MS
EKG R AXIS: 12 DEGREES
EKG T AXIS: 24 DEGREES
EKG VENTRICULAR RATE: 58 BPM

## 2024-08-21 ENCOUNTER — OFFICE VISIT (OUTPATIENT)
Dept: INTERNAL MEDICINE CLINIC | Age: 69
End: 2024-08-21
Payer: MEDICARE

## 2024-08-21 VITALS
SYSTOLIC BLOOD PRESSURE: 154 MMHG | RESPIRATION RATE: 18 BRPM | WEIGHT: 206 LBS | OXYGEN SATURATION: 100 % | HEART RATE: 62 BPM | DIASTOLIC BLOOD PRESSURE: 82 MMHG | BODY MASS INDEX: 29.13 KG/M2 | TEMPERATURE: 97.2 F

## 2024-08-21 DIAGNOSIS — I10 PRIMARY HYPERTENSION: ICD-10-CM

## 2024-08-21 DIAGNOSIS — E08.21 DIABETES MELLITUS DUE TO UNDERLYING CONDITION WITH DIABETIC NEPHROPATHY, WITHOUT LONG-TERM CURRENT USE OF INSULIN (HCC): Primary | ICD-10-CM

## 2024-08-21 DIAGNOSIS — E78.5 HYPERLIPIDEMIA, UNSPECIFIED HYPERLIPIDEMIA TYPE: ICD-10-CM

## 2024-08-21 DIAGNOSIS — R09.82 POST-NASAL DRIP: ICD-10-CM

## 2024-08-21 DIAGNOSIS — N18.30 STAGE 3 CHRONIC KIDNEY DISEASE, UNSPECIFIED WHETHER STAGE 3A OR 3B CKD (HCC): ICD-10-CM

## 2024-08-21 PROCEDURE — 3077F SYST BP >= 140 MM HG: CPT

## 2024-08-21 PROCEDURE — 1124F ACP DISCUSS-NO DSCNMKR DOCD: CPT

## 2024-08-21 PROCEDURE — 3079F DIAST BP 80-89 MM HG: CPT

## 2024-08-21 PROCEDURE — 99213 OFFICE O/P EST LOW 20 MIN: CPT

## 2024-08-21 RX ORDER — FLUTICASONE PROPIONATE 50 MCG
2 SPRAY, SUSPENSION (ML) NASAL DAILY
Qty: 16 G | Refills: 0 | Status: SHIPPED | OUTPATIENT
Start: 2024-08-21

## 2024-08-21 NOTE — PROGRESS NOTES
1955    Chief Complaint   Patient presents with    Pre-op Exam     Dr. Cruz  -  8/28/24 - Excision of osteomyelitis fifth metatarsal right foot, resection of wound and primary closure of right foot wound       Pt is a 68 y.o. female who presents for a preoperative medical consultation at the request of Dr. Cruz prior to excision of osteomyelitis if fifth metatarsal right foot wound w/closure.  Pt denies current pain. Pt has failed conservative measures such as debridement and antibiotic treatment.     Reactions to anesthesia: none    History of excessive bleeding: none    History of blood clots: none    History of blood transfusions: none      Quit smoking 10 years ago.     Past Medical History:   Diagnosis Date    Chronic renal disease, stage 3, moderately decreased glomerular filtration rate (GFR) between 30-59 mL/min/1.73 square meter (HCC)     Diabetes mellitus (HCC)     Diabetic polyneuropathy associated with type 2 diabetes mellitus (HCC) 02/23/2022    Hypercholesterolemia     Hypertension     Psoriasis        Past Surgical History:   Procedure Laterality Date    FOOT DEBRIDEMENT Right 4/27/2022    RIGHT FOOT DEBRIDEMENT/WASH OUT performed by Ge Cruz DPM at [facility] OR    TOE AMPUTATION Right 4/23/2022    RIGHT FOOT WOUND DEBRIDEMENT WITH POSS RIGHT 5TH METATARSAL RESECTION performed by Ge Cruz DPM at [facility] OR       Current Outpatient Medications   Medication Sig Dispense Refill    Continuous Glucose Sensor (FREESTYLE MILI 2 SENSOR) [facility] 1 each by Does not apply route every 14 days 2 each 12    fluticasone (FLONASE) 50 MCG/ACT nasal spray 2 sprays by Each Nostril route daily 16 g 0    empagliflozin (JARDIANCE) 25 MG tablet Take 1 tablet by mouth daily 90 tablet 1    amLODIPine (NORVASC) 10 MG tablet Take 1 tablet by mouth daily 90 tablet 3    atorvastatin (LIPITOR) 40 MG tablet Take 1 tablet by mouth nightly 90 tablet 3    aspirin 81 MG chewable tablet Take 1 tablet by mouth daily

## 2024-08-27 NOTE — PROGRESS NOTES
[facility] [phone]    [facility]:  [place] [phone]  [facility] (8 locations):  [phone]                   Well Visit, Ages 18 to 65: Care Instructions  Well visits can help you stay healthy. Your doctor has checked your overall health and may have suggested ways to take good care of yourself. Your doctor also may have recommended tests. You can help prevent illness with healthy eating, good sleep, vaccinations, regular exercise, and other steps.    Get the tests that you and your doctor decide on. Depending on your age and risks, examples might include screening for diabetes; hepatitis C; HIV; and cervical, breast, lung, and colon cancer. Screening helps find diseases before any symptoms appear.   Eat healthy foods. Choose fruits, vegetables, whole grains, lean protein, and low-fat dairy foods. Limit saturated fat and reduce salt.     Limit alcohol. Men should have no more than 2 drinks a day. Women should have no more than 1. For some people, no alcohol is the best choice.   Exercise. Get at least 30 minutes of exercise on most days of the week. Walking can be a good choice.     Reach and stay at your healthy weight. This will lower your risk for many health problems.   Take care of your mental health. Try to stay connected with friends, family, and community, and find ways to manage stress.     If you're feeling depressed or hopeless, talk to someone. A counselor can help. If you don't have a counselor, talk to your doctor.   Talk to your doctor if you think you may have a problem with alcohol or drug use. This includes prescription medicines, marijuana, and other drugs.     Avoid tobacco and nicotine: Don't smoke, vape, or chew. If you need help quitting, talk to your doctor.   Practice safer sex. Getting tested, using condoms or dental dams, and limiting sex partners can help prevent STIs.     Use birth control if it's important to you to prevent  Pharmacy Note  ED Culture Follow-up    Gold Drummond is a 78 y.o. female. Allergies: Patient has no known allergies. Current antimicrobials:   Reviewed patient's urine culture - culture positive for E. coli. Patient was discharged on cephalexin 500 mg BID x 10 days, and culture is sensitive to prescribed medication. Antibiotic prescribed at discharge is appropriate - no changes made to antibiotic regimen. Please call with any questions.  Troy Me[facility], PharmD 2:51 PM 1/12/2023 pregnancy. Talk with your doctor about your choices and what might be best for you.   Prevent problems where you can. Protect your skin from too much sun, wash your hands, brush your teeth twice a day, and wear a seat belt in the car.   Where can you learn more?  Go to https://[email or web address].net/patientEd and enter P072 to learn more about \"Well Visit, Ages 18 to 65: Care Instructions.\"  Current as of: August 6, 2023  Content Version: 14.1  © 2006-2024 [employer].   Care instructions adapted under license by [employer]. If you have questions about a medical condition or this instruction, always ask your healthcare professional. [employer] disclaims any warranty or liability for your use of this information.

## 2024-08-28 ENCOUNTER — HOSPITAL ENCOUNTER (OUTPATIENT)
Age: 69
Setting detail: OUTPATIENT SURGERY
Discharge: HOME | End: 2024-08-28
Attending: PODIATRIST | Admitting: PODIATRIST
Payer: MEDICARE

## 2024-08-28 ENCOUNTER — ANESTHESIA EVENT (OUTPATIENT)
Dept: OPERATING ROOM | Age: 69
End: 2024-08-28
Payer: MEDICARE

## 2024-08-28 ENCOUNTER — ANESTHESIA (OUTPATIENT)
Dept: OPERATING ROOM | Age: 69
End: 2024-08-28
Payer: MEDICARE

## 2024-08-28 VITALS
DIASTOLIC BLOOD PRESSURE: 80 MMHG | TEMPERATURE: 96.6 F | BODY MASS INDEX: 29.26 KG/M2 | SYSTOLIC BLOOD PRESSURE: 169 MMHG | HEIGHT: 70 IN | OXYGEN SATURATION: 96 % | RESPIRATION RATE: 12 BRPM | HEART RATE: 64 BPM | WEIGHT: 204.4 LBS

## 2024-08-28 LAB — GLUCOSE BLD STRIP.AUTO-MCNC: 142 MG/DL (ref 70–108)

## 2024-08-28 PROCEDURE — 6360000002 HC RX W HCPCS: Performed by: ANESTHESIOLOGY

## 2024-08-28 PROCEDURE — 6360000002 HC RX W HCPCS

## 2024-08-28 PROCEDURE — 7100000000 HC PACU RECOVERY - FIRST 15 MIN: Performed by: PODIATRIST

## 2024-08-28 PROCEDURE — 7100000010 HC PHASE II RECOVERY - FIRST 15 MIN: Performed by: PODIATRIST

## 2024-08-28 PROCEDURE — 2500000003 HC RX 250 WO HCPCS: Performed by: ANESTHESIOLOGY

## 2024-08-28 PROCEDURE — 2500000003 HC RX 250 WO HCPCS: Performed by: PODIATRIST

## 2024-08-28 PROCEDURE — 7100000011 HC PHASE II RECOVERY - ADDTL 15 MIN: Performed by: PODIATRIST

## 2024-08-28 PROCEDURE — 82948 REAGENT STRIP/BLOOD GLUCOSE: CPT

## 2024-08-28 PROCEDURE — 2709999900 HC NON-CHARGEABLE SUPPLY: Performed by: PODIATRIST

## 2024-08-28 PROCEDURE — 2580000003 HC RX 258

## 2024-08-28 PROCEDURE — 3600000003 HC SURGERY LEVEL 3 BASE: Performed by: PODIATRIST

## 2024-08-28 PROCEDURE — 3700000001 HC ADD 15 MINUTES (ANESTHESIA): Performed by: PODIATRIST

## 2024-08-28 PROCEDURE — 6360000002 HC RX W HCPCS: Performed by: PODIATRIST

## 2024-08-28 PROCEDURE — 7100000001 HC PACU RECOVERY - ADDTL 15 MIN: Performed by: PODIATRIST

## 2024-08-28 PROCEDURE — 6360000002 HC RX W HCPCS: Performed by: NURSE ANESTHETIST, CERTIFIED REGISTERED

## 2024-08-28 PROCEDURE — 3700000000 HC ANESTHESIA ATTENDED CARE: Performed by: PODIATRIST

## 2024-08-28 PROCEDURE — 3600000013 HC SURGERY LEVEL 3 ADDTL 15MIN: Performed by: PODIATRIST

## 2024-08-28 RX ORDER — METOCLOPRAMIDE HYDROCHLORIDE 5 MG/ML
10 INJECTION INTRAMUSCULAR; INTRAVENOUS
Status: DISCONTINUED | OUTPATIENT
Start: 2024-08-28 | End: 2024-08-28 | Stop reason: HOSPADM

## 2024-08-28 RX ORDER — FENTANYL CITRATE 50 UG/ML
25 INJECTION, SOLUTION INTRAMUSCULAR; INTRAVENOUS EVERY 5 MIN PRN
Status: DISCONTINUED | OUTPATIENT
Start: 2024-08-28 | End: 2024-08-28 | Stop reason: HOSPADM

## 2024-08-28 RX ORDER — MEPERIDINE HYDROCHLORIDE 25 MG/ML
12.5 INJECTION INTRAMUSCULAR; INTRAVENOUS; SUBCUTANEOUS EVERY 4 HOURS PRN
Status: DISCONTINUED | OUTPATIENT
Start: 2024-08-28 | End: 2024-08-28 | Stop reason: HOSPADM

## 2024-08-28 RX ORDER — SODIUM CHLORIDE 0.9 % (FLUSH) 0.9 %
5-40 SYRINGE (ML) INJECTION EVERY 12 HOURS SCHEDULED
Status: DISCONTINUED | OUTPATIENT
Start: 2024-08-28 | End: 2024-08-28 | Stop reason: HOSPADM

## 2024-08-28 RX ORDER — MORPHINE SULFATE 2 MG/ML
2 INJECTION, SOLUTION INTRAMUSCULAR; INTRAVENOUS
Status: DISCONTINUED | OUTPATIENT
Start: 2024-08-28 | End: 2024-08-28 | Stop reason: HOSPADM

## 2024-08-28 RX ORDER — LIDOCAINE HYDROCHLORIDE 20 MG/ML
INJECTION, SOLUTION INFILTRATION; PERINEURAL PRN
Status: DISCONTINUED | OUTPATIENT
Start: 2024-08-28 | End: 2024-08-28 | Stop reason: SDUPTHER

## 2024-08-28 RX ORDER — SODIUM CHLORIDE 9 MG/ML
INJECTION, SOLUTION INTRAVENOUS CONTINUOUS
Status: DISCONTINUED | OUTPATIENT
Start: 2024-08-28 | End: 2024-08-28 | Stop reason: HOSPADM

## 2024-08-28 RX ORDER — PROPOFOL 10 MG/ML
INJECTION, EMULSION INTRAVENOUS PRN
Status: DISCONTINUED | OUTPATIENT
Start: 2024-08-28 | End: 2024-08-28 | Stop reason: SDUPTHER

## 2024-08-28 RX ORDER — OXYCODONE HYDROCHLORIDE 5 MG/1
10 TABLET ORAL EVERY 4 HOURS PRN
Status: DISCONTINUED | OUTPATIENT
Start: 2024-08-28 | End: 2024-08-28 | Stop reason: HOSPADM

## 2024-08-28 RX ORDER — FENTANYL CITRATE 50 UG/ML
INJECTION, SOLUTION INTRAMUSCULAR; INTRAVENOUS PRN
Status: DISCONTINUED | OUTPATIENT
Start: 2024-08-28 | End: 2024-08-28 | Stop reason: SDUPTHER

## 2024-08-28 RX ORDER — DIPHENHYDRAMINE HYDROCHLORIDE 50 MG/ML
12.5 INJECTION INTRAMUSCULAR; INTRAVENOUS
Status: DISCONTINUED | OUTPATIENT
Start: 2024-08-28 | End: 2024-08-28 | Stop reason: HOSPADM

## 2024-08-28 RX ORDER — ONDANSETRON 4 MG/1
4 TABLET, ORALLY DISINTEGRATING ORAL EVERY 8 HOURS PRN
Status: DISCONTINUED | OUTPATIENT
Start: 2024-08-28 | End: 2024-08-28 | Stop reason: HOSPADM

## 2024-08-28 RX ORDER — SODIUM CHLORIDE 0.9 % (FLUSH) 0.9 %
5-40 SYRINGE (ML) INJECTION PRN
Status: DISCONTINUED | OUTPATIENT
Start: 2024-08-28 | End: 2024-08-28 | Stop reason: HOSPADM

## 2024-08-28 RX ORDER — OXYCODONE HYDROCHLORIDE 5 MG/1
5 TABLET ORAL EVERY 4 HOURS PRN
Status: DISCONTINUED | OUTPATIENT
Start: 2024-08-28 | End: 2024-08-28 | Stop reason: HOSPADM

## 2024-08-28 RX ORDER — SODIUM CHLORIDE 9 MG/ML
INJECTION, SOLUTION INTRAVENOUS PRN
Status: DISCONTINUED | OUTPATIENT
Start: 2024-08-28 | End: 2024-08-28 | Stop reason: HOSPADM

## 2024-08-28 RX ORDER — LIDOCAINE HYDROCHLORIDE AND EPINEPHRINE 10; 10 MG/ML; UG/ML
INJECTION, SOLUTION INFILTRATION; PERINEURAL PRN
Status: DISCONTINUED | OUTPATIENT
Start: 2024-08-28 | End: 2024-08-28 | Stop reason: ALTCHOICE

## 2024-08-28 RX ORDER — ONDANSETRON 2 MG/ML
4 INJECTION INTRAMUSCULAR; INTRAVENOUS EVERY 6 HOURS PRN
Status: DISCONTINUED | OUTPATIENT
Start: 2024-08-28 | End: 2024-08-28 | Stop reason: HOSPADM

## 2024-08-28 RX ORDER — BUPIVACAINE HYDROCHLORIDE 5 MG/ML
INJECTION, SOLUTION EPIDURAL; INTRACAUDAL PRN
Status: DISCONTINUED | OUTPATIENT
Start: 2024-08-28 | End: 2024-08-28 | Stop reason: ALTCHOICE

## 2024-08-28 RX ORDER — FENTANYL CITRATE 50 UG/ML
50 INJECTION, SOLUTION INTRAMUSCULAR; INTRAVENOUS EVERY 5 MIN PRN
Status: DISCONTINUED | OUTPATIENT
Start: 2024-08-28 | End: 2024-08-28 | Stop reason: HOSPADM

## 2024-08-28 RX ORDER — ONDANSETRON 2 MG/ML
INJECTION INTRAMUSCULAR; INTRAVENOUS PRN
Status: DISCONTINUED | OUTPATIENT
Start: 2024-08-28 | End: 2024-08-28 | Stop reason: SDUPTHER

## 2024-08-28 RX ORDER — DEXAMETHASONE SODIUM PHOSPHATE 4 MG/ML
INJECTION, SOLUTION INTRA-ARTICULAR; INTRALESIONAL; INTRAMUSCULAR; INTRAVENOUS; SOFT TISSUE PRN
Status: DISCONTINUED | OUTPATIENT
Start: 2024-08-28 | End: 2024-08-28 | Stop reason: SDUPTHER

## 2024-08-28 RX ORDER — NALOXONE HYDROCHLORIDE 0.4 MG/ML
INJECTION, SOLUTION INTRAMUSCULAR; INTRAVENOUS; SUBCUTANEOUS PRN
Status: DISCONTINUED | OUTPATIENT
Start: 2024-08-28 | End: 2024-08-28 | Stop reason: HOSPADM

## 2024-08-28 RX ADMIN — ONDANSETRON 4 MG: 2 INJECTION INTRAMUSCULAR; INTRAVENOUS at 13:38

## 2024-08-28 RX ADMIN — LIDOCAINE HYDROCHLORIDE 100 MG: 20 INJECTION, SOLUTION INFILTRATION; PERINEURAL at 12:56

## 2024-08-28 RX ADMIN — WATER 2000 MG: 1 INJECTION INTRAMUSCULAR; INTRAVENOUS; SUBCUTANEOUS at 12:59

## 2024-08-28 RX ADMIN — FENTANYL CITRATE 50 MCG: 50 INJECTION, SOLUTION INTRAMUSCULAR; INTRAVENOUS at 13:04

## 2024-08-28 RX ADMIN — FENTANYL CITRATE 50 MCG: 50 INJECTION, SOLUTION INTRAMUSCULAR; INTRAVENOUS at 12:58

## 2024-08-28 RX ADMIN — PROPOFOL 50 MG: 10 INJECTION, EMULSION INTRAVENOUS at 13:05

## 2024-08-28 RX ADMIN — SODIUM CHLORIDE: 9 INJECTION, SOLUTION INTRAVENOUS at 12:53

## 2024-08-28 RX ADMIN — DEXAMETHASONE SODIUM PHOSPHATE 4 MG: 4 INJECTION, SOLUTION INTRAMUSCULAR; INTRAVENOUS at 13:01

## 2024-08-28 RX ADMIN — PROPOFOL 150 MG: 10 INJECTION, EMULSION INTRAVENOUS at 12:58

## 2024-08-28 ASSESSMENT — PAIN - FUNCTIONAL ASSESSMENT
PAIN_FUNCTIONAL_ASSESSMENT: NONE - DENIES PAIN
PAIN_FUNCTIONAL_ASSESSMENT: 0-10

## 2024-08-28 NOTE — ANESTHESIA POSTPROCEDURE EVALUATION
Department of Anesthesiology  Postprocedure Note    Patient: Jocelynn Stern  MRN: [id number]  YOB: 1955  Date of evaluation: 8/28/2024    Procedure Summary       Date: 08/28/24 Room / Location: [address]    Anesthesia Start: 1253 Anesthesia Stop: 1346    Procedure: Right Excision of Osteomyelitis 5th Metatarsal Resection of Wound & Primary Closure of Wound Diagnosis:       Chronic osteomyelitis (HCC)      Pressure injury of right foot, stage 4 (HCC)      (Chronic osteomyelitis (HCC) [M86.60])      (Pressure injury of right foot, stage 4 (HCC) [L89.894])    Surgeons: Ge Cruz DPM Responsible Provider: Roshan Torres MD    Anesthesia Type: general ASA Status: 2            Anesthesia Type: No value filed.    Ciera Phase I: Ciera Score: 10    Ciera Phase II:      Anesthesia Post Evaluation    Patient location during evaluation: PACU  Patient participation: complete - patient participated  Level of consciousness: awake and alert  Airway patency: patent  Nausea & Vomiting: no nausea and no vomiting  Cardiovascular status: hemodynamically stable  Respiratory status: acceptable  Hydration status: euvolemic  Pain management: adequate    [facility]  POST-ANESTHESIA NOTE       Name:  Jocelynn Stern                                         Age:  68 y.o.  MRN:  [id number]      Last Vitals:  BP (!) 165/79   Pulse 65   Temp (!) 96.6 °F (35.9 °C) (Temporal)   Resp 12   Ht 1.778 m (5' 10\")   Wt 92.7 kg (204 lb 6.4 oz)   SpO2 97%   BMI 29.33 kg/m²   Patient Vitals for the past 4 hrs:   BP Temp Temp src Pulse Resp SpO2 Height Weight   08/28/24 1400 (!) 165/79 -- -- 65 12 97 % -- --   08/28/24 1355 (!) 171/76 -- -- 66 12 96 % -- --   08/28/24 1350 (!) 151/69 -- -- 63 16 96 % -- --   08/28/24 1344 (!) 151/69 (!) 96.6 °F (35.9 °C) Temporal 63 16 97 % -- --   08/28/24 1112 (!) 157/70 97 °F (36.1 °C) Temporal 59 16 97 % 1.778 m (5' 10\") 92.7 kg (204  lb 6.4 oz)       Level of Consciousness:  Awake    Respiratory:  Stable    Oxygen Saturation:  Stable    Cardiovascular:  Stable    Hydration:  Adequate    PONV:  Stable    Post-op Pain:  Adequate analgesia    Post-op Assessment:  No apparent anesthetic complications    Additional Follow-Up / Treatment / Comment:  None    ANISA MICHAUD MD  August 28, 2024   2:09 PM      No notable events documented.

## 2024-08-28 NOTE — DISCHARGE INSTRUCTIONS
Post Op Instructions    Pt Name: Jocelynn Stern  Medical Record Number: [id number]  Today's Date: 8/28/2024    GENERAL ANESTHESIA OR SEDATION  1. Do not drive or operate hazardous machinery for 24 hours.  2. Do not make important business or personal decisions for 24 hours.  3. Do not drink alcoholic beverages or use tobacco for 24 hours.    ACTIVITY INSTRUCTIONS:  [] Rest today. Resume light to normal activity tomorrow.   [x] You may ambulate as tolerated in your post op shoe/boot.  [] No weight is to be placed on the operative limb.  Use crutches, walker, Roll-a-bout as needed.  [x]Elevate the operative limb as much as possible to reduce swelling and discomfort for the first 72 hours      DIET INSTRUCTIONS:  []Begin with clear liquids. If not nauseated, may increase to a low-fat diet when you desire.   [x]Regular diet as tolerated.  []Other:     MEDICATIONS  [x]Prescription sent with you to be used as directed.   Do not drink alcohol or drive while taking these medications. You may experience dizziness or drowsiness with these medications. You may also experience constipation which can be relieved with stool softners or laxatives.  [x]You may resume your daily prescription medication schedule unless otherwise specified.  [x]If taking 325mg Aspirin or other blood thinners such as Coumadin or Plavix, you may resume tomorrow, unless told otherwise.     WOUND/DRESSING INSTRUCTIONS:  Always ensure you and your care giver clean hands before and after caring for the wound.  [x] Keep dressing clean and dry until you are seen in the offfice      [x] Ice operative site for 20 minutes 4 times a day.   - you may apply ice bag behind the knee or directly on the foot/ankle bandage.   - do not apply ice directly to the skin  [x] You may loosen the ACE wrap if it feels too tight, but do not disturb the underlying white gauze wrap.      FOLLOW-UP CARE. SPECIFICALLY WATCH FOR:   Fever over 101 degrees by mouth   Increased redness,

## 2024-08-28 NOTE — H&P
[facility]  History and Physical Update    Pt Name: Jocelynn Stern  MRN: [id number]  YOB: 1955  Date of evaluation: 8/28/2024    [x] I have examined the patient and reviewed the H&P/Consult and there are no changes to the patient or plans.    [] I have examined the patient and reviewed the H&P/Consult and have noted the following changes:        Ge Cruz DPM DPM  Electronically signed 8/28/2024 at 12:48 PM

## 2024-08-28 NOTE — BRIEF OP NOTE
Brief Postoperative Note      Patient: Jocelynn Stern  YOB: 1955  MRN: [id number]    Date of Procedure: 8/28/2024    Pre-Op Diagnosis Codes:      * Chronic osteomyelitis (HCC) [M86.60]     * Pressure injury of right foot, stage 4 (HCC) [L89.894]    Post-Op Diagnosis: Same       Procedure(s):  Right Excision of Osteomyelitis 5th Metatarsal Resection of Wound & Primary Closure of Wound    Surgeon(s):  Ge Cruz DPM    Assistant:  Resident: Zay Ogden DPM; Ronaldo Sandhu DPM    Anesthesia: General    Estimated Blood Loss (mL): Minimal    Complications: None    Hemostasis: Ankle tourniquet at 250 mmHg    Injectables: 10 cc of one-to-one ratio with 1% lidocaine with epinephrine, 0.5% Marcaine plain    Materials: 3-0 Vicryl, 4-0 Prolene.    Specimens:   * No specimens in log *    Implants:  * No implants in log *      Drains: * No LDAs found *    Findings:  Other Findings: None    Electronically signed by Ronaldo Sandhu DPM on 8/28/2024 at 1:54 PM

## 2024-08-28 NOTE — OP NOTE
Operative Note      Patient: Jocelynn Stern  YOB: 1955  MRN: [id number]    Date of Procedure: 8/28/2024    Pre-Op Diagnosis Codes:      * Chronic osteomyelitis (HCC) [M86.60]     * Pressure injury of right foot, stage 4 (HCC) [L89.894]    Post-Op Diagnosis: Same       Procedure(s):  Right Excision of Osteomyelitis 5th Metatarsal Resection of Wound & Primary Closure of Wound    Surgeon(s):  Ge Cruz DPM    Assistant:   Resident: Zay Ogden DPM; Ronaldo Sandhu DPM    Anesthesia: General    Estimated Blood Loss (mL): less than 20    Complications: None    Specimens:   * No specimens in log *    Implants:  * No implants in log *      Drains: * No LDAs found *    Findings:  Other Findings: None    Detailed Description of Procedure:     Indications: Patient is a 68y.o. female with a chief complaint of osteomyelitis right fifth metatarsal who is being seen by Dr. Cruz and being treated for osteomyelitis right fifth metatarsal.  At this time all conservative options have been exhausted and surgical intervention is necessary.  The procedure has been explained to the patient and they understand the risks, benefits and possible complications including Patient understands the possible risks and complications to the procedure(s)  including and not limited to: Pain that may involve CRPS(explained), infection or continued infection needing additional amputation of antibiotics, slow healing or nonhealing to the skin or the bone, bone infection, implant problems or failure, DVT, PE (blood clots), loss of digit, limb, anesthesia risk of numbness, burning, nerve injury(neuropraxia), respiration and heart problems like ARDS or CP with MI, CHF.  Patient understands and consents.  .  No promises have been made as to surgical outcome.    Procedure:   The patient was transported from the pre-op holding to the operating room and placed in a supine position.  A pneumatic ankle tourniquet was applied to the

## 2024-08-28 NOTE — ANESTHESIA PRE PROCEDURE
Department of Anesthesiology  Preprocedure Note       Name:  Jocelynn Stern   Age:  68 y.o.  :  1955                                          MRN:  [id number]         Date:  2024      Surgeon: Surgeon(s):  Ge Cruz DPM    Procedure: Procedure(s):  Right Excision of Osteomyelitis 5th Metatarsal Resection of Wound & Primary Closure of Wound    Medications prior to admission:   Prior to Admission medications    Medication Sig Start Date End Date Taking? Authorizing Provider   fluticasone (FLONASE) 50 MCG/ACT nasal spray 2 sprays by Each Nostril route daily 24  Yes Bart Pimentel DO   empagliflozin (JARDIANCE) 25 MG tablet Take 1 tablet by mouth daily 24  Yes Perez Hilton DO   amLODIPine (NORVASC) 10 MG tablet Take 1 tablet by mouth daily 24  Yes Perez Hilton DO   atorvastatin (LIPITOR) 40 MG tablet Take 1 tablet by mouth nightly 24  Yes Perez Hilton DO   lisinopril (PRINIVIL;ZESTRIL) 40 MG tablet Take 1 tablet by mouth in the morning and 1 tablet in the evening. 24  Yes Perez Hilton DO   Continuous Glucose Sensor (FREESTYLE MILI 2 SENSOR) [facility] 1 each by Does not apply route every 14 days 24   Bart Pimentel DO   aspirin 81 MG chewable tablet Take 1 tablet by mouth daily 24   Perez Hilton DO       Current medications:    Current Facility-Administered Medications   Medication Dose Route Frequency Provider Last Rate Last Admin    0.9 % sodium chloride infusion   IntraVENous Continuous Snow, Ronaldo, DPM        sodium chloride flush 0.9 % injection 5-40 mL  5-40 mL IntraVENous 2 times per day Snow, Ronaldo, DPM        sodium chloride flush 0.9 % injection 5-40 mL  5-40 mL IntraVENous PRN Snow, Ronaldo, DPM        0.9 % sodium chloride infusion   IntraVENous PRN Snow, Ronaldo, DPM         Facility-Administered Medications Ordered in Other Encounters   Medication Dose Route Frequency Provider Last Rate Last Admin

## 2024-08-28 NOTE — PROGRESS NOTES
1344 To recovery via cart. Spont resp. VSS. Report received from surgical rn and CRNA. IV infusing KVO. Ace wrap in place and surgical shoe in place to right foot with elevation on pillow. Denies pain or nausea.   1350 Restful  1355 Denies needs  1400 Talkative. Stable  1405 Condition unchanged  1410 Comfortable. Denies needs  1415 Meets criteria for transfer to phase II recovery care. Transfer via cart.  1420 In phase II recovery. Family in room. Snack and drink given. Call bell in reach  1440 Discharge instructions given to patient and family with each voicing understanding. IV discontinued. Up to dress self  1500 Discharge to  home in stable condition per wheelchair with family

## 2024-11-27 ENCOUNTER — OFFICE VISIT (OUTPATIENT)
Dept: CARDIOLOGY CLINIC | Age: 69
End: 2024-11-27

## 2024-11-27 VITALS
BODY MASS INDEX: 31.21 KG/M2 | SYSTOLIC BLOOD PRESSURE: 130 MMHG | HEART RATE: 71 BPM | WEIGHT: 218 LBS | HEIGHT: 70 IN | DIASTOLIC BLOOD PRESSURE: 68 MMHG

## 2024-11-27 DIAGNOSIS — E78.00 PURE HYPERCHOLESTEROLEMIA: ICD-10-CM

## 2024-11-27 DIAGNOSIS — I10 PRIMARY HYPERTENSION: Primary | ICD-10-CM

## 2024-11-27 DIAGNOSIS — R94.39 ABNORMAL NUCLEAR STRESS TEST: ICD-10-CM

## 2024-11-27 NOTE — PROGRESS NOTES
Chief Complaint   Patient presents with    Follow-up     1 year follow up.   PROVIDER:     Shashank Resendez M.D.     CONSULT DATE:  2021     REASON OF CONSULTATION:  Elevated troponin in a 65-year-old female  patient, admitted with accelerated hypertension.          Patient here for a 1 year follow up    Last EKG done on 2024.      Denied chest pain, sob, palpitation, dizziness or  edema    No Hx of  angina or angina equivalent    Denied  syncope    Nonsmoker    Walk with cane at home    FHX  Father had CABg in his 60's  Mother had CABG I her 60's      Past Surgical History:   Procedure Laterality Date    FOOT DEBRIDEMENT Right 2022    RIGHT FOOT DEBRIDEMENT/WASH OUT performed by Ge Cruz DPM at [facility] OR    LEG SURGERY N/A 2024    Right Excision of Osteomyelitis 5th Metatarsal Resection of Wound & Primary Closure of Wound performed by Ge Cruz DPM at [facility] OR    TOE AMPUTATION Right 2022    RIGHT FOOT WOUND DEBRIDEMENT WITH POSS RIGHT 5TH METATARSAL RESECTION performed by Ge Cruz DPM at [facility] OR       No Known Allergies     Family History   Problem Relation Age of Onset    Other Mother         thyroidectomy    Diabetes Mother     Heart Disease Mother     Heart Disease Father         Social History     Socioeconomic History    Marital status: Single     Spouse name: Not on file    Number of children: 1    Years of education: Not on file    Highest education level: Not on file   Occupational History    Not on file   Tobacco Use    Smoking status: Former     Current packs/day: 0.00     Average packs/day: 0.5 packs/day for 15.0 years (7.5 ttl pk-yrs)     Types: Cigarettes     Start date: 1996     Quit date: 2011     Years since quittin.9    Smokeless tobacco: Never   Substance and Sexual Activity    Alcohol use: Not Currently     Comment: social    Drug use: Never    Sexual activity: Not on file   Other Topics Concern    Not on file

## 2024-12-02 NOTE — ANESTHESIA POSTPROCEDURE EVALUATION
Department of Anesthesiology  Postprocedure Note    Patient: Noah Guthrie  MRN: [id number]  YOB: 1955  Date of evaluation: 4/27/2022  Time:  4:55 PM     Procedure Summary     Date: 04/27/22 Room / Location: [address]    Anesthesia Start: 1527 Anesthesia Stop: 5494    Procedure: RIGHT FOOT DEBRIDEMENT/WASH OUT (Right Foot) Diagnosis: (RIGHT FOOT WOUND, CELLULITIS, PAIN)    Surgeons: Benjamin Carvajal DPM Responsible Provider: Mala Harkins MD    Anesthesia Type: general ASA Status: 2          Anesthesia Type: general    Ciera Phase I: Ciera Score: 10    Ciera Phase II:      Last vitals: Reviewed and per EMR flowsheets.        Anesthesia Post Evaluation    Patient location during evaluation: PACU  Patient participation: complete - patient participated  Level of consciousness: awake  Airway patency: patent  Nausea & Vomiting: no vomiting and no nausea  Complications: no  Cardiovascular status: hemodynamically stable  Respiratory status: acceptable  Hydration status: stable       Plan:  1. PST instructions given ; NPO status/  instructions to be given by ASU   2. Pt instructed to take following meds on day of surgery:   3. Pt instructed to take routine evening medications unless indicated   4. Stop NSAIDS ( Aspirin Alev Motrin Mobic Diclofenac), herbal supplements , MVI , Vitamin fish oil 7 days prior to surgery  unless   directed by surgeon or cardiologist;   5. Medical Optimization  with    6. EZ wash instructions given & mupirocin instructions given  7. CBC BMP PTT, PT/INR T&S EKG  done        54 y/o female S/P b/L mastectomy with b/l DINESH reconstruction s/p chemo , PMH of Breast cancer, HTN, HLD, DM2, PAULO. Pt denies breasts pain, no fever. c/o breast asymmetry  She is here for PST for planned left  revision of reconstructed breast with revision of abdominal donor site and right lateral abdominal dog ear         Plan:  1. PST instructions given ; NPO status/  instructions to be given by ASU   2. Pt instructed to take following meds on day of surgery: none  3. Pt instructed to take routine evening medications unless indicated   4. Stop NSAIDS ( Aspirin Alev Motrin Mobic Diclofenac), herbal supplements , MVI , Vitamin fish oil 7 days prior to surgery  unless   directed by surgeon or cardiologist;   5. Medical Optimization  with Dr Moore   6. EZ wash instructions given   7. CBC BMP T&S EKG  done        54 y/o female S/P b/L mastectomy with b/l DINESH reconstruction s/p chemo , PMH of Breast cancer, HTN, HLD, DM2, PAULO. Pt denies breasts pain, no fever. c/o breast asymmetry  She is here for PST for planned left  revision of reconstructed breast with revision of abdominal donor site and right lateral abdominal dog ear   Patient took phentermine today; Spoke to Dr Ovalle ; Medication has to be stopped at least 3 days prior to surgery. Can proceed with case only if medically necessary; Spoke with [facility] surgical coordinator; She will touch base with surgeon Dr Marie .         Plan:  1. PST instructions given ; NPO status/  instructions to be given by ASU   2. Pt instructed to take following meds on day of surgery: none  3. Pt instructed to take routine evening medications unless indicated   4. Stop NSAIDS ( Aspirin Alev Motrin Mobic Diclofenac), herbal supplements , MVI , Vitamin fish oil 7 days prior to surgery  unless   directed by surgeon or cardiologist;   5. Medical Optimization  with Dr Moore   6. EZ wash instructions given   7. CBC BMP T&S EKG  done

## 2024-12-03 ENCOUNTER — TELEPHONE (OUTPATIENT)
Dept: INTERNAL MEDICINE CLINIC | Age: 69
End: 2024-12-03

## 2024-12-17 DIAGNOSIS — E78.5 HYPERLIPIDEMIA, UNSPECIFIED HYPERLIPIDEMIA TYPE: ICD-10-CM

## 2024-12-17 DIAGNOSIS — E08.21 DIABETES MELLITUS DUE TO UNDERLYING CONDITION WITH DIABETIC NEPHROPATHY, WITHOUT LONG-TERM CURRENT USE OF INSULIN (HCC): ICD-10-CM

## 2024-12-17 DIAGNOSIS — I10 ESSENTIAL HYPERTENSION: ICD-10-CM

## 2024-12-18 ENCOUNTER — TELEPHONE (OUTPATIENT)
Dept: CARDIOLOGY CLINIC | Age: 69
End: 2024-12-18

## 2024-12-18 NOTE — TELEPHONE ENCOUNTER
Called and spoke with pt. She is out of medication and will complete the application for assistance.     Are there samples we could offer her to get her by until we can get her approved for assistance?

## 2024-12-18 NOTE — TELEPHONE ENCOUNTER
Krysten/Mirella -  It looks like pt is on Jardiance for Diabetes.  It looks like pt already reached out to PCP to try and get an alternative.

## 2024-12-18 NOTE — TELEPHONE ENCOUNTER
After talking with her seems like PCP wanted her to come in and they didn't change anything or make her any appointments or assistance options.

## 2024-12-18 NOTE — TELEPHONE ENCOUNTER
Patient is calling about new medication, Jardiance. She said after her insurance, its going to be around $500 for just one fill. Please advise. She said her social security, she doesn't get that much to afford that.

## 2024-12-19 RX ORDER — AMLODIPINE BESYLATE 10 MG/1
10 TABLET ORAL DAILY
Qty: 90 TABLET | Refills: 3 | Status: SHIPPED | OUTPATIENT
Start: 2024-12-19

## 2024-12-19 RX ORDER — ATORVASTATIN CALCIUM 40 MG/1
40 TABLET, FILM COATED ORAL NIGHTLY
Qty: 90 TABLET | Refills: 3 | Status: SHIPPED | OUTPATIENT
Start: 2024-12-19

## 2024-12-19 RX ORDER — LISINOPRIL 40 MG/1
40 TABLET ORAL EVERY 12 HOURS
Qty: 180 TABLET | Refills: 3 | Status: SHIPPED | OUTPATIENT
Start: 2024-12-19

## 2025-01-25 ENCOUNTER — HOSPITAL ENCOUNTER (EMERGENCY)
Age: 70
Discharge: HOME | End: 2025-01-25
Payer: MEDICARE

## 2025-01-25 VITALS
DIASTOLIC BLOOD PRESSURE: 85 MMHG | RESPIRATION RATE: 16 BRPM | OXYGEN SATURATION: 97 % | SYSTOLIC BLOOD PRESSURE: 162 MMHG | TEMPERATURE: 97.6 F

## 2025-01-25 DIAGNOSIS — N39.0 ACUTE UTI (URINARY TRACT INFECTION): Primary | ICD-10-CM

## 2025-01-25 LAB
BILIRUB UR STRIP.AUTO-MCNC: NEGATIVE MG/DL
CHARACTER UR: ABNORMAL
COLOR, UA: YELLOW
GLUCOSE UR QL STRIP.AUTO: 500 MG/DL
KETONES UR QL STRIP.AUTO: NEGATIVE
NITRITE UR QL STRIP.AUTO: POSITIVE
PH UR STRIP.AUTO: 5.5 [PH] (ref 5–9)
PROT UR STRIP.AUTO-MCNC: 100 MG/DL
RBC #/AREA URNS HPF: ABNORMAL /[HPF]
SP GR UR STRIP.AUTO: 1.02 (ref 1–1.03)
UROBILINOGEN, URINE: 0.2 EU/DL (ref 0.2–1)
WBC #/AREA URNS HPF: ABNORMAL /[HPF]

## 2025-01-25 PROCEDURE — 87086 URINE CULTURE/COLONY COUNT: CPT

## 2025-01-25 PROCEDURE — 99213 OFFICE O/P EST LOW 20 MIN: CPT | Performed by: NURSE PRACTITIONER

## 2025-01-25 PROCEDURE — 81003 URINALYSIS AUTO W/O SCOPE: CPT

## 2025-01-25 PROCEDURE — 87186 SC STD MICRODIL/AGAR DIL: CPT

## 2025-01-25 PROCEDURE — 87077 CULTURE AEROBIC IDENTIFY: CPT

## 2025-01-25 PROCEDURE — 99213 OFFICE O/P EST LOW 20 MIN: CPT

## 2025-01-25 RX ORDER — NITROFURANTOIN 25; 75 MG/1; MG/1
100 CAPSULE ORAL 2 TIMES DAILY
Qty: 10 CAPSULE | Refills: 0 | Status: SHIPPED | OUTPATIENT
Start: 2025-01-25 | End: 2025-01-30

## 2025-01-25 ASSESSMENT — PAIN SCALES - GENERAL: PAINLEVEL_OUTOF10: 2

## 2025-01-25 ASSESSMENT — ENCOUNTER SYMPTOMS
SHORTNESS OF BREATH: 0
COUGH: 0
VOMITING: 0
ABDOMINAL PAIN: 0
DIARRHEA: 0
NAUSEA: 0

## 2025-01-25 ASSESSMENT — PAIN DESCRIPTION - PAIN TYPE: TYPE: ACUTE PAIN

## 2025-01-25 ASSESSMENT — PAIN - FUNCTIONAL ASSESSMENT: PAIN_FUNCTIONAL_ASSESSMENT: 0-10

## 2025-01-25 ASSESSMENT — PAIN DESCRIPTION - FREQUENCY: FREQUENCY: INTERMITTENT

## 2025-01-25 ASSESSMENT — PAIN DESCRIPTION - DESCRIPTORS: DESCRIPTORS: BURNING

## 2025-01-25 NOTE — ED PROVIDER NOTES
[facility] URGENT CARE  UrgentCare Encounter      CHIEFCOMPLAINT       Chief Complaint   Patient presents with    Dysuria      Onset 2 days       Nurses Notes reviewed and I agree except as noted in the HPI.  HISTORY OF PRESENT ILLNESS     Jocelynn Stern is a 69 y.o. female who presents to the urgent care for evaluation of a possible UTI that started 2 days ago.  Has burning urgency and frequency.  Has incontinence and wears a depends.    The patient/patient representative has no other acute complaints at this time.    REVIEW OF SYSTEMS     Review of Systems   Constitutional:  Negative for chills, fatigue and fever.   Respiratory:  Negative for cough and shortness of breath.    Cardiovascular:  Negative for chest pain.   Gastrointestinal:  Negative for abdominal pain, diarrhea, nausea and vomiting.   Genitourinary:  Positive for dysuria, frequency and urgency. Negative for flank pain and hematuria.   Skin:  Negative for rash.       PAST MEDICAL HISTORY         Diagnosis Date    Chronic renal disease, stage 3, moderately decreased glomerular filtration rate (GFR) between 30-59 mL/min/1.73 square meter (HCC)     Diabetes mellitus (HCC)     Diabetic polyneuropathy associated with type 2 diabetes mellitus (HCC) 02/23/2022    Hypercholesterolemia     Hypertension     Psoriasis        SURGICAL HISTORY     Patient  has a past surgical history that includes Toe amputation (Right, 4/23/2022); Foot Debridement (Right, 4/27/2022); and Leg Surgery (N/A, 8/28/2024).    CURRENT MEDICATIONS       Previous Medications    AMLODIPINE (NORVASC) 10 MG TABLET    Take 1 tablet by mouth daily    ASPIRIN 81 MG CHEWABLE TABLET    Take 1 tablet by mouth daily    ATORVASTATIN (LIPITOR) 40 MG TABLET    Take 1 tablet by mouth nightly    CONTINUOUS GLUCOSE SENSOR (FREESTYLE MILI 2 SENSOR) MISC    1 each by Does not apply route every 14 days    EMPAGLIFLOZIN (JARDIANCE) 25 MG TABLET    Take 1 tablet by mouth daily    FLUTICASONE (FLONASE) 50

## 2025-01-26 LAB
BACTERIA UR CULT: ABNORMAL
ORGANISM: ABNORMAL

## 2025-01-27 LAB
BACTERIA UR CULT: ABNORMAL
ORGANISM: ABNORMAL

## 2025-04-10 DIAGNOSIS — E78.5 HYPERLIPIDEMIA, UNSPECIFIED HYPERLIPIDEMIA TYPE: ICD-10-CM

## 2025-04-10 DIAGNOSIS — E08.21 DIABETES MELLITUS DUE TO UNDERLYING CONDITION WITH DIABETIC NEPHROPATHY, WITHOUT LONG-TERM CURRENT USE OF INSULIN (HCC): ICD-10-CM

## 2025-04-11 RX ORDER — ATORVASTATIN CALCIUM 40 MG/1
40 TABLET, FILM COATED ORAL NIGHTLY
Qty: 90 TABLET | Refills: 3 | OUTPATIENT
Start: 2025-04-11

## 2025-04-17 DIAGNOSIS — E08.21 DIABETES MELLITUS DUE TO UNDERLYING CONDITION WITH DIABETIC NEPHROPATHY, WITHOUT LONG-TERM CURRENT USE OF INSULIN (HCC): ICD-10-CM

## 2025-04-17 DIAGNOSIS — E78.5 HYPERLIPIDEMIA, UNSPECIFIED HYPERLIPIDEMIA TYPE: ICD-10-CM

## 2025-04-17 DIAGNOSIS — I10 ESSENTIAL HYPERTENSION: ICD-10-CM

## 2025-04-18 RX ORDER — LISINOPRIL 40 MG/1
40 TABLET ORAL EVERY 12 HOURS
Qty: 180 TABLET | Refills: 3 | Status: SHIPPED | OUTPATIENT
Start: 2025-04-18

## 2025-04-18 RX ORDER — ATORVASTATIN CALCIUM 40 MG/1
40 TABLET, FILM COATED ORAL NIGHTLY
Qty: 90 TABLET | Refills: 3 | Status: SHIPPED | OUTPATIENT
Start: 2025-04-18

## 2025-04-18 RX ORDER — AMLODIPINE BESYLATE 10 MG/1
10 TABLET ORAL DAILY
Qty: 90 TABLET | Refills: 3 | Status: SHIPPED | OUTPATIENT
Start: 2025-04-18

## 2025-05-29 ENCOUNTER — OFFICE VISIT (OUTPATIENT)
Dept: INTERNAL MEDICINE CLINIC | Age: 70
End: 2025-05-29
Payer: MEDICARE

## 2025-05-29 VITALS
RESPIRATION RATE: 18 BRPM | BODY MASS INDEX: 31.39 KG/M2 | HEART RATE: 84 BPM | WEIGHT: 218.8 LBS | OXYGEN SATURATION: 97 % | DIASTOLIC BLOOD PRESSURE: 58 MMHG | TEMPERATURE: 98 F | SYSTOLIC BLOOD PRESSURE: 110 MMHG

## 2025-05-29 DIAGNOSIS — E08.21 DIABETES MELLITUS DUE TO UNDERLYING CONDITION WITH DIABETIC NEPHROPATHY, WITHOUT LONG-TERM CURRENT USE OF INSULIN (HCC): Primary | ICD-10-CM

## 2025-05-29 DIAGNOSIS — R09.89 DIMINISHED PULSES IN LOWER EXTREMITY: ICD-10-CM

## 2025-05-29 DIAGNOSIS — E66.811 OBESITY (BMI 30.0-34.9): ICD-10-CM

## 2025-05-29 DIAGNOSIS — Z12.31 BREAST CANCER SCREENING BY MAMMOGRAM: ICD-10-CM

## 2025-05-29 DIAGNOSIS — R35.0 FREQUENT URINATION: ICD-10-CM

## 2025-05-29 DIAGNOSIS — E78.5 HYPERLIPIDEMIA, UNSPECIFIED HYPERLIPIDEMIA TYPE: ICD-10-CM

## 2025-05-29 DIAGNOSIS — Z12.11 SCREENING FOR COLON CANCER: ICD-10-CM

## 2025-05-29 DIAGNOSIS — L97.528 DIABETIC ULCER OF TOE OF LEFT FOOT ASSOCIATED WITH DIABETES MELLITUS DUE TO UNDERLYING CONDITION, WITH OTHER ULCER SEVERITY (HCC): ICD-10-CM

## 2025-05-29 DIAGNOSIS — E08.621 DIABETIC ULCER OF TOE OF LEFT FOOT ASSOCIATED WITH DIABETES MELLITUS DUE TO UNDERLYING CONDITION, WITH OTHER ULCER SEVERITY (HCC): ICD-10-CM

## 2025-05-29 DIAGNOSIS — I10 ESSENTIAL HYPERTENSION: ICD-10-CM

## 2025-05-29 DIAGNOSIS — Z12.39 ENCOUNTER FOR SCREENING FOR MALIGNANT NEOPLASM OF BREAST, UNSPECIFIED SCREENING MODALITY: ICD-10-CM

## 2025-05-29 DIAGNOSIS — L85.3 DRY SKIN: ICD-10-CM

## 2025-05-29 LAB — HBA1C MFR BLD: 9.6 % (ref 4.3–5.7)

## 2025-05-29 PROCEDURE — 3078F DIAST BP <80 MM HG: CPT

## 2025-05-29 PROCEDURE — 99214 OFFICE O/P EST MOD 30 MIN: CPT

## 2025-05-29 PROCEDURE — 83036 HEMOGLOBIN GLYCOSYLATED A1C: CPT

## 2025-05-29 PROCEDURE — 1159F MED LIST DOCD IN RCRD: CPT

## 2025-05-29 PROCEDURE — 1124F ACP DISCUSS-NO DSCNMKR DOCD: CPT

## 2025-05-29 PROCEDURE — 3074F SYST BP LT 130 MM HG: CPT

## 2025-05-29 RX ORDER — AVOBENZONE, HOMOSALATE, OCTISALATE, OCTOCRYLENE 30; 40; 45; 26 MG/ML; MG/ML; MG/ML; MG/ML
CREAM TOPICAL
Qty: 100 EACH | Refills: 3 | Status: SHIPPED | OUTPATIENT
Start: 2025-05-29

## 2025-05-29 RX ORDER — GLUCOSAMINE HCL/CHONDROITIN SU 500-400 MG
CAPSULE ORAL
Qty: 100 STRIP | Refills: 3 | Status: SHIPPED | OUTPATIENT
Start: 2025-05-29

## 2025-05-29 SDOH — ECONOMIC STABILITY: FOOD INSECURITY: WITHIN THE PAST 12 MONTHS, YOU WORRIED THAT YOUR FOOD WOULD RUN OUT BEFORE YOU GOT MONEY TO BUY MORE.: NEVER TRUE

## 2025-05-29 SDOH — ECONOMIC STABILITY: FOOD INSECURITY: WITHIN THE PAST 12 MONTHS, THE FOOD YOU BOUGHT JUST DIDN'T LAST AND YOU DIDN'T HAVE MONEY TO GET MORE.: NEVER TRUE

## 2025-05-29 ASSESSMENT — PATIENT HEALTH QUESTIONNAIRE - PHQ9
SUM OF ALL RESPONSES TO PHQ QUESTIONS 1-9: 0
2. FEELING DOWN, DEPRESSED OR HOPELESS: NOT AT ALL
SUM OF ALL RESPONSES TO PHQ QUESTIONS 1-9: 0
1. LITTLE INTEREST OR PLEASURE IN DOING THINGS: NOT AT ALL

## 2025-05-29 NOTE — PATIENT INSTRUCTIONS
Gently scrub both feet with warm wet wash cloth nightly. Dry well. Apply vaseline and put on socks. Be careful not to slip. Can also try over the counter hydrocortisone ointment.     You are due for:   Shingles vaccine  DTAP   Pneumococcal 50+  RSV   COVID 19  Diabetic Eye exam  Colonoscopy  Mammo  Dexa scan   **Podiatry - foot doctor appt ASAP**  **MEHUL test**  ** Lab work**

## 2025-05-29 NOTE — PROGRESS NOTES
diabetic foot ulcer.  Will start patient on Mounjaro 2.5 mg weekly, patient will need significant reduction in A1c which would be better accomplished with Mounjaro over Trulicity  Glucose monitor, lancets, strips called in  Patient is due for diabetic eye exam-will call her ophthalmologist  Diabetic foot exam noting diminished sensation bilaterally and new diabetic foot ulcer of left second digit  Patient educated on wearing hard soled slippers around the house to avoid injury  BMP, micro albumin creatinine ratio ordered  Diabetic Foot Ulcer: Left second digit.  Patient does have a history of osteomyelitis of right fifth digit s/p amputation.  Patient has not follow-up with podiatry for about 6 months.  Patient states that they have previously not evaluated her left foot.  Patient is unsure of how long this ulcer has been here.  It is not painful.  He  Decreased pulses of lower extremities: Decrease posterior tibial pulse bilaterally.  Does not look like patient has ever had MEHUL ordered.  Given the above we will order MEHUL.  Frequent urination: Suspect 2/2 uncontrolled diabetes.  Patient states has been going on for several weeks.  She denies burning or pain with urination.  Family with patient today requesting UA to rule out UTI.  HTN: Stable, patient is on lisinopril and amlodipine.  Patient to continue these medications.  BMP ordered.  HLD: Patient is on atorvastatin 40 mg.  She is due for repeat lipid panel.  Obesity: BMI 31.39.  Suspect improvement in weight with Mounjaro.  Recommend healthy lifestyle choices.  Patient likes to drink coffee with creamer.  Suggest swapping creamer for sugar-free.  Dry skin of bilateral feet: Patient has extensive dry flaky skin on bilateral feet.  Patient states she has had this for a long time.  She states that podiatry told her that she should follow-up with dermatology for better answer as to what is causing the flaking.  Patient also has what looks to be fungal infection of

## 2025-06-03 ENCOUNTER — LAB (OUTPATIENT)
Dept: LAB | Age: 70
End: 2025-06-03

## 2025-06-03 ENCOUNTER — HOSPITAL ENCOUNTER (OUTPATIENT)
Dept: WOMENS IMAGING | Age: 70
Discharge: HOME | End: 2025-06-03
Payer: MEDICARE

## 2025-06-03 VITALS — WEIGHT: 220 LBS | HEIGHT: 70 IN | BODY MASS INDEX: 31.5 KG/M2

## 2025-06-03 DIAGNOSIS — E78.5 HYPERLIPIDEMIA, UNSPECIFIED HYPERLIPIDEMIA TYPE: ICD-10-CM

## 2025-06-03 DIAGNOSIS — Z12.31 BREAST CANCER SCREENING BY MAMMOGRAM: ICD-10-CM

## 2025-06-03 DIAGNOSIS — Z12.39 ENCOUNTER FOR SCREENING FOR MALIGNANT NEOPLASM OF BREAST, UNSPECIFIED SCREENING MODALITY: ICD-10-CM

## 2025-06-03 DIAGNOSIS — E08.21 DIABETES MELLITUS DUE TO UNDERLYING CONDITION WITH DIABETIC NEPHROPATHY, WITHOUT LONG-TERM CURRENT USE OF INSULIN (HCC): ICD-10-CM

## 2025-06-03 LAB
ANION GAP SERPL CALC-SCNC: 13 MEQ/L (ref 8–16)
BUN SERPL-MCNC: 39 MG/DL (ref 8–23)
CALCIUM SERPL-MCNC: 9.3 MG/DL (ref 8.8–10.2)
CHLORIDE SERPL-SCNC: 107 MEQ/L (ref 98–111)
CHOLEST SERPL-MCNC: 120 MG/DL (ref 100–199)
CO2 SERPL-SCNC: 19 MEQ/L (ref 22–29)
CREAT SERPL-MCNC: 1.3 MG/DL (ref 0.5–0.9)
GFR SERPL CREATININE-BSD FRML MDRD: 45 ML/MIN/1.73M2
GLUCOSE SERPL-MCNC: 169 MG/DL (ref 74–109)
HDLC SERPL-MCNC: 37 MG/DL
LDLC SERPL CALC-MCNC: 62 MG/DL
POTASSIUM SERPL-SCNC: 4.8 MEQ/L (ref 3.5–5.2)
SODIUM SERPL-SCNC: 139 MEQ/L (ref 135–145)
TRIGL SERPL-MCNC: 107 MG/DL (ref 0–199)

## 2025-06-03 PROCEDURE — 77063 BREAST TOMOSYNTHESIS BI: CPT

## 2025-06-09 ENCOUNTER — HOSPITAL ENCOUNTER (OUTPATIENT)
Dept: INTERVENTIONAL RADIOLOGY/VASCULAR | Age: 70
Discharge: HOME | End: 2025-06-11
Payer: MEDICARE

## 2025-06-09 DIAGNOSIS — E08.21 DIABETES MELLITUS DUE TO UNDERLYING CONDITION WITH DIABETIC NEPHROPATHY, WITHOUT LONG-TERM CURRENT USE OF INSULIN (HCC): ICD-10-CM

## 2025-06-09 DIAGNOSIS — R09.89 DIMINISHED PULSES IN LOWER EXTREMITY: ICD-10-CM

## 2025-06-09 DIAGNOSIS — E08.621 DIABETIC ULCER OF TOE OF LEFT FOOT ASSOCIATED WITH DIABETES MELLITUS DUE TO UNDERLYING CONDITION, WITH OTHER ULCER SEVERITY (HCC): ICD-10-CM

## 2025-06-09 DIAGNOSIS — L97.528 DIABETIC ULCER OF TOE OF LEFT FOOT ASSOCIATED WITH DIABETES MELLITUS DUE TO UNDERLYING CONDITION, WITH OTHER ULCER SEVERITY (HCC): ICD-10-CM

## 2025-06-09 PROCEDURE — 93925 LOWER EXTREMITY STUDY: CPT

## 2025-06-12 ENCOUNTER — HOSPITAL ENCOUNTER (OUTPATIENT)
Dept: WOMENS IMAGING | Age: 70
Discharge: HOME | End: 2025-06-12
Attending: RADIOLOGY
Payer: MEDICARE

## 2025-06-12 DIAGNOSIS — R92.8 ABNORMAL MAMMOGRAM: Primary | ICD-10-CM

## 2025-06-12 DIAGNOSIS — Z09 FOLLOW-UP EXAM: ICD-10-CM

## 2025-06-12 DIAGNOSIS — R92.8 ABNORMAL MAMMOGRAM: ICD-10-CM

## 2025-06-12 DIAGNOSIS — N60.01 BREAST CYST, RIGHT: ICD-10-CM

## 2025-06-12 PROCEDURE — 76642 ULTRASOUND BREAST LIMITED: CPT

## 2025-06-12 PROCEDURE — G0279 TOMOSYNTHESIS, MAMMO: HCPCS

## 2025-06-25 ENCOUNTER — TELEPHONE (OUTPATIENT)
Dept: INTERNAL MEDICINE CLINIC | Age: 70
End: 2025-06-25

## 2025-06-25 NOTE — TELEPHONE ENCOUNTER
Patient's son called stating that the cost of the Mounjaro even with insurance prior authorization is going to be $680 and his mother cannot afford to pay for it . Son was asking about another alternative ? Patient is scheduled to see you on 7/10/25 . Please advise.

## 2025-07-10 ENCOUNTER — OFFICE VISIT (OUTPATIENT)
Dept: INTERNAL MEDICINE CLINIC | Age: 70
End: 2025-07-10
Payer: MEDICARE

## 2025-07-10 VITALS
HEIGHT: 70 IN | BODY MASS INDEX: 33.16 KG/M2 | HEART RATE: 72 BPM | DIASTOLIC BLOOD PRESSURE: 66 MMHG | WEIGHT: 231.6 LBS | SYSTOLIC BLOOD PRESSURE: 118 MMHG | TEMPERATURE: 97.9 F

## 2025-07-10 DIAGNOSIS — E11.65 TYPE 2 DIABETES MELLITUS WITH HYPERGLYCEMIA, WITHOUT LONG-TERM CURRENT USE OF INSULIN (HCC): ICD-10-CM

## 2025-07-10 DIAGNOSIS — Z00.00 WELCOME TO MEDICARE PREVENTIVE VISIT: Primary | ICD-10-CM

## 2025-07-10 PROCEDURE — 3046F HEMOGLOBIN A1C LEVEL >9.0%: CPT | Performed by: INTERNAL MEDICINE

## 2025-07-10 PROCEDURE — 1123F ACP DISCUSS/DSCN MKR DOCD: CPT | Performed by: INTERNAL MEDICINE

## 2025-07-10 PROCEDURE — 99214 OFFICE O/P EST MOD 30 MIN: CPT | Performed by: INTERNAL MEDICINE

## 2025-07-10 PROCEDURE — G0438 PPPS, INITIAL VISIT: HCPCS | Performed by: INTERNAL MEDICINE

## 2025-07-10 PROCEDURE — 3074F SYST BP LT 130 MM HG: CPT | Performed by: INTERNAL MEDICINE

## 2025-07-10 PROCEDURE — 3078F DIAST BP <80 MM HG: CPT | Performed by: INTERNAL MEDICINE

## 2025-07-10 ASSESSMENT — PATIENT HEALTH QUESTIONNAIRE - PHQ9
SUM OF ALL RESPONSES TO PHQ QUESTIONS 1-9: 2
SUM OF ALL RESPONSES TO PHQ QUESTIONS 1-9: 2
2. FEELING DOWN, DEPRESSED OR HOPELESS: SEVERAL DAYS
1. LITTLE INTEREST OR PLEASURE IN DOING THINGS: SEVERAL DAYS
SUM OF ALL RESPONSES TO PHQ QUESTIONS 1-9: 2
SUM OF ALL RESPONSES TO PHQ QUESTIONS 1-9: 2

## 2025-07-10 ASSESSMENT — LIFESTYLE VARIABLES
HOW MANY STANDARD DRINKS CONTAINING ALCOHOL DO YOU HAVE ON A TYPICAL DAY: 1 OR 2
HOW OFTEN DO YOU HAVE A DRINK CONTAINING ALCOHOL: MONTHLY OR LESS

## 2025-07-10 NOTE — ACP (ADVANCE CARE PLANNING)
Advance Care Planning     Advance Care Planning (ACP) Physician/NP/PA Conversation    Date of Conversation: 7/10/2025  Conducted with: Patient with Decision Making Capacity    Healthcare Decision Maker:      Primary Decision Maker: Tatianna Arechiga - Sadaf - [phone]    Secondary Decision Maker: Celio Stern - Brother/Sister - [phone]    Click here to complete Healthcare Decision Makers including selection of the Healthcare Decision Maker Relationship (ie \"Primary\")      Care Preferences:    Hospitalization:  \"If your health worsens and it becomes clear that your chance of recovery is unlikely, what would be your preference regarding hospitalization?\"  The patient would prefer hospitalization.    Ventilation:  \"If you were unable to breath on your own and your chance of recovery was unlikely, what would be your preference about the use of a ventilator (breathing machine) if it was available to you?\"  The patient is unsure.  Wants to be sure that really unlikely to recover - if truly severe - then no ventilator.    Resuscitation:  \"In the event your heart stopped as a result of an underlying serious health condition, would you want attempts made to restart your heart, or would you prefer a natural death?\"  Yes, attempt to resuscitate.    resuscitation preferences    Conversation Outcomes / Follow-Up Plan:  ACP in process - information provided, considering goals and options  Reviewed DNR/DNI and patient elects Full Code (Attempt Resuscitation)    Length of Voluntary ACP Conversation in minutes:  <16 minutes (Non-Billable)    Janice Duff MD

## 2025-07-10 NOTE — PATIENT INSTRUCTIONS
warranty or liability for your use of this information.    Personalized Preventive Plan for Jocelynn Stern - 7/10/2025  Medicare offers a range of preventive health benefits. Some of the tests and screenings are paid in full while other may be subject to a deductible, co-insurance, and/or copay.  Some of these benefits include a comprehensive review of your medical history including lifestyle, illnesses that may run in your family, and various assessments and screenings as appropriate.  After reviewing your medical record and screening and assessments performed today your provider may have ordered immunizations, labs, imaging, and/or referrals for you.  A list of these orders (if applicable) as well as your Preventive Care list are included within your After Visit Summary for your review.

## 2025-07-10 NOTE — PROGRESS NOTES
Medicare Annual Wellness Visit    Jocelynn Stern is here for Medicare AWV    Assessment & Plan   Welcome to Medicare preventive visit  Type 2 diabetes mellitus with hyperglycemia, without long-term current use of insulin (HCC)  -     Semaglutide,0.25 or 0.5MG/DOS, 2 MG/3ML SOPN; Inject 0.25 mg into the skin every 7 days X one month then increase to 0.5 mg weekly., Disp-3 mL, R-1Normal     Wellness visit issues:  Set up dentist appt when not as busy.  Vaccines are due - Prevnar 20, Tdap, shingles, RSV, COVID.  She has colonoscopy and EGD to be set up with Dr. Pacheco.  Hold off on DEXA as overwhelmed.    Acute issues:  DM - uncontrolled with glucose in 200's and last HgA1c 9.6 5/29/25 - unable to afford Mounjaro - reportedly Ozempic has better coverage for her.  I believe they have forms to fill out for company assistance.  They will also be trying to get her on Medicaid.  We can give samples if needed.  Complicated by stage 3a renal disease - need microalbumin and UA with culture.  Has DM eye exam set up.    Abnormal mammogram - repeat in 6 months with u/s.    Hypertension,hyperlipidemia - controlled on current medication.    PAD/ulcer - defer to Dr. Cruz.    Return in about 2 months (around 9/10/2025).     Subjective   The following acute and/or chronic problems were also addressed today:    Son is with her today to help her navigate multiple issues, she seems forgetful.  She lives with son, he is keeping track of doctor visits, medication, etc.    Left foot ulcer - She saw Dr. Crzu and the toe ulcer healed but new ulcer on outer side of left foot.  They say ulcer looks better and they are to see Dr. Cruz next week.  Reviewed vascular study - hold off intervention as has collateral flow and ulcer seems to be healing.  Will send study to Dr. Cruz.    DM - on Moujaro her glucose improved to 100's but off Mounjaro glucose increased to 200's.  Now going to try Ozempic as better for insurance coverage.   Stage

## 2025-07-14 ENCOUNTER — APPOINTMENT (OUTPATIENT)
Dept: GENERAL RADIOLOGY | Age: 70
DRG: 617 | End: 2025-07-14
Payer: MEDICARE

## 2025-07-14 ENCOUNTER — APPOINTMENT (OUTPATIENT)
Dept: CT IMAGING | Age: 70
DRG: 617 | End: 2025-07-14
Payer: MEDICARE

## 2025-07-14 ENCOUNTER — HOSPITAL ENCOUNTER (INPATIENT)
Age: 70
LOS: 14 days | Discharge: NURSING, REHAB OR LONG-TERM CARE | DRG: 617 | End: 2025-07-28
Attending: EMERGENCY MEDICINE | Admitting: PODIATRIST
Payer: MEDICARE

## 2025-07-14 DIAGNOSIS — E11.621 DIABETIC ULCER OF LEFT FOOT ASSOCIATED WITH TYPE 2 DIABETES MELLITUS, LIMITED TO BREAKDOWN OF SKIN, UNSPECIFIED PART OF FOOT (HCC): ICD-10-CM

## 2025-07-14 DIAGNOSIS — L97.521 DIABETIC ULCER OF LEFT FOOT ASSOCIATED WITH TYPE 2 DIABETES MELLITUS, LIMITED TO BREAKDOWN OF SKIN, UNSPECIFIED PART OF FOOT (HCC): ICD-10-CM

## 2025-07-14 DIAGNOSIS — M86.9 OSTEOMYELITIS OF LEFT FOOT, UNSPECIFIED TYPE (HCC): Primary | ICD-10-CM

## 2025-07-14 DIAGNOSIS — M86.10 OTHER ACUTE OSTEOMYELITIS, UNSPECIFIED SITE (HCC): ICD-10-CM

## 2025-07-14 DIAGNOSIS — I73.9 PAD (PERIPHERAL ARTERY DISEASE): ICD-10-CM

## 2025-07-14 DIAGNOSIS — R01.1 HEART MURMUR: ICD-10-CM

## 2025-07-14 LAB
ANION GAP SERPL CALC-SCNC: 12 MEQ/L (ref 8–16)
BASOPHILS ABSOLUTE: 0.1 THOU/MM3 (ref 0–0.1)
BASOPHILS NFR BLD AUTO: 0.5 %
BUN SERPL-MCNC: 26 MG/DL (ref 8–23)
CALCIUM SERPL-MCNC: 9.1 MG/DL (ref 8.8–10.2)
CHLORIDE SERPL-SCNC: 102 MEQ/L (ref 98–111)
CO2 SERPL-SCNC: 22 MEQ/L (ref 22–29)
CREAT SERPL-MCNC: 1.2 MG/DL (ref 0.5–0.9)
DEPRECATED RDW RBC AUTO: 34.8 FL (ref 35–45)
EKG ATRIAL RATE: 84 BPM
EKG P AXIS: 54 DEGREES
EKG P-R INTERVAL: 124 MS
EKG Q-T INTERVAL: 420 MS
EKG QRS DURATION: 138 MS
EKG QTC CALCULATION (BAZETT): 496 MS
EKG R AXIS: 22 DEGREES
EKG T AXIS: 29 DEGREES
EKG VENTRICULAR RATE: 84 BPM
EOSINOPHIL NFR BLD AUTO: 1.7 %
EOSINOPHILS ABSOLUTE: 0.3 THOU/MM3 (ref 0–0.4)
ERYTHROCYTE [DISTWIDTH] IN BLOOD BY AUTOMATED COUNT: 11.9 % (ref 11.5–14.5)
GFR SERPL CREATININE-BSD FRML MDRD: 49 ML/MIN/1.73M2
GLUCOSE BLD STRIP.AUTO-MCNC: 309 MG/DL (ref 70–108)
GLUCOSE BLD STRIP.AUTO-MCNC: 339 MG/DL (ref 70–108)
GLUCOSE BLD STRIP.AUTO-MCNC: 345 MG/DL (ref 70–108)
GLUCOSE SERPL-MCNC: 223 MG/DL (ref 74–109)
HCT VFR BLD AUTO: 33.3 % (ref 37–47)
HGB BLD-MCNC: 10.5 GM/DL (ref 12–16)
IMM GRANULOCYTES # BLD AUTO: 0.08 THOU/MM3 (ref 0–0.07)
IMM GRANULOCYTES NFR BLD AUTO: 0.5 %
LACTIC ACID, SEPSIS: 1 MMOL/L (ref 0.5–1.9)
LACTIC ACID, SEPSIS: 1.1 MMOL/L (ref 0.5–1.9)
LYMPHOCYTES ABSOLUTE: 4.7 THOU/MM3 (ref 1–4.8)
LYMPHOCYTES NFR BLD AUTO: 26.8 %
MCH RBC QN AUTO: 25.6 PG (ref 26–33)
MCHC RBC AUTO-ENTMCNC: 31.5 GM/DL (ref 32.2–35.5)
MCV RBC AUTO: 81.2 FL (ref 81–99)
MONOCYTES ABSOLUTE: 1.3 THOU/MM3 (ref 0.4–1.3)
MONOCYTES NFR BLD AUTO: 7.2 %
NEUTROPHILS ABSOLUTE: 11.1 THOU/MM3 (ref 1.8–7.7)
NEUTROPHILS NFR BLD AUTO: 63.3 %
NEUTS HYPERSEG BLD QL SMEAR: PRESENT
NRBC BLD AUTO-RTO: 0 /100 WBC
OSMOLALITY SERPL CALC.SUM OF ELEC: 283.6 MOSMOL/KG (ref 275–300)
PLATELET # BLD AUTO: 399 THOU/MM3 (ref 130–400)
PLATELET BLD QL SMEAR: ADEQUATE
PMV BLD AUTO: 10.2 FL (ref 9.4–12.4)
POTASSIUM SERPL-SCNC: 4.7 MEQ/L (ref 3.5–5.2)
RBC # BLD AUTO: 4.1 MILL/MM3 (ref 4.2–5.4)
SCAN OF BLOOD SMEAR: NORMAL
SODIUM SERPL-SCNC: 136 MEQ/L (ref 135–145)
VARIANT LYMPHS BLD QL SMEAR: ABNORMAL %
WBC # BLD AUTO: 17.5 THOU/MM3 (ref 4.8–10.8)

## 2025-07-14 PROCEDURE — 75635 CT ANGIO ABDOMINAL ARTERIES: CPT

## 2025-07-14 PROCEDURE — 87075 CULTR BACTERIA EXCEPT BLOOD: CPT

## 2025-07-14 PROCEDURE — 99222 1ST HOSP IP/OBS MODERATE 55: CPT | Performed by: SURGERY

## 2025-07-14 PROCEDURE — 6360000002 HC RX W HCPCS

## 2025-07-14 PROCEDURE — 93010 ELECTROCARDIOGRAM REPORT: CPT | Performed by: NUCLEAR MEDICINE

## 2025-07-14 PROCEDURE — 36415 COLL VENOUS BLD VENIPUNCTURE: CPT

## 2025-07-14 PROCEDURE — 2580000003 HC RX 258: Performed by: SURGERY

## 2025-07-14 PROCEDURE — 82948 REAGENT STRIP/BLOOD GLUCOSE: CPT

## 2025-07-14 PROCEDURE — 83605 ASSAY OF LACTIC ACID: CPT

## 2025-07-14 PROCEDURE — 93005 ELECTROCARDIOGRAM TRACING: CPT

## 2025-07-14 PROCEDURE — 71046 X-RAY EXAM CHEST 2 VIEWS: CPT

## 2025-07-14 PROCEDURE — 99222 1ST HOSP IP/OBS MODERATE 55: CPT | Performed by: PHYSICIAN ASSISTANT

## 2025-07-14 PROCEDURE — 6360000002 HC RX W HCPCS: Performed by: PHYSICAL THERAPY ASSISTANT

## 2025-07-14 PROCEDURE — 87186 SC STD MICRODIL/AGAR DIL: CPT

## 2025-07-14 PROCEDURE — 2580000003 HC RX 258: Performed by: PHYSICAL THERAPY ASSISTANT

## 2025-07-14 PROCEDURE — 87077 CULTURE AEROBIC IDENTIFY: CPT

## 2025-07-14 PROCEDURE — 6370000000 HC RX 637 (ALT 250 FOR IP): Performed by: PHYSICAL THERAPY ASSISTANT

## 2025-07-14 PROCEDURE — 87070 CULTURE OTHR SPECIMN AEROBIC: CPT

## 2025-07-14 PROCEDURE — 80048 BASIC METABOLIC PNL TOTAL CA: CPT

## 2025-07-14 PROCEDURE — 93005 ELECTROCARDIOGRAM TRACING: CPT | Performed by: PHYSICAL THERAPY ASSISTANT

## 2025-07-14 PROCEDURE — 6370000000 HC RX 637 (ALT 250 FOR IP): Performed by: SURGERY

## 2025-07-14 PROCEDURE — 87040 BLOOD CULTURE FOR BACTERIA: CPT

## 2025-07-14 PROCEDURE — 85025 COMPLETE CBC W/AUTO DIFF WBC: CPT

## 2025-07-14 PROCEDURE — 6360000004 HC RX CONTRAST MEDICATION: Performed by: EMERGENCY MEDICINE

## 2025-07-14 PROCEDURE — 87147 CULTURE TYPE IMMUNOLOGIC: CPT

## 2025-07-14 PROCEDURE — 99285 EMERGENCY DEPT VISIT HI MDM: CPT

## 2025-07-14 PROCEDURE — 6360000002 HC RX W HCPCS: Performed by: SURGERY

## 2025-07-14 PROCEDURE — 87205 SMEAR GRAM STAIN: CPT

## 2025-07-14 PROCEDURE — 6370000000 HC RX 637 (ALT 250 FOR IP)

## 2025-07-14 PROCEDURE — 2580000003 HC RX 258: Performed by: EMERGENCY MEDICINE

## 2025-07-14 PROCEDURE — 2580000003 HC RX 258

## 2025-07-14 PROCEDURE — 73630 X-RAY EXAM OF FOOT: CPT

## 2025-07-14 PROCEDURE — 1200000000 HC SEMI PRIVATE

## 2025-07-14 PROCEDURE — 6370000000 HC RX 637 (ALT 250 FOR IP): Performed by: PHYSICIAN ASSISTANT

## 2025-07-14 RX ORDER — SODIUM CHLORIDE 9 MG/ML
INJECTION, SOLUTION INTRAVENOUS CONTINUOUS
Status: ACTIVE | OUTPATIENT
Start: 2025-07-14 | End: 2025-07-15

## 2025-07-14 RX ORDER — DEXTROSE MONOHYDRATE 100 MG/ML
INJECTION, SOLUTION INTRAVENOUS CONTINUOUS PRN
Status: DISCONTINUED | OUTPATIENT
Start: 2025-07-14 | End: 2025-07-28 | Stop reason: HOSPADM

## 2025-07-14 RX ORDER — AMLODIPINE BESYLATE 10 MG/1
10 TABLET ORAL DAILY
Status: DISCONTINUED | OUTPATIENT
Start: 2025-07-14 | End: 2025-07-28 | Stop reason: HOSPADM

## 2025-07-14 RX ORDER — INSULIN GLARGINE 100 [IU]/ML
10 INJECTION, SOLUTION SUBCUTANEOUS NIGHTLY
Status: DISCONTINUED | OUTPATIENT
Start: 2025-07-14 | End: 2025-07-28 | Stop reason: HOSPADM

## 2025-07-14 RX ORDER — SENNOSIDES 8.6 MG
325 CAPSULE ORAL ONCE
Status: COMPLETED | OUTPATIENT
Start: 2025-07-14 | End: 2025-07-14

## 2025-07-14 RX ORDER — ENOXAPARIN SODIUM 100 MG/ML
40 INJECTION SUBCUTANEOUS EVERY 24 HOURS
Status: DISCONTINUED | OUTPATIENT
Start: 2025-07-14 | End: 2025-07-18

## 2025-07-14 RX ORDER — MAGNESIUM SULFATE IN WATER 40 MG/ML
2000 INJECTION, SOLUTION INTRAVENOUS PRN
Status: DISCONTINUED | OUTPATIENT
Start: 2025-07-14 | End: 2025-07-24 | Stop reason: SDUPTHER

## 2025-07-14 RX ORDER — ACETAMINOPHEN 325 MG/1
650 TABLET ORAL EVERY 6 HOURS PRN
Status: DISCONTINUED | OUTPATIENT
Start: 2025-07-14 | End: 2025-07-28 | Stop reason: HOSPADM

## 2025-07-14 RX ORDER — ACETAMINOPHEN 650 MG/1
650 SUPPOSITORY RECTAL EVERY 6 HOURS PRN
Status: DISCONTINUED | OUTPATIENT
Start: 2025-07-14 | End: 2025-07-28 | Stop reason: HOSPADM

## 2025-07-14 RX ORDER — ASPIRIN 81 MG/1
81 TABLET, CHEWABLE ORAL DAILY
Status: DISCONTINUED | OUTPATIENT
Start: 2025-07-15 | End: 2025-07-16

## 2025-07-14 RX ORDER — INSULIN LISPRO 100 [IU]/ML
5 INJECTION, SOLUTION INTRAVENOUS; SUBCUTANEOUS
Status: DISCONTINUED | OUTPATIENT
Start: 2025-07-14 | End: 2025-07-28 | Stop reason: HOSPADM

## 2025-07-14 RX ORDER — GLUCOSAMINE HCL/CHONDROITIN SU 500-400 MG
1 CAPSULE ORAL 2 TIMES DAILY
Status: DISCONTINUED | OUTPATIENT
Start: 2025-07-14 | End: 2025-07-14 | Stop reason: CLARIF

## 2025-07-14 RX ORDER — SODIUM CHLORIDE 0.9 % (FLUSH) 0.9 %
5-40 SYRINGE (ML) INJECTION EVERY 12 HOURS SCHEDULED
Status: DISCONTINUED | OUTPATIENT
Start: 2025-07-14 | End: 2025-07-28 | Stop reason: HOSPADM

## 2025-07-14 RX ORDER — POTASSIUM CHLORIDE 7.45 MG/ML
10 INJECTION INTRAVENOUS PRN
Status: DISCONTINUED | OUTPATIENT
Start: 2025-07-14 | End: 2025-07-28 | Stop reason: HOSPADM

## 2025-07-14 RX ORDER — POTASSIUM CHLORIDE 1500 MG/1
40 TABLET, EXTENDED RELEASE ORAL PRN
Status: DISCONTINUED | OUTPATIENT
Start: 2025-07-14 | End: 2025-07-28 | Stop reason: HOSPADM

## 2025-07-14 RX ORDER — INSULIN LISPRO 100 [IU]/ML
0-4 INJECTION, SOLUTION INTRAVENOUS; SUBCUTANEOUS
Status: DISCONTINUED | OUTPATIENT
Start: 2025-07-14 | End: 2025-07-28 | Stop reason: HOSPADM

## 2025-07-14 RX ORDER — POLYETHYLENE GLYCOL 3350 17 G/17G
17 POWDER, FOR SOLUTION ORAL DAILY PRN
Status: DISCONTINUED | OUTPATIENT
Start: 2025-07-14 | End: 2025-07-14

## 2025-07-14 RX ORDER — ONDANSETRON 2 MG/ML
4 INJECTION INTRAMUSCULAR; INTRAVENOUS EVERY 6 HOURS PRN
Status: DISCONTINUED | OUTPATIENT
Start: 2025-07-14 | End: 2025-07-16 | Stop reason: SDUPTHER

## 2025-07-14 RX ORDER — 0.9 % SODIUM CHLORIDE 0.9 %
1000 INTRAVENOUS SOLUTION INTRAVENOUS ONCE
Status: COMPLETED | OUTPATIENT
Start: 2025-07-14 | End: 2025-07-14

## 2025-07-14 RX ORDER — ATORVASTATIN CALCIUM 40 MG/1
40 TABLET, FILM COATED ORAL NIGHTLY
Status: DISCONTINUED | OUTPATIENT
Start: 2025-07-14 | End: 2025-07-28 | Stop reason: HOSPADM

## 2025-07-14 RX ORDER — SODIUM CHLORIDE 0.9 % (FLUSH) 0.9 %
5-40 SYRINGE (ML) INJECTION PRN
Status: DISCONTINUED | OUTPATIENT
Start: 2025-07-14 | End: 2025-07-28 | Stop reason: HOSPADM

## 2025-07-14 RX ORDER — ONDANSETRON 4 MG/1
4 TABLET, ORALLY DISINTEGRATING ORAL EVERY 8 HOURS PRN
Status: DISCONTINUED | OUTPATIENT
Start: 2025-07-14 | End: 2025-07-16 | Stop reason: SDUPTHER

## 2025-07-14 RX ORDER — IOPAMIDOL 755 MG/ML
80 INJECTION, SOLUTION INTRAVASCULAR
Status: COMPLETED | OUTPATIENT
Start: 2025-07-14 | End: 2025-07-14

## 2025-07-14 RX ORDER — LISINOPRIL 40 MG/1
40 TABLET ORAL EVERY 12 HOURS
Status: DISCONTINUED | OUTPATIENT
Start: 2025-07-14 | End: 2025-07-28 | Stop reason: HOSPADM

## 2025-07-14 RX ORDER — GLUCAGON 1 MG/ML
1 KIT INJECTION PRN
Status: DISCONTINUED | OUTPATIENT
Start: 2025-07-14 | End: 2025-07-28 | Stop reason: HOSPADM

## 2025-07-14 RX ORDER — SODIUM CHLORIDE 9 MG/ML
INJECTION, SOLUTION INTRAVENOUS PRN
Status: DISCONTINUED | OUTPATIENT
Start: 2025-07-14 | End: 2025-07-28 | Stop reason: HOSPADM

## 2025-07-14 RX ADMIN — SODIUM CHLORIDE 1000 ML: 0.9 INJECTION, SOLUTION INTRAVENOUS at 12:50

## 2025-07-14 RX ADMIN — INSULIN GLARGINE 10 UNITS: 100 INJECTION, SOLUTION SUBCUTANEOUS at 21:54

## 2025-07-14 RX ADMIN — ENOXAPARIN SODIUM 40 MG: 100 INJECTION SUBCUTANEOUS at 21:57

## 2025-07-14 RX ADMIN — PIPERACILLIN AND TAZOBACTAM 4500 MG: 4; .5 INJECTION, POWDER, LYOPHILIZED, FOR SOLUTION INTRAVENOUS at 19:17

## 2025-07-14 RX ADMIN — INSULIN LISPRO 3 UNITS: 100 INJECTION, SOLUTION INTRAVENOUS; SUBCUTANEOUS at 22:37

## 2025-07-14 RX ADMIN — SODIUM CHLORIDE: 0.9 INJECTION, SOLUTION INTRAVENOUS at 18:47

## 2025-07-14 RX ADMIN — ATORVASTATIN CALCIUM 40 MG: 40 TABLET, FILM COATED ORAL at 21:59

## 2025-07-14 RX ADMIN — Medication 2500 MG: at 22:16

## 2025-07-14 RX ADMIN — IOPAMIDOL 80 ML: 755 INJECTION, SOLUTION INTRAVENOUS at 14:26

## 2025-07-14 RX ADMIN — ASPIRIN 325 MG: 325 TABLET, COATED ORAL at 19:17

## 2025-07-14 RX ADMIN — LISINOPRIL 40 MG: 40 TABLET ORAL at 22:02

## 2025-07-14 RX ADMIN — INSULIN LISPRO 5 UNITS: 100 INJECTION, SOLUTION INTRAVENOUS; SUBCUTANEOUS at 21:54

## 2025-07-14 ASSESSMENT — PAIN DESCRIPTION - ORIENTATION
ORIENTATION: LEFT
ORIENTATION: LEFT

## 2025-07-14 ASSESSMENT — PAIN SCALES - GENERAL
PAINLEVEL_OUTOF10: 3
PAINLEVEL_OUTOF10: 5

## 2025-07-14 ASSESSMENT — PAIN DESCRIPTION - DESCRIPTORS
DESCRIPTORS: ACHING
DESCRIPTORS: ACHING

## 2025-07-14 ASSESSMENT — PAIN DESCRIPTION - PAIN TYPE
TYPE: ACUTE PAIN
TYPE: ACUTE PAIN

## 2025-07-14 ASSESSMENT — PAIN DESCRIPTION - ONSET: ONSET: ON-GOING

## 2025-07-14 ASSESSMENT — PAIN - FUNCTIONAL ASSESSMENT
PAIN_FUNCTIONAL_ASSESSMENT: NONE - DENIES PAIN
PAIN_FUNCTIONAL_ASSESSMENT: 0-10
PAIN_FUNCTIONAL_ASSESSMENT: 0-10
PAIN_FUNCTIONAL_ASSESSMENT: NONE - DENIES PAIN
PAIN_FUNCTIONAL_ASSESSMENT: PREVENTS OR INTERFERES SOME ACTIVE ACTIVITIES AND ADLS
PAIN_FUNCTIONAL_ASSESSMENT: 0-10

## 2025-07-14 ASSESSMENT — PAIN DESCRIPTION - LOCATION
LOCATION: FOOT
LOCATION: FOOT

## 2025-07-14 ASSESSMENT — PAIN DESCRIPTION - FREQUENCY
FREQUENCY: CONTINUOUS
FREQUENCY: CONTINUOUS

## 2025-07-14 NOTE — ED TRIAGE NOTES
Pt to ED from home sent by pediatrist for left foot ulcer. Pt reports that she has been monitoring for about a month that has not healed. Pt denies any fevers or illness. Pt is alert and oriented x 4. VSS.

## 2025-07-14 NOTE — ED NOTES
ED to inpatient nurses report      Chief Complaint:  Chief Complaint   Patient presents with    Foot Ulcer     Present to ED from: home    MOA:     LOC: alert and orientated to name, place, date  Mobility: Requires assistance * 2  Oxygen Baseline: RA    Current needs required: RA     Code Status:   Full Code    What abnormal results were found and what did you give/do to treat them? Foot ulcer  Any procedures or intervention occur? NA    Mental Status:  Level of Consciousness: Alert (0)    Psych Assessment:        Vitals:  Patient Vitals for the past 24 hrs:   BP Temp Temp src Pulse Resp SpO2 Height Weight   07/14/25 1643 (!) 148/67 -- -- 79 15 98 % -- --   07/14/25 1523 (!) 166/80 -- -- 91 14 98 % -- --   07/14/25 1357 (!) 188/93 -- -- 97 16 97 % -- --   07/14/25 1253 (!) 164/96 -- -- 74 -- 96 % -- --   07/14/25 1219 (!) 150/67 -- -- 74 16 97 % -- --   07/14/25 1122 -- -- -- -- -- 98 % -- --   07/14/25 1105 -- 98.4 °F (36.9 °C) Oral -- -- -- -- --   07/14/25 1101 (!) 129/54 -- -- 69 16 -- 1.778 m (5' 10\") 95.3 kg (210 lb)        LDAs:   Peripheral IV 07/14/25 Left Antecubital (Active)   Site Assessment Clean, dry & intact 07/14/25 1120   Line Status Normal saline locked;Specimen collected;Flushed 07/14/25 1120   Phlebitis Assessment No symptoms 07/14/25 1120   Infiltration Assessment 0 07/14/25 1120   Dressing Status New dressing applied;Clean, dry & intact 07/14/25 1120   Dressing Type Transparent 07/14/25 1120   Dressing Intervention New 07/14/25 1120       Ambulatory Status:  Presents to emergency department  because of falls (Syncope, seizure, or loss of consciousness): No, Age > 70: No, Altered Mental Status, Intoxication with alcohol or substance confusion (Disorientation, impaired judgment, poor safety awaremess, or inability to follow instructions): No, Impaired Mobility: Ambulates or transfers with assistive devices or assistance; Unable to ambulate or transer.: Yes    Diagnosis:  DISPOSITION Admitted

## 2025-07-14 NOTE — CONSULTS
Chief Complaint   Patient presents with    Foot Ulcer         HPI: Jocelynn Stern is an 69 y.o. female with left foot gangrene, left 5th metatarsal and toe osteomyelitis due to IDDM with poor control and PAD. She had a lower extremity arterial duplex back in June that showed a TBI of only 0.19, possible left popliteal artery occlusion. She has yet to see a vascular surgeon yet. She denies any Hx of CAD, stroke or prior LE interventions. She has no recent HbA1c. She stopped smoking two years ago. No chest pains or SOB.      Review of Systems   Constitutional:  Positive for fatigue. Negative for appetite change and unexpected weight change.   HENT:  Negative for rhinorrhea.    Eyes:  Negative for visual disturbance.   Respiratory:  Negative for chest tightness and shortness of breath.    Cardiovascular:  Negative for chest pain and leg swelling.   Gastrointestinal:  Negative for abdominal pain.   Genitourinary:  Negative for frequency.   Musculoskeletal:  Negative for back pain.   Neurological:  Positive for numbness. Negative for facial asymmetry, light-headedness and headaches.        Diabetic neuropathy   Psychiatric/Behavioral:  Negative for behavioral problems and confusion.           Allergies: No Known Allergies      Current Facility-Administered Medications   Medication Dose Route Frequency Provider Last Rate Last Admin    0.9 % sodium chloride infusion   IntraVENous Continuous Reynaldo Archibald DPM        sodium chloride flush 0.9 % injection 5-40 mL  5-40 mL IntraVENous 2 times per day Reynaldo Archibald DPM        sodium chloride flush 0.9 % injection 5-40 mL  5-40 mL IntraVENous PRN Reynaldo Archibald DPM        0.9 % sodium chloride infusion   IntraVENous PRN Reynaldo Archibald DPM        potassium chloride (KLOR-CON M) extended release tablet 40 mEq  40 mEq Oral PRN Reynaldo Archibald DPM        Or    potassium bicarb-citric acid (EFFER-K) effervescent tablet 40 mEq  40 mEq Oral PRN Reynaldo Archibald

## 2025-07-14 NOTE — PROCEDURES
PROCEDURE NOTE  Date: 7/14/2025   Name: Jocelynn Stern  YOB: 1955    Procedures  EKG completed

## 2025-07-14 NOTE — PLAN OF CARE
Problem: Chronic Conditions and Co-morbidities  Goal: Patient's chronic conditions and co-morbidity symptoms are monitored and maintained or improved  Outcome: Progressing  Flowsheets (Taken 7/14/2025 1850)  Care Plan - Patient's Chronic Conditions and Co-Morbidity Symptoms are Monitored and Maintained or Improved: Monitor and assess patient's chronic conditions and comorbid symptoms for stability, deterioration, or improvement     Problem: Discharge Planning  Goal: Discharge to home or other facility with appropriate resources  Outcome: Progressing  Flowsheets (Taken 7/14/2025 1850)  Discharge to home or other facility with appropriate resources: Identify barriers to discharge with patient and caregiver     Problem: Pain  Goal: Verbalizes/displays adequate comfort level or baseline comfort level  Outcome: Progressing  Flowsheets (Taken 7/14/2025 1850)  Verbalizes/displays adequate comfort level or baseline comfort level: Assess pain using appropriate pain scale     Problem: Safety - Adult  Goal: Free from fall injury  Outcome: Progressing  Flowsheets (Taken 7/14/2025 1850)  Free From Fall Injury: Instruct family/caregiver on patient safety   Care plan reviewed with patient and daughter.  Patient and daughter verbalize understanding of the plan of care and contribute to goal setting.

## 2025-07-14 NOTE — H&P
Department of Podiatric Surgery  History and Physical        CHIEF COMPLAINT: Left foot ulcer    Reason for Admission:  Osteomyelitis of left foot    History Obtained From:  patient    HISTORY OF PRESENT ILLNESS:      The patient is a 69 y.o. female with significant past medical history of Chronic renal disease, stage III, diabetes mellitus,  diabetic polyneuropathy, hypercholesterolemia, hypertension, psoriasis who presents with a left foot ulcer at the fifth digit.  Patient reports that she had previously seen Dr. Cruz in clinic for a wound on her left fourth digit.  Patient notes that while treating this ulcer she got a tear in the skin on her fifth digit from tape.  Patient reports that she first noticed this ulcer approximately a month ago and it has progressively gotten worse.  Patient noted that the ulcer was very large and draining a fair amount of yellow fluid and decided to see Dr. Cruz in clinic.  Patient was seen today by Dr. Cruz in the office and was recommended to come to the ED.  Patient denies any pain in the site at this time.  Patient's noted that her daughter has been dressing the wounds but it is continued to progressively get worse.  Patient notes that she has never seen a vascular physician in the past.  Patient relates that she does not have much sensation in her foot. Patient denies any constitutional symptoms including N/V/D/F or SOB.    Past Medical History:        Diagnosis Date    Chronic renal disease, stage 3, moderately decreased glomerular filtration rate (GFR) between 30-59 mL/min/1.73 square meter (HCC)     Diabetes mellitus (HCC)     Diabetic polyneuropathy associated with type 2 diabetes mellitus (HCC) 02/23/2022    Hypercholesterolemia     Hypertension     Psoriasis      Past Surgical History:        Procedure Laterality Date    BREAST BIOPSY N/A     doesnt remember which breast, can't find a scar    FOOT DEBRIDEMENT Right 04/27/2022    RIGHT FOOT DEBRIDEMENT/WASH OUT

## 2025-07-14 NOTE — ED PROVIDER NOTES
[facility]      CHIEF COMPLAINT       Chief Complaint   Patient presents with    Foot Ulcer       Nurses Notes reviewed and I agree except as noted in the HPI.      HISTORY OF PRESENT ILLNESS    Jocelynn Stern is a 69 y.o. female who presents with complaint of foot ulcer to left lateral foot, states that she has had it for about a week, states that she was being seen by podiatry today and was subsequently sent to the ED for possible debridement.  Patient does not know if she has history of PAD, but she reports history of borderline diabetes, however, review of patient's record shows that she does have diabetes and diabetes complications including retinopathy, foot ulcers.  Onset: Acute on chronic  Duration: Weeks  Timing: Persistent  Location of Pain: No pain  Intesity/severity: None healing foot ulcer  Modifying Factors:   Relieved by;  Previous Episodes;  Tx Before arrival: None    PAST MEDICAL HISTORY    has a past medical history of Chronic renal disease, stage 3, moderately decreased glomerular filtration rate (GFR) between 30-59 mL/min/1.73 square meter (HCC), Diabetes mellitus (HCC), Diabetic polyneuropathy associated with type 2 diabetes mellitus (HCC), Hypercholesterolemia, Hypertension, and Psoriasis.    SURGICAL HISTORY      has a past surgical history that includes Toe amputation (Right, 04/23/2022); Foot Debridement (Right, 04/27/2022); Leg Surgery (N/A, 08/28/2024); and Breast biopsy (N/A).    CURRENT MEDICATIONS       Current Discharge Medication List        CONTINUE these medications which have NOT CHANGED    Details   atorvastatin (LIPITOR) 40 MG tablet Take 1 tablet by mouth nightly  Qty: 90 tablet, Refills: 3    Associated Diagnoses: Hyperlipidemia, unspecified hyperlipidemia type      amLODIPine (NORVASC) 10 MG tablet Take 1 tablet by mouth daily  Qty: 90 tablet, Refills: 3    Associated Diagnoses: Essential hypertension      lisinopril (PRINIVIL;ZESTRIL) 40 MG tablet Take 1 tablet by

## 2025-07-14 NOTE — ED NOTES
Pt resting in bed watching tv at this time. Respirations even and unlabored. No concerns voiced at this time.

## 2025-07-15 ENCOUNTER — APPOINTMENT (OUTPATIENT)
Age: 70
DRG: 617 | End: 2025-07-15
Attending: PHYSICIAN ASSISTANT
Payer: MEDICARE

## 2025-07-15 PROBLEM — Z86.39 HISTORY OF NON-INSULIN DEPENDENT DIABETES MELLITUS: Status: ACTIVE | Noted: 2025-07-15

## 2025-07-15 LAB
ANION GAP SERPL CALC-SCNC: 13 MEQ/L (ref 8–16)
BACTERIA URNS QL MICRO: ABNORMAL /HPF
BASOPHILS ABSOLUTE: 0.1 THOU/MM3 (ref 0–0.1)
BASOPHILS NFR BLD AUTO: 0.2 %
BILIRUB UR QL STRIP.AUTO: NEGATIVE
BUN SERPL-MCNC: 25 MG/DL (ref 8–23)
CALCIUM SERPL-MCNC: 8.6 MG/DL (ref 8.8–10.2)
CASTS #/AREA URNS LPF: ABNORMAL /LPF
CHARACTER UR: ABNORMAL
CHLORIDE SERPL-SCNC: 105 MEQ/L (ref 98–111)
CO2 SERPL-SCNC: 17 MEQ/L (ref 22–29)
COLOR, UA: YELLOW
CREAT SERPL-MCNC: 1.3 MG/DL (ref 0.5–0.9)
CRYSTALS URNS MICRO: ABNORMAL
DEPRECATED RDW RBC AUTO: 36.2 FL (ref 35–45)
ECHO AO ASC DIAM: 3.1 CM
ECHO AO ASCENDING AORTA INDEX: 1.46 CM/M2
ECHO AV AREA PEAK VELOCITY: 1.9 CM2
ECHO AV AREA VTI: 1.9 CM2
ECHO AV AREA/BSA PEAK VELOCITY: 0.9 CM2/M2
ECHO AV AREA/BSA VTI: 0.9 CM2/M2
ECHO AV CUSP MM: 1.8 CM
ECHO AV MEAN GRADIENT: 9 MMHG
ECHO AV MEAN VELOCITY: 1.4 M/S
ECHO AV PEAK GRADIENT: 17 MMHG
ECHO AV PEAK VELOCITY: 2.1 M/S
ECHO AV VELOCITY RATIO: 0.57
ECHO AV VTI: 46.7 CM
ECHO BSA: 2.17 M2
ECHO EST RA PRESSURE: 3 MMHG
ECHO LA AREA 2C: 12.5 CM2
ECHO LA AREA 4C: 14.9 CM2
ECHO LA DIAMETER INDEX: 1.6 CM/M2
ECHO LA DIAMETER: 3.4 CM
ECHO LA MAJOR AXIS: 4.9 CM
ECHO LA MINOR AXIS: 4.6 CM
ECHO LA VOL BP: 34 ML (ref 22–52)
ECHO LA VOL MOD A2C: 29 ML (ref 22–52)
ECHO LA VOL MOD A4C: 37 ML (ref 22–52)
ECHO LA VOL/BSA BIPLANE: 16 ML/M2 (ref 16–34)
ECHO LA VOLUME INDEX MOD A2C: 14 ML/M2 (ref 16–34)
ECHO LA VOLUME INDEX MOD A4C: 17 ML/M2 (ref 16–34)
ECHO LV E' LATERAL VELOCITY: 13.9 CM/S
ECHO LV E' SEPTAL VELOCITY: 10.8 CM/S
ECHO LV EF PHYSICIAN: 55 %
ECHO LV FRACTIONAL SHORTENING: 24 % (ref 28–44)
ECHO LV INTERNAL DIMENSION DIASTOLE INDEX: 2.3 CM/M2
ECHO LV INTERNAL DIMENSION DIASTOLIC: 4.9 CM (ref 3.9–5.3)
ECHO LV INTERNAL DIMENSION SYSTOLIC INDEX: 1.74 CM/M2
ECHO LV INTERNAL DIMENSION SYSTOLIC: 3.7 CM
ECHO LV ISOVOLUMETRIC RELAXATION TIME (IVRT): 56 MS
ECHO LV IVSD: 0.9 CM (ref 0.6–0.9)
ECHO LV MASS 2D: 153 G (ref 67–162)
ECHO LV MASS INDEX 2D: 71.8 G/M2 (ref 43–95)
ECHO LV POSTERIOR WALL DIASTOLIC: 0.9 CM (ref 0.6–0.9)
ECHO LV RELATIVE WALL THICKNESS RATIO: 0.37
ECHO LVOT AREA: 3.1 CM2
ECHO LVOT AV VTI INDEX: 0.61
ECHO LVOT DIAM: 2 CM
ECHO LVOT MEAN GRADIENT: 4 MMHG
ECHO LVOT PEAK GRADIENT: 6 MMHG
ECHO LVOT PEAK VELOCITY: 1.2 M/S
ECHO LVOT STROKE VOLUME INDEX: 41.7 ML/M2
ECHO LVOT SV: 88.9 ML
ECHO LVOT VTI: 28.3 CM
ECHO MV A VELOCITY: 0.94 M/S
ECHO MV E DECELERATION TIME (DT): 197 MS
ECHO MV E VELOCITY: 0.99 M/S
ECHO MV E/A RATIO: 1.05
ECHO MV E/E' LATERAL: 7.12
ECHO MV E/E' RATIO (AVERAGED): 8.14
ECHO MV E/E' SEPTAL: 9.17
ECHO MV REGURGITANT PEAK GRADIENT: 74 MMHG
ECHO MV REGURGITANT PEAK VELOCITY: 4.3 M/S
ECHO PV MAX VELOCITY: 0.8 M/S
ECHO PV PEAK GRADIENT: 3 MMHG
ECHO RIGHT VENTRICULAR SYSTOLIC PRESSURE (RVSP): 35 MMHG
ECHO RV INTERNAL DIMENSION: 2.6 CM
ECHO RV TAPSE: 2.6 CM (ref 1.7–?)
ECHO TV E WAVE: 0.8 M/S
ECHO TV REGURGITANT MAX VELOCITY: 2.82 M/S
ECHO TV REGURGITANT PEAK GRADIENT: 32 MMHG
EOSINOPHIL NFR BLD AUTO: 0.1 %
EOSINOPHILS ABSOLUTE: 0 THOU/MM3 (ref 0–0.4)
EPITHELIAL CELLS, UA: ABNORMAL /HPF
ERYTHROCYTE [DISTWIDTH] IN BLOOD BY AUTOMATED COUNT: 11.9 % (ref 11.5–14.5)
GFR SERPL CREATININE-BSD FRML MDRD: 45 ML/MIN/1.73M2
GLUCOSE BLD STRIP.AUTO-MCNC: 106 MG/DL (ref 70–108)
GLUCOSE BLD STRIP.AUTO-MCNC: 220 MG/DL (ref 70–108)
GLUCOSE BLD STRIP.AUTO-MCNC: 250 MG/DL (ref 70–108)
GLUCOSE BLD STRIP.AUTO-MCNC: 96 MG/DL (ref 70–108)
GLUCOSE SERPL-MCNC: 257 MG/DL (ref 74–109)
GLUCOSE UR QL STRIP.AUTO: NEGATIVE MG/DL
HCT VFR BLD AUTO: 32.6 % (ref 37–47)
HGB BLD-MCNC: 10.1 GM/DL (ref 12–16)
HGB UR QL STRIP.AUTO: ABNORMAL
IMM GRANULOCYTES # BLD AUTO: 0.21 THOU/MM3 (ref 0–0.07)
IMM GRANULOCYTES NFR BLD AUTO: 0.7 %
KETONES UR QL STRIP.AUTO: NEGATIVE
LYMPHOCYTES ABSOLUTE: 3.7 THOU/MM3 (ref 1–4.8)
LYMPHOCYTES NFR BLD AUTO: 12.7 %
MCH RBC QN AUTO: 25.6 PG (ref 26–33)
MCHC RBC AUTO-ENTMCNC: 31 GM/DL (ref 32.2–35.5)
MCV RBC AUTO: 82.5 FL (ref 81–99)
MONOCYTES ABSOLUTE: 1.6 THOU/MM3 (ref 0.4–1.3)
MONOCYTES NFR BLD AUTO: 5.4 %
NEUTROPHILS ABSOLUTE: 23.3 THOU/MM3 (ref 1.8–7.7)
NEUTROPHILS NFR BLD AUTO: 80.9 %
NEUTS HYPERSEG BLD QL SMEAR: PRESENT
NITRITE UR QL STRIP: NEGATIVE
NRBC BLD AUTO-RTO: 0 /100 WBC
PH UR STRIP.AUTO: 6 [PH] (ref 5–9)
PLATELET # BLD AUTO: 396 THOU/MM3 (ref 130–400)
PMV BLD AUTO: 9.8 FL (ref 9.4–12.4)
POTASSIUM SERPL-SCNC: 4.2 MEQ/L (ref 3.5–5.2)
PROT UR STRIP.AUTO-MCNC: >= 300 MG/DL
RBC # BLD AUTO: 3.95 MILL/MM3 (ref 4.2–5.4)
RBC URINE: ABNORMAL /HPF
SCAN OF BLOOD SMEAR: NORMAL
SODIUM SERPL-SCNC: 135 MEQ/L (ref 135–145)
SP GR UR REFRACT.AUTO: 1.02 (ref 1–1.03)
UROBILINOGEN, URINE: 0.2 EU/DL (ref 0–1)
WBC # BLD AUTO: 28.8 THOU/MM3 (ref 4.8–10.8)
WBC #/AREA URNS HPF: > 200 /HPF
WBC #/AREA URNS HPF: ABNORMAL /[HPF]

## 2025-07-15 PROCEDURE — 81001 URINALYSIS AUTO W/SCOPE: CPT

## 2025-07-15 PROCEDURE — 93306 TTE W/DOPPLER COMPLETE: CPT | Performed by: INTERNAL MEDICINE

## 2025-07-15 PROCEDURE — 6370000000 HC RX 637 (ALT 250 FOR IP)

## 2025-07-15 PROCEDURE — 6370000000 HC RX 637 (ALT 250 FOR IP): Performed by: PHYSICAL THERAPY ASSISTANT

## 2025-07-15 PROCEDURE — 80048 BASIC METABOLIC PNL TOTAL CA: CPT

## 2025-07-15 PROCEDURE — 6370000000 HC RX 637 (ALT 250 FOR IP): Performed by: PHYSICIAN ASSISTANT

## 2025-07-15 PROCEDURE — 6360000002 HC RX W HCPCS

## 2025-07-15 PROCEDURE — 2580000003 HC RX 258

## 2025-07-15 PROCEDURE — 97116 GAIT TRAINING THERAPY: CPT

## 2025-07-15 PROCEDURE — 97530 THERAPEUTIC ACTIVITIES: CPT

## 2025-07-15 PROCEDURE — 85025 COMPLETE CBC W/AUTO DIFF WBC: CPT

## 2025-07-15 PROCEDURE — 82948 REAGENT STRIP/BLOOD GLUCOSE: CPT

## 2025-07-15 PROCEDURE — 6360000002 HC RX W HCPCS: Performed by: PHYSICAL THERAPY ASSISTANT

## 2025-07-15 PROCEDURE — 2580000003 HC RX 258: Performed by: PHYSICAL THERAPY ASSISTANT

## 2025-07-15 PROCEDURE — 1200000000 HC SEMI PRIVATE

## 2025-07-15 PROCEDURE — 97162 PT EVAL MOD COMPLEX 30 MIN: CPT

## 2025-07-15 PROCEDURE — 93306 TTE W/DOPPLER COMPLETE: CPT

## 2025-07-15 PROCEDURE — 99232 SBSQ HOSP IP/OBS MODERATE 35: CPT | Performed by: NURSE PRACTITIONER

## 2025-07-15 PROCEDURE — APPSS30 APP SPLIT SHARED TIME 16-30 MINUTES: Performed by: PHYSICIAN ASSISTANT

## 2025-07-15 PROCEDURE — 36415 COLL VENOUS BLD VENIPUNCTURE: CPT

## 2025-07-15 PROCEDURE — 6370000000 HC RX 637 (ALT 250 FOR IP): Performed by: SURGERY

## 2025-07-15 PROCEDURE — 87086 URINE CULTURE/COLONY COUNT: CPT

## 2025-07-15 RX ORDER — INSULIN GLARGINE 100 [IU]/ML
5 INJECTION, SOLUTION SUBCUTANEOUS ONCE
Status: COMPLETED | OUTPATIENT
Start: 2025-07-15 | End: 2025-07-15

## 2025-07-15 RX ORDER — LACTOBACILLUS RHAMNOSUS GG 10B CELL
1 CAPSULE ORAL
Status: DISCONTINUED | OUTPATIENT
Start: 2025-07-16 | End: 2025-07-28 | Stop reason: HOSPADM

## 2025-07-15 RX ADMIN — AMLODIPINE BESYLATE 10 MG: 10 TABLET ORAL at 09:46

## 2025-07-15 RX ADMIN — INSULIN LISPRO 5 UNITS: 100 INJECTION, SOLUTION INTRAVENOUS; SUBCUTANEOUS at 09:47

## 2025-07-15 RX ADMIN — INSULIN LISPRO 2 UNITS: 100 INJECTION, SOLUTION INTRAVENOUS; SUBCUTANEOUS at 09:47

## 2025-07-15 RX ADMIN — INSULIN GLARGINE 5 UNITS: 100 INJECTION, SOLUTION SUBCUTANEOUS at 21:51

## 2025-07-15 RX ADMIN — ASPIRIN 81 MG: 81 TABLET, CHEWABLE ORAL at 09:46

## 2025-07-15 RX ADMIN — PIPERACILLIN AND TAZOBACTAM 3375 MG: 3; .375 INJECTION, POWDER, FOR SOLUTION INTRAVENOUS at 01:53

## 2025-07-15 RX ADMIN — PIPERACILLIN AND TAZOBACTAM 3375 MG: 3; .375 INJECTION, POWDER, FOR SOLUTION INTRAVENOUS at 17:40

## 2025-07-15 RX ADMIN — INSULIN LISPRO 1 UNITS: 100 INJECTION, SOLUTION INTRAVENOUS; SUBCUTANEOUS at 11:54

## 2025-07-15 RX ADMIN — PIPERACILLIN AND TAZOBACTAM 3375 MG: 3; .375 INJECTION, POWDER, FOR SOLUTION INTRAVENOUS at 09:55

## 2025-07-15 RX ADMIN — ATORVASTATIN CALCIUM 40 MG: 40 TABLET, FILM COATED ORAL at 21:51

## 2025-07-15 RX ADMIN — LISINOPRIL 40 MG: 40 TABLET ORAL at 09:46

## 2025-07-15 RX ADMIN — SODIUM CHLORIDE: 0.9 INJECTION, SOLUTION INTRAVENOUS at 17:26

## 2025-07-15 RX ADMIN — INSULIN LISPRO 5 UNITS: 100 INJECTION, SOLUTION INTRAVENOUS; SUBCUTANEOUS at 17:26

## 2025-07-15 RX ADMIN — INSULIN LISPRO 5 UNITS: 100 INJECTION, SOLUTION INTRAVENOUS; SUBCUTANEOUS at 11:54

## 2025-07-15 RX ADMIN — ENOXAPARIN SODIUM 40 MG: 100 INJECTION SUBCUTANEOUS at 21:51

## 2025-07-15 RX ADMIN — Medication 1500 MG: at 22:42

## 2025-07-15 NOTE — PROGRESS NOTES
Hospitalist Progress Note    Patient:  Jocelynn Stern      Unit/Bed:7K-07/007-A    YOB: 1955    MRN: [id number]       Acct: [id number]     PCP: Janice Duff MD    Date of Admission: 7/14/2025    Assessment/Plan:    1.  Osteomyelitis of the left fifth ray, patient with diabetic ulceration of the left lower extremity.  On IV antibiotics  Management per podiatry service  Vascular surgery on consult, planning left lower extremity angiogram with findings of CTA with runoff    2.  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  Review of record shows patient recently initiated on Ozempic and reported has not yet picked up the prescription  Continue Lantus insulin  Continue Humalog 5 units 3 times daily with meals  Insulin sliding scale  Monitor glucose ACHS osteomyelitis  Encouraged carb controlled diet  Last hemoglobin A1c 9.6 on 5/29/25  Dietitian was consulted for diet education    3.  Abnormal abdominal CT  that showed   Findings of mild paralytic ileus and constipation.Cholelithiasis but no acute findings of the gallbladder.  Patient continues to deny any abdominal pain or GI symptoms.    4.  Normocytic anemia  Hemoglobin 10.1 no bleeding noted  Monitor CBC    5.  Essential hypertension  Continue amlodipine, lisinopril    6.  Hyperlipidemia  Continue atorvastatin    7.  Obesity BMI 30.13  Encourage healthy food choices      Anticipated Discharge in : Per primary service    Active Hospital Problems    Diagnosis Date Noted    Osteomyelitis of left foot, unspecified type (HCC) [M86.9] 07/14/2025    PAD (peripheral artery disease) [I73.9] 07/14/2025         Chief Complaint: Left lower extremity ulceration     Hospital Course: History of Present Illness:  Jocelynn Stern is a 69 y.o. female with a history of non-insulin-dependent type 2 diabetes mellitus, hypertension, hyperlipidemia, and CKD who is currently admitted to [facility] under the podiatry  SubCUTAneous TID WC    insulin lispro  0-4 Units SubCUTAneous 4x Daily AC & HS    vancomycin (VANCOCIN) intermittent dosing (placeholder)   Other RX Placeholder    vancomycin  1,500 mg IntraVENous Q24H     PRN Meds: sodium chloride flush, sodium chloride, potassium chloride **OR** potassium alternative oral replacement **OR** potassium chloride, magnesium sulfate, ondansetron **OR** ondansetron, acetaminophen **OR** acetaminophen, glucose, dextrose bolus **OR** dextrose bolus, glucagon (rDNA), dextrose      Intake/Output Summary (Last 24 hours) at 7/15/2025 1051  Last data filed at 7/15/2025 0657  Gross per 24 hour   Intake 500 ml   Output 0 ml   Net 500 ml       Diet:  ADULT DIET; Regular; 3 carb choices (45 gm/meal)  Diet NPO  Diet NPO Exceptions are: Sips of Water with Meds    Exam:  BP (!) 118/56   Pulse 70   Temp 97.9 °F (36.6 °C) (Oral)   Resp 16   Ht 1.778 m (5' 10\")   Wt 95.3 kg (210 lb)   SpO2 97%   PF (!) 16 L/min   BMI 30.13 kg/m²     General appearance: Awake alert no apparent distress, appears stated age and cooperative.  HEENT: Pupils equal, round, and reactive to light. Conjunctivae/corneas clear.  Neck: Supple, with full range of motion. No jugular venous distention. Trachea midline.  Respiratory:  Normal respiratory effort. Clear to auscultation, bilaterally without Rales/Wheezes/Rhonchi.  Cardiovascular: Regular rate and rhythm with normal S1/S2 without murmurs, rubs or gallops.  Abdomen: Soft, non-tender, non-distended with normal bowel sounds.  Musculoskeletal: Walking boot to left foot, underneath dressings dry and intact.    Skin: Skin color, texture, turgor normal.  No rashes or lesions.  Neurologic:  Neurovascularly intact , patient with reduced lower extremity sensation, patient has.    Psychiatric: Alert and oriented, thought content appropriate, normal insight  Capillary Refill: Brisk,< 3 seconds   Peripheral Pulses: +2 palpable, equal bilaterally       Labs:   Recent Labs

## 2025-07-15 NOTE — PLAN OF CARE
Problem: Pain  Goal: Verbalizes/displays adequate comfort level or baseline comfort level  7/15/2025 0000 by Adriane Moncada, RN  Outcome: Progressing     Problem: Safety - Adult  Goal: Free from fall injury  7/15/2025 0000 by Adriane Moncada, RN  Outcome: Progressing   Care plan reviewed with patient.  Patient verbalize understanding of the plan of care and contribute to goal setting.

## 2025-07-15 NOTE — PLAN OF CARE
Problem: Chronic Conditions and Co-morbidities  Goal: Patient's chronic conditions and co-morbidity symptoms are monitored and maintained or improved  Outcome: Progressing  Flowsheets (Taken 7/15/2025 1751)  Care Plan - Patient's Chronic Conditions and Co-Morbidity Symptoms are Monitored and Maintained or Improved: Monitor and assess patient's chronic conditions and comorbid symptoms for stability, deterioration, or improvement     Problem: Discharge Planning  Goal: Discharge to home or other facility with appropriate resources  Outcome: Progressing  Flowsheets (Taken 7/15/2025 1751)  Discharge to home or other facility with appropriate resources:   Identify barriers to discharge with patient and caregiver   Arrange for needed discharge resources and transportation as appropriate   Identify discharge learning needs (meds, wound care, etc)     Problem: Pain  Goal: Verbalizes/displays adequate comfort level or baseline comfort level  Outcome: Progressing  Flowsheets (Taken 7/15/2025 1751)  Verbalizes/displays adequate comfort level or baseline comfort level:   Encourage patient to monitor pain and request assistance   Implement non-pharmacological measures as appropriate and evaluate response   Assess pain using appropriate pain scale   Administer analgesics based on type and severity of pain and evaluate response     Problem: Safety - Adult  Goal: Free from fall injury  Outcome: Progressing  Flowsheets (Taken 7/15/2025 1751)  Free From Fall Injury: Instruct family/caregiver on patient safety    Care plan reviewed with patient.  Patient verbalize understanding of the plan of care and contribute to goal setting.

## 2025-07-15 NOTE — CONSULTS
and spleen unremarkable. Small 8 mm nodule left adrenal gland. Small 9 mm nodule right adrenal gland. These are likely adenomas. 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 Mild hydronephrosis and hydroureter of both kidneys. No definite obstructing lesion is seen. Bowel: Moderate amount of fecal material in the colon, consistent with constipation. The appendix is normal in appearance. No free air or free fluid. There are few small lymph nodes adjacent to the abdominal aorta but none of these appear to be abnormally enlarged. There is mild air and fluid distention of several small bowel loops in the mid abdomen and right lower abdomen, consistent with mild ileus. There is a loculated 3.8 cm fluid collection in the left lower quadrant she has a few small calcifications in it. The appearance is suggestive of a small cyst or seroma. Bones : No evidence for acute fracture or bone destruction. Degenerative changes in both knees and both ankles. Pelvis: Urinary bladder urinary bladder unremarkable. There are 2 prominent calcified fibroids in the uterus, largest measuring 5.2 cm. No loculated fluid collection seen. Lower extremities: Muscular structures symmetric in appearance. No soft tissue mass is seen. Vascular: AORTA: Distal descending thoracic aorta and abdominal aorta are normal in caliber. Celiac artery, SMA, both renal arteries, and CARMEN are widely patent. PELVIC VESSELS: Both common, internal, and external iliac arteries are widely patent. LEFT LEG: COMMON FEMORAL ARTERY/PROFUNDA:  Widely patent SFA/POPLITEAL ARTERY: Multifocal calcific stenosis of the SFA and popliteal artery proximally. Total occlusion of the distal popliteal artery., upwards of 50% TRIFURCATION VESSELS: The calf vessels are poorly opacified and cannot be adequately evaluated on the study. Note, the prior ultrasound, there was reconstituted flow in the  posterior tibial and peroneal arteries. RIGHT LEG: COMMON FEMORAL ARTERY/PROFUNDA:  Widely patent SFA/POPLITEAL ARTERY: Multifocal calcific stenosis of the SFA, upwards of 50%. There also appears to be 50% calcific stenosis of the right popliteal artery. TRIFURCATION VESSELS: The trifurcation vessels are not well opacified on this study and cannot be adequately evaluated. Recent ultrasound revealed good flow in the anterior tibial and posterior tibial arteries.     1. Trifurcation vessels not well demonstrated in either leg on the study. See comments above. 2. Multifocal calcific stenosis both SFAs upwards of 50% bilaterally. Total occlusion distal left popliteal artery with good reconstitution of the trifurcation vessels left calf distally. 3. Bulky calcified uterine fibroids. Likely 3.8 cm seroma or cystlike structure in the left lower quadrant. Findings of mild paralytic ileus and constipation. Cholelithiasis but no acute findings of the gallbladder. **This report has been created using voice recognition software.  It may contain minor errors which are inherent in voice recognition technology.** Electronically signed by Dr. Igor Keys    XR FOOT LEFT (MIN 3 VIEWS)  Result Date: 7/14/2025  PROCEDURE: XR FOOT LEFT (MIN 3 VIEWS) CLINICAL INFORMATION: Foot ulcer. COMPARISON: No prior study. TECHNIQUE: 4 views of the left foot. FINDINGS: There is an ulcer adjacent to the left fifth metatarsal phalangeal joint. There is a possible fracture at the base of the fifth proximal phalanx. Please correlate clinically. There are degenerative changes. The metatarsals are normally aligned with their respective tarsal bones. There is a small plantar spur.. The soft tissues are normal.     1. Ulcer adjacent to the fifth metatarsophalangeal joint. 2. Question fracture involving the base of the fifth proximal phalanx please correlate clinically. 3. Degenerative changes.. **This report has been created using voice recognition

## 2025-07-15 NOTE — PROGRESS NOTES
[facility]   Pharmacy Pharmacokinetic Monitoring Service - Vancomycin     Jocelynn Stern is a 69 y.o. female starting on vancomycin therapy for SSTI/bone and joint infection. Pharmacy consulted by Reynaldo Archibald DPM for monitoring and adjustment.    Target Concentration: Goal AUC/TIFFANIE 400-600 mg*hr/L    Additional Antimicrobials: pip/tazo    Pertinent Laboratory Values:   Wt Readings from Last 1 Encounters:   07/14/25 95.3 kg (210 lb)     Estimated Creatinine Clearance: 56 mL/min (A) (based on SCr of 1.2 mg/dL (H)).  Recent Labs     07/14/25  1118   CREATININE 1.2*     Pertinent Cultures:  Date Source Results   7/14/25 Blood X2 sent   7/14/25 Toe cx sent   MRSA Nasal Swab: N/A. Non-respiratory infection.    Plan:  Dosing recommendations based on Bayesian software  Start vancomycin 2500 mg loading dose, followed by 1500 mg IV q24h for anticipated AUC of 557 and trough concentration of 13.3 at steady state  Renal labs as indicated   Vancomycin concentration not ordered at this time  Pharmacy will monitor renal function and adjust therapy as indicated    Thank you for the consult,  Jahaira Salinas, TreyD, BCPS   7/14/2025  8:54 PM

## 2025-07-15 NOTE — PROGRESS NOTES
[facility]  INPATIENT PHYSICAL THERAPY  EVALUATION  [facility] ORTHOPEDICS 7K - 7K-07/007-A    Discharge Recommendations: Continue to assess pending progress (will need 24 hour care and continue physical therapy recommended at discharge)  Equipment Recommendations: No             Time In: 0859  Time Out: 0942  Timed Code Treatment Minutes: 31 Minutes  Minutes: 43        Date: 7/15/2025  Patient Name: Jocelynn Stern,  Gender:  female        MRN: [id number]  : 1955  (69 y.o.)      Referring Practitioner: Reynaldo Archibald, AGUSTIN  Diagnosis: Osteomyelitis of left foot, unspecified type (HCC)  Additional Pertinent Hx: The patient is a 69 y.o. female with significant past medical history of Chronic renal disease, stage III, diabetes mellitus,  diabetic polyneuropathy, hypercholesterolemia, hypertension, psoriasis who presents with a left foot ulcer at the fifth digit.  Patient reports that she had previously seen Dr. Cruz in clinic for a wound on her left fourth digit.  Patient notes that while treating this ulcer she got a tear in the skin on her fifth digit from tape.  Patient reports that she first noticed this ulcer approximately a month ago and it has progressively gotten worse.  Patient noted that the ulcer was very large and draining a fair amount of yellow fluid and decided to see Dr. Cruz in clinic.  Patient was seen today by Dr. Cruz in the office and was recommended to come to the ED.  Patient denies any pain in the site at this time.  Patient's noted that her daughter has been dressing the wounds but it is continued to progressively get worse.  Patient notes that she has never seen a vascular physician in the past.  Patient relates that she does not have much sensation in her foot. Patient denies any constitutional symptoms including N/V/D/F or SOB.     Restrictions/Precautions:  Restrictions/Precautions: Weight Bearing  Left Lower Extremity Weight Bearing: Heel Weight

## 2025-07-15 NOTE — CARE COORDINATION
Case Management Assessment Initial Evaluation    Date/Time of Evaluation: 7/15/2025 7:51 AM  Assessment Completed by: Supriya Eldridge    If patient is discharged prior to next notation, then this note serves as note for discharge by case management.    Patient Name: Jocelynn Stern                   YOB: 1955  Diagnosis: Osteomyelitis of left foot, unspecified type (HCC) [M86.9]                   Date / Time: 2025 10:53 AM  Location: [address] [place] [address]     Patient Admission Status: Inpatient   Readmission Risk Low 0-14, Mod 15-19), High > 20: Readmission Risk Score: 10.9    Current PCP: Janice Duff MD  Health Care Decision Makers:   Primary Decision Maker: Tatianna Arechiga - Child - [phone]    Secondary Decision Maker: Celio Stern - Brother/Sister - [phone]    Additional Case Management Notes: Patient presents to ED with complaints of left foot ulcer. Sent by Podiatry office , Dr. Cruz.  Podiatry, Hospitalist and [facility] Surgery following. IVF. Creatinine 1.3. WBC 28.8 HGB 10.1    Procedures: 7/15 ECHO: pending   Angiogram Left Extremity.    Imagin/14 XR Left foot:   1. Ulcer adjacent to the fifth metatarsophalangeal joint.  2. Question fracture involving the base of the fifth proximal phalanx please correlate clinically.  3. Degenerative changes.     CTA ABD AORTA / RUNOFF  1. Trifurcation vessels not well demonstrated in either leg on the study. See comments above.  2. Multifocal calcific stenosis both SFAs upwards of 50% bilaterally. Total occlusion distal left popliteal artery with good reconstitution of the trifurcation vessels left calf distally.  3. Bulky calcified uterine fibroids. Likely 3.8 cm seroma or cystlike structure in the left lower quadrant. Findings of mild paralytic ileus and constipation. Cholelithiasis but no acute findings of the gallbladder.     CXR: negative      Patient Goals/Plan/Treatment Preferences: Met with Jocelynn. Patient lives with

## 2025-07-15 NOTE — PROGRESS NOTES
S: Pt denies any changes overnight. Reports that ace wrap dressing feels good on left foot.      O:     Allergies: No Known Allergies      Current Facility-Administered Medications   Medication Dose Route Frequency Provider Last Rate Last Admin    0.9 % sodium chloride infusion   IntraVENous Continuous Reynaldo Archibald DPM 75 mL/hr at 07/14/25 1847 New Bag at 07/14/25 1847    sodium chloride flush 0.9 % injection 5-40 mL  5-40 mL IntraVENous 2 times per day Reynaldo Archibald DPM        sodium chloride flush 0.9 % injection 5-40 mL  5-40 mL IntraVENous PRN Reynaldo Archibald DPM        0.9 % sodium chloride infusion   IntraVENous PRN Reynaldo Archibald DPM        potassium chloride (KLOR-CON M) extended release tablet 40 mEq  40 mEq Oral PRN Reynaldo Archibald DPM        Or    potassium bicarb-citric acid (EFFER-K) effervescent tablet 40 mEq  40 mEq Oral PRN Reynaldo Archibald DPM        Or    potassium chloride 10 mEq/100 mL IVPB (Peripheral Line)  10 mEq IntraVENous PRN Reynaldo Archibald DPM        magnesium sulfate 2000 mg in 50 mL IVPB premix  2,000 mg IntraVENous PRN Reynaldo Archibald DPM        enoxaparin (LOVENOX) injection 40 mg  40 mg SubCUTAneous Q24H Reynaldo Archibald DPM   40 mg at 07/14/25 2157    ondansetron (ZOFRAN-ODT) disintegrating tablet 4 mg  4 mg Oral Q8H PRN Reynaldo Archibald DPM        Or    ondansetron (ZOFRAN) injection 4 mg  4 mg IntraVENous Q6H PRN Reynaldo Archibald DPM        acetaminophen (TYLENOL) tablet 650 mg  650 mg Oral Q6H PRN Reynaldo Archibald DPM        Or    acetaminophen (TYLENOL) suppository 650 mg  650 mg Rectal Q6H PRN Reynaldo Archibald DPM        atorvastatin (LIPITOR) tablet 40 mg  40 mg Oral Nightly Reynaldo Archibald DPM   40 mg at 07/14/25 2159    amLODIPine (NORVASC) tablet 10 mg  10 mg Oral Daily Reynaldo Archibald DPM        lisinopril (PRINIVIL;ZESTRIL) tablet 40 mg  40 mg Oral Q12H Reynaldo Archibald DPM   40 mg at 07/14/25 2202

## 2025-07-15 NOTE — PROGRESS NOTES
[facility]  Podiatric Medicine and Surgery Progress Note      Jocelynn Stern    ASSESSMENT AND PLAN:   1. Osteomyelitis left 5th ray   - Pt. is a 69 y.o. female   - Patient was examined and evaluated  - WBC 28.8, afebrile  - Labs reviewed, see results above  - X-rays reviewed, see results above  - Vancomycin and Zosyn   - Swab culture obtained, pending anaerobic and aerobic gram stain showing gram positive cocci   - DVT Prophylaxis Lovenox. Will discontinue at midnight pending surgery.   - Heel weightbearing to the to the left foot in CAM boot.   - Consult Hospitalist  - Consult Vascular surgery. Planning for intervention.   - Consult Physical therapy  - Discussed the need for surgical intervention in the form of left partial 5th ray amputation. Patient in agreement and understands possible risks and complications.   -Plan for OR with Dr. Cruz 7/16 at 12pm   -Procedure consent ordered. Hold antiplatelets and anticoagulants. NPO at midnight.           2. Chronic renal disease, stage 3      - Hospitalist Consulted, continue home medications     3. Diabetes mellitus      - Hospitalist Consulted, continue home medications     4. Diabetic polyneuropathy associated with type 2 diabetes mellitus      - Hospitalist Consulted, continue home medications     5. Hypercholesterolemia      - Hospitalist Consulted, continue home medications     6. Hypertension      - Hospitalist Consulted, continue home medications     7. Psoriasis     - Hospitalist Consulted, continue home medications    Dispo: OR 7/16 with Dr. Cruz       Subjective      7/15/25  Patient seen bedside today on behalf of Dr Cruz. Patient appeared pleasant, was oriented to person, place and time and in no acute distress. Patient states she is having minimal pain to her L foot. States someone from the vascular team came and talked to her yesterday about a procedure. States Dr. Cruz had told her in the office that she would likely need    Lab Results   Component Value Date/Time    CRP 5.79 04/20/2022 01:28 PM     Micro:   Lab Results   Component Value Date/Time    BC No growth-preliminary No growth 04/20/2022 03:25 PM      Hemoglobin A1C:   Lab Results   Component Value Date/Time    LABA1C 9.6 05/29/2025 01:30 PM    LABA1C 6.8 08/17/2024 10:39 AM       Images  XR CHEST (2 VW)   Final Result   1. No consolidation.      This document has been electronically signed by: Olvin Gerard MD on    07/14/2025 05:57 PM      CTA ABDOMINAL AORTA W BILAT RUNOFF W WO CONTRAST   Final Result   1. Trifurcation vessels not well demonstrated in either leg on the study. See   comments above.   2. Multifocal calcific stenosis both SFAs upwards of 50% bilaterally. Total   occlusion distal left popliteal artery with good reconstitution of the   trifurcation vessels left calf distally.   3. Bulky calcified uterine fibroids. Likely 3.8 cm seroma or cystlike structure   in the left lower quadrant. Findings of mild paralytic ileus and constipation.   Cholelithiasis but no acute findings of the gallbladder.            **This report has been created using voice recognition software.  It may contain   minor errors which are inherent in voice recognition technology.**         Electronically signed by Dr. Igor Keys      XR FOOT LEFT (MIN 3 VIEWS)   Final Result   1. Ulcer adjacent to the fifth metatarsophalangeal joint.   2. Question fracture involving the base of the fifth proximal phalanx please   correlate clinically.   3. Degenerative changes..               **This report has been created using voice recognition software. It may contain   minor errors which are inherent in voice recognition technology.**            Electronically signed by Dr. Chris Benítez      IR ANGIOGRAM EXTREMITY LEFT    (Results Pending)       Assessment     Chronic  Patient Active Problem List   Diagnosis    Primary hypertension    Hyperlipidemia     borderline Abnormal nuclear stress test- NO angina

## 2025-07-16 ENCOUNTER — APPOINTMENT (OUTPATIENT)
Dept: INTERVENTIONAL RADIOLOGY/VASCULAR | Age: 70
DRG: 617 | End: 2025-07-16
Payer: MEDICARE

## 2025-07-16 ENCOUNTER — ANESTHESIA (OUTPATIENT)
Dept: OPERATING ROOM | Age: 70
End: 2025-07-16
Payer: MEDICARE

## 2025-07-16 ENCOUNTER — ANESTHESIA EVENT (OUTPATIENT)
Dept: OPERATING ROOM | Age: 70
End: 2025-07-16
Payer: MEDICARE

## 2025-07-16 LAB
ANION GAP SERPL CALC-SCNC: 12 MEQ/L (ref 8–16)
BACTERIA UR CULT: ABNORMAL
BASOPHILS ABSOLUTE: 0.1 THOU/MM3 (ref 0–0.1)
BASOPHILS NFR BLD AUTO: 0.4 %
BUN SERPL-MCNC: 23 MG/DL (ref 8–23)
CALCIUM SERPL-MCNC: 8.9 MG/DL (ref 8.8–10.2)
CHLORIDE SERPL-SCNC: 106 MEQ/L (ref 98–111)
CO2 SERPL-SCNC: 20 MEQ/L (ref 22–29)
CREAT SERPL-MCNC: 1.5 MG/DL (ref 0.5–0.9)
DEPRECATED RDW RBC AUTO: 36.9 FL (ref 35–45)
EOSINOPHIL NFR BLD AUTO: 0.9 %
EOSINOPHILS ABSOLUTE: 0.2 THOU/MM3 (ref 0–0.4)
ERYTHROCYTE [DISTWIDTH] IN BLOOD BY AUTOMATED COUNT: 12.3 % (ref 11.5–14.5)
GFR SERPL CREATININE-BSD FRML MDRD: 38 ML/MIN/1.73M2
GLUCOSE BLD STRIP.AUTO-MCNC: 100 MG/DL (ref 70–108)
GLUCOSE BLD STRIP.AUTO-MCNC: 103 MG/DL (ref 70–108)
GLUCOSE BLD STRIP.AUTO-MCNC: 106 MG/DL (ref 70–108)
GLUCOSE BLD STRIP.AUTO-MCNC: 113 MG/DL (ref 70–108)
GLUCOSE BLD STRIP.AUTO-MCNC: 253 MG/DL (ref 70–108)
GLUCOSE SERPL-MCNC: 100 MG/DL (ref 74–109)
HCT VFR BLD AUTO: 31.6 % (ref 37–47)
HGB BLD-MCNC: 9.8 GM/DL (ref 12–16)
IMM GRANULOCYTES # BLD AUTO: 0.14 THOU/MM3 (ref 0–0.07)
IMM GRANULOCYTES NFR BLD AUTO: 0.6 %
LYMPHOCYTES ABSOLUTE: 5.8 THOU/MM3 (ref 1–4.8)
LYMPHOCYTES NFR BLD AUTO: 24.7 %
MCH RBC QN AUTO: 25.9 PG (ref 26–33)
MCHC RBC AUTO-ENTMCNC: 31 GM/DL (ref 32.2–35.5)
MCV RBC AUTO: 83.6 FL (ref 81–99)
MONOCYTES ABSOLUTE: 1.3 THOU/MM3 (ref 0.4–1.3)
MONOCYTES NFR BLD AUTO: 5.4 %
NEUTROPHILS ABSOLUTE: 15.9 THOU/MM3 (ref 1.8–7.7)
NEUTROPHILS NFR BLD AUTO: 68 %
NRBC BLD AUTO-RTO: 0 /100 WBC
ORGANISM: ABNORMAL
PLATELET # BLD AUTO: 382 THOU/MM3 (ref 130–400)
PLATELET BLD QL SMEAR: ADEQUATE
PMV BLD AUTO: 9.4 FL (ref 9.4–12.4)
POTASSIUM SERPL-SCNC: 4.5 MEQ/L (ref 3.5–5.2)
RBC # BLD AUTO: 3.78 MILL/MM3 (ref 4.2–5.4)
SCAN OF BLOOD SMEAR: NORMAL
SODIUM SERPL-SCNC: 138 MEQ/L (ref 135–145)
VANCOMYCIN SERPL-MCNC: 21.7 UG/ML (ref 10–39.9)
WBC # BLD AUTO: 23.4 THOU/MM3 (ref 4.8–10.8)

## 2025-07-16 PROCEDURE — 87205 SMEAR GRAM STAIN: CPT

## 2025-07-16 PROCEDURE — 97116 GAIT TRAINING THERAPY: CPT

## 2025-07-16 PROCEDURE — 3600000013 HC SURGERY LEVEL 3 ADDTL 15MIN: Performed by: PODIATRIST

## 2025-07-16 PROCEDURE — 6370000000 HC RX 637 (ALT 250 FOR IP): Performed by: PHYSICAL THERAPY ASSISTANT

## 2025-07-16 PROCEDURE — 2580000003 HC RX 258: Performed by: PHYSICAL THERAPY ASSISTANT

## 2025-07-16 PROCEDURE — 047U3ZZ DILATION OF LEFT PERONEAL ARTERY, PERCUTANEOUS APPROACH: ICD-10-PCS | Performed by: PODIATRIST

## 2025-07-16 PROCEDURE — 2580000003 HC RX 258

## 2025-07-16 PROCEDURE — 3700000001 HC ADD 15 MINUTES (ANESTHESIA): Performed by: PODIATRIST

## 2025-07-16 PROCEDURE — 1200000000 HC SEMI PRIVATE

## 2025-07-16 PROCEDURE — 7100000001 HC PACU RECOVERY - ADDTL 15 MIN: Performed by: PODIATRIST

## 2025-07-16 PROCEDURE — 97110 THERAPEUTIC EXERCISES: CPT

## 2025-07-16 PROCEDURE — 2500000003 HC RX 250 WO HCPCS: Performed by: PHYSICAL THERAPY ASSISTANT

## 2025-07-16 PROCEDURE — 6360000002 HC RX W HCPCS: Performed by: PODIATRIST

## 2025-07-16 PROCEDURE — 6360000002 HC RX W HCPCS

## 2025-07-16 PROCEDURE — 2500000003 HC RX 250 WO HCPCS: Performed by: PODIATRIST

## 2025-07-16 PROCEDURE — 2500000003 HC RX 250 WO HCPCS

## 2025-07-16 PROCEDURE — 047N3Z1 DILATION OF LEFT POPLITEAL ARTERY USING DRUG-COATED BALLOON, PERCUTANEOUS APPROACH: ICD-10-PCS | Performed by: PODIATRIST

## 2025-07-16 PROCEDURE — 87077 CULTURE AEROBIC IDENTIFY: CPT

## 2025-07-16 PROCEDURE — 3600000003 HC SURGERY LEVEL 3 BASE: Performed by: PODIATRIST

## 2025-07-16 PROCEDURE — 87186 SC STD MICRODIL/AGAR DIL: CPT

## 2025-07-16 PROCEDURE — 6370000000 HC RX 637 (ALT 250 FOR IP)

## 2025-07-16 PROCEDURE — 0Y6N0ZF DETACHMENT AT LEFT FOOT, PARTIAL 5TH RAY, OPEN APPROACH: ICD-10-PCS | Performed by: PODIATRIST

## 2025-07-16 PROCEDURE — 2720000010 HC SURG SUPPLY STERILE: Performed by: PODIATRIST

## 2025-07-16 PROCEDURE — 88305 TISSUE EXAM BY PATHOLOGIST: CPT

## 2025-07-16 PROCEDURE — 2500000003 HC RX 250 WO HCPCS: Performed by: NURSE ANESTHETIST, CERTIFIED REGISTERED

## 2025-07-16 PROCEDURE — 80048 BASIC METABOLIC PNL TOTAL CA: CPT

## 2025-07-16 PROCEDURE — 7100000000 HC PACU RECOVERY - FIRST 15 MIN: Performed by: PODIATRIST

## 2025-07-16 PROCEDURE — 2500000003 HC RX 250 WO HCPCS: Performed by: SURGERY

## 2025-07-16 PROCEDURE — 2709999900 HC NON-CHARGEABLE SUPPLY: Performed by: PODIATRIST

## 2025-07-16 PROCEDURE — 6360000002 HC RX W HCPCS: Performed by: NURSE ANESTHETIST, CERTIFIED REGISTERED

## 2025-07-16 PROCEDURE — 87147 CULTURE TYPE IMMUNOLOGIC: CPT

## 2025-07-16 PROCEDURE — 36415 COLL VENOUS BLD VENIPUNCTURE: CPT

## 2025-07-16 PROCEDURE — 82948 REAGENT STRIP/BLOOD GLUCOSE: CPT

## 2025-07-16 PROCEDURE — 80202 ASSAY OF VANCOMYCIN: CPT

## 2025-07-16 PROCEDURE — 3700000000 HC ANESTHESIA ATTENDED CARE: Performed by: PODIATRIST

## 2025-07-16 PROCEDURE — 87070 CULTURE OTHR SPECIMN AEROBIC: CPT

## 2025-07-16 PROCEDURE — 6360000002 HC RX W HCPCS: Performed by: PHYSICAL THERAPY ASSISTANT

## 2025-07-16 PROCEDURE — 99232 SBSQ HOSP IP/OBS MODERATE 35: CPT | Performed by: NURSE PRACTITIONER

## 2025-07-16 PROCEDURE — 85025 COMPLETE CBC W/AUTO DIFF WBC: CPT

## 2025-07-16 PROCEDURE — 87075 CULTR BACTERIA EXCEPT BLOOD: CPT

## 2025-07-16 RX ORDER — SODIUM CHLORIDE 9 MG/ML
INJECTION, SOLUTION INTRAVENOUS PRN
Status: DISCONTINUED | OUTPATIENT
Start: 2025-07-16 | End: 2025-07-27 | Stop reason: SDUPTHER

## 2025-07-16 RX ORDER — SODIUM CHLORIDE 0.9 % (FLUSH) 0.9 %
5-40 SYRINGE (ML) INJECTION PRN
Status: DISCONTINUED | OUTPATIENT
Start: 2025-07-16 | End: 2025-07-27 | Stop reason: SDUPTHER

## 2025-07-16 RX ORDER — LIDOCAINE HYDROCHLORIDE 20 MG/ML
INJECTION, SOLUTION INTRAVENOUS
Status: DISCONTINUED | OUTPATIENT
Start: 2025-07-16 | End: 2025-07-16 | Stop reason: SDUPTHER

## 2025-07-16 RX ORDER — SODIUM CHLORIDE 0.9 % (FLUSH) 0.9 %
5-40 SYRINGE (ML) INJECTION PRN
Status: DISCONTINUED | OUTPATIENT
Start: 2025-07-16 | End: 2025-07-16 | Stop reason: HOSPADM

## 2025-07-16 RX ORDER — FENTANYL CITRATE 50 UG/ML
INJECTION, SOLUTION INTRAMUSCULAR; INTRAVENOUS
Status: DISCONTINUED | OUTPATIENT
Start: 2025-07-16 | End: 2025-07-16 | Stop reason: SDUPTHER

## 2025-07-16 RX ORDER — OXYCODONE HYDROCHLORIDE 5 MG/1
5 TABLET ORAL EVERY 4 HOURS PRN
Status: DISCONTINUED | OUTPATIENT
Start: 2025-07-16 | End: 2025-07-28 | Stop reason: HOSPADM

## 2025-07-16 RX ORDER — OXYCODONE HYDROCHLORIDE 5 MG/1
10 TABLET ORAL EVERY 4 HOURS PRN
Status: DISCONTINUED | OUTPATIENT
Start: 2025-07-16 | End: 2025-07-28 | Stop reason: HOSPADM

## 2025-07-16 RX ORDER — SODIUM CHLORIDE 9 MG/ML
INJECTION, SOLUTION INTRAVENOUS CONTINUOUS
Status: DISCONTINUED | OUTPATIENT
Start: 2025-07-16 | End: 2025-07-28 | Stop reason: HOSPADM

## 2025-07-16 RX ORDER — EPHEDRINE SULFATE/0.9% NACL/PF 25 MG/5 ML
SYRINGE (ML) INTRAVENOUS
Status: DISCONTINUED | OUTPATIENT
Start: 2025-07-16 | End: 2025-07-16 | Stop reason: SDUPTHER

## 2025-07-16 RX ORDER — ONDANSETRON 4 MG/1
4 TABLET, ORALLY DISINTEGRATING ORAL EVERY 8 HOURS PRN
Status: DISCONTINUED | OUTPATIENT
Start: 2025-07-16 | End: 2025-07-28 | Stop reason: HOSPADM

## 2025-07-16 RX ORDER — SODIUM CHLORIDE 0.9 % (FLUSH) 0.9 %
5-40 SYRINGE (ML) INJECTION EVERY 12 HOURS SCHEDULED
Status: DISCONTINUED | OUTPATIENT
Start: 2025-07-16 | End: 2025-07-16 | Stop reason: HOSPADM

## 2025-07-16 RX ORDER — MIDAZOLAM HYDROCHLORIDE 1 MG/ML
INJECTION, SOLUTION INTRAMUSCULAR; INTRAVENOUS
Status: DISCONTINUED | OUTPATIENT
Start: 2025-07-16 | End: 2025-07-16 | Stop reason: SDUPTHER

## 2025-07-16 RX ORDER — BUPIVACAINE HYDROCHLORIDE AND EPINEPHRINE 5; 5 MG/ML; UG/ML
INJECTION, SOLUTION EPIDURAL; INTRACAUDAL; PERINEURAL PRN
Status: DISCONTINUED | OUTPATIENT
Start: 2025-07-16 | End: 2025-07-16 | Stop reason: ALTCHOICE

## 2025-07-16 RX ORDER — SODIUM CHLORIDE 9 MG/ML
INJECTION, SOLUTION INTRAVENOUS PRN
Status: DISCONTINUED | OUTPATIENT
Start: 2025-07-16 | End: 2025-07-16 | Stop reason: HOSPADM

## 2025-07-16 RX ORDER — MORPHINE SULFATE 4 MG/ML
4 INJECTION, SOLUTION INTRAMUSCULAR; INTRAVENOUS
Status: DISCONTINUED | OUTPATIENT
Start: 2025-07-16 | End: 2025-07-28 | Stop reason: HOSPADM

## 2025-07-16 RX ORDER — PROPOFOL 10 MG/ML
INJECTION, EMULSION INTRAVENOUS
Status: DISCONTINUED | OUTPATIENT
Start: 2025-07-16 | End: 2025-07-16 | Stop reason: SDUPTHER

## 2025-07-16 RX ORDER — MORPHINE SULFATE 2 MG/ML
2 INJECTION, SOLUTION INTRAMUSCULAR; INTRAVENOUS
Status: DISCONTINUED | OUTPATIENT
Start: 2025-07-16 | End: 2025-07-28 | Stop reason: HOSPADM

## 2025-07-16 RX ORDER — ONDANSETRON 2 MG/ML
4 INJECTION INTRAMUSCULAR; INTRAVENOUS EVERY 6 HOURS PRN
Status: DISCONTINUED | OUTPATIENT
Start: 2025-07-16 | End: 2025-07-28 | Stop reason: HOSPADM

## 2025-07-16 RX ORDER — SODIUM CHLORIDE 0.9 % (FLUSH) 0.9 %
5-40 SYRINGE (ML) INJECTION EVERY 12 HOURS SCHEDULED
Status: DISCONTINUED | OUTPATIENT
Start: 2025-07-16 | End: 2025-07-27 | Stop reason: SDUPTHER

## 2025-07-16 RX ORDER — ACETAMINOPHEN 325 MG/1
650 TABLET ORAL EVERY 4 HOURS PRN
Status: DISCONTINUED | OUTPATIENT
Start: 2025-07-16 | End: 2025-07-16 | Stop reason: SDUPTHER

## 2025-07-16 RX ADMIN — FENTANYL CITRATE 50 MCG: 50 INJECTION, SOLUTION INTRAMUSCULAR; INTRAVENOUS at 12:43

## 2025-07-16 RX ADMIN — SODIUM CHLORIDE: 0.9 INJECTION, SOLUTION INTRAVENOUS at 22:10

## 2025-07-16 RX ADMIN — PIPERACILLIN AND TAZOBACTAM 3375 MG: 3; .375 INJECTION, POWDER, FOR SOLUTION INTRAVENOUS at 02:39

## 2025-07-16 RX ADMIN — FENTANYL CITRATE 50 MCG: 50 INJECTION, SOLUTION INTRAMUSCULAR; INTRAVENOUS at 12:29

## 2025-07-16 RX ADMIN — Medication 100 MG: at 12:20

## 2025-07-16 RX ADMIN — AMLODIPINE BESYLATE 10 MG: 10 TABLET ORAL at 09:07

## 2025-07-16 RX ADMIN — INSULIN LISPRO 2 UNITS: 100 INJECTION, SOLUTION INTRAVENOUS; SUBCUTANEOUS at 21:52

## 2025-07-16 RX ADMIN — SODIUM CHLORIDE, PRESERVATIVE FREE 10 ML: 5 INJECTION INTRAVENOUS at 09:07

## 2025-07-16 RX ADMIN — MIDAZOLAM 2 MG: 1 INJECTION INTRAMUSCULAR; INTRAVENOUS at 12:14

## 2025-07-16 RX ADMIN — SODIUM CHLORIDE: 9 INJECTION, SOLUTION INTRAVENOUS at 10:13

## 2025-07-16 RX ADMIN — WATER 2000 MG: 1 INJECTION INTRAMUSCULAR; INTRAVENOUS; SUBCUTANEOUS at 12:19

## 2025-07-16 RX ADMIN — ATORVASTATIN CALCIUM 40 MG: 40 TABLET, FILM COATED ORAL at 21:53

## 2025-07-16 RX ADMIN — PROPOFOL 150 MG: 10 INJECTION, EMULSION INTRAVENOUS at 12:20

## 2025-07-16 RX ADMIN — EPHEDRINE SULFATE 25 MG: 5 INJECTION INTRAVENOUS at 12:38

## 2025-07-16 RX ADMIN — SODIUM CHLORIDE: 9 INJECTION, SOLUTION INTRAVENOUS at 13:09

## 2025-07-16 RX ADMIN — LISINOPRIL 40 MG: 40 TABLET ORAL at 09:07

## 2025-07-16 RX ADMIN — INSULIN GLARGINE 10 UNITS: 100 INJECTION, SOLUTION SUBCUTANEOUS at 21:53

## 2025-07-16 RX ADMIN — PIPERACILLIN AND TAZOBACTAM 3375 MG: 3; .375 INJECTION, POWDER, FOR SOLUTION INTRAVENOUS at 10:12

## 2025-07-16 RX ADMIN — WATER 2000 MG: 1 INJECTION INTRAMUSCULAR; INTRAVENOUS; SUBCUTANEOUS at 21:52

## 2025-07-16 ASSESSMENT — ENCOUNTER SYMPTOMS
COUGH: 0
SHORTNESS OF BREATH: 0
BLOOD IN STOOL: 0
ABDOMINAL PAIN: 0
WHEEZING: 0

## 2025-07-16 ASSESSMENT — PAIN SCALES - GENERAL
PAINLEVEL_OUTOF10: 0

## 2025-07-16 ASSESSMENT — PAIN - FUNCTIONAL ASSESSMENT: PAIN_FUNCTIONAL_ASSESSMENT: NONE - DENIES PAIN

## 2025-07-16 NOTE — PLAN OF CARE
Problem: Chronic Conditions and Co-morbidities  Goal: Patient's chronic conditions and co-morbidity symptoms are monitored and maintained or improved  7/16/2025 0003 by Lesley Erazo, RN  Outcome: Progressing  Flowsheets (Taken 7/16/2025 0003)  Care Plan - Patient's Chronic Conditions and Co-Morbidity Symptoms are Monitored and Maintained or Improved:   Monitor and assess patient's chronic conditions and comorbid symptoms for stability, deterioration, or improvement   Collaborate with multidisciplinary team to address chronic and comorbid conditions and prevent exacerbation or deterioration   Update acute care plan with appropriate goals if chronic or comorbid symptoms are exacerbated and prevent overall improvement and discharge  7/15/2025 1751 by Tarah Elliott LPN  Outcome: Progressing  Flowsheets (Taken 7/15/2025 1751)  Care Plan - Patient's Chronic Conditions and Co-Morbidity Symptoms are Monitored and Maintained or Improved: Monitor and assess patient's chronic conditions and comorbid symptoms for stability, deterioration, or improvement     Problem: Discharge Planning  Goal: Discharge to home or other facility with appropriate resources  7/16/2025 0003 by Lesley Erazo, RN  Outcome: Progressing  Flowsheets (Taken 7/16/2025 0003)  Discharge to home or other facility with appropriate resources:   Identify barriers to discharge with patient and caregiver   Arrange for needed discharge resources and transportation as appropriate   Identify discharge learning needs (meds, wound care, etc)  7/15/2025 1751 by Tarah Elliott LPN  Outcome: Progressing  Flowsheets (Taken 7/15/2025 1751)  Discharge to home or other facility with appropriate resources:   Identify barriers to discharge with patient and caregiver   Arrange for needed discharge resources and transportation as appropriate   Identify discharge learning needs (meds, wound care, etc)     Problem: Pain  Goal: Verbalizes/displays adequate  comfort level or baseline comfort level  7/16/2025 0003 by Lesley Erazo, RN  Outcome: Progressing  Flowsheets (Taken 7/16/2025 0003)  Verbalizes/displays adequate comfort level or baseline comfort level:   Encourage patient to monitor pain and request assistance   Assess pain using appropriate pain scale   Administer analgesics based on type and severity of pain and evaluate response   Implement non-pharmacological measures as appropriate and evaluate response  7/15/2025 1751 by Tarah Elliott LPN  Outcome: Progressing  Flowsheets (Taken 7/15/2025 1751)  Verbalizes/displays adequate comfort level or baseline comfort level:   Encourage patient to monitor pain and request assistance   Implement non-pharmacological measures as appropriate and evaluate response   Assess pain using appropriate pain scale   Administer analgesics based on type and severity of pain and evaluate response     Problem: Safety - Adult  Goal: Free from fall injury  7/16/2025 0003 by Lesley Erazo, RN  Outcome: Progressing  Flowsheets (Taken 7/16/2025 0003)  Free From Fall Injury: Instruct family/caregiver on patient safety  7/15/2025 1751 by Tarah Elliott LPN  Outcome: Progressing  Flowsheets (Taken 7/15/2025 1751)  Free From Fall Injury: Instruct family/caregiver on patient safety   Care plan reviewed with patient.  Patient  verbalize understanding of the plan of care and contribute to goal setting.

## 2025-07-16 NOTE — OP NOTE
Operative Note      Patient: Jocelynn Stern  YOB: 1955  MRN: [id number]     Date of Procedure: 7/16/2025     Pre-Op Diagnosis Codes:      * Other acute osteomyelitis, unspecified site ([facility]) [M86.10]     Post-Op Diagnosis: Same       Procedure(s):  Left 5th Digit Amputation Left 5th Partial Ray Amputation     Surgeon(s):  Ge Cruz DPM     Assistant:  Resident: Vamsi Lord DPM  Resident: Reynaldo Archibald DPM     Anesthesia: General     Estimated Blood Loss (mL): Minimal     Complications: None     Specimens:   ID Type Source Tests Collected by Time Destination   1 : Left Foot 5th Digit Gangrene Culture Tissue Toe CULTURE, ANAEROBIC AND AEROBIC eG Cruz DPM 7/16/2025 1243     A : Left Foot 5th Digit Gangrene Tissue Toe SURGICAL PATHOLOGY Ge Cruz DPM 7/16/2025 1243           Implants:  * No implants in log *      Drains:        External Urinary Catheter (Active)   Output (mL) 0 mL 07/14/25 2018         Findings:  Present At Time Of Surgery (PATOS) (choose all levels that have infection present):  - Organ Space infection (below fascia) present as evidenced by osteomyelitis  Other Findings: Consistent with diagnosis    Hemostasis: pneumatic ankle tourniquet at 250mmHg     Injectables: 30 cc 0.5% marcaine with epinephrine     Materials: 4-0 prolene, 3-0 prolene and 2-0 prolene     Detailed Description of Procedure:   Indications: Patient is a 69 y.o. female with a chief complaint of Osteomyelitis of the left foot who is being seen by Dr. Cruz and being treated for the stated chief complaint. At this time all conservative options have been exhausted and surgical intervention is necessary. The procedure has been explained to the patient and they understand the risks, benefits and possible complications including pain, bleeding, numbness, infection, blood clots, and loss of limb. No promises have been made as to surgical outcome.    Procedure:  The patient was transported

## 2025-07-16 NOTE — PROGRESS NOTES
1335: Pt arrives to pacu, awakens to verbal stimuli. Pt on room air, respirations easy and unlabored. Dressing CDI. VSS    1345: Pt resting off and on with eyes closed, easily awakens to voice. Continues to deny pain    1355: Report given to Jennifer on 7k    1405: Pt meets criteria for discharge from pacu, awaiting transport    1414: Pt transported to 7K in stable condition. VSS

## 2025-07-16 NOTE — H&P
[facility]  History and Physical Update    Pt Name: Jocelynn Stern  MRN: [id number]  YOB: 1955  Date of evaluation: 7/16/2025    [x] I have examined the patient and reviewed the H&P/Consult and there are no changes to the patient or plans.    [] I have examined the patient and reviewed the H&P/Consult and have noted the following changes:        Ge Cruz DPM DPM  Electronically signed 7/16/2025 at 11:52 AM

## 2025-07-16 NOTE — PROGRESS NOTES
0642 Pt in specials radiology for left leg angiogram. Discussed procedure with pt and pt verbalizes understanding.  0655 Call received from vascular PA-Dr Melo called to emergency surgery and pt delayed.   0656 Pt informed of plan and verbalizes understanding.  0700 Pt transferred to 7K per bed. Report called to Lesley TOVAR.

## 2025-07-16 NOTE — ANESTHESIA POSTPROCEDURE EVALUATION
Department of Anesthesiology  Postprocedure Note    Patient: Jocelynn Stern  MRN: [id number]  YOB: 1955  Date of evaluation: 7/16/2025    Procedure Summary       Date: 07/16/25 [facility] / [facility] / [facility]    Anesthesia Start: 1213 Anesthesia Stop: 1340    Procedure: Left 5th Digit Amputation Left 5th Partial Ray Amputation (Left: Toes) Diagnosis:       Other acute osteomyelitis, unspecified site (HCC)      (Other acute osteomyelitis, unspecified site (HCC) [M86.10])    Surgeons: Ge Cruz DPM Responsible Provider: Narciso Triana DO    Anesthesia Type: general ASA Status: 3            Anesthesia Type: No value filed.    Ciera Phase I: Ciera Score: 9    Ciera Phase II:      Anesthesia Post Evaluation    Patient location during evaluation: PACU  Level of consciousness: awake  Airway patency: patent  Nausea & Vomiting: no nausea  Cardiovascular status: hemodynamically stable  Respiratory status: acceptable  Hydration status: stable  Pain management: adequate    No notable events documented.

## 2025-07-16 NOTE — PROGRESS NOTES
Hospitalist Progress Note    Patient:  Jocelynn Stern      Unit/Bed:7K-07/007-A    YOB: 1955    MRN: [id number]       Acct: [id number]     PCP: Janice Duff MD    Date of Admission: 7/14/2025    Assessment/Plan:    1.  Osteomyelitis of the left fifth ray, patient with diabetic ulceration of the left lower extremity.  On IV antibiotics  Management per podiatry service  Vascular surgery on consult, planning left lower extremity angiogram with findings of CTA with runoff    7/16/25  Patient scheduled for surgery today  Vascular surgery rescheduled angiogram for tomorrow, recommended patient to be n.p.o. starting 2 AM tomorrow    2.  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  Review of record shows patient recently initiated on Ozempic and reported has not yet picked up the prescription  Continue Lantus insulin  Continue Humalog 5 units 3 times daily with meals  Insulin sliding scale  Monitor glucose ACHS osteomyelitis  Encouraged carb controlled diet  Last hemoglobin A1c 9.6 on 5/29/25  Dietitian was consulted for diet education    3.  Abnormal abdominal CT  that showed   Findings of mild paralytic ileus and constipation.Cholelithiasis but no acute findings of the gallbladder.  Patient continues to deny any abdominal pain or GI symptoms.    4.  Normocytic anemia  Hemoglobin 10.1 no bleeding noted  Monitor CBC    5.  Essential hypertension  Continue amlodipine, lisinopril    6.  Hyperlipidemia  Continue atorvastatin    7.  Obesity BMI 30.13  Encourage healthy food choices      Anticipated Discharge in : Per primary service    Active Hospital Problems    Diagnosis Date Noted    History of non-insulin dependent diabetes mellitus [Z86.39] 07/15/2025    Osteomyelitis of left foot, unspecified type (HCC) [M86.9] 07/14/2025    PAD (peripheral artery disease) [I73.9] 07/14/2025    Essential hypertension [I10] 10/21/2021         Chief Complaint: Left lower extremity

## 2025-07-16 NOTE — BRIEF OP NOTE
Brief Postoperative Note      Patient: Jocelynn Stern  YOB: 1955  MRN: [id number]    Date of Procedure: 7/16/2025    Pre-Op Diagnosis Codes:      * Other acute osteomyelitis, unspecified site (HCC) [M86.10]    Post-Op Diagnosis: Same       Procedure(s):  Left 5th Digit Amputation Left 5th Partial Ray Amputation    Surgeon(s):  Ge Cruz DPM    Assistant:  Resident: Vamsi Lord DPM  Resident: Reynaldo Archibald DPM    Anesthesia: General    Estimated Blood Loss (mL): Minimal    Complications: None    Specimens:   ID Type Source Tests Collected by Time Destination   1 : Left Foot 5th Digit Gangrene Culture Tissue Toe CULTURE, ANAEROBIC AND AEROBIC Ge Cruz DPM 7/16/2025 1243    A : Left Foot 5th Digit Gangrene Tissue Toe SURGICAL PATHOLOGY Ge Cruz DPM 7/16/2025 1243        Implants:  * No implants in log *      Drains:   External Urinary Catheter (Active)   Output (mL) 0 mL 07/14/25 2018       Findings:  Present At Time Of Surgery (PATOS) (choose all levels that have infection present):  - Organ Space infection (below fascia) present as evidenced by osteomyelitis  Other Findings: Consistent with diagnosis    Hemostasis: pneumatic ankle tourniquet at 250mmHg    Injectables: 30 cc 0.5% marcaine with epinephrine    Materials: 4-0 prolene, 3-0 prolene and 2-0 prolene     Electronically signed by Reynaldo Archibald DPM on 7/16/2025 at 1:47 PM

## 2025-07-16 NOTE — PROGRESS NOTES
Patient returned to 7K at this time. Extension tubing with flush attached noted to IV. Call received from specials asking for extension tubing be removed. Removed at this time.

## 2025-07-16 NOTE — PROGRESS NOTES
Call received from specials stating that doctor got called into emergency surgery and would be sending patient back to unit.

## 2025-07-16 NOTE — PROGRESS NOTES
Patient received sacramental anointing by a . If you are in need of  support, please call [phone]. If you are in need of a  after 6:30pm, please call the house supervisor for the on-call .      Ivana Neville  [facility] Health Coordinator  [phone]

## 2025-07-16 NOTE — ANESTHESIA PRE PROCEDURE
Department of Anesthesiology  Preprocedure Note       Name:  Jocelynn Stern   Age:  69 y.o.  :  1955                                          MRN:  [id number]         Date:  2025      Surgeon: Surgeon(s):  Ge Cruz DPM    Procedure: Procedure(s):  Left 5th Digit Amputation Left 5th Partial Ray Amputation    Medications prior to admission:   Prior to Admission medications    Medication Sig Start Date End Date Taking? Authorizing Provider   atorvastatin (LIPITOR) 40 MG tablet Take 1 tablet by mouth nightly 25  Yes Lloyd Villatoro MD   amLODIPine (NORVASC) 10 MG tablet Take 1 tablet by mouth daily 25  Yes Lloyd Villatoro MD   lisinopril (PRINIVIL;ZESTRIL) 40 MG tablet Take 1 tablet by mouth in the morning and 1 tablet in the evening. 25  Yes Lloyd Villatoro MD   Semaglutide,0.25 or 0.5MG/DOS, 2 MG/3ML SOPN Inject 0.25 mg into the skin every 7 days X one month then increase to 0.5 mg weekly. 7/10/25   Janice Duff MD   polyethylene glycol (GLYCOLAX) 17 GM/SCOOP powder Colonoscopy Prep Dispense 238 Gram Bottle.  Use as Directed  Patient not taking: Reported on 2025   Ricco Pacheco MD   blood glucose monitor kit and supplies Use to check blood sugar once a day 25   Bart Pimentel DO   Lancets MISC Use to check blood sugar once a day 25   Bart Pimentel DO   blood glucose monitor strips Use to check blood sugar once a day 25   Bart Pimentel DO   Tirzepatide (MOUNJARO) 2.5 MG/0.5ML SOAJ pen Inject 2.5 mg into the skin every 7 days  Patient not taking: Reported on 2025   Bart Pimentel DO   Continuous Glucose Sensor (FREESTYLE MILI 2 SENSOR) MISC 1 each by Does not apply route every 14 days  Patient not taking: Reported on 7/10/2025 8/21/24   Bart Pimentel DO       Current medications:    Current Facility-Administered Medications   Medication Dose Route Frequency Provider Last Rate Last Admin   • sodium chloride flush 0.9 % injection

## 2025-07-16 NOTE — PROGRESS NOTES
Patient returned to 7K 07 from recovery. Patient has intact dry dressing on her left foot. See flowsheets for VS and assessment.

## 2025-07-16 NOTE — PROGRESS NOTES
[facility]  INPATIENT PHYSICAL THERAPY  DAILY NOTE  [facility] ORTHOPEDICS 7K - 7K-07/007-A      Discharge Recommendations: await to see how pt does following sx but anticipate home w/ family assist and cont PT   Equipment Recommendations: No                 Time In: 0808  Time Out: 0836  Timed Code Treatment Minutes: 28 Minutes  Minutes: 28          Date: 2025  Patient Name: Jocelynn Stern,  Gender:  female        MRN: [id number]  : 1955  (69 y.o.)     Referring Practitioner: Reynaldo Archibald, AGUSTIN  Diagnosis: Osteomyelitis of left foot, unspecified type (HCC)  Additional Pertinent Hx: The patient is a 69 y.o. female with significant past medical history of Chronic renal disease, stage III, diabetes mellitus,  diabetic polyneuropathy, hypercholesterolemia, hypertension, psoriasis who presents with a left foot ulcer at the fifth digit.  Patient reports that she had previously seen Dr. Cruz in clinic for a wound on her left fourth digit.  Patient notes that while treating this ulcer she got a tear in the skin on her fifth digit from tape.  Patient reports that she first noticed this ulcer approximately a month ago and it has progressively gotten worse.  Patient noted that the ulcer was very large and draining a fair amount of yellow fluid and decided to see Dr. Cruz in clinic.  Patient was seen today by Dr. Cruz in the office and was recommended to come to the ED.  Patient denies any pain in the site at this time.  Patient's noted that her daughter has been dressing the wounds but it is continued to progressively get worse.  Patient notes that she has never seen a vascular physician in the past.  Patient relates that she does not have much sensation in her foot. Patient denies any constitutional symptoms including N/V/D/F or SOB.     Prior Level of Function:  Lives With: Other (Comment) (Daughter, son in law, and grandkids)  Type of Home: House  Home Layout: One level  Home Access:

## 2025-07-16 NOTE — PROGRESS NOTES
Patient was scheduled for angiogram this morning.  Unfortunately had an emergency in the operating room and got tied up for multiple hours.  I went talk to patient she is scheduled for surgery podiatry later today.  I have readded her to our calendar for tomorrow afternoon for angiography.  Please keep her n.p.o. starting tomorrow morning at 2 AM.  Will plan to do her angiogram tomorrow afternoon

## 2025-07-17 ENCOUNTER — APPOINTMENT (OUTPATIENT)
Dept: INTERVENTIONAL RADIOLOGY/VASCULAR | Age: 70
DRG: 617 | End: 2025-07-17
Payer: MEDICARE

## 2025-07-17 LAB
ANION GAP SERPL CALC-SCNC: 12 MEQ/L (ref 8–16)
BACTERIA SPEC AEROBE CULT: ABNORMAL
BACTERIA SPEC ANAEROBE CULT: ABNORMAL
BASOPHILS ABSOLUTE: 0.1 THOU/MM3 (ref 0–0.1)
BASOPHILS NFR BLD AUTO: 0.5 %
BUN SERPL-MCNC: 17 MG/DL (ref 8–23)
CALCIUM SERPL-MCNC: 8 MG/DL (ref 8.8–10.2)
CHLORIDE SERPL-SCNC: 109 MEQ/L (ref 98–111)
CO2 SERPL-SCNC: 18 MEQ/L (ref 22–29)
CREAT SERPL-MCNC: 1 MG/DL (ref 0.5–0.9)
DEPRECATED RDW RBC AUTO: 36.9 FL (ref 35–45)
EOSINOPHIL NFR BLD AUTO: 1.9 %
EOSINOPHILS ABSOLUTE: 0.3 THOU/MM3 (ref 0–0.4)
ERYTHROCYTE [DISTWIDTH] IN BLOOD BY AUTOMATED COUNT: 12.1 % (ref 11.5–14.5)
GFR SERPL CREATININE-BSD FRML MDRD: 61 ML/MIN/1.73M2
GLUCOSE BLD STRIP.AUTO-MCNC: 114 MG/DL (ref 70–108)
GLUCOSE BLD STRIP.AUTO-MCNC: 120 MG/DL (ref 70–108)
GLUCOSE BLD STRIP.AUTO-MCNC: 141 MG/DL (ref 70–108)
GLUCOSE BLD STRIP.AUTO-MCNC: 162 MG/DL (ref 70–108)
GLUCOSE BLD STRIP.AUTO-MCNC: 185 MG/DL (ref 70–108)
GLUCOSE SERPL-MCNC: 158 MG/DL (ref 74–109)
GRAM STN SPEC: ABNORMAL
HCT VFR BLD AUTO: 30.1 % (ref 37–47)
HGB BLD-MCNC: 9.3 GM/DL (ref 12–16)
IMM GRANULOCYTES # BLD AUTO: 0.06 THOU/MM3 (ref 0–0.07)
IMM GRANULOCYTES NFR BLD AUTO: 0.5 %
KCT BLD-ACNC: 187 SECONDS (ref 1–150)
LYMPHOCYTES ABSOLUTE: 3.3 THOU/MM3 (ref 1–4.8)
LYMPHOCYTES NFR BLD AUTO: 24.7 %
MCH RBC QN AUTO: 25.7 PG (ref 26–33)
MCHC RBC AUTO-ENTMCNC: 30.9 GM/DL (ref 32.2–35.5)
MCV RBC AUTO: 83.1 FL (ref 81–99)
MONOCYTES ABSOLUTE: 0.8 THOU/MM3 (ref 0.4–1.3)
MONOCYTES NFR BLD AUTO: 6.3 %
NEUTROPHILS ABSOLUTE: 8.7 THOU/MM3 (ref 1.8–7.7)
NEUTROPHILS NFR BLD AUTO: 66.1 %
NRBC BLD AUTO-RTO: 0 /100 WBC
ORGANISM: ABNORMAL
PLATELET # BLD AUTO: 361 THOU/MM3 (ref 130–400)
PMV BLD AUTO: 9.6 FL (ref 9.4–12.4)
POTASSIUM SERPL-SCNC: 3.7 MEQ/L (ref 3.5–5.2)
RBC # BLD AUTO: 3.62 MILL/MM3 (ref 4.2–5.4)
SODIUM SERPL-SCNC: 139 MEQ/L (ref 135–145)
WBC # BLD AUTO: 13.2 THOU/MM3 (ref 4.8–10.8)

## 2025-07-17 PROCEDURE — 6360000002 HC RX W HCPCS: Performed by: SURGERY

## 2025-07-17 PROCEDURE — 6360000004 HC RX CONTRAST MEDICATION: Performed by: SURGERY

## 2025-07-17 PROCEDURE — 6360000002 HC RX W HCPCS

## 2025-07-17 PROCEDURE — 2500000003 HC RX 250 WO HCPCS

## 2025-07-17 PROCEDURE — 6370000000 HC RX 637 (ALT 250 FOR IP): Performed by: PHYSICAL THERAPY ASSISTANT

## 2025-07-17 PROCEDURE — 85347 COAGULATION TIME ACTIVATED: CPT

## 2025-07-17 PROCEDURE — 1200000000 HC SEMI PRIVATE

## 2025-07-17 PROCEDURE — 6370000000 HC RX 637 (ALT 250 FOR IP)

## 2025-07-17 PROCEDURE — 6360000002 HC RX W HCPCS: Performed by: PHYSICAL THERAPY ASSISTANT

## 2025-07-17 PROCEDURE — 97530 THERAPEUTIC ACTIVITIES: CPT

## 2025-07-17 PROCEDURE — 82948 REAGENT STRIP/BLOOD GLUCOSE: CPT

## 2025-07-17 PROCEDURE — 37228 HC TIB PER TERRITORY PLASTY: CPT

## 2025-07-17 PROCEDURE — 75710 ARTERY X-RAYS ARM/LEG: CPT

## 2025-07-17 PROCEDURE — 85025 COMPLETE CBC W/AUTO DIFF WBC: CPT

## 2025-07-17 PROCEDURE — C1769 GUIDE WIRE: HCPCS

## 2025-07-17 PROCEDURE — 37224 HC FEM POP TERRITORY PLASTY: CPT

## 2025-07-17 PROCEDURE — 6370000000 HC RX 637 (ALT 250 FOR IP): Performed by: SURGERY

## 2025-07-17 PROCEDURE — 80048 BASIC METABOLIC PNL TOTAL CA: CPT

## 2025-07-17 PROCEDURE — 2580000003 HC RX 258

## 2025-07-17 PROCEDURE — 97164 PT RE-EVAL EST PLAN CARE: CPT

## 2025-07-17 PROCEDURE — 99232 SBSQ HOSP IP/OBS MODERATE 35: CPT | Performed by: PHYSICIAN ASSISTANT

## 2025-07-17 PROCEDURE — 2580000003 HC RX 258: Performed by: PHYSICAL THERAPY ASSISTANT

## 2025-07-17 PROCEDURE — 36415 COLL VENOUS BLD VENIPUNCTURE: CPT

## 2025-07-17 PROCEDURE — 76937 US GUIDE VASCULAR ACCESS: CPT

## 2025-07-17 PROCEDURE — 2500000003 HC RX 250 WO HCPCS: Performed by: PHYSICAL THERAPY ASSISTANT

## 2025-07-17 RX ORDER — CLOPIDOGREL BISULFATE 75 MG/1
75 TABLET ORAL DAILY
Status: DISCONTINUED | OUTPATIENT
Start: 2025-07-17 | End: 2025-07-28 | Stop reason: HOSPADM

## 2025-07-17 RX ORDER — PROTAMINE SULFATE 10 MG/ML
INJECTION, SOLUTION INTRAVENOUS PRN
Status: COMPLETED | OUTPATIENT
Start: 2025-07-17 | End: 2025-07-17

## 2025-07-17 RX ORDER — IOPAMIDOL 612 MG/ML
INJECTION, SOLUTION INTRAVASCULAR PRN
Status: COMPLETED | OUTPATIENT
Start: 2025-07-17 | End: 2025-07-17

## 2025-07-17 RX ORDER — LIDOCAINE HYDROCHLORIDE 20 MG/ML
INJECTION, SOLUTION EPIDURAL; INFILTRATION; INTRACAUDAL; PERINEURAL PRN
Status: COMPLETED | OUTPATIENT
Start: 2025-07-17 | End: 2025-07-17

## 2025-07-17 RX ORDER — MIDAZOLAM HYDROCHLORIDE 1 MG/ML
INJECTION, SOLUTION INTRAMUSCULAR; INTRAVENOUS PRN
Status: COMPLETED | OUTPATIENT
Start: 2025-07-17 | End: 2025-07-17

## 2025-07-17 RX ORDER — HEPARIN SODIUM 1000 [USP'U]/ML
INJECTION, SOLUTION INTRAVENOUS; SUBCUTANEOUS PRN
Status: COMPLETED | OUTPATIENT
Start: 2025-07-17 | End: 2025-07-17

## 2025-07-17 RX ORDER — FENTANYL CITRATE 50 UG/ML
INJECTION, SOLUTION INTRAMUSCULAR; INTRAVENOUS PRN
Status: COMPLETED | OUTPATIENT
Start: 2025-07-17 | End: 2025-07-17

## 2025-07-17 RX ADMIN — LIDOCAINE HYDROCHLORIDE 8 ML: 20 INJECTION, SOLUTION EPIDURAL; INFILTRATION; INTRACAUDAL; PERINEURAL at 17:42

## 2025-07-17 RX ADMIN — MIDAZOLAM 0.5 MG: 1 INJECTION INTRAMUSCULAR; INTRAVENOUS at 16:30

## 2025-07-17 RX ADMIN — HEPARIN SODIUM 5000 UNITS: 1000 INJECTION INTRAVENOUS; SUBCUTANEOUS at 16:54

## 2025-07-17 RX ADMIN — INSULIN GLARGINE 10 UNITS: 100 INJECTION, SOLUTION SUBCUTANEOUS at 21:39

## 2025-07-17 RX ADMIN — Medication 2500 MG: at 22:09

## 2025-07-17 RX ADMIN — INSULIN LISPRO 5 UNITS: 100 INJECTION, SOLUTION INTRAVENOUS; SUBCUTANEOUS at 18:52

## 2025-07-17 RX ADMIN — SODIUM CHLORIDE: 0.9 INJECTION, SOLUTION INTRAVENOUS at 05:42

## 2025-07-17 RX ADMIN — OXYCODONE 10 MG: 5 TABLET ORAL at 09:40

## 2025-07-17 RX ADMIN — SODIUM CHLORIDE: 0.9 INJECTION, SOLUTION INTRAVENOUS at 22:08

## 2025-07-17 RX ADMIN — ATORVASTATIN CALCIUM 40 MG: 40 TABLET, FILM COATED ORAL at 22:09

## 2025-07-17 RX ADMIN — IOPAMIDOL 45 ML: 612 INJECTION, SOLUTION INTRAVENOUS at 17:42

## 2025-07-17 RX ADMIN — HEPARIN SODIUM 1000 UNITS: 1000 INJECTION INTRAVENOUS; SUBCUTANEOUS at 17:02

## 2025-07-17 RX ADMIN — WATER 2000 MG: 1 INJECTION INTRAMUSCULAR; INTRAVENOUS; SUBCUTANEOUS at 05:38

## 2025-07-17 RX ADMIN — SODIUM CHLORIDE: 0.9 INJECTION, SOLUTION INTRAVENOUS at 13:36

## 2025-07-17 RX ADMIN — AMLODIPINE BESYLATE 10 MG: 10 TABLET ORAL at 11:16

## 2025-07-17 RX ADMIN — WATER 2000 MG: 1 INJECTION INTRAMUSCULAR; INTRAVENOUS; SUBCUTANEOUS at 14:31

## 2025-07-17 RX ADMIN — MIDAZOLAM 1 MG: 1 INJECTION INTRAMUSCULAR; INTRAVENOUS at 16:23

## 2025-07-17 RX ADMIN — CLOPIDOGREL BISULFATE 75 MG: 75 TABLET, FILM COATED ORAL at 21:40

## 2025-07-17 RX ADMIN — FENTANYL CITRATE 50 MCG: 50 INJECTION, SOLUTION INTRAMUSCULAR; INTRAVENOUS at 16:23

## 2025-07-17 RX ADMIN — PROTAMINE SULFATE 10 MG: 10 INJECTION, SOLUTION INTRAVENOUS at 17:35

## 2025-07-17 ASSESSMENT — PAIN SCALES - GENERAL
PAINLEVEL_OUTOF10: 8
PAINLEVEL_OUTOF10: 1

## 2025-07-17 ASSESSMENT — PAIN DESCRIPTION - LOCATION
LOCATION: FOOT
LOCATION: FOOT

## 2025-07-17 ASSESSMENT — PAIN - FUNCTIONAL ASSESSMENT
PAIN_FUNCTIONAL_ASSESSMENT: ACTIVITIES ARE NOT PREVENTED
PAIN_FUNCTIONAL_ASSESSMENT: ACTIVITIES ARE NOT PREVENTED

## 2025-07-17 ASSESSMENT — PAIN DESCRIPTION - ORIENTATION
ORIENTATION: LEFT
ORIENTATION: LEFT

## 2025-07-17 ASSESSMENT — PAIN DESCRIPTION - DESCRIPTORS
DESCRIPTORS: ACHING;THROBBING
DESCRIPTORS: ACHING

## 2025-07-17 NOTE — PROGRESS NOTES
constitutional symptoms including N/V/D/F or SOB.           Past medical history        Diagnosis Date    Chronic renal disease, stage 3, moderately decreased glomerular filtration rate (GFR) between 30-59 mL/min/1.73 square meter (HCC)     Diabetes mellitus (HCC)     Diabetic polyneuropathy associated with type 2 diabetes mellitus (HCC) 2022    Hypercholesterolemia     Hypertension     Psoriasis        Past surgical history        Procedure Laterality Date    BREAST BIOPSY N/A     doesnt remember which breast, can't find a scar    FOOT DEBRIDEMENT Right 2022    RIGHT FOOT DEBRIDEMENT/WASH OUT performed by Ge Cruz DPM at [facility] OR    LEG SURGERY N/A 2024    Right Excision of Osteomyelitis 5th Metatarsal Resection of Wound & Primary Closure of Wound performed by Ge Cruz DPM at [facility] OR    TOE AMPUTATION Right 2022    RIGHT FOOT WOUND DEBRIDEMENT WITH POSS RIGHT 5TH METATARSAL RESECTION performed by Ge Cruz DPM at [facility] OR    TOE AMPUTATION Left 2025    Left 5th Digit Amputation Left 5th Partial Ray Amputation performed by Ge Cruz DPM at [facility] OR       Family history        Problem Relation Age of Onset    Diabetes Mother     Heart Disease Mother     Heart Disease Father     Breast Cancer Paternal Aunt 74       Social history    Social History     Tobacco Use    Smoking status: Former     Current packs/day: 0.00     Average packs/day: 0.5 packs/day for 15.0 years (7.5 ttl pk-yrs)     Types: Cigarettes     Start date: 1996     Quit date: 2011     Years since quittin.5    Smokeless tobacco: Never   Vaping Use    Vaping status: Never Used   Substance Use Topics    Alcohol use: Yes     Comment: social    Drug use: Never       Allergies    No Known Allergies    Medications    No current facility-administered medications on file prior to encounter.     Current Outpatient Medications on File Prior to Encounter   Medication Sig  metatarsophalangeal joint.   2. Question fracture involving the base of the fifth proximal phalanx please   correlate clinically.   3. Degenerative changes..               **This report has been created using voice recognition software. It may contain   minor errors which are inherent in voice recognition technology.**            Electronically signed by Dr. Chris Benítez      IR ANGIOGRAM EXTREMITY LEFT    (Results Pending)       Assessment     Chronic  Patient Active Problem List   Diagnosis    Essential hypertension    Hyperlipidemia     borderline Abnormal nuclear stress test- NO angina or angina equiv- med RX    Diabetes mellitus due to underlying condition with hyperosmolarity without coma, without long-term current use of insulin (HCC)    Severe nonproliferative diabetic retinopathy associated with type 2 diabetes mellitus (HCC)    Proliferative retinopathy with retinal edema due to type 2 diabetes mellitus (HCC)    Hypertensive heart and kidney disease without heart failure and with stage 3b chronic kidney disease (HCC)    Diabetic polyneuropathy associated with type 2 diabetes mellitus (HCC)    Age-related cataract of both eyes    Psoriasis    Osteomyelitis of left foot, unspecified type (HCC)    PAD (peripheral artery disease)    History of non-insulin dependent diabetes mellitus           Maryellen Lord DPM PGY-2  07/17/25 4:04 PM

## 2025-07-17 NOTE — PROGRESS NOTES
Plan for left sided angio today with potential intevention on left pop.  Discussed with patient and daughter.

## 2025-07-17 NOTE — PROGRESS NOTES
Comprehensive Nutrition Assessment    Type and Reason for Visit:  Initial, Wound, Consult (Diabetic Diet Education)    Nutrition Recommendations/Plan:   When diet initiated again s/p angiogram - recommend 4 CHO count diet  Start ONS: Dustin (BID) and Diet Starry to mix  Diabetic diet education reinforced today - handouts provided; Pt lives with daughter and KIKI who is a T1DM and does all the cooking.      Malnutrition Assessment:  Malnutrition Status:  No malnutrition (07/17/25 1124)    Context:  Acute Illness     Findings of the 6 clinical characteristics of malnutrition:  Energy Intake:  No decrease in energy intake  Weight Loss:  No weight loss     Body Fat Loss:  No body fat loss     Muscle Mass Loss:  No muscle mass loss    Fluid Accumulation:  No fluid accumulation     Strength:  Not Performed    Nutrition Assessment:     Pt. nutritionally compromised AEB wounds.  At risk for further nutrition compromise r/t increased nutrient needs for wound healing - s/p 5th digit amputation 7/16, OM L foot, underlying medical condition ( has a past medical history of CKDIII, Diabetes mellitus, Diabetic polyneuropathy, HLD, HTN, and Psoriasis, hx/o foot wounds and debridements, former smoker).       Nutrition Related Findings:    Pt. Report/Treatments/Miscellaneous: Pt seen, reports familiarity with diabetic diet. States her son in law is a T1DM and does all the cooking for her and her daughter. She notes that he is very knowledgeable in the diet. Pt accepting of handouts provided. Noted in EMR pt recently prescribed ozempic - has not started yet. Discussed protein needs r/t wounds - pt agreed to trying Dustin and mixing with diet starry.   GI Status: BM - 7/14  Pertinent Labs: BUN 17, creatinine 1.0, glucose 158, POC glucose over last 24 hours: 100-253, HgbA1C (5/29/25): 9.6%  Pertinent Meds: lipitor, ancef, lantus, humalog, probiotic - held, IVF     Wound Type:  (s/p 5th digit amputation 7/16 - plan angiogram 7/17)        Current Nutrition Intake & Therapies:    Average Meal Intake: % (mostly while on PO diet)  Average Supplements Intake:  (agreed to trying tanya when on PO diet)  Diet NPO    Anthropometric Measures:  Height: 177.8 cm (5' 10\")  Ideal Body Weight (IBW): 150 lbs (68 kg)    Admission Body Weight: 95.3 kg (210 lb) (7/14: stated weight; no edema)  Current Body Weight: 95.3 kg (210 lb) (7/14: stated weight; no edema)  Current BMI (kg/m2): 30.1  Usual Body Weight:  (per pt ~200-210# range; per EMR: 4/4/24: 196# 13 oz, 8/28/24: 204# 6 oz, 5/29/25: 218# 13 oz, 7/8/25: 212# 10 oz)        Weight Adjustment For: No Adjustment                 BMI Categories: Obese Class 1 (BMI 30.0-34.9)    Estimated Daily Nutrient Needs:  Energy Requirements Based On: Kcal/kg  Weight Used for Energy Requirements: Admission (95.3 kg)  Energy (kcal/day): 5503-3740 kcals (15-18 kcals/kg)  Weight Used for Protein Requirements: Ideal (68.18 kg)  Protein (g/day): 55-75 grams (0.8-1.1 grams/kg of IBW) - CKD III; wound       Nutrition Diagnosis:   Increased nutrient needs related to increase demand for energy/nutrients as evidenced by wounds    Nutrition Interventions:   Food and/or Nutrient Delivery: Continue NPO (resume diabetic diet and start ONS when diet initiated again s/p angiogram today)  Nutrition Education/Counseling: Education/Counseling initiated (discussed ONS for wound healing; reinforced DM diet today)  Coordination of Nutrition Care: Continue to monitor while inpatient       Goals:  Goals: by next RD assessment, Meet at least 75% of estimated needs  Type of Goal: New goal       Nutrition Monitoring and Evaluation:      Food/Nutrient Intake Outcomes: Food and Nutrient Intake, Supplement Intake  Physical Signs/Symptoms Outcomes: Biochemical Data, GI Status, Fluid Status or Edema, Nutrition Focused Physical Findings, Skin, Weight    Discharge Planning:    Continue Oral Nutrition Supplement (as tolerated/accepted)     Hayder Mas,

## 2025-07-17 NOTE — PROGRESS NOTES
[facility]   Pharmacy Pharmacokinetic Monitoring Service - Vancomycin     Jocelynn Stern is a 69 y.o. female re-starting on vancomycin therapy for Osteomyelitis. Pharmacy consulted by KEITH Ramos for monitoring and adjustment.    Target Concentration: Goal AUC/TIFFANIE 400-600 mg*hr/L    Additional Antimicrobials: none    Pertinent Laboratory Values:   Wt Readings from Last 1 Encounters:   07/14/25 95.3 kg (210 lb)     Estimated Creatinine Clearance: 67 mL/min (A) (based on SCr of 1 mg/dL (H)).  Recent Labs     07/16/25  0728 07/17/25  0801   CREATININE 1.5* 1.0*     Pertinent Cultures:  Date Source Results   7/14 Blood x 2 Negative to date   7/14 Toe cx MSSA   7/16 Toe cx Staphylococcus   MRSA Nasal Swab: N/A. Non-respiratory infection.    Plan:  Dosing recommendations based on Bayesian software  Reload patient with vancomycin 2500 mg x 1 due to being almost 48 hours since last dose.  Renal labs as indicated   Vancomycin concentration not ordered at this time  Pharmacy will monitor renal function and adjust therapy as indicated    Thank you for the consult,  Mariam Stubbs, PharmD  7/17/2025 7:11 PM

## 2025-07-17 NOTE — PLAN OF CARE
Problem: Chronic Conditions and Co-morbidities  Goal: Patient's chronic conditions and co-morbidity symptoms are monitored and maintained or improved  Outcome: Progressing  Flowsheets (Taken 7/17/2025 0054)  Care Plan - Patient's Chronic Conditions and Co-Morbidity Symptoms are Monitored and Maintained or Improved:   Monitor and assess patient's chronic conditions and comorbid symptoms for stability, deterioration, or improvement   Collaborate with multidisciplinary team to address chronic and comorbid conditions and prevent exacerbation or deterioration   Update acute care plan with appropriate goals if chronic or comorbid symptoms are exacerbated and prevent overall improvement and discharge     Problem: Discharge Planning  Goal: Discharge to home or other facility with appropriate resources  Outcome: Progressing  Flowsheets (Taken 7/17/2025 0054)  Discharge to home or other facility with appropriate resources:   Identify barriers to discharge with patient and caregiver   Arrange for needed discharge resources and transportation as appropriate   Identify discharge learning needs (meds, wound care, etc)     Problem: Pain  Goal: Verbalizes/displays adequate comfort level or baseline comfort level  Outcome: Progressing  Flowsheets (Taken 7/17/2025 0054)  Verbalizes/displays adequate comfort level or baseline comfort level:   Encourage patient to monitor pain and request assistance   Implement non-pharmacological measures as appropriate and evaluate response   Assess pain using appropriate pain scale     Problem: Safety - Adult  Goal: Free from fall injury  Outcome: Progressing  Flowsheets (Taken 7/17/2025 0054)  Free From Fall Injury: Instruct family/caregiver on patient safety     Problem: Skin/Tissue Integrity  Goal: Skin integrity remains intact  Description: 1.  Monitor for areas of redness and/or skin breakdown  2.  Assess vascular access sites hourly  3.  Every 4-6 hours minimum:  Change oxygen saturation

## 2025-07-17 NOTE — PLAN OF CARE
Problem: Chronic Conditions and Co-morbidities  Goal: Patient's chronic conditions and co-morbidity symptoms are monitored and maintained or improved  Outcome: Progressing  Flowsheets (Taken 7/17/2025 0054 by Lesley Erazo, RN)  Care Plan - Patient's Chronic Conditions and Co-Morbidity Symptoms are Monitored and Maintained or Improved:   Monitor and assess patient's chronic conditions and comorbid symptoms for stability, deterioration, or improvement   Collaborate with multidisciplinary team to address chronic and comorbid conditions and prevent exacerbation or deterioration   Update acute care plan with appropriate goals if chronic or comorbid symptoms are exacerbated and prevent overall improvement and discharge     Problem: Discharge Planning  Goal: Discharge to home or other facility with appropriate resources  Outcome: Progressing  Flowsheets (Taken 7/17/2025 0054 by Lesley Erazo, RN)  Discharge to home or other facility with appropriate resources:   Identify barriers to discharge with patient and caregiver   Arrange for needed discharge resources and transportation as appropriate   Identify discharge learning needs (meds, wound care, etc)     Problem: Pain  Goal: Verbalizes/displays adequate comfort level or baseline comfort level  Outcome: Progressing  Flowsheets (Taken 7/17/2025 0054 by Lesley Erazo, RN)  Verbalizes/displays adequate comfort level or baseline comfort level:   Encourage patient to monitor pain and request assistance   Implement non-pharmacological measures as appropriate and evaluate response   Assess pain using appropriate pain scale     Problem: Safety - Adult  Goal: Free from fall injury  Outcome: Progressing  Flowsheets (Taken 7/17/2025 0054 by Lesley Erazo, RN)  Free From Fall Injury: Instruct family/caregiver on patient safety     Problem: Skin/Tissue Integrity  Goal: Skin integrity remains intact  Description: 1.  Monitor for areas of redness and/or skin breakdown  2.

## 2025-07-17 NOTE — PROGRESS NOTES
1600 Patient received in IR for arteriogram procedure with family taken to main radiology.  1612 Dr. Melo in; spoke to patient and assessment obtained.  1615 This procedure has been fully reviewed with the patient and written informed consent has been obtained.  1620 Patient prepped for procedure.  1631 Procedure started with Dr. Melo.  1633 Access to right artery with use of sonosite.   1712 Angioplasty of popliteal and tibial peroneal trunk using [place] 18 3 x 200 balloon.   1719 ACT running.   1720 Angioplasty of popliteal using IN.PACT 018 4 x 40 balloon.   1723 , Dr Melo notified.   1726 Family called to consult room.  1733 Procedure completed; patient tolerated well. MynxGrip device deployed to right femoral artery. Manual pressure being held to right groin site.   1735 Dr. Melo spoke to patient's family.  1743 Manual pressure discontinued; no bleeding noted. Quick clot, guaze, and op site to right femoral site; area soft to touch with no bleeding noted.   1751 Patient on bed; comfort ensured. Right femoral dressing remains dry and intact with area soft.   1753 Patient taken to 7K via bed with family at bedside. Right femoral dressing remains dry and intact with area soft. Pt alert and oriented x3; follows commands. Skin pink, warm, and dry. Respirations easy, regular, and nonlabored. Bedside report given to Summer TOVAR on 7K.

## 2025-07-17 NOTE — PROCEDURES
PROCEDURE NOTE  Date: 7/17/2025   Name: Jocelynn Stern  YOB: 1955    Procedures  Surgeon- Propper  Procedure-   Revasc with PTA using drug balloon of the left POP  Revasc with PTA of the Peroneal left side  Left side angiogram    Preop Dx:  Critical limb ischemia    Post Op Diagnosis  Same    EBL  Min    Anesthesia  Local with sedation    Implants:  Right mynx device    Indication for surgery  This the patient presented with critical ischemia.  We discussed doing angiography as imaging indicated a likely popliteal artery occlusion.  All the risks and benefits were discussed and made preparations for the procedure.    On consent she was taken back the operating positioned supine the right groin and left groin were prepped and draped a timeout was performed.  Under ultrasound guidance lidocaine was instilled around the common femoral artery on the right and a micropuncture access needle and wire were used to access the right femoral artery.  A 5 Cymraes sheath was inserted.  I selected up and over the aortic bifurcation with a floppy Glidewire and placed the floppy Glidewire down the SFA.  I then placed an endhole catheter down into the superficial femoral artery and performed my first angiogram.  This demonstrated good flow throughout the common femoral SFA and profunda.  I advanced the catheter deeper into the SFA over a wire and then did left lower extremity angiography.  This revealed good flow through the SFA with an occlusion of the popliteal artery and substantial collateral flow.  The distal runoff was only through the peroneal artery with no obvious native anterior tibial or posterior tibial artery.  I made the decision to try and revascularize this as the peroneal came back relatively proximal.    I heparinized the patient and placed a Robles wire into the SFA.  I placed a 5 Cymraes long sheath down into the superficial femoral artery on the left-hand side.  Using the V18 wire and crossing

## 2025-07-17 NOTE — PROGRESS NOTES
Hospitalist Progress Note    Patient:  Jocelynn Stern      Unit/Bed:7K-07/007-A    YOB: 1955    MRN: [id number]       Acct: [id number]     PCP: Janice Duff MD    Date of Admission: 7/14/2025    Assessment/Plan:    Osteomyelitis of the left fifth ray: Diabetic ulceration of the left lower extremity  Podiatry primary and managing  S/p Amputation 7/16/25, source control attained  Continue IV Ancef for now  Consider ID consultation  Vascular surgery consulted, patient to go for left lower extremity angiogram today via right groin access for left distal popliteal artery occlusion.  Vascular surgery suspects tibial disease as well.     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  Patient continues to deny any abdominal pain or GI symptoms.     Normocytic anemia  Hemoglobin 10.1 no bleeding noted  Monitor CBC    Essential hypertension  Continue amlodipine, lisinopril    Hyperlipidemia  Continue atorvastatin    Obesity BMI 30.13  Encourage healthy food choices    DVT Prophylaxis: [] Lovenox / [] Heparin / [] SCDs / [] Already on Systemic Anticoagulation / [] None     Disposition:    [x] Home       [] TCU       [] Rehab       [] Psych       [] SNF       [] Long Term Care Facility       [] Other-    Chief Complaint: Left lower extremity ulceration       Subjective: 69 y.o. female admitted to the hospitalist service for left lower extremity ulceration.  Patient reports no

## 2025-07-17 NOTE — PROGRESS NOTES
[facility]  INPATIENT PHYSICAL THERAPY  REASSESSMENT  [facility] ORTHOPEDICS 7K - 7K-07/007-A      Discharge Recommendations: Subacute/Skilled Nursing Facility  Equipment Recommendations: No  defer to next LOC          Time In: 0753  Time Out: 08  Timed Code Treatment Minutes: 23 Minutes  Minutes: 31     Date: 2025  Patient Name: Jocelynn Stern,  Gender:  female        MRN: [id number]  : 1955  (69 y.o.)     Referring Practitioner: Reynaldo Archibald DPM  Diagnosis: Osteomyelitis of left foot, unspecified type (HCC)  Additional Pertinent Hx: The patient is a 69 y.o. female with significant past medical history of Chronic renal disease, stage III, diabetes mellitus,  diabetic polyneuropathy, hypercholesterolemia, hypertension, psoriasis who presents with a left foot ulcer at the fifth digit.  Patient reports that she had previously seen Dr. Cruz in clinic for a wound on her left fourth digit.  Patient notes that while treating this ulcer she got a tear in the skin on her fifth digit from tape.  Patient reports that she first noticed this ulcer approximately a month ago and it has progressively gotten worse.  Patient noted that the ulcer was very large and draining a fair amount of yellow fluid and decided to see Dr. Cruz in clinic.  Patient was seen today by Dr. Cruz in the office and was recommended to come to the ED.  Patient denies any pain in the site at this time.  Patient's noted that her daughter has been dressing the wounds but it is continued to progressively get worse.  Patient notes that she has never seen a vascular physician in the past.  Patient relates that she does not have much sensation in her foot. Patient denies any constitutional symptoms including N/V/D/F or SOB.  On 25, patient underwent Left 5th Digit Amputation Left 5th Partial Ray Amputation     Prior Level of Function:  Lives With: Other (Comment) (Daughter, son in law, and grandkids)  Type of Home:

## 2025-07-18 LAB
GLUCOSE BLD STRIP.AUTO-MCNC: 133 MG/DL (ref 70–108)
GLUCOSE BLD STRIP.AUTO-MCNC: 164 MG/DL (ref 70–108)
GLUCOSE BLD STRIP.AUTO-MCNC: 194 MG/DL (ref 70–108)
GLUCOSE BLD STRIP.AUTO-MCNC: 195 MG/DL (ref 70–108)

## 2025-07-18 PROCEDURE — 97166 OT EVAL MOD COMPLEX 45 MIN: CPT

## 2025-07-18 PROCEDURE — 6370000000 HC RX 637 (ALT 250 FOR IP): Performed by: PHYSICIAN ASSISTANT

## 2025-07-18 PROCEDURE — 6370000000 HC RX 637 (ALT 250 FOR IP)

## 2025-07-18 PROCEDURE — 2500000003 HC RX 250 WO HCPCS

## 2025-07-18 PROCEDURE — 97530 THERAPEUTIC ACTIVITIES: CPT

## 2025-07-18 PROCEDURE — 2500000003 HC RX 250 WO HCPCS: Performed by: PHYSICAL THERAPY ASSISTANT

## 2025-07-18 PROCEDURE — 97110 THERAPEUTIC EXERCISES: CPT

## 2025-07-18 PROCEDURE — 6370000000 HC RX 637 (ALT 250 FOR IP): Performed by: PHYSICAL THERAPY ASSISTANT

## 2025-07-18 PROCEDURE — 97542 WHEELCHAIR MNGMENT TRAINING: CPT

## 2025-07-18 PROCEDURE — 99232 SBSQ HOSP IP/OBS MODERATE 35: CPT | Performed by: PHYSICIAN ASSISTANT

## 2025-07-18 PROCEDURE — APPSS30 APP SPLIT SHARED TIME 16-30 MINUTES: Performed by: PHYSICIAN ASSISTANT

## 2025-07-18 PROCEDURE — 82948 REAGENT STRIP/BLOOD GLUCOSE: CPT

## 2025-07-18 PROCEDURE — 1200000000 HC SEMI PRIVATE

## 2025-07-18 PROCEDURE — 6360000002 HC RX W HCPCS

## 2025-07-18 PROCEDURE — 6370000000 HC RX 637 (ALT 250 FOR IP): Performed by: SURGERY

## 2025-07-18 RX ORDER — ASPIRIN 81 MG/1
81 TABLET, CHEWABLE ORAL DAILY
Status: DISCONTINUED | OUTPATIENT
Start: 2025-07-18 | End: 2025-07-28 | Stop reason: HOSPADM

## 2025-07-18 RX ORDER — ALPRAZOLAM 0.5 MG
0.25 TABLET ORAL 2 TIMES DAILY PRN
Status: DISCONTINUED | OUTPATIENT
Start: 2025-07-18 | End: 2025-07-28 | Stop reason: HOSPADM

## 2025-07-18 RX ADMIN — INSULIN LISPRO 5 UNITS: 100 INJECTION, SOLUTION INTRAVENOUS; SUBCUTANEOUS at 08:51

## 2025-07-18 RX ADMIN — INSULIN LISPRO 5 UNITS: 100 INJECTION, SOLUTION INTRAVENOUS; SUBCUTANEOUS at 12:04

## 2025-07-18 RX ADMIN — INSULIN GLARGINE 10 UNITS: 100 INJECTION, SOLUTION SUBCUTANEOUS at 22:11

## 2025-07-18 RX ADMIN — WATER 2000 MG: 1 INJECTION INTRAMUSCULAR; INTRAVENOUS; SUBCUTANEOUS at 16:25

## 2025-07-18 RX ADMIN — SODIUM CHLORIDE, PRESERVATIVE FREE 10 ML: 5 INJECTION INTRAVENOUS at 22:12

## 2025-07-18 RX ADMIN — ASPIRIN 81 MG: 81 TABLET, CHEWABLE ORAL at 13:13

## 2025-07-18 RX ADMIN — INSULIN LISPRO 5 UNITS: 100 INJECTION, SOLUTION INTRAVENOUS; SUBCUTANEOUS at 18:17

## 2025-07-18 RX ADMIN — AMLODIPINE BESYLATE 10 MG: 10 TABLET ORAL at 08:50

## 2025-07-18 RX ADMIN — INSULIN LISPRO 1 UNITS: 100 INJECTION, SOLUTION INTRAVENOUS; SUBCUTANEOUS at 12:03

## 2025-07-18 RX ADMIN — ATORVASTATIN CALCIUM 40 MG: 40 TABLET, FILM COATED ORAL at 22:12

## 2025-07-18 RX ADMIN — Medication 1 CAPSULE: at 08:50

## 2025-07-18 RX ADMIN — CLOPIDOGREL BISULFATE 75 MG: 75 TABLET, FILM COATED ORAL at 08:50

## 2025-07-18 RX ADMIN — ALPRAZOLAM 0.25 MG: 0.5 TABLET ORAL at 13:13

## 2025-07-18 NOTE — TELEPHONE ENCOUNTER
Just LIYAH - I discussed this issue at our last appt and the son stated Delilah did give him paperwork to fill out for Ozempic and I ordered this for her at last visit.

## 2025-07-18 NOTE — PROGRESS NOTES
Patient is status post revascularization of the left popliteal artery through the peroneal artery.  She has inline flow to the ankle through the peroneal artery but has no AT or PT.  There is significant collateralization of the calf.  This is probably the best flow she can achieve.  I do not see any targets for distal bypass to the AT or PT and the peroneal is now in line.    Start Plavix 75 mg daily

## 2025-07-18 NOTE — PROGRESS NOTES
Hospitalist Progress Note    Patient:  Jocelynn Stern      Unit/Bed:7-07/007-A    YOB: 1955    MRN: [id number]       Acct: [id number]     PCP: Janice Duff MD    Date of Admission: 7/14/2025    Assessment/Plan:    Osteomyelitis of the left fifth ray: Diabetic ulceration of the left lower extremity  Podiatry primary and managing  S/p Amputation 7/16/25, source control attained  Continue IV Ancef for now  Plan for transition to doxycycline upon d/c  Vascular surgery consulted  Patient s/p left lower extremity angiogram, plan for ASA, Plavix, statin, and RTC in 4 weeks with BLE arterial duplex US and TBI's  PT/OT eval and treat, possible IPR candidate    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  Review of record shows patient recently initiated on Ozempic and reported has not yet picked up the prescription  Continue Lantus  Continue Humalog 5 units 3 times daily with meals with Insulin sliding scale  Monitor glucose ACHS osteomyelitis  Encouraged carb controlled diet  Last hemoglobin A1c 9.6 on 5/29/25  Dietitian was consulted for diet education    Normocytic anemia  Hemoglobin 10.1 no bleeding noted  Monitor CBC    Essential hypertension  Continue amlodipine, lisinopril    Hyperlipidemia  Continue atorvastatin    Obesity BMI 30.13  Encourage healthy food choices    DVT Prophylaxis: [] Lovenox / [] Heparin / [] SCDs / [] Already on Systemic Anticoagulation / [] None     Disposition:    [x] Home       [] TCU       [] Rehab       [] Psych       [] SNF       [] Long Term Care Facility       [] Other-    Chief Complaint: Left lower extremity ulceration       Subjective: 69 y.o. female admitted to the hospitalist service for left lower extremity ulceration.  Patient visited on 7K.  She reports she is not really having any pain.  She denies nausea or vomiting.  Dressing and surgical shoe remain in place of the left lower extremity.  Vascular surgery will follow  the left lower extremity   Neurological:      Mental Status: She is alert.   Psychiatric:         Speech: She is communicative.        Labs:   Recent Labs     07/16/25  0728 07/17/25  0801   WBC 23.4* 13.2*   HGB 9.8* 9.3*   HCT 31.6* 30.1*    361     Recent Labs     07/16/25  0728 07/17/25  0801    139   K 4.5 3.7    109   CO2 20* 18*   BUN 23 17   CREATININE 1.5* 1.0*   CALCIUM 8.9 8.0*     No results for input(s): \"AST\", \"ALT\", \"BILIDIR\", \"BILITOT\", \"ALKPHOS\" in the last 72 hours.  No results for input(s): \"INR\" in the last 72 hours.  No results for input(s): \"CKTOTAL\", \"TROPONINI\" in the last 72 hours.    Urinalysis:      Lab Results   Component Value Date/Time    NITRU NEGATIVE 07/15/2025 01:58 PM    WBCUA > 200 07/15/2025 01:58 PM    BACTERIA FEW 07/15/2025 01:58 PM    RBCUA 5-10 07/15/2025 01:58 PM    BLOODU LARGE 07/15/2025 01:58 PM    GLUCOSEU NEGATIVE 07/15/2025 01:58 PM       Radiology:  IR ANGIOGRAM EXTREMITY LEFT   Final Result      XR CHEST (2 VW)   Final Result   1. No consolidation.      This document has been electronically signed by: Olvin Gerard MD on    07/14/2025 05:57 PM      CTA ABDOMINAL AORTA W BILAT RUNOFF W WO CONTRAST   Final Result   1. Trifurcation vessels not well demonstrated in either leg on the study. See   comments above.   2. Multifocal calcific stenosis both SFAs upwards of 50% bilaterally. Total   occlusion distal left popliteal artery with good reconstitution of the   trifurcation vessels left calf distally.   3. Bulky calcified uterine fibroids. Likely 3.8 cm seroma or cystlike structure   in the left lower quadrant. Findings of mild paralytic ileus and constipation.   Cholelithiasis but no acute findings of the gallbladder.            **This report has been created using voice recognition software.  It may contain   minor errors which are inherent in voice recognition technology.**         Electronically signed by Dr. Igor Keys      XR FOOT LEFT (MIN 3

## 2025-07-18 NOTE — PROGRESS NOTES
[facility]  Podiatric Medicine and Surgery Progress Note      Jocelynn Stern    Subjective      7/18/25  Patient seen bedside today on behalf of Dr Cruz. Patient appeared pleasant, was oriented to person, place and time and in no acute distress. Patient is s/p day 2 from Left 5th Digit Amputation. Patient states that she is in pain at the operative foot at the time of the visit. Patient noted that she had her vascular procedure yesterday and she believes it went well. Patient noted that she wanted to be seen by physical therapy prior to going home since there are times during the day when she would be alone. Patient denies any N/V/F/C/SOB or CP. No other pedal concerns.    7/17/25  Patient seen bedside today on behalf of Dr. Cruz. Patient appeared pleasant, was oriented to person, place and time and in no acute distress. She is s/p day 1 from Left 5th Digit Amputation Left 5th Partial Ray Amputation. States she continues to feel minimal pain to the operative foot. Patient getting ready to be transferred down for her vascular procedure. Patient denies any N/V/F/C/SOB or CP. No other pedal concerns.     7/15/25  Patient seen bedside today on behalf of Dr Cruz. Patient appeared pleasant, was oriented to person, place and time and in no acute distress. Patient states she is having minimal pain to her L foot. States someone from the vascular team came and talked to her yesterday about a procedure. States Dr. Cruz had told her in the office that she would likely need an amputation on the L foot. Patient is agreeable. Her only concern is how it will affect her balance. Discussed that therapy will be able to work with her post op. States she has been tolerating her antibiotics fine. Discussed giving her a probiotic to prevent diarrhea.  Patient denies any N/V/F/C/SOB or CP. No other pedal concerns.     History of present illness  The patient is a 69 y.o. female with significant past medical

## 2025-07-18 NOTE — PLAN OF CARE
Problem: Chronic Conditions and Co-morbidities  Goal: Patient's chronic conditions and co-morbidity symptoms are monitored and maintained or improved  7/17/2025 2310 by Mili Epps RN  Outcome: Progressing  7/17/2025 1455 by Brittni Noriega RN  Outcome: Progressing  Flowsheets (Taken 7/17/2025 0054 by Lesley Erazo, RN)  Care Plan - Patient's Chronic Conditions and Co-Morbidity Symptoms are Monitored and Maintained or Improved:   Monitor and assess patient's chronic conditions and comorbid symptoms for stability, deterioration, or improvement   Collaborate with multidisciplinary team to address chronic and comorbid conditions and prevent exacerbation or deterioration   Update acute care plan with appropriate goals if chronic or comorbid symptoms are exacerbated and prevent overall improvement and discharge     Problem: Discharge Planning  Goal: Discharge to home or other facility with appropriate resources  7/17/2025 2310 by Mili Epps RN  Outcome: Progressing  7/17/2025 1455 by Brittni Noriega RN  Outcome: Progressing  Flowsheets (Taken 7/17/2025 0054 by Lesley Erazo, RN)  Discharge to home or other facility with appropriate resources:   Identify barriers to discharge with patient and caregiver   Arrange for needed discharge resources and transportation as appropriate   Identify discharge learning needs (meds, wound care, etc)     Problem: Pain  Goal: Verbalizes/displays adequate comfort level or baseline comfort level  7/17/2025 2310 by Mili Epps RN  Outcome: Progressing  7/17/2025 1455 by Brittni Noriega RN  Outcome: Progressing  Flowsheets (Taken 7/17/2025 0054 by Lesley Erazo, RN)  Verbalizes/displays adequate comfort level or baseline comfort level:   Encourage patient to monitor pain and request assistance   Implement non-pharmacological measures as appropriate and evaluate response   Assess pain using appropriate pain scale     Problem: Safety - Adult  Goal: Free from fall

## 2025-07-18 NOTE — PLAN OF CARE
Problem: Chronic Conditions and Co-morbidities  Goal: Patient's chronic conditions and co-morbidity symptoms are monitored and maintained or improved  7/18/2025 1027 by Brittni Noriega RN  Outcome: Progressing  Flowsheets (Taken 7/17/2025 0054 by Lesley Erazo, RN)  Care Plan - Patient's Chronic Conditions and Co-Morbidity Symptoms are Monitored and Maintained or Improved:   Monitor and assess patient's chronic conditions and comorbid symptoms for stability, deterioration, or improvement   Collaborate with multidisciplinary team to address chronic and comorbid conditions and prevent exacerbation or deterioration   Update acute care plan with appropriate goals if chronic or comorbid symptoms are exacerbated and prevent overall improvement and discharge     Problem: Discharge Planning  Goal: Discharge to home or other facility with appropriate resources  7/18/2025 1027 by Brittni Noriega RN  Outcome: Progressing  Flowsheets (Taken 7/17/2025 0054 by Lesley Erazo, RN)  Discharge to home or other facility with appropriate resources:   Identify barriers to discharge with patient and caregiver   Arrange for needed discharge resources and transportation as appropriate   Identify discharge learning needs (meds, wound care, etc)     Problem: Pain  Goal: Verbalizes/displays adequate comfort level or baseline comfort level  7/18/2025 1027 by Brittni Noriega RN  Outcome: Progressing  Flowsheets (Taken 7/17/2025 0054 by Lesley Erazo, RN)  Verbalizes/displays adequate comfort level or baseline comfort level:   Encourage patient to monitor pain and request assistance   Implement non-pharmacological measures as appropriate and evaluate response   Assess pain using appropriate pain scale     Problem: Safety - Adult  Goal: Free from fall injury  7/18/2025 1027 by Brittni Noriega RN  Outcome: Progressing  Flowsheets (Taken 7/17/2025 1457)  Free From Fall Injury: Instruct family/caregiver on patient safety     Problem: Skin/Tissue  Integrity  Goal: Skin integrity remains intact  Description: 1.  Monitor for areas of redness and/or skin breakdown  2.  Assess vascular access sites hourly  3.  Every 4-6 hours minimum:  Change oxygen saturation probe site  4.  Every 4-6 hours:  If on nasal continuous positive airway pressure, respiratory therapy assess nares and determine need for appliance change or resting period  7/18/2025 1027 by Brittni Noriega RN  Outcome: Progressing  Flowsheets (Taken 7/17/2025 2145 by Mili Epps RN)  Skin Integrity Remains Intact: Monitor for areas of redness and/or skin breakdown     Problem: Nutrition Deficit:  Goal: Optimize nutritional status  7/18/2025 1027 by Brittni Noriega RN  Outcome: Progressing  Flowsheets (Taken 7/17/2025 1455)  Nutrient intake appropriate for improving, restoring, or maintaining nutritional needs: Assess nutritional status and recommend course of action     Problem: Skin/Tissue Integrity - Adult  Goal: Incisions, wounds, or drain sites healing without S/S of infection  7/18/2025 1027 by Brittni Noriega RN  Outcome: Progressing  Flowsheets (Taken 7/17/2025 2145 by Mili Epps RN)  Incisions, Wounds, or Drain Sites Healing Without Sign and Symptoms of Infection: ADMISSION and DAILY: Assess and document risk factors for pressure ulcer development     Problem: Musculoskeletal - Adult  Goal: Return mobility to safest level of function  7/18/2025 1027 by Brittni Noriega RN  Outcome: Progressing  Flowsheets (Taken 7/17/2025 2145 by Mili Epps RN)  Return Mobility to Safest Level of Function: Assess patient stability and activity tolerance for standing, transferring and ambulating with or without assistive devices     Problem: Gastrointestinal - Adult  Goal: Maintains or returns to baseline bowel function  7/18/2025 1027 by Brittni Noriega RN  Outcome: Progressing  Flowsheets (Taken 7/17/2025 1455)  Maintains or returns to baseline bowel function: Assess bowel function     Problem:

## 2025-07-18 NOTE — PROGRESS NOTES
[facility]  INPATIENT PHYSICAL THERAPY  DAILY NOTE  [facility] ORTHOPEDICS 7K - 7K-07/007-A      Discharge Recommendations: Inpatient Therapy Stay and anticipate pt will need 24 hour assist at discharge   Equipment Recommendations: No  anticipate pt will need a w/c and ramp if returning home- defer to next LOC              Time In: 945  Time Out: 1040  Timed Code Treatment Minutes: 55 Minutes  Minutes: 55          Date: 2025  Patient Name: Jocelynn Stern,  Gender:  female        MRN: [id number]  : 1955  (69 y.o.)     Referring Practitioner: Reynaldo Archibald DPM  Diagnosis: Osteomyelitis of left foot, unspecified type (HCC)  Additional Pertinent Hx: The patient is a 69 y.o. female with significant past medical history of Chronic renal disease, stage III, diabetes mellitus,  diabetic polyneuropathy, hypercholesterolemia, hypertension, psoriasis who presents with a left foot ulcer at the fifth digit.  Patient reports that she had previously seen Dr. Cruz in clinic for a wound on her left fourth digit.  Patient notes that while treating this ulcer she got a tear in the skin on her fifth digit from tape.  Patient reports that she first noticed this ulcer approximately a month ago and it has progressively gotten worse.  Patient noted that the ulcer was very large and draining a fair amount of yellow fluid and decided to see Dr. Cruz in clinic.  Patient was seen today by Dr. Cruz in the office and was recommended to come to the ED.  Patient denies any pain in the site at this time.  Patient's noted that her daughter has been dressing the wounds but it is continued to progressively get worse.  Patient notes that she has never seen a vascular physician in the past.  Patient relates that she does not have much sensation in her foot. Patient denies any constitutional symptoms including N/V/D/F or SOB.  On 25, patient underwent Left 5th Digit Amputation Left 5th Partial Ray Amputation  maintaining NWBing and would benefit from continued skilled PT to address these impairments and return to PLOF.   Activity Tolerance:  Patient tolerance of  treatment:Fair.  Plan: Current Treatment Recommendations: Strengthening, Balance training, Functional mobility training, Transfer training, Gait training, Stair training, Neuromuscular re-education, Home exercise program, Safety education & training, Patient/Caregiver education & training, Therapeutic activities  General Plan:  (5x GM)    Education:  Learners: Patient  Patient Education: Plan of Care, discussed discharge planning and recommendation of ramp if returning home     Goals:  Patient Goals : none stated  Short Term Goals  Time Frame for Short Term Goals: by discharge  Short Term Goal 1: Patient to transfer sit <> supine with bed flat and no rail with (I).  Short Term Goal 2: Patient to transfer sit to stand with good compliance with (L) NWB status with walker with min (A) to prepare for pivot transfers.  Short Term Goal 3: Patient to transfer bed <>chair with good compliance with (L) LE NWB with sliding board with set up assistance.  Short Term Goal 4: Patient to be able to complete 15 reps of (B) LE AROM to increase strength for mobility needs.  Short Term Goal 5: Patient to demonstrate (I) WC mobility on level surfaces, thru doorways, and around obstacles.  Long Term Goals  Time Frame for Long Term Goals : n/a d/t short stay    Following session, patient left in safe position in chair, with alarm, and call light within reach

## 2025-07-18 NOTE — CARE COORDINATION
7/18/25, 2:48 PM EDT    DISCHARGE PLANNING EVALUATION       Spoke with patient and she does not want ECF.  She plans home with her daughter.  She stated that she might consider home but will speak with her daughter.

## 2025-07-18 NOTE — PROGRESS NOTES
[facility]  INPATIENT REHABILITATION  ADMISSIONS COORDINATOR CONSULT    Referral Type: internal    Patient Name: Jocelynn Stern      MRN: [id number]    : 1955  (69 y.o.)  Gender: female   Race:White (non-)     Payor Source: Payor: [employer] [place] MEDIC[place] / Plan: [employer] [place] MED[place] / Product Type: *No Product type* /   Secondary Payor Source:      Isolation Status: No active isolations    Lives With: Other (Comment) (Daughter, son in law, and grandkids)  Type of Home: House  Home Layout: One level  Home Access: Stairs to enter with rails  Entrance Stairs - Number of Steps: 4-5             What is treatment plan?  Disciplines Required upon Admission to Inpatient Rehabilitation: Physical Therapy and Occupational Therapy  Post operative: Yes  Fall: No  Dialysis: No  Diet: ADULT DIET; Regular  Discussed patient with PM&R providers.  Patient does not appear to be a good candidate for IPR admission based on medical diagnosis and restrictions with managed medicare.    A conversation was held with TAJ Green regarding the above.   Spoke with patient who states that she feels she can go home.  Did discuss this patient with OT Marychuy.      TAJ Green updated via telephone call.     Will sign off.

## 2025-07-18 NOTE — PROGRESS NOTES
[facility]   Pharmacy Pharmacokinetic Monitoring Service - Vancomycin    Indication: Osteomyelitis  Target Concentration: Goal AUC/TIFFANIE 400-600 mg*hr/L  Day of Therapy: 2  Additional Antimicrobials: None    Pertinent Laboratory Values:   Wt Readings from Last 1 Encounters:   07/14/25 95.3 kg (210 lb)     Temp Readings from Last 1 Encounters:   07/18/25 98.1 °F (36.7 °C) (Oral)     Estimated Creatinine Clearance: 67 mL/min (A) (based on SCr of 1 mg/dL (H)).  Recent Labs     07/16/25  0728 07/17/25  0801   CREATININE 1.5* 1.0*   BUN 23 17   WBC 23.4* 13.2*     Pertinent Cultures:  Date Source Results   7/14 Blood x 2 Negative to date   7/14 Toe cx MSSA   7/16 Toe cx MSSA and Strep B   (Both ancef sensitive)   MRSA Nasal Swab: N/A. Non-respiratory infection.    Recent vancomycin administrations                     vancomycin (VANCOCIN) 2500 mg in sodium chloride 0.9 % 500 mL IVPB (mg) 2,500 mg New Bag 07/17/25 2209    vancomycin (VANCOCIN) 1500 mg in sodium chloride 0.9 % 250 mL IVPB (mg) 1,500 mg New Bag 07/15/25 2242                  Plan:  Patient given 2,500 mg loading dose on 7/17 at 2210  Will start maintenance dose of 1,500 mg IV q18hr   Repeat vancomycin concentration ordered for 7/19 AM labs  Pharmacy will monitor renal function and adjust therapy as indicated    Thank you for the consult,  Narciso Armas PharmD 7/18/2025 3:40 PM

## 2025-07-18 NOTE — PROGRESS NOTES
CT/CV Surgery Progress Note    2025 8:17 AM  Surgeon:  Dr. Melo    Procedure: POD #1 LE Angiogram, left 5th toe amputation    Revasc with PTA using drug balloon of the left POP  Revasc with PTA of the Peroneal left side  Left side angiogram    Subjective:  Ms. Stern is resting comfortably in bed, alert, and in no acute distress. Pt denies chest pressure, SOB, fever,chills, N/V/D. She states her pain is manageable. Denies any acute complaints or changes overnight. She expresses concern for needing additional help at home once she is discharged. Pt lives with her daughter. Daughter and daughter's spouse work and may not be able to provide extra care pt thinks she needs. She is requesting home health to come in and help as needed.     Intake/Output Summary (Last 24 hours) at 2025 0817  Last data filed at 2025 2114  Gross per 24 hour   Intake 500 ml   Output 250 ml   Net 250 ml     Vital Signs: /71   Pulse 71   Temp 97.9 °F (36.6 °C) (Oral)   Resp 17   Ht 1.778 m (5' 10\")   Wt 95.3 kg (210 lb)   SpO2 98%   PF (!) 16 L/min   BMI 30.13 kg/m²    Temp (24hrs), Av.2 °F (36.8 °C), Min:97.7 °F (36.5 °C), Max:99 °F (37.2 °C)    Labs:   CBC:  Recent Labs     25  0728 25  0801   WBC 23.4* 13.2*   HGB 9.8* 9.3*   HCT 31.6* 30.1*   MCV 83.6 83.1    361     BMP:   Recent Labs     25  0728 25  1036 25  0801 25  1045 25  0632     --  139  --   --    K 4.5  --  3.7  --   --      --  109  --   --    CO2 20*  --  18*  --   --    BUN 23  --  17  --   --    CREATININE 1.5*  --  1.0*  --   --    POCGLU  --    < >  --    < > 133*    < > = values in this interval not displayed.     Last HgA1C:   Lab Results   Component Value Date    LABA1C 9.6 (H) 2025         Scheduled Meds:    vancomycin (VANCOCIN) intermittent dosing (placeholder)   Other RX Placeholder    clopidogrel  75 mg Oral Daily    sodium chloride flush  5-40 mL IntraVENous 2

## 2025-07-18 NOTE — PROGRESS NOTES
Digit Amputation Left 5th Partial Ray Amputation    Restrictions/Precautions:  Restrictions/Precautions: Weight Bearing  Left Lower Extremity Weight Bearing: Non Weight Bearing  Partial Weight Bearing Percentage Or Pounds: Order changed to NWB on 7/16/25.  Required Braces or Orthoses  Left Lower Extremity Brace: Boot (CAM boot arrived with patient. Patient states she has not had it on yet. Podiatry in during assessment and approved.)  LLE Brace Type: CAM boot    Subjective  Chart Reviewed: Yes, Orders, Progress Notes, History and Physical  Patient assessed for rehabilitation services?: Yes    Subjective: RN okayed OT session. Upon arrival patient was resting in w/c. Pt was agreeable to OT session.    Pain: 4/10: Left foot     Vitals: Vitals not assessed per clinical judgement, see nursing flowsheet    Social/Functional History:  Lives With: Other (Comment) (Daughter, son in law, and grandkids)  Type of Home: House  Home Layout: One level  Home Access: Stairs to enter with rails  Entrance Stairs - Number of Steps: 4-5  Entrance Stairs - Rails: Both  Home Equipment: Walker - Rolling   Bathroom Shower/Tub: Tub/Shower unit  Bathroom Toilet: Standard  Bathroom Equipment: Shower chair, Grab bars in shower, Grab bars around toilet       Prior Level of Assist for ADLs: Independent  Prior Level of Assist for Homemaking: Independent  Homemaking Responsibilities: Yes  Prior Level of Assist for Transfers: Independent  Prior Level of Assist for Ambulation: Independent household ambulator, with or without device  Has the patient had two or more falls in the past year or any fall with injury in the past year?: No    Active : No  Patient's  Info: daughter provides transport          VISION:WFL    HEARING:  WFL    COGNITION: Decreased Recall, Decreased Insight, Decreased Problem Solving, and Decreased Safety Awareness    RANGE OF MOTION:  Bilateral Upper Extremity:  WFL    STRENGTH:  Bilateral Upper Extremity:  Impaired  transfers with Min Assist to increase indep with all toileting.  Short Term Goal 3: Pt will complete LB dressing with Min Assist to increase indep within home environment.  Short Term Goal 4: Pt will complete showering/bathing task with Min Assist to increase indep within home environment.  Additional Goals?: No  Long Term Goals  Time Frame for Long Term Goals : No LTGs d/t short estimated length of stay.    AM-PAC Inpatient Daily Activity Raw Score: 15  AM-PAC Inpatient ADL T-Scale Score : 34.69    Following session, patient left in safe position in bed, with alarm, and call light within reach

## 2025-07-19 LAB
BACTERIA BLD AEROBE CULT: NORMAL
BACTERIA BLD AEROBE CULT: NORMAL
GLUCOSE BLD STRIP.AUTO-MCNC: 123 MG/DL (ref 70–108)
GLUCOSE BLD STRIP.AUTO-MCNC: 170 MG/DL (ref 70–108)
GLUCOSE BLD STRIP.AUTO-MCNC: 195 MG/DL (ref 70–108)
GLUCOSE BLD STRIP.AUTO-MCNC: 233 MG/DL (ref 70–108)

## 2025-07-19 PROCEDURE — 1200000000 HC SEMI PRIVATE

## 2025-07-19 PROCEDURE — 6370000000 HC RX 637 (ALT 250 FOR IP): Performed by: SURGERY

## 2025-07-19 PROCEDURE — 6360000002 HC RX W HCPCS

## 2025-07-19 PROCEDURE — 6370000000 HC RX 637 (ALT 250 FOR IP)

## 2025-07-19 PROCEDURE — 82948 REAGENT STRIP/BLOOD GLUCOSE: CPT

## 2025-07-19 PROCEDURE — 6370000000 HC RX 637 (ALT 250 FOR IP): Performed by: PHYSICAL THERAPY ASSISTANT

## 2025-07-19 PROCEDURE — 99232 SBSQ HOSP IP/OBS MODERATE 35: CPT | Performed by: PHYSICIAN ASSISTANT

## 2025-07-19 PROCEDURE — 2500000003 HC RX 250 WO HCPCS

## 2025-07-19 PROCEDURE — 2500000003 HC RX 250 WO HCPCS: Performed by: PHYSICAL THERAPY ASSISTANT

## 2025-07-19 RX ADMIN — WATER 2000 MG: 1 INJECTION INTRAMUSCULAR; INTRAVENOUS; SUBCUTANEOUS at 00:17

## 2025-07-19 RX ADMIN — ATORVASTATIN CALCIUM 40 MG: 40 TABLET, FILM COATED ORAL at 22:00

## 2025-07-19 RX ADMIN — INSULIN GLARGINE 10 UNITS: 100 INJECTION, SOLUTION SUBCUTANEOUS at 22:00

## 2025-07-19 RX ADMIN — SODIUM CHLORIDE, PRESERVATIVE FREE 10 ML: 5 INJECTION INTRAVENOUS at 22:13

## 2025-07-19 RX ADMIN — ASPIRIN 81 MG: 81 TABLET, CHEWABLE ORAL at 08:54

## 2025-07-19 RX ADMIN — AMLODIPINE BESYLATE 10 MG: 10 TABLET ORAL at 08:54

## 2025-07-19 RX ADMIN — INSULIN LISPRO 5 UNITS: 100 INJECTION, SOLUTION INTRAVENOUS; SUBCUTANEOUS at 18:02

## 2025-07-19 RX ADMIN — INSULIN LISPRO 5 UNITS: 100 INJECTION, SOLUTION INTRAVENOUS; SUBCUTANEOUS at 08:54

## 2025-07-19 RX ADMIN — INSULIN LISPRO 1 UNITS: 100 INJECTION, SOLUTION INTRAVENOUS; SUBCUTANEOUS at 22:00

## 2025-07-19 RX ADMIN — SODIUM CHLORIDE, PRESERVATIVE FREE 10 ML: 5 INJECTION INTRAVENOUS at 22:00

## 2025-07-19 RX ADMIN — WATER 2000 MG: 1 INJECTION INTRAMUSCULAR; INTRAVENOUS; SUBCUTANEOUS at 08:54

## 2025-07-19 RX ADMIN — INSULIN LISPRO 5 UNITS: 100 INJECTION, SOLUTION INTRAVENOUS; SUBCUTANEOUS at 13:52

## 2025-07-19 RX ADMIN — WATER 2000 MG: 1 INJECTION INTRAMUSCULAR; INTRAVENOUS; SUBCUTANEOUS at 18:02

## 2025-07-19 RX ADMIN — CLOPIDOGREL BISULFATE 75 MG: 75 TABLET, FILM COATED ORAL at 08:54

## 2025-07-19 RX ADMIN — INSULIN LISPRO 1 UNITS: 100 INJECTION, SOLUTION INTRAVENOUS; SUBCUTANEOUS at 13:52

## 2025-07-19 RX ADMIN — Medication 1 CAPSULE: at 08:54

## 2025-07-19 RX ADMIN — SODIUM CHLORIDE, PRESERVATIVE FREE 10 ML: 5 INJECTION INTRAVENOUS at 08:54

## 2025-07-19 ASSESSMENT — PAIN SCALES - GENERAL
PAINLEVEL_OUTOF10: 0
PAINLEVEL_OUTOF10: 0

## 2025-07-19 NOTE — PLAN OF CARE
Problem: Chronic Conditions and Co-morbidities  Goal: Patient's chronic conditions and co-morbidity symptoms are monitored and maintained or improved  Outcome: Progressing  Flowsheets (Taken 7/18/2025 2100)  Care Plan - Patient's Chronic Conditions and Co-Morbidity Symptoms are Monitored and Maintained or Improved: Monitor and assess patient's chronic conditions and comorbid symptoms for stability, deterioration, or improvement     Problem: Pain  Goal: Verbalizes/displays adequate comfort level or baseline comfort level  Outcome: Progressing     Problem: Safety - Adult  Goal: Free from fall injury  Outcome: Progressing     Problem: Skin/Tissue Integrity  Goal: Skin integrity remains intact  Description: 1.  Monitor for areas of redness and/or skin breakdown  2.  Assess vascular access sites hourly  3.  Every 4-6 hours minimum:  Change oxygen saturation probe site  4.  Every 4-6 hours:  If on nasal continuous positive airway pressure, respiratory therapy assess nares and determine need for appliance change or resting period  Outcome: Progressing  Flowsheets (Taken 7/18/2025 2100)  Skin Integrity Remains Intact: Monitor for areas of redness and/or skin breakdown     Problem: Metabolic/Fluid and Electrolytes - Adult  Goal: Electrolytes maintained within normal limits  Outcome: Progressing  Flowsheets (Taken 7/18/2025 2100)  Electrolytes maintained within normal limits: Monitor labs and assess patient for signs and symptoms of electrolyte imbalances   Care plan reviewed with patient. Patient verbalizes understanding of the plan of care and contribute to goal setting.

## 2025-07-19 NOTE — PLAN OF CARE
Problem: Chronic Conditions and Co-morbidities  Goal: Patient's chronic conditions and co-morbidity symptoms are monitored and maintained or improved  Outcome: Progressing  Flowsheets (Taken 7/18/2025 2100 by Ally Aquino, RN)  Care Plan - Patient's Chronic Conditions and Co-Morbidity Symptoms are Monitored and Maintained or Improved: Monitor and assess patient's chronic conditions and comorbid symptoms for stability, deterioration, or improvement     Problem: Discharge Planning  Goal: Discharge to home or other facility with appropriate resources  Outcome: Progressing  Flowsheets (Taken 7/18/2025 2100 by Ally Aquino, RN)  Discharge to home or other facility with appropriate resources: Identify barriers to discharge with patient and caregiver     Problem: Pain  Goal: Verbalizes/displays adequate comfort level or baseline comfort level  Outcome: Progressing  Flowsheets (Taken 7/17/2025 0054 by Lesley Erazo, RN)  Verbalizes/displays adequate comfort level or baseline comfort level:   Encourage patient to monitor pain and request assistance   Implement non-pharmacological measures as appropriate and evaluate response   Assess pain using appropriate pain scale     Problem: Safety - Adult  Goal: Free from fall injury  Outcome: Progressing  Flowsheets (Taken 7/18/2025 1028 by Brittni Noriega, RN)  Free From Fall Injury: Instruct family/caregiver on patient safety     Problem: Skin/Tissue Integrity  Goal: Skin integrity remains intact  Description: 1.  Monitor for areas of redness and/or skin breakdown  2.  Assess vascular access sites hourly  3.  Every 4-6 hours minimum:  Change oxygen saturation probe site  4.  Every 4-6 hours:  If on nasal continuous positive airway pressure, respiratory therapy assess nares and determine need for appliance change or resting period  Outcome: Progressing  Flowsheets (Taken 7/19/2025 1103)  Skin Integrity Remains Intact:   Monitor for areas of redness and/or skin breakdown   Turn and

## 2025-07-19 NOTE — PROGRESS NOTES
Hospitalist Progress Note    Patient:  Jocelynn Stern      Unit/Bed:7K-07/007-A    YOB: 1955    MRN: [id number]       Acct: [id number]     PCP: Janice Duff MD    Date of Admission: 7/14/2025    Assessment/Plan:    Osteomyelitis of the left fifth ray: Diabetic ulceration of the left lower extremity  Podiatry primary and managing  S/p Amputation 7/16/25, source control attained  Continue IV Ancef for now, dose increased to 2g daily  Plan for transition to doxycycline upon d/c  Vascular surgery consulted  Patient s/p left lower extremity angiogram, plan for ASA, Plavix, statin, and RTC in 4 weeks with BLE arterial duplex US and TBI's  PT/OT eval and treat, IPR coordinator does not feel she is a good IPR candidate, PT recommends 24 hour assistance. Await further recs.    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  Review of record shows patient recently initiated on Ozempic and reported has not yet picked up the prescription  Continue Lantus  Continue Humalog 5 units 3 times daily with meals with Insulin sliding scale  Monitor glucose ACHS  Encouraged carb controlled diet  Last hemoglobin A1c 9.6 on 5/29/25  Dietitian was consulted for diet education    Normocytic anemia  Hemoglobin 1stable  Trend CBC PRN    Essential hypertension  Continue amlodipine, lisinopril    Hyperlipidemia  Continue atorvastatin    Obesity BMI 30.13  Encourage healthy food choices    Disposition: Pending clinical course    Chief Complaint: Left lower extremity ulceration       Subjective: 69 y.o. female admitted to the hospitalist service for left lower extremity ulceration.  Patient visited on 7K.  She offers that she is not really having any pain.  She denies shortness of breath.  She denies nausea or vomiting.  She denies bowel movement today.  Bowel regimen increased.    Medications:    Infusion Medications    sodium chloride Stopped (07/18/25 0027)    sodium chloride      sodium

## 2025-07-19 NOTE — PROGRESS NOTES
[facility]  OCCUPATIONAL THERAPY MISSED TREATMENT NOTE  [facility] 7K  7K-07/007-A      Date: 2025  Patient Name: Jocelynn Stern        CSN: [id number]   : 1955  (69 y.o.)  Gender: female   Referring Practitioner: Henrique Nobles PA-C            REASON FOR MISSED TREATMENT: Patient Eating will attempt next available time.

## 2025-07-19 NOTE — PROGRESS NOTES
[facility]  Podiatric Medicine and Surgery Progress Note    Jocelynn Stern    Principle  Osteomyelitis left 5th ray   S/p Left 5th Digit Amputation, Left 5th Partial Ray Amputation     Plan     - Pt. is a 69 y.o. female   - Patient was examined and evaluated  - 3 days s/p Left 5th Digit Amputation, Left 5th Partial Ray Amputation   - WBC 13.2, afebrile  - Repeat CBC ordered daily  - Labs reviewed, see results above  - X-rays reviewed, see results above  - Culture, anaerobic and aerobic; tissue preliminary result-Staphylococcus aureus, Streptococcus agalactiae group B  - Antibiotics; IV vancomycin discontinued; IV Ancef started on 7/18  - Will plan to transition to doxycyline at discharge.   - DVT Prophylaxis. Patient to resume Plavix and 81 mg aspirin per vascular.  - Nonweightbearing to the left foot in CAM boot  - Vascular intervention performed on 7/17   - Patient to continue working with physical therapy  - Patient stable at time of visit. Patient to remain inpatient for wound observation.   - Patient expresses understanding and agreement with the current treatment plan.  - All of patient's questions and concerns were answered and addressed at today's visit.    Dispo: Patient is stable at this time. Will continue monitoring the incision and wound healing through the weekend.    Subjective   7/19/25  Patient seen bedside today on behalf of Dr. Cruz. Patient appeared pleasant, was oriented to person, place and time and in no acute distress. Patient is 3 days s/p Left 5th Digit Amputation, Left 5th Partial Ray Amputation. Patient was resting comfortably in bed. Patient denies any new issues or complaints. Patient relates minimal pain to the left foot at this time. Patient relates that she has worked with physical therapy and states that this went well. Patient denies any N/V/F/C/SOB or CP. No other pedal concerns. Patient relates she may be going to [place], [place] upon discharge so that she  2022    RIGHT FOOT WOUND DEBRIDEMENT WITH POSS RIGHT 5TH METATARSAL RESECTION performed by Ge Cruz DPM at [facility] OR    TOE AMPUTATION Left 2025    Left 5th Digit Amputation Left 5th Partial Ray Amputation performed by Ge Cruz DPM at [facility] OR       Family history        Problem Relation Age of Onset    Diabetes Mother     Heart Disease Mother     Heart Disease Father     Breast Cancer Paternal Aunt 74       Social history    Social History     Tobacco Use    Smoking status: Former     Current packs/day: 0.00     Average packs/day: 0.5 packs/day for 15.0 years (7.5 ttl pk-yrs)     Types: Cigarettes     Start date: 1996     Quit date: 2011     Years since quittin.5    Smokeless tobacco: Never   Vaping Use    Vaping status: Never Used   Substance Use Topics    Alcohol use: Yes     Comment: social    Drug use: Never       Allergies    No Known Allergies    Medications    No current facility-administered medications on file prior to encounter.     Current Outpatient Medications on File Prior to Encounter   Medication Sig Dispense Refill    atorvastatin (LIPITOR) 40 MG tablet Take 1 tablet by mouth nightly 90 tablet 3    amLODIPine (NORVASC) 10 MG tablet Take 1 tablet by mouth daily 90 tablet 3    lisinopril (PRINIVIL;ZESTRIL) 40 MG tablet Take 1 tablet by mouth in the morning and 1 tablet in the evening. 180 tablet 3    Semaglutide,0.25 or 0.5MG/DOS, 2 MG/3ML SOPN Inject 0.25 mg into the skin every 7 days X one month then increase to 0.5 mg weekly. 3 mL 1    polyethylene glycol (GLYCOLAX) 17 GM/SCOOP powder Colonoscopy Prep Dispense 238 Gram Bottle.  Use as Directed (Patient not taking: Reported on 2025) 238 g 0    blood glucose monitor kit and supplies Use to check blood sugar once a day 1 kit 0    Lancets MISC Use to check blood sugar once a day 100 each 3    blood glucose monitor strips Use to check blood sugar once a day 100 strip 3    Continuous Glucose Sensor (FREESTYLE

## 2025-07-20 LAB
BACTERIA SPEC AEROBE CULT: ABNORMAL
BACTERIA SPEC AEROBE CULT: ABNORMAL
BACTERIA SPEC ANAEROBE CULT: ABNORMAL
BASOPHILS ABSOLUTE: 0.1 THOU/MM3 (ref 0–0.1)
BASOPHILS NFR BLD AUTO: 0.6 %
DEPRECATED RDW RBC AUTO: 35.7 FL (ref 35–45)
EOSINOPHIL NFR BLD AUTO: 4.2 %
EOSINOPHILS ABSOLUTE: 0.6 THOU/MM3 (ref 0–0.4)
ERYTHROCYTE [DISTWIDTH] IN BLOOD BY AUTOMATED COUNT: 12.2 % (ref 11.5–14.5)
GLUCOSE BLD STRIP.AUTO-MCNC: 124 MG/DL (ref 70–108)
GLUCOSE BLD STRIP.AUTO-MCNC: 172 MG/DL (ref 70–108)
GLUCOSE BLD STRIP.AUTO-MCNC: 215 MG/DL (ref 70–108)
GLUCOSE BLD STRIP.AUTO-MCNC: 228 MG/DL (ref 70–108)
GRAM STN SPEC: ABNORMAL
HCT VFR BLD AUTO: 29.6 % (ref 37–47)
HGB BLD-MCNC: 9.3 GM/DL (ref 12–16)
IMM GRANULOCYTES # BLD AUTO: 0.11 THOU/MM3 (ref 0–0.07)
IMM GRANULOCYTES NFR BLD AUTO: 0.8 %
LYMPHOCYTES ABSOLUTE: 6 THOU/MM3 (ref 1–4.8)
LYMPHOCYTES NFR BLD AUTO: 42.6 %
MCH RBC QN AUTO: 25.4 PG (ref 26–33)
MCHC RBC AUTO-ENTMCNC: 31.4 GM/DL (ref 32.2–35.5)
MCV RBC AUTO: 80.9 FL (ref 81–99)
MONOCYTES ABSOLUTE: 1.1 THOU/MM3 (ref 0.4–1.3)
MONOCYTES NFR BLD AUTO: 8 %
NEUTROPHILS ABSOLUTE: 6.1 THOU/MM3 (ref 1.8–7.7)
NEUTROPHILS NFR BLD AUTO: 43.8 %
NRBC BLD AUTO-RTO: 0 /100 WBC
ORGANISM: ABNORMAL
ORGANISM: ABNORMAL
PLATELET # BLD AUTO: 420 THOU/MM3 (ref 130–400)
PLATELET BLD QL SMEAR: ABNORMAL
PMV BLD AUTO: 9.6 FL (ref 9.4–12.4)
RBC # BLD AUTO: 3.66 MILL/MM3 (ref 4.2–5.4)
SCAN OF BLOOD SMEAR: NORMAL
VARIANT LYMPHS BLD QL SMEAR: ABNORMAL %
WBC # BLD AUTO: 14 THOU/MM3 (ref 4.8–10.8)

## 2025-07-20 PROCEDURE — 85025 COMPLETE CBC W/AUTO DIFF WBC: CPT

## 2025-07-20 PROCEDURE — 2500000003 HC RX 250 WO HCPCS

## 2025-07-20 PROCEDURE — 6370000000 HC RX 637 (ALT 250 FOR IP)

## 2025-07-20 PROCEDURE — 6370000000 HC RX 637 (ALT 250 FOR IP): Performed by: PHYSICAL THERAPY ASSISTANT

## 2025-07-20 PROCEDURE — 36415 COLL VENOUS BLD VENIPUNCTURE: CPT

## 2025-07-20 PROCEDURE — 99232 SBSQ HOSP IP/OBS MODERATE 35: CPT | Performed by: PHYSICIAN ASSISTANT

## 2025-07-20 PROCEDURE — 2500000003 HC RX 250 WO HCPCS: Performed by: PHYSICAL THERAPY ASSISTANT

## 2025-07-20 PROCEDURE — 82948 REAGENT STRIP/BLOOD GLUCOSE: CPT

## 2025-07-20 PROCEDURE — 1200000000 HC SEMI PRIVATE

## 2025-07-20 PROCEDURE — 6360000002 HC RX W HCPCS

## 2025-07-20 PROCEDURE — 6370000000 HC RX 637 (ALT 250 FOR IP): Performed by: PHYSICIAN ASSISTANT

## 2025-07-20 PROCEDURE — 6370000000 HC RX 637 (ALT 250 FOR IP): Performed by: SURGERY

## 2025-07-20 RX ORDER — AMOXICILLIN 500 MG/1
500 CAPSULE ORAL 2 TIMES DAILY
Qty: 14 CAPSULE | Refills: 0 | Status: CANCELLED | OUTPATIENT
Start: 2025-07-20 | End: 2025-07-27

## 2025-07-20 RX ORDER — DOXYCYCLINE HYCLATE 100 MG
100 TABLET ORAL 2 TIMES DAILY
Qty: 14 TABLET | Refills: 0 | Status: CANCELLED | OUTPATIENT
Start: 2025-07-20 | End: 2025-07-27

## 2025-07-20 RX ORDER — DOCUSATE SODIUM 100 MG/1
100 CAPSULE, LIQUID FILLED ORAL 2 TIMES DAILY
Status: DISCONTINUED | OUTPATIENT
Start: 2025-07-20 | End: 2025-07-23 | Stop reason: SDUPTHER

## 2025-07-20 RX ORDER — SENNOSIDES 8.6 MG/1
2 TABLET ORAL NIGHTLY
Status: DISCONTINUED | OUTPATIENT
Start: 2025-07-20 | End: 2025-07-28 | Stop reason: HOSPADM

## 2025-07-20 RX ADMIN — ATORVASTATIN CALCIUM 40 MG: 40 TABLET, FILM COATED ORAL at 20:00

## 2025-07-20 RX ADMIN — SODIUM CHLORIDE, PRESERVATIVE FREE 10 ML: 5 INJECTION INTRAVENOUS at 08:14

## 2025-07-20 RX ADMIN — INSULIN GLARGINE 10 UNITS: 100 INJECTION, SOLUTION SUBCUTANEOUS at 21:02

## 2025-07-20 RX ADMIN — INSULIN LISPRO 5 UNITS: 100 INJECTION, SOLUTION INTRAVENOUS; SUBCUTANEOUS at 16:48

## 2025-07-20 RX ADMIN — DOCUSATE SODIUM 100 MG: 100 CAPSULE, LIQUID FILLED ORAL at 20:00

## 2025-07-20 RX ADMIN — DOCUSATE SODIUM 100 MG: 100 CAPSULE, LIQUID FILLED ORAL at 14:40

## 2025-07-20 RX ADMIN — WATER 2000 MG: 1 INJECTION INTRAMUSCULAR; INTRAVENOUS; SUBCUTANEOUS at 08:12

## 2025-07-20 RX ADMIN — SODIUM CHLORIDE, PRESERVATIVE FREE 10 ML: 5 INJECTION INTRAVENOUS at 20:00

## 2025-07-20 RX ADMIN — Medication 1 CAPSULE: at 08:12

## 2025-07-20 RX ADMIN — INSULIN LISPRO 5 UNITS: 100 INJECTION, SOLUTION INTRAVENOUS; SUBCUTANEOUS at 08:11

## 2025-07-20 RX ADMIN — INSULIN LISPRO 1 UNITS: 100 INJECTION, SOLUTION INTRAVENOUS; SUBCUTANEOUS at 21:02

## 2025-07-20 RX ADMIN — INSULIN LISPRO 1 UNITS: 100 INJECTION, SOLUTION INTRAVENOUS; SUBCUTANEOUS at 16:48

## 2025-07-20 RX ADMIN — WATER 2000 MG: 1 INJECTION INTRAMUSCULAR; INTRAVENOUS; SUBCUTANEOUS at 00:53

## 2025-07-20 RX ADMIN — INSULIN LISPRO 5 UNITS: 100 INJECTION, SOLUTION INTRAVENOUS; SUBCUTANEOUS at 12:17

## 2025-07-20 RX ADMIN — WATER 2000 MG: 1 INJECTION INTRAMUSCULAR; INTRAVENOUS; SUBCUTANEOUS at 15:55

## 2025-07-20 RX ADMIN — CLOPIDOGREL BISULFATE 75 MG: 75 TABLET, FILM COATED ORAL at 08:12

## 2025-07-20 RX ADMIN — AMLODIPINE BESYLATE 10 MG: 10 TABLET ORAL at 08:13

## 2025-07-20 RX ADMIN — SENNOSIDES 17.2 MG: 8.6 TABLET, FILM COATED ORAL at 20:00

## 2025-07-20 RX ADMIN — ASPIRIN 81 MG: 81 TABLET, CHEWABLE ORAL at 08:12

## 2025-07-20 ASSESSMENT — PAIN SCALES - GENERAL: PAINLEVEL_OUTOF10: 0

## 2025-07-20 NOTE — PLAN OF CARE
Problem: Chronic Conditions and Co-morbidities  Goal: Patient's chronic conditions and co-morbidity symptoms are monitored and maintained or improved  Recent Flowsheet Documentation  Taken 7/19/2025 2050 by Ally Aquino RN  Care Plan - Patient's Chronic Conditions and Co-Morbidity Symptoms are Monitored and Maintained or Improved: Monitor and assess patient's chronic conditions and comorbid symptoms for stability, deterioration, or improvement  7/19/2025 1820 by Tereza Eisenberg RN  Outcome: Progressing  Flowsheets (Taken 7/18/2025 2100 by Ally Aquino RN)  Care Plan - Patient's Chronic Conditions and Co-Morbidity Symptoms are Monitored and Maintained or Improved: Monitor and assess patient's chronic conditions and comorbid symptoms for stability, deterioration, or improvement     Problem: Discharge Planning  Goal: Discharge to home or other facility with appropriate resources  Recent Flowsheet Documentation  Taken 7/19/2025 2050 by Ally Aquino RN  Discharge to home or other facility with appropriate resources: Identify barriers to discharge with patient and caregiver  7/19/2025 1820 by Tereza Eisenberg RN  Outcome: Progressing  Flowsheets (Taken 7/18/2025 2100 by Ally Aquino RN)  Discharge to home or other facility with appropriate resources: Identify barriers to discharge with patient and caregiver     Problem: Pain  Goal: Verbalizes/displays adequate comfort level or baseline comfort level  7/20/2025 0304 by Ally Aquino RN  Outcome: Progressing  Flowsheets  Taken 7/20/2025 0050  Verbalizes/displays adequate comfort level or baseline comfort level: Encourage patient to monitor pain and request assistance  Taken 7/19/2025 2130  Verbalizes/displays adequate comfort level or baseline comfort level: Encourage patient to monitor pain and request assistance  7/19/2025 1820 by Tereza Eisenberg RN  Outcome: Progressing  Flowsheets (Taken 7/17/2025 0054 by Lesley Erazo, RN)  Verbalizes/displays adequate

## 2025-07-20 NOTE — PROGRESS NOTES
[facility]  Podiatric Medicine and Surgery Progress Note    Jocelynn Stern    Principle  Osteomyelitis left 5th ray   S/p Left 5th Digit Amputation, Left 5th Partial Ray Amputation     Plan     - Pt. is a 69 y.o. female   - Patient was examined and evaluated  - 4 days s/p Left 5th Digit Amputation, Left 5th Partial Ray Amputation   - WBC 14.0, afebrile  - Repeat CBC ordered daily  - Labs reviewed, see results above  - X-rays reviewed, see results above  - Culture, anaerobic and aerobic; tissue preliminary result-Staphylococcus aureus, Streptococcus agalactiae group B  - Antibiotics; IV vancomycin discontinued; IV Ancef started on 7/18  - Will plan to transition to Augmentin 875-125 mg at discharge  - DVT Prophylaxis. Patient to resume Plavix and 81 mg aspirin per vascular.  - Dressings; Betadine to the incision, Adaptic, DSD, CAM boot  - Nonweightbearing to the left foot in CAM boot  - Vascular intervention performed on 7/17   - Patient to continue working with physical therapy  - Patient stable at time of visit. Patient to remain inpatient for wound observation.   - Patient expresses understanding and agreement with the current treatment plan.  - All of patient's questions and concerns were answered and addressed at today's visit.    Dispo: Patient is stable at this time. Will continue monitoring the incision and wound healing through the weekend. Tentatively plan to discharge on 7/21/2025.    Subjective   7/20/25  Patient seen bedside today on behalf of Dr. Cruz. Patient appeared pleasant, was oriented to person, place and time and in no acute distress. Patient is 4 days s/p Left 5th Digit Amputation, Left 5th Partial Ray Amputation. Patient was resting comfortably in her wheelchair. Patient denies any new issues or complaints. Patient denies any pain to the left foot at this time. Patient relates that she has continued to work with physical therapy on things like transferring and remaining

## 2025-07-20 NOTE — PLAN OF CARE
Problem: Chronic Conditions and Co-morbidities  Goal: Patient's chronic conditions and co-morbidity symptoms are monitored and maintained or improved  Outcome: Progressing  Flowsheets (Taken 7/19/2025 2050 by Ally Aquino RN)  Care Plan - Patient's Chronic Conditions and Co-Morbidity Symptoms are Monitored and Maintained or Improved: Monitor and assess patient's chronic conditions and comorbid symptoms for stability, deterioration, or improvement     Problem: Discharge Planning  Goal: Discharge to home or other facility with appropriate resources  Outcome: Progressing  Flowsheets (Taken 7/19/2025 2050 by Ally Aquino RN)  Discharge to home or other facility with appropriate resources: Identify barriers to discharge with patient and caregiver     Problem: Pain  Goal: Verbalizes/displays adequate comfort level or baseline comfort level  7/20/2025 0955 by Tereza Eisenberg RN  Outcome: Progressing  Flowsheets (Taken 7/20/2025 0050 by Ally Aquino RN)  Verbalizes/displays adequate comfort level or baseline comfort level: Encourage patient to monitor pain and request assistance     Problem: Safety - Adult  Goal: Free from fall injury  7/20/2025 0955 by Tereza Eisenberg RN  Outcome: Progressing  Flowsheets (Taken 7/18/2025 1028 by Brittni Noriega RN)  Free From Fall Injury: Instruct family/caregiver on patient safety     Problem: Skin/Tissue Integrity  Goal: Skin integrity remains intact  Description: 1.  Monitor for areas of redness and/or skin breakdown  2.  Assess vascular access sites hourly  3.  Every 4-6 hours minimum:  Change oxygen saturation probe site  4.  Every 4-6 hours:  If on nasal continuous positive airway pressure, respiratory therapy assess nares and determine need for appliance change or resting period  7/20/2025 0955 by Tereza Eisenberg RN  Outcome: Progressing  Flowsheets (Taken 7/20/2025 0748)  Skin Integrity Remains Intact:   Monitor for areas of redness and/or skin breakdown   Turn and

## 2025-07-20 NOTE — PROGRESS NOTES
Hospitalist Progress Note    Patient:  Jocelynn Stern      Unit/Bed:7K-07/007-A    YOB: 1955    MRN: [id number]       Acct: [id number]     PCP: Janice Duff MD    Date of Admission: 7/14/2025    Assessment/Plan:    Osteomyelitis of the left fifth ray: Diabetic ulceration of the left lower extremity  Podiatry primary and managing  S/p Amputation 7/16/25, source control attained  Continue IV Ancef for now, dose increased to 2g daily  Plan for transition to doxycycline upon d/c  Vascular surgery consulted  Patient s/p left lower extremity angiogram, plan for ASA, Plavix, statin, and RTC in 4 weeks with BLE arterial duplex US and TBI's  PT/OT eval and treat, IPR coordinator does not feel she is a good IPR candidate, PT recommends 24 hour assistance. Await further recs.  Will discuss d/c plans with case management and social work tomorrow. Patient denies BM yet. Bowel regimen increased.    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  Review of record shows patient recently initiated on Ozempic and reported has not yet picked up the prescription  Continue Lantus  Continue Humalog 5 units 3 times daily with meals with Insulin sliding scale  Monitor glucose ACHS  Encouraged carb controlled diet  Last hemoglobin A1c 9.6 on 5/29/25  Dietitian was consulted for diet education    Normocytic anemia  Hemoglobin 1stable  Trend CBC PRN    Essential hypertension  Continue amlodipine, lisinopril    Hyperlipidemia  Continue atorvastatin    Obesity BMI 30.13  Encourage healthy food choices    Disposition: Pending clinical course    Chief Complaint: Left lower extremity ulceration       Subjective: 69 y.o. female admitted to the hospitalist service for left lower extremity ulceration.  Patient visited on 7K.  She continues to report that she is not really experiencing any pain.  She denies chest pain.  She denies shortness of breath.  She denies nausea or vomiting.  Patient has  software.  It may contain   minor errors which are inherent in voice recognition technology.**         Electronically signed by Dr. Igor Keys      XR FOOT LEFT (MIN 3 VIEWS)   Final Result   1. Ulcer adjacent to the fifth metatarsophalangeal joint.   2. Question fracture involving the base of the fifth proximal phalanx please   correlate clinically.   3. Degenerative changes..               **This report has been created using voice recognition software. It may contain   minor errors which are inherent in voice recognition technology.**            Electronically signed by Dr. Chris Benítez      Vascular duplex lower extremity arteries bilateral    (Results Pending)       Diet: ADULT DIET; Regular  ADULT ORAL NUTRITION SUPPLEMENT; Breakfast, Dinner; Wound Healing Oral Supplement      Code Status: Full Code      Electronically signed by Henrique Nobles PA-C on 7/20/2025 at 1:40 PM

## 2025-07-21 LAB
AUTO DIFF PNL BLD: ABNORMAL
BASOPHILS ABSOLUTE: 0.1 THOU/MM3 (ref 0–0.1)
BASOPHILS NFR BLD AUTO: 0.5 %
DEPRECATED RDW RBC AUTO: 37.2 FL (ref 35–45)
EOSINOPHIL NFR BLD AUTO: 4.5 %
EOSINOPHILS ABSOLUTE: 0.8 THOU/MM3 (ref 0–0.4)
ERYTHROCYTE [DISTWIDTH] IN BLOOD BY AUTOMATED COUNT: 12.4 % (ref 11.5–14.5)
GLUCOSE BLD STRIP.AUTO-MCNC: 130 MG/DL (ref 70–108)
GLUCOSE BLD STRIP.AUTO-MCNC: 154 MG/DL (ref 70–108)
GLUCOSE BLD STRIP.AUTO-MCNC: 184 MG/DL (ref 70–108)
GLUCOSE BLD STRIP.AUTO-MCNC: 219 MG/DL (ref 70–108)
HCT VFR BLD AUTO: 32.3 % (ref 37–47)
HGB BLD-MCNC: 9.8 GM/DL (ref 12–16)
IMM GRANULOCYTES # BLD AUTO: 0.15 THOU/MM3 (ref 0–0.07)
IMM GRANULOCYTES NFR BLD AUTO: 0.9 %
LYMPHOCYTES ABSOLUTE: 7.6 THOU/MM3 (ref 1–4.8)
LYMPHOCYTES NFR BLD AUTO: 44.7 %
MCH RBC QN AUTO: 25.3 PG (ref 26–33)
MCHC RBC AUTO-ENTMCNC: 30.3 GM/DL (ref 32.2–35.5)
MCV RBC AUTO: 83.2 FL (ref 81–99)
MONOCYTES ABSOLUTE: 1.3 THOU/MM3 (ref 0.4–1.3)
MONOCYTES NFR BLD AUTO: 7.7 %
NEUTROPHILS ABSOLUTE: 7 THOU/MM3 (ref 1.8–7.7)
NEUTROPHILS NFR BLD AUTO: 41.7 %
NRBC BLD AUTO-RTO: 0 /100 WBC
PATHOLOGIST REVIEW: ABNORMAL
PLATELET # BLD AUTO: 446 THOU/MM3 (ref 130–400)
PLATELET BLD QL SMEAR: ABNORMAL
PMV BLD AUTO: 9.4 FL (ref 9.4–12.4)
RBC # BLD AUTO: 3.88 MILL/MM3 (ref 4.2–5.4)
SCAN OF BLOOD SMEAR: NORMAL
VARIANT LYMPHS BLD QL SMEAR: ABNORMAL %
WBC # BLD AUTO: 16.9 THOU/MM3 (ref 4.8–10.8)

## 2025-07-21 PROCEDURE — 2500000003 HC RX 250 WO HCPCS: Performed by: PHYSICAL THERAPY ASSISTANT

## 2025-07-21 PROCEDURE — 2500000003 HC RX 250 WO HCPCS

## 2025-07-21 PROCEDURE — 6370000000 HC RX 637 (ALT 250 FOR IP): Performed by: PHYSICAL THERAPY ASSISTANT

## 2025-07-21 PROCEDURE — 97530 THERAPEUTIC ACTIVITIES: CPT

## 2025-07-21 PROCEDURE — 6370000000 HC RX 637 (ALT 250 FOR IP)

## 2025-07-21 PROCEDURE — 36415 COLL VENOUS BLD VENIPUNCTURE: CPT

## 2025-07-21 PROCEDURE — 82948 REAGENT STRIP/BLOOD GLUCOSE: CPT

## 2025-07-21 PROCEDURE — 6370000000 HC RX 637 (ALT 250 FOR IP): Performed by: PHYSICIAN ASSISTANT

## 2025-07-21 PROCEDURE — 85025 COMPLETE CBC W/AUTO DIFF WBC: CPT

## 2025-07-21 PROCEDURE — 6360000002 HC RX W HCPCS

## 2025-07-21 PROCEDURE — 1200000000 HC SEMI PRIVATE

## 2025-07-21 PROCEDURE — 97110 THERAPEUTIC EXERCISES: CPT

## 2025-07-21 PROCEDURE — 6370000000 HC RX 637 (ALT 250 FOR IP): Performed by: SURGERY

## 2025-07-21 RX ADMIN — ASPIRIN 81 MG: 81 TABLET, CHEWABLE ORAL at 09:09

## 2025-07-21 RX ADMIN — ATORVASTATIN CALCIUM 40 MG: 40 TABLET, FILM COATED ORAL at 22:41

## 2025-07-21 RX ADMIN — SENNOSIDES 17.2 MG: 8.6 TABLET, FILM COATED ORAL at 22:41

## 2025-07-21 RX ADMIN — INSULIN LISPRO 5 UNITS: 100 INJECTION, SOLUTION INTRAVENOUS; SUBCUTANEOUS at 12:15

## 2025-07-21 RX ADMIN — OXYCODONE 5 MG: 5 TABLET ORAL at 22:51

## 2025-07-21 RX ADMIN — DOCUSATE SODIUM 100 MG: 100 CAPSULE, LIQUID FILLED ORAL at 22:41

## 2025-07-21 RX ADMIN — SODIUM CHLORIDE, PRESERVATIVE FREE 10 ML: 5 INJECTION INTRAVENOUS at 08:54

## 2025-07-21 RX ADMIN — SODIUM CHLORIDE, PRESERVATIVE FREE 10 ML: 5 INJECTION INTRAVENOUS at 09:10

## 2025-07-21 RX ADMIN — CLOPIDOGREL BISULFATE 75 MG: 75 TABLET, FILM COATED ORAL at 09:09

## 2025-07-21 RX ADMIN — INSULIN LISPRO 5 UNITS: 100 INJECTION, SOLUTION INTRAVENOUS; SUBCUTANEOUS at 08:11

## 2025-07-21 RX ADMIN — WATER 2000 MG: 1 INJECTION INTRAMUSCULAR; INTRAVENOUS; SUBCUTANEOUS at 00:03

## 2025-07-21 RX ADMIN — INSULIN LISPRO 5 UNITS: 100 INJECTION, SOLUTION INTRAVENOUS; SUBCUTANEOUS at 17:02

## 2025-07-21 RX ADMIN — AMLODIPINE BESYLATE 10 MG: 10 TABLET ORAL at 09:09

## 2025-07-21 RX ADMIN — WATER 2000 MG: 1 INJECTION INTRAMUSCULAR; INTRAVENOUS; SUBCUTANEOUS at 16:09

## 2025-07-21 RX ADMIN — INSULIN GLARGINE 10 UNITS: 100 INJECTION, SOLUTION SUBCUTANEOUS at 22:41

## 2025-07-21 RX ADMIN — Medication 1 CAPSULE: at 09:14

## 2025-07-21 RX ADMIN — SODIUM CHLORIDE, PRESERVATIVE FREE 10 ML: 5 INJECTION INTRAVENOUS at 22:41

## 2025-07-21 RX ADMIN — DOCUSATE SODIUM 100 MG: 100 CAPSULE, LIQUID FILLED ORAL at 09:09

## 2025-07-21 RX ADMIN — INSULIN LISPRO 1 UNITS: 100 INJECTION, SOLUTION INTRAVENOUS; SUBCUTANEOUS at 17:01

## 2025-07-21 RX ADMIN — WATER 2000 MG: 1 INJECTION INTRAMUSCULAR; INTRAVENOUS; SUBCUTANEOUS at 09:45

## 2025-07-21 ASSESSMENT — PAIN SCALES - GENERAL
PAINLEVEL_OUTOF10: 4
PAINLEVEL_OUTOF10: 2

## 2025-07-21 ASSESSMENT — PAIN DESCRIPTION - PAIN TYPE: TYPE: ACUTE PAIN;SURGICAL PAIN

## 2025-07-21 ASSESSMENT — PAIN DESCRIPTION - FREQUENCY: FREQUENCY: INTERMITTENT

## 2025-07-21 ASSESSMENT — PAIN DESCRIPTION - ONSET: ONSET: ON-GOING

## 2025-07-21 ASSESSMENT — PAIN DESCRIPTION - LOCATION: LOCATION: FOOT

## 2025-07-21 ASSESSMENT — PAIN DESCRIPTION - ORIENTATION: ORIENTATION: LEFT

## 2025-07-21 ASSESSMENT — PAIN DESCRIPTION - DESCRIPTORS: DESCRIPTORS: DULL

## 2025-07-21 ASSESSMENT — PAIN - FUNCTIONAL ASSESSMENT: PAIN_FUNCTIONAL_ASSESSMENT: ACTIVITIES ARE NOT PREVENTED

## 2025-07-21 NOTE — DISCHARGE INSTRUCTIONS
Please keep dressing dry and intact. Remain Non-Weightbearing in the CAM Walking boot.    Dressing to be changed every other day Monday Wednesday Friday involving a Betadine dressing to the lateral incision with dry sterile dressing 4 x 4, Kerlix, Ace, cam boot application.    Please follow up with Dr. Cruz outpatient. Call to confirm an appointment as soon as possible.    Post Op Instructions    Pt Name: Jocelynn Stern  Medical Record Number: [id number]  Today's Date: 7/25/2025    GENERAL ANESTHESIA OR SEDATION  1. Do not drive or operate hazardous machinery for 24 hours.  2. Do not make important business or personal decisions for 24 hours.  3. Do not drink alcoholic beverages or use tobacco for 24 hours.    ACTIVITY INSTRUCTIONS:  [] Rest today. Resume light to normal activity tomorrow.   [] You may ambulate as tolerated in your post op shoe/boot.  [x] No weight is to be placed on the operative limb.  Use crutches, walker, Roll-a-bout as needed.  [x]Elevate the operative limb as much as possible to reduce swelling and discomfort for the first 72 hours      DIET INSTRUCTIONS:  []Begin with clear liquids. If not nauseated, may increase to a low-fat diet when you desire.   [x]Regular diet as tolerated.  []Other:     MEDICATIONS  [x]Prescription sent with you to be used as directed.   Do not drink alcohol or drive while taking these medications. You may experience dizziness or drowsiness with these medications. You may also experience constipation which can be relieved with stool softners or laxatives.  [x]You may resume your daily prescription medication schedule unless otherwise specified.  [x]If taking 325mg Aspirin or other blood thinners such as Coumadin or Plavix, you may resume tomorrow, unless told otherwise.     WOUND/DRESSING INSTRUCTIONS:  Always ensure you and your care giver clean hands before and after caring for the wound.  [x] Keep dressing clean and dry until you are seen in the offfice      [] Ice

## 2025-07-21 NOTE — PROGRESS NOTES
[facility] ORTHOPEDICS 7K  Occupational Therapy  Daily Note    Discharge Recommendations: ECF with OT and Inpatient Therapy Stay  Equipment Recommendations: Yes Transfer tub bench, wheelchair, RW vs slide board, drop arm BSC      Time In: 1140  Time Out: 1205  Timed Code Treatment Minutes: 25 Minutes  Minutes: 25          Date: 2025  Patient Name: Jocelynn Stern,   Gender: female      Room: [facility]007-A  MRN: [id number]  : 1955  (69 y.o.)  Referring Practitioner: Henrique Nobles PA-C  Diagnosis: Osteomyelitis of left foot, unspecified type (HCC)  Additional Pertinent Hx: The patient is a 69 y.o. female with significant past medical history of Chronic renal disease, stage III, diabetes mellitus,  diabetic polyneuropathy, hypercholesterolemia, hypertension, psoriasis who presents with a left foot ulcer at the fifth digit.  Patient reports that she had previously seen Dr. Cruz in clinic for a wound on her left fourth digit.  Patient notes that while treating this ulcer she got a tear in the skin on her fifth digit from tape.  Patient reports that she first noticed this ulcer approximately a month ago and it has progressively gotten worse.  Patient noted that the ulcer was very large and draining a fair amount of yellow fluid and decided to see Dr. Cruz in clinic.  Patient was seen today by Dr. Cruz in the office and was recommended to come to the ED.  Patient denies any pain in the site at this time.  Patient's noted that her daughter has been dressing the wounds but it is continued to progressively get worse.  Patient notes that she has never seen a vascular physician in the past.  Patient relates that she does not have much sensation in her foot. Patient denies any constitutional symptoms including N/V/D/F or SOB.  On 25, patient underwent Left 5th Digit Amputation Left 5th Partial Ray Amputation    Restrictions/Precautions:  Restrictions/Precautions: Weight

## 2025-07-21 NOTE — PROGRESS NOTES
Results   Component Value Date/Time    CRP 5.79 04/20/2022 01:28 PM     Micro:   Lab Results   Component Value Date/Time    BC  07/14/2025 11:59 AM     No growth 24 hours. No growth 48 hours. No growth at 5 days      Hemoglobin A1C:   Lab Results   Component Value Date/Time    LABA1C 9.6 05/29/2025 01:30 PM    LABA1C 6.8 08/17/2024 10:39 AM       Images  IR ANGIOGRAM EXTREMITY LEFT   Final Result      XR CHEST (2 VW)   Final Result   1. No consolidation.      This document has been electronically signed by: Olvin Gerard MD on    07/14/2025 05:57 PM      CTA ABDOMINAL AORTA W BILAT RUNOFF W WO CONTRAST   Final Result   1. Trifurcation vessels not well demonstrated in either leg on the study. See   comments above.   2. Multifocal calcific stenosis both SFAs upwards of 50% bilaterally. Total   occlusion distal left popliteal artery with good reconstitution of the   trifurcation vessels left calf distally.   3. Bulky calcified uterine fibroids. Likely 3.8 cm seroma or cystlike structure   in the left lower quadrant. Findings of mild paralytic ileus and constipation.   Cholelithiasis but no acute findings of the gallbladder.            **This report has been created using voice recognition software.  It may contain   minor errors which are inherent in voice recognition technology.**         Electronically signed by Dr. Igor Keys      XR FOOT LEFT (MIN 3 VIEWS)   Final Result   1. Ulcer adjacent to the fifth metatarsophalangeal joint.   2. Question fracture involving the base of the fifth proximal phalanx please   correlate clinically.   3. Degenerative changes..               **This report has been created using voice recognition software. It may contain   minor errors which are inherent in voice recognition technology.**            Electronically signed by Dr. Chris Benítez      Vascular duplex lower extremity arteries bilateral    (Results Pending)       Assessment     Chronic  Patient Active Problem List   Diagnosis     Essential hypertension    Hyperlipidemia     borderline Abnormal nuclear stress test- NO angina or angina equiv- med RX    Diabetes mellitus due to underlying condition with hyperosmolarity without coma, without long-term current use of insulin (HCC)    Severe nonproliferative diabetic retinopathy associated with type 2 diabetes mellitus (HCC)    Proliferative retinopathy with retinal edema due to type 2 diabetes mellitus (HCC)    Hypertensive heart and kidney disease without heart failure and with stage 3b chronic kidney disease (HCC)    Diabetic polyneuropathy associated with type 2 diabetes mellitus (HCC)    Age-related cataract of both eyes    Psoriasis    Osteomyelitis of left foot, unspecified type (HCC)    PAD (peripheral artery disease)    History of non-insulin dependent diabetes mellitus       Khris Quiñonez DPM PGY-1  07/21/25 1:34 PM

## 2025-07-21 NOTE — CARE COORDINATION
DISCHARGE PLANNING EVALUATION  7/21/25, 12:03 PM EDT    Reason for Referral: discharge plan  Decision Maker: daughter is POA  Current Services: none  New Services Requested: snf for rehab   Family/ Social/ Home environment: patient lives at home with her daughter, daughter works away from home during the day, does not have other caregivers available at home.  Requesting snf for rehab before returning home  Payment Source:[employer]   Transportation at Discharge: ambulance  Post-acute (PAC) provider list was provided to patient. Patient was informed of their freedom to choose PAC provider. Discussed and offered to show the patient the relevant PAC Providers quality and resource use measures on Medicare Compare web site via computer based on patient's goals of care and treatment preferences. Questions regarding selection process were answered.      Teach Back Method used with patient's daughter  regarding care plan and discharge plan  Patient's daughter verbalized understanding of the plan of care and contribute to goal setting.       Patient preferences and discharge plan:  spoke with patient's daughter about discharge plan.  Patient and daughter have agreed to snf for rehab before returning home.   Requesting Abiel Stark, referral made, will also consider [facility] snf.      Electronically signed by DANE Dietz on 7/21/2025 at 12:03 PM

## 2025-07-21 NOTE — PLAN OF CARE
Patient stable for discharge from hospitalist perspective. Recommend she resume her home diabetes, HTN, and HLD medications. Antibiotics per Podiatry with outpatient f/u.     Hospitalist service will sign off at this time. Thank you for the consultation and allowing us to partake in the care of this patient. Please feel free to contact us with any questions or concerns.    Electronically signed by Henrique Nobles PA-C on 7/21/2025 at 8:26 AM

## 2025-07-21 NOTE — PLAN OF CARE
Problem: Chronic Conditions and Co-morbidities  Goal: Patient's chronic conditions and co-morbidity symptoms are monitored and maintained or improved  Outcome: Progressing  Flowsheets (Taken 7/21/2025 1456)  Care Plan - Patient's Chronic Conditions and Co-Morbidity Symptoms are Monitored and Maintained or Improved: Monitor and assess patient's chronic conditions and comorbid symptoms for stability, deterioration, or improvement     Problem: Discharge Planning  Goal: Discharge to home or other facility with appropriate resources  7/21/2025 1456 by Kylah Cline LPN  Outcome: Progressing  Flowsheets (Taken 7/21/2025 1456)  Discharge to home or other facility with appropriate resources:   Identify barriers to discharge with patient and caregiver   Arrange for needed discharge resources and transportation as appropriate     Problem: Pain  Goal: Verbalizes/displays adequate comfort level or baseline comfort level  Outcome: Progressing  Flowsheets (Taken 7/21/2025 1456)  Verbalizes/displays adequate comfort level or baseline comfort level:   Encourage patient to monitor pain and request assistance   Assess pain using appropriate pain scale   Implement non-pharmacological measures as appropriate and evaluate response     Problem: Safety - Adult  Goal: Free from fall injury  Outcome: Progressing  Flowsheets (Taken 7/21/2025 1456)  Free From Fall Injury: Instruct family/caregiver on patient safety     Problem: Skin/Tissue Integrity  Goal: Skin integrity remains intact  Description: 1.  Monitor for areas of redness and/or skin breakdown  2.  Assess vascular access sites hourly  3.  Every 4-6 hours minimum:  Change oxygen saturation probe site  4.  Every 4-6 hours:  If on nasal continuous positive airway pressure, respiratory therapy assess nares and determine need for appliance change or resting period  Outcome: Progressing  Flowsheets (Taken 7/21/2025 1456)  Skin Integrity Remains Intact:   Monitor for areas of redness

## 2025-07-21 NOTE — PROGRESS NOTES
[facility]  INPATIENT PHYSICAL THERAPY  DAILY NOTE  [facility] ORTHOPEDICS 7K - 7K-07/007-A      Discharge Recommendations: Subacute/Skilled Nursing Facility pt is unsafe to return home due to not having 24 hour assistance. Pt unable to maintain NWB with transfers with stair entrance into home   Equipment Recommendations: No  anticipate pt will need a w/c and ramp if returning home- defer to next LOC            Time In: 1031  Time Out: 1114  Timed Code Treatment Minutes: 43 Minutes  Minutes: 43          Date: 2025  Patient Name: Jocelynn Stern,  Gender:  female        MRN: [id number]  : 1955  (69 y.o.)     Referring Practitioner: Reynaldo Archibald DPM  Diagnosis: Osteomyelitis of left foot, unspecified type (HCC)  Additional Pertinent Hx: The patient is a 69 y.o. female with significant past medical history of Chronic renal disease, stage III, diabetes mellitus,  diabetic polyneuropathy, hypercholesterolemia, hypertension, psoriasis who presents with a left foot ulcer at the fifth digit.  Patient reports that she had previously seen Dr. Cruz in clinic for a wound on her left fourth digit.  Patient notes that while treating this ulcer she got a tear in the skin on her fifth digit from tape.  Patient reports that she first noticed this ulcer approximately a month ago and it has progressively gotten worse.  Patient noted that the ulcer was very large and draining a fair amount of yellow fluid and decided to see Dr. Cruz in clinic.  Patient was seen today by Dr. Cruz in the office and was recommended to come to the ED.  Patient denies any pain in the site at this time.  Patient's noted that her daughter has been dressing the wounds but it is continued to progressively get worse.  Patient notes that she has never seen a vascular physician in the past.  Patient relates that she does not have much sensation in her foot. Patient denies any constitutional symptoms including N/V/D/F or SOB.   required to complete steps and increased safety concerns. Per poditry pt able to be heel WB on L LE for stairs only however pt voices increased difficulty prior to admission due to decreased strength. Pt voices 1 HR at home and pt unable to hop at this time. Did not trial at this date due to safety with education on need of ramp if wanting discharge home     Balance:  Static Sitting Balance:  Stand By Assistance  Static Standing Balance: Minimal Assistance, X 1, verbal cues and tactile cues with therapist foot placed under L LE to maintain NWB   Dynamic Standing Balance: Moderate Assistance, pt demo lateral lean at times with B UE support on RW. Pt required assistance with otto/doff brief and shorts due to decreased stability with 1 LOB when pt attempting to take R UE off of RW to otto pants.   *pt was incontinent of large amount of urine upon arrival with dependence to complete pericare. Pt then required use of restroom during session with independence for pericare on toileted and continent of urine. Education on need to transfer to commode during day for increased strengthening and mobility   Exercise:  None    Functional Outcome Measures:  [facility] (6 CLICK) BASIC MOBILITY  AM-[facility] Inpatient Mobility Raw Score : 12  AM-PAC Inpatient T-Scale Score : 35.33        Modified [place]:  Current Functional Status:  Not Applicable    ASSESSMENT:  Assessment: Patient progressing toward established goals.  Activity Tolerance:  Patient tolerance of  treatment:Fair.  Plan: Current Treatment Recommendations: Strengthening, Balance training, Functional mobility training, Transfer training, Gait training, Stair training, Neuromuscular re-education, Home exercise program, Safety education & training, Patient/Caregiver education & training, Therapeutic activities  General Plan:  (5xO)    Education:  Learners: Patient  Patient Education: Plan of Care, Precautions/Restrictions, Bed Mobility, Transfers, Wheelchair Mobility, Up in Chair

## 2025-07-21 NOTE — PLAN OF CARE
Problem: Chronic Conditions and Co-morbidities  Goal: Patient's chronic conditions and co-morbidity symptoms are monitored and maintained or improved  7/20/2025 2303 by Mili Epps RN  Outcome: Progressing  7/20/2025 0955 by Tereza Eisenberg RN  Outcome: Progressing  Flowsheets (Taken 7/19/2025 2050 by Ally Aquino RN)  Care Plan - Patient's Chronic Conditions and Co-Morbidity Symptoms are Monitored and Maintained or Improved: Monitor and assess patient's chronic conditions and comorbid symptoms for stability, deterioration, or improvement     Problem: Discharge Planning  Goal: Discharge to home or other facility with appropriate resources  7/20/2025 2303 by Mili Epps RN  Outcome: Progressing  7/20/2025 0955 by Tereza Eisenberg RN  Outcome: Progressing  Flowsheets (Taken 7/19/2025 2050 by Ally Aquino RN)  Discharge to home or other facility with appropriate resources: Identify barriers to discharge with patient and caregiver     Problem: Pain  Goal: Verbalizes/displays adequate comfort level or baseline comfort level  7/20/2025 2303 by Mili Epps RN  Outcome: Progressing  7/20/2025 0955 by Tereza Eisenberg RN  Outcome: Progressing  Flowsheets (Taken 7/20/2025 0050 by Ally Aquino RN)  Verbalizes/displays adequate comfort level or baseline comfort level: Encourage patient to monitor pain and request assistance     Problem: Safety - Adult  Goal: Free from fall injury  7/20/2025 2303 by Mili Epps RN  Outcome: Progressing  7/20/2025 0955 by Tereza Eisenberg RN  Outcome: Progressing  Flowsheets (Taken 7/18/2025 1028 by Brittni Noriega, RN)  Free From Fall Injury: Instruct family/caregiver on patient safety     Problem: Skin/Tissue Integrity  Goal: Skin integrity remains intact  Description: 1.  Monitor for areas of redness and/or skin breakdown  2.  Assess vascular access sites hourly  3.  Every 4-6 hours minimum:  Change oxygen saturation probe site  4.  Every 4-6 hours:  If on nasal continuous

## 2025-07-21 NOTE — DISCHARGE SUMMARY
Physician Discharge Summary     Patient ID:  Jocelynn Stern  [id number]    Physician: Ge Cruz DPM     Admission date: 7/14/2025    Discharge date: 07/28/25    Date of Surgery: 07/16/25    Admission Diagnoses: Osteomyelitis of left foot, unspecified type (HCC) [M86.9]    Discharge Diagnoses: Osteomyelitis of left foot, unspecified type (HCC) [M86.9]    Procedures: Left 5th Digit Amputation Left 5th Partial Ray Amputation     History, Physical Exam:   The patient is a 69 y.o. female with significant past medical history of Chronic renal disease, stage III, diabetes mellitus,  diabetic polyneuropathy, hypercholesterolemia, hypertension, psoriasis who presents with a left foot ulcer at the fifth digit.  Patient reports that she had previously seen Dr. Cruz in clinic for a wound on her left fourth digit.  Patient notes that while treating this ulcer she got a tear in the skin on her fifth digit from tape.  Patient reports that she first noticed this ulcer approximately a month ago and it has progressively gotten worse.  Patient noted that the ulcer was very large and draining a fair amount of yellow fluid and decided to see Dr. Cruz in clinic.  Patient was seen today by Dr. Cruz in the office and was recommended to come to the ED.  Patient denies any pain in the site at this time.  Patient's noted that her daughter has been dressing the wounds but it is continued to progressively get worse.  Patient notes that she has never seen a vascular physician in the past.  Patient relates that she does not have much sensation in her foot. Patient denies any constitutional symptoms including N/V/D/F or SOB.     Physical exam (lower extremity focused)  Vascular: Dorsalis pedis and posterior tibial pulses nonpalpable. No pedal hair present. Minimal edema that is consistent with post-op state. Skin temperature warm to warm. Capillary fill time brisk.      Dermatologic: Minimal erythema, consistent with postoperative  [place] upon discharge so that she can be closer to family in case she needs any help. Patient relates that she will still be able to follow-up with Dr. Cruz outpatient. Will plan to transition to Augmentin 875-125 mg at discharge.    7/19  Patient seen bedside today on behalf of Dr. Cruz. Patient appeared pleasant, was oriented to person, place and time and in no acute distress. Patient is 3 days s/p Left 5th Digit Amputation, Left 5th Partial Ray Amputation. Patient was resting comfortably in bed. Patient denies any new issues or complaints. Patient relates minimal pain to the left foot at this time. Patient relates that she has worked with physical therapy and states that this went well. Patient denies any N/V/F/C/SOB or CP. No other pedal concerns. Patient relates she may be going to [place], [place] upon discharge so that she can be closer to family in case she needs any help. Patient relates that she will still be able to follow-up with Dr. Cruz outpatient. IV vancomycin discontinued; IV Ancef started on 7/18. Patient to resume Plavix and 81 mg aspirin per vascular. Repeat CBC ordered daily.    7/18  Patient seen bedside today on behalf of Dr Cruz. Patient appeared pleasant, was oriented to person, place and time and in no acute distress. Patient is s/p day 2 from Left 5th Digit Amputation. Patient states that she is in pain at the operative foot at the time of the visit. Patient noted that she had her vascular procedure yesterday and she believes it went well. Patient noted that she wanted to be seen by physical therapy prior to going home since there are times during the day when she would be alone. Patient denies any N/V/F/C/SOB or CP. No other pedal concerns. Patient stable at time of visit. Patient to remain inpatient for wound observation. Antibiotic coverage changed to vancomycin with pharmacy to dose for MRSA coverage.    7/17  Patient seen bedside today on behalf of Dr. Cruz.

## 2025-07-21 NOTE — FLOWSHEET NOTE
07/21/25 1301   Treatment Team Notification   Reason for Communication Review case  (Messaged MD Sandhu in regards to canceling discharge due to patient going to accepting SNF when one is found.)   Name of Team Member Notified Dr Sandhu   Treatment Team Role Resident   Method of Communication Secure Message   Response See orders

## 2025-07-22 LAB
BASOPHILS ABSOLUTE: 0.1 THOU/MM3 (ref 0–0.1)
BASOPHILS NFR BLD AUTO: 0.5 %
DEPRECATED RDW RBC AUTO: 36.8 FL (ref 35–45)
EOSINOPHIL NFR BLD AUTO: 4.1 %
EOSINOPHILS ABSOLUTE: 0.7 THOU/MM3 (ref 0–0.4)
ERYTHROCYTE [DISTWIDTH] IN BLOOD BY AUTOMATED COUNT: 12.5 % (ref 11.5–14.5)
GLUCOSE BLD STRIP.AUTO-MCNC: 134 MG/DL (ref 70–108)
GLUCOSE BLD STRIP.AUTO-MCNC: 176 MG/DL (ref 70–108)
GLUCOSE BLD STRIP.AUTO-MCNC: 234 MG/DL (ref 70–108)
GLUCOSE BLD STRIP.AUTO-MCNC: 261 MG/DL (ref 70–108)
HCT VFR BLD AUTO: 30.2 % (ref 37–47)
HGB BLD-MCNC: 9.5 GM/DL (ref 12–16)
IMM GRANULOCYTES # BLD AUTO: 0.15 THOU/MM3 (ref 0–0.07)
IMM GRANULOCYTES NFR BLD AUTO: 0.8 %
LYMPHOCYTES ABSOLUTE: 8.8 THOU/MM3 (ref 1–4.8)
LYMPHOCYTES NFR BLD AUTO: 48.2 %
MCH RBC QN AUTO: 25.8 PG (ref 26–33)
MCHC RBC AUTO-ENTMCNC: 31.5 GM/DL (ref 32.2–35.5)
MCV RBC AUTO: 82.1 FL (ref 81–99)
MONOCYTES ABSOLUTE: 1.4 THOU/MM3 (ref 0.4–1.3)
MONOCYTES NFR BLD AUTO: 7.7 %
NEUTROPHILS ABSOLUTE: 7 THOU/MM3 (ref 1.8–7.7)
NEUTROPHILS NFR BLD AUTO: 38.7 %
NRBC BLD AUTO-RTO: 0 /100 WBC
PLATELET # BLD AUTO: 443 THOU/MM3 (ref 130–400)
PLATELET BLD QL SMEAR: ABNORMAL
PMV BLD AUTO: 9.3 FL (ref 9.4–12.4)
RBC # BLD AUTO: 3.68 MILL/MM3 (ref 4.2–5.4)
SCAN OF BLOOD SMEAR: NORMAL
VARIANT LYMPHS BLD QL SMEAR: ABNORMAL %
WBC # BLD AUTO: 18.2 THOU/MM3 (ref 4.8–10.8)

## 2025-07-22 PROCEDURE — 6370000000 HC RX 637 (ALT 250 FOR IP): Performed by: SURGERY

## 2025-07-22 PROCEDURE — 82948 REAGENT STRIP/BLOOD GLUCOSE: CPT

## 2025-07-22 PROCEDURE — 6370000000 HC RX 637 (ALT 250 FOR IP): Performed by: PHYSICIAN ASSISTANT

## 2025-07-22 PROCEDURE — 97535 SELF CARE MNGMENT TRAINING: CPT

## 2025-07-22 PROCEDURE — 97530 THERAPEUTIC ACTIVITIES: CPT

## 2025-07-22 PROCEDURE — 2500000003 HC RX 250 WO HCPCS

## 2025-07-22 PROCEDURE — 85025 COMPLETE CBC W/AUTO DIFF WBC: CPT

## 2025-07-22 PROCEDURE — 2500000003 HC RX 250 WO HCPCS: Performed by: PHYSICAL THERAPY ASSISTANT

## 2025-07-22 PROCEDURE — 36415 COLL VENOUS BLD VENIPUNCTURE: CPT

## 2025-07-22 PROCEDURE — 6360000002 HC RX W HCPCS

## 2025-07-22 PROCEDURE — 6370000000 HC RX 637 (ALT 250 FOR IP): Performed by: PHYSICAL THERAPY ASSISTANT

## 2025-07-22 PROCEDURE — 6370000000 HC RX 637 (ALT 250 FOR IP)

## 2025-07-22 PROCEDURE — 1200000000 HC SEMI PRIVATE

## 2025-07-22 RX ADMIN — DOCUSATE SODIUM 100 MG: 100 CAPSULE, LIQUID FILLED ORAL at 20:59

## 2025-07-22 RX ADMIN — INSULIN LISPRO 1 UNITS: 100 INJECTION, SOLUTION INTRAVENOUS; SUBCUTANEOUS at 21:00

## 2025-07-22 RX ADMIN — INSULIN LISPRO 5 UNITS: 100 INJECTION, SOLUTION INTRAVENOUS; SUBCUTANEOUS at 08:54

## 2025-07-22 RX ADMIN — INSULIN LISPRO 5 UNITS: 100 INJECTION, SOLUTION INTRAVENOUS; SUBCUTANEOUS at 11:57

## 2025-07-22 RX ADMIN — INSULIN GLARGINE 10 UNITS: 100 INJECTION, SOLUTION SUBCUTANEOUS at 21:00

## 2025-07-22 RX ADMIN — SENNOSIDES 17.2 MG: 8.6 TABLET, FILM COATED ORAL at 20:59

## 2025-07-22 RX ADMIN — Medication 1 CAPSULE: at 08:54

## 2025-07-22 RX ADMIN — SODIUM CHLORIDE, PRESERVATIVE FREE 10 ML: 5 INJECTION INTRAVENOUS at 01:01

## 2025-07-22 RX ADMIN — ASPIRIN 81 MG: 81 TABLET, CHEWABLE ORAL at 08:54

## 2025-07-22 RX ADMIN — INSULIN LISPRO 2 UNITS: 100 INJECTION, SOLUTION INTRAVENOUS; SUBCUTANEOUS at 16:55

## 2025-07-22 RX ADMIN — ATORVASTATIN CALCIUM 40 MG: 40 TABLET, FILM COATED ORAL at 21:01

## 2025-07-22 RX ADMIN — SODIUM CHLORIDE, PRESERVATIVE FREE 10 ML: 5 INJECTION INTRAVENOUS at 21:00

## 2025-07-22 RX ADMIN — CLOPIDOGREL BISULFATE 75 MG: 75 TABLET, FILM COATED ORAL at 08:54

## 2025-07-22 RX ADMIN — INSULIN LISPRO 5 UNITS: 100 INJECTION, SOLUTION INTRAVENOUS; SUBCUTANEOUS at 16:56

## 2025-07-22 RX ADMIN — AMLODIPINE BESYLATE 10 MG: 10 TABLET ORAL at 08:54

## 2025-07-22 RX ADMIN — DOCUSATE SODIUM 100 MG: 100 CAPSULE, LIQUID FILLED ORAL at 08:54

## 2025-07-22 RX ADMIN — WATER 2000 MG: 1 INJECTION INTRAMUSCULAR; INTRAVENOUS; SUBCUTANEOUS at 10:35

## 2025-07-22 RX ADMIN — WATER 2000 MG: 1 INJECTION INTRAMUSCULAR; INTRAVENOUS; SUBCUTANEOUS at 16:35

## 2025-07-22 RX ADMIN — WATER 2000 MG: 1 INJECTION INTRAMUSCULAR; INTRAVENOUS; SUBCUTANEOUS at 01:00

## 2025-07-22 ASSESSMENT — PAIN SCALES - GENERAL
PAINLEVEL_OUTOF10: 0
PAINLEVEL_OUTOF10: 0

## 2025-07-22 NOTE — PROGRESS NOTES
[facility]  INPATIENT PHYSICAL THERAPY  DAILY NOTE  [facility] ORTHOPEDICS 7K - 7K-07/007-A      Discharge Recommendations: Subacute/Skilled Nursing Facility  Equipment Recommendations: No  anticipate pt will need a w/c and ramp if returning home- defer to next LOC            Time In: 0949  Time Out: 1030  Timed Code Treatment Minutes: 41 Minutes  Minutes: 41          Date: 2025  Patient Name: Jocelynn Stern,  Gender:  female        MRN: [id number]  : 1955  (69 y.o.)     Referring Practitioner: Reynaldo Archibald, AGUSTIN  Diagnosis: Osteomyelitis of left foot, unspecified type (HCC)  Additional Pertinent Hx: The patient is a 69 y.o. female with significant past medical history of Chronic renal disease, stage III, diabetes mellitus,  diabetic polyneuropathy, hypercholesterolemia, hypertension, psoriasis who presents with a left foot ulcer at the fifth digit.  Patient reports that she had previously seen Dr. Cruz in clinic for a wound on her left fourth digit.  Patient notes that while treating this ulcer she got a tear in the skin on her fifth digit from tape.  Patient reports that she first noticed this ulcer approximately a month ago and it has progressively gotten worse.  Patient noted that the ulcer was very large and draining a fair amount of yellow fluid and decided to see Dr. Cruz in clinic.  Patient was seen today by Dr. Cruz in the office and was recommended to come to the ED.  Patient denies any pain in the site at this time.  Patient's noted that her daughter has been dressing the wounds but it is continued to progressively get worse.  Patient notes that she has never seen a vascular physician in the past.  Patient relates that she does not have much sensation in her foot. Patient denies any constitutional symptoms including N/V/D/F or SOB.  On 25, patient underwent Left 5th Digit Amputation Left 5th Partial Ray Amputation     Prior Level of Function:  Lives With: Other

## 2025-07-22 NOTE — PLAN OF CARE
Problem: Chronic Conditions and Co-morbidities  Goal: Patient's chronic conditions and co-morbidity symptoms are monitored and maintained or improved  7/22/2025 0449 by Delores Rajan RN  Outcome: Progressing  7/21/2025 1456 by Kylah Cline LPN  Outcome: Progressing  Flowsheets (Taken 7/21/2025 1456)  Care Plan - Patient's Chronic Conditions and Co-Morbidity Symptoms are Monitored and Maintained or Improved: Monitor and assess patient's chronic conditions and comorbid symptoms for stability, deterioration, or improvement     Problem: Discharge Planning  Goal: Discharge to home or other facility with appropriate resources  7/22/2025 0449 by Delores Rajan RN  Outcome: Progressing  7/21/2025 1456 by Kylah Cline LPN  Outcome: Progressing  Flowsheets (Taken 7/21/2025 1456)  Discharge to home or other facility with appropriate resources:   Identify barriers to discharge with patient and caregiver   Arrange for needed discharge resources and transportation as appropriate     Problem: Pain  Goal: Verbalizes/displays adequate comfort level or baseline comfort level  7/22/2025 0449 by Delores Rajan RN  Outcome: Progressing  7/21/2025 1456 by Kylah Cline LPN  Outcome: Progressing  Flowsheets (Taken 7/21/2025 1456)  Verbalizes/displays adequate comfort level or baseline comfort level:   Encourage patient to monitor pain and request assistance   Assess pain using appropriate pain scale   Implement non-pharmacological measures as appropriate and evaluate response     Problem: Safety - Adult  Goal: Free from fall injury  7/22/2025 0449 by Delores Rajan RN  Outcome: Progressing  7/21/2025 1456 by Kylah Cline LPN  Outcome: Progressing  Flowsheets (Taken 7/21/2025 1456)  Free From Fall Injury: Instruct family/caregiver on patient safety     Problem: Skin/Tissue Integrity  Goal: Skin integrity remains intact  Description: 1.  Monitor for areas of redness and/or skin breakdown  2.  Assess vascular access sites hourly  3.  Every

## 2025-07-22 NOTE — CARE COORDINATION
7/22/25, 1:45 PM EDT    DISCHARGE PLANNING EVALUATION    Spoke with [facility] admissions, if IV antibiotics are changed to q12 and this is documented, then facility can start precert.  Spoke with daughter, Tatianna, to update

## 2025-07-22 NOTE — PROGRESS NOTES
[facility] ORTHOPEDICS 7K  Occupational Therapy  Daily Note    Discharge Recommendations: Subacute/skilled nursing facility  Equipment Recommendations: Yes Transfer tub bench, wheelchair, RW vs slide board, drop arm BSC    Time In: 1332  Time Out: 1429  Timed Code Treatment Minutes: 57 Minutes  Minutes: 57          Date: 2025  Patient Name: Jocelynn Stern,   Gender: female      Room: [facility]/007-A  MRN: [id number]  : 1955  (69 y.o.)  Referring Practitioner: Henrique Nobles PA-C  Diagnosis: Osteomyelitis of left foot, unspecified type (HCC)  Additional Pertinent Hx: The patient is a 69 y.o. female with significant past medical history of Chronic renal disease, stage III, diabetes mellitus,  diabetic polyneuropathy, hypercholesterolemia, hypertension, psoriasis who presents with a left foot ulcer at the fifth digit.  Patient reports that she had previously seen Dr. Cruz in clinic for a wound on her left fourth digit.  Patient notes that while treating this ulcer she got a tear in the skin on her fifth digit from tape.  Patient reports that she first noticed this ulcer approximately a month ago and it has progressively gotten worse.  Patient noted that the ulcer was very large and draining a fair amount of yellow fluid and decided to see Dr. Cruz in clinic.  Patient was seen today by Dr. Cruz in the office and was recommended to come to the ED.  Patient denies any pain in the site at this time.  Patient's noted that her daughter has been dressing the wounds but it is continued to progressively get worse.  Patient notes that she has never seen a vascular physician in the past.  Patient relates that she does not have much sensation in her foot. Patient denies any constitutional symptoms including N/V/D/F or SOB.  On 25, patient underwent Left 5th Digit Amputation Left 5th Partial Ray Amputation    Restrictions/Precautions:  Restrictions/Precautions: Weight Bearing  Left  Lower Extremity Weight Bearing: Non Weight Bearing  Partial Weight Bearing Percentage Or Pounds: Order changed to \"nonweightbearing and patient is to only use heel for partial weightbearing when going up the steps to enter her home.\" on 7/21  Required Braces or Orthoses  Left Lower Extremity Brace: Boot (CAM boot arrived with patient. Patient states she has not had it on yet. Podiatry in during assessment and approved.)  LLE Brace Type: CAM boot     Social/Functional History:  Lives With: Other (Comment) (Daughter, son in law, and grandkids)  Type of Home: House  Home Layout: One level  Home Access: Stairs to enter with rails  Entrance Stairs - Number of Steps: 4-5  Entrance Stairs - Rails: Both  Home Equipment: Walker - Rolling   Bathroom Shower/Tub: Tub/Shower unit  Bathroom Toilet: Standard  Bathroom Equipment: Shower chair, Grab bars in shower, Grab bars around toilet       Prior Level of Assist for ADLs: Independent  Prior Level of Assist for Homemaking: Independent  Homemaking Responsibilities: Yes  Prior Level of Assist for Transfers: Independent  Prior Level of Assist for Ambulation: Independent household ambulator, with or without device  Has the patient had two or more falls in the past year or any fall with injury in the past year?: No    Active : No  Patient's  Info: daughter provides transport          SUBJECTIVE: RN ok'ed session. Pt sitting on raised toilet seat upon arrival. Pt pleasant and agreeable to OT session.     PAIN: no c/o    Vitals: Vitals not assessed per clinical judgement, see nursing flowsheet    COGNITION: Decreased Problem Solving and Decreased Safety Awareness    ADL:   Grooming: Supervision.  Pt completed oral care, washing hands and shaving face while seated in w/c at sink. Pt demo'ed min fatigue with task and reaching out of JUS.  Toileting: Minimal Assistance.  Pt required A for bottom hygiene and to tie drawstrings. Pt able to complete 1 UE release standing for hip

## 2025-07-22 NOTE — PROGRESS NOTES
Physician Progress Note      PATIENT:               ANETA THOMPSON  CSN #:                  [id number]  :                       1955  ADMIT DATE:       2025 10:53 AM  DISCH DATE:  RESPONDING  PROVIDER #:        Rivka Hays CNP          QUERY TEXT:    Based on your medical judgment, please clarify these findings and document if   any of the following are being evaluated and/or treated:    The clinical indicators include:  69 years old female with DM , PVD  , CKD , HTN ,    \" CKD appears to be around stage IIIa.   Creatinine 1.2, appears around   baseline.\"-IM Consults by Bert Black PA-C at 2025    -  creatinine 1.2, BUN 26 , GFR 49    /15 creatinine 1.3 ,BUN 25 , GFR 45    /16 creatinine 1.5, BUN 23, GFR 38    / creatinine 1.0 , BUN 17 , GFR 61    -IVF , Serial lab    Thank you    Gerald Hwang [facility]  ,  CDS  Options provided:  -- Acute kidney failure  -- CKD 3 without LOLY  -- Other - I will add my own diagnosis  -- Disagree - Not applicable / Not valid  -- Disagree - Clinically unable to determine / Unknown  -- Refer to Clinical Documentation Reviewer    PROVIDER RESPONSE TEXT:    This patient has CKD 3 without LOLY    Query created by: Gerald Hwang on 2025 7:42 AM      Electronically signed by:  Rivka Hays CNP 2025 6:15 PM

## 2025-07-22 NOTE — PROGRESS NOTES
Pt assess and given bedtime meds. No concerns voiced. Offered blanket and fresh ice water and pt agrees and this nurse provided these. Lights turned off and pt denies any other needs. Call light remains within reach, bed alarm on.

## 2025-07-22 NOTE — CONSULTS
CONSULTATION NOTE :ID       Patient - Jocelynn Stern,  Age - 69 y.o.    - 1955      Room Number - 7K-07/007-A   MRN -  [id number]   [facility] # - [id number]  Date of Admission -  2025 10:53 AM  Patient's PCP: Janice Duff MD     Requesting Physician: Ge Cruz DPM    REASON FOR CONSULTATION   Fifth toe osteomyelitis  CHIEF COMPLAINT   Foot ulcer    HISTORY OF PRESENT ILLNESS     This is a very pleasant 69 y.o. female who was admitted to the hospital with a chief complaints of nonhealing foot ulcer at the fifth digit.  Follows with podiatry outpatient for chronic wound of left foot digit.  While treating chronic wound on fourth digit, reported getting a tear on her skin on the fifth digit from tape.  This occurred 1 month ago, resulting in ulcer that was nonhealing and progressively worsened.  Admits to associated yellow fluid drainage.  Assessed by podiatry outpatient, recommended to present inpatient where she was have found to have osteomyelitis of her fifth digit.  Subsequently had left fifth digit amputation, partial ray amputation.  She was started on cefazolin due to MSSA, group B strep.  Vascular surgery also consulted for patient's peripheral artery disease, critical limb ischemia.  CTA abdominal aorta with runoff showed bilateral calcific stenosis in bilateral superficial femoral arteries 50%, total occlusion distal left popliteal artery with good reconstitution of trifurcation vessels.  Patient underwent left popliteal/peroneal angioplasty.    Did smoke 5 cigarettes/day for an unspecified number of years, quit 5 to 10 years prior.    Infectious disease was consulted for osteomyelitis of the fifth toe.      PAST MEDICAL  HISTORY       Past Medical History:   Diagnosis Date    Chronic renal disease, stage 3, moderately decreased glomerular filtration rate (GFR) between 30-59 mL/min/1.73 square meter (HCC)     Diabetes mellitus (HCC)     Diabetic  Current patient location: [place]    Is the patient currently in the state of [place]? YES    Visit mode: VIDEO    If the visit is dropped, the patient can be reconnected by:Patient connected via Paltalk    Will anyone else be joining the visit? NO  (If patient encounters technical issues they should call [phone])    Are changes needed to the allergy or medication list? Patient completed e-check in for visit prior to appointment, so VF did not review e-check in information again with patient due to this.    Are refills needed on medications prescribed by this physician? Discuss with provider    Rooming Documentation:  Not applicable    Reason for visit: RECHECK (Follow up from the ER. Patient has felt miserable. He has been taking prescribed medications. )    Wanda LUDWIG

## 2025-07-22 NOTE — PROGRESS NOTES
[facility]  Podiatric Medicine and Surgery Progress Note      Jocelynn Stern    Subjective      7/22/25  Patient seen bedside today on behalf of Dr Cruz. Patient appeared pleasant, was oriented to person, place and time and in no acute distress. Patient noted that she is feeling well and not in any pain at the time of the visit. Patient denies any strikethrough or drainage in her dressing. Patient noted that she worked with Physical therapy yesterday and they recommended that she be discharged to skilled nursing facility prior to going home. Patient agreed with this as she does not want to go home earlier than when she is ready to avoid further injury. Patient denies any new problems today. Patient denies any N/V/F/C/SOB or CP. No other pedal concerns.    7/21  Patient was seen at the bedside today on behalf of Dr. Cruz.  Patient appeared pleasant was oriented to person, place, and time, in no acute distress.  Patient is 5 days status post left fifth digit amputation, left fifth partial ray amputation.  Patient reports that she was denied for inpatient rehab and she is concerned about getting up for steps and to her house when she is discharged home.  Patient admits to no pain today at bedside.  Patient denies any constitutional symptoms including but not limited to fever, nausea, chest pain, and palpitations.  Patient endorses no further pedal complaints at this time.  Patient is to start taking Augmentin 875-125 mg at discharge.  I discussed with patient the importance of remaining nonweightbearing and patient is to only use heel for partial weightbearing when going up the steps to enter her home.     7/20  Patient seen bedside today on behalf of Dr. Cruz. Patient appeared pleasant, was oriented to person, place and time and in no acute distress. Patient is 4 days s/p Left 5th Digit Amputation, Left 5th Partial Ray Amputation. Patient was resting comfortably in her wheelchair. Patient  podiatry service for surgical intervention while she's in house. Hospitalist consulted. Home medication resumed. IV antibiotics ordered. Swab culture collected. Lovenox started. Vascular surgery consulted.     History of present illness  The patient is a 69 y.o. female with significant past medical history of Chronic renal disease, stage III, diabetes mellitus, diabetic polyneuropathy, hypercholesterolemia, hypertension, psoriasis who presents with a left foot ulcer at the fifth digit. Patient reports that she had previously seen Dr. Cruz in clinic for a wound on her left fourth digit. Patient notes that while treating this ulcer she got a tear in the skin on her fifth digit from tape. Patient reports that she first noticed this ulcer approximately a month ago and it has progressively gotten worse. Patient noted that the ulcer was very large and draining a fair amount of yellow fluid and decided to see Dr. Cruz in clinic. Patient was seen today by Dr. Cruz in the office and was recommended to come to the ED. Patient denies any pain in the site at this time. Patient's noted that her daughter has been dressing the wounds but it is continued to progressively get worse. Patient notes that she has never seen a vascular physician in the past. Patient relates that she does not have much sensation in her foot. Patient denies any constitutional symptoms including N/V/D/F or SOB.           Past medical history        Diagnosis Date    Chronic renal disease, stage 3, moderately decreased glomerular filtration rate (GFR) between 30-59 mL/min/1.73 square meter (HCC)     Diabetes mellitus (HCC)     Diabetic polyneuropathy associated with type 2 diabetes mellitus (HCC) 02/23/2022    Hypercholesterolemia     Hypertension     Psoriasis        Past surgical history        Procedure Laterality Date    BREAST BIOPSY N/A     doesnt remember which breast, can't find a scar    FOOT DEBRIDEMENT Right 04/27/2022    RIGHT FOOT

## 2025-07-23 LAB
GLUCOSE BLD STRIP.AUTO-MCNC: 128 MG/DL (ref 70–108)
GLUCOSE BLD STRIP.AUTO-MCNC: 182 MG/DL (ref 70–108)
GLUCOSE BLD STRIP.AUTO-MCNC: 185 MG/DL (ref 70–108)
GLUCOSE BLD STRIP.AUTO-MCNC: 235 MG/DL (ref 70–108)

## 2025-07-23 PROCEDURE — 6370000000 HC RX 637 (ALT 250 FOR IP)

## 2025-07-23 PROCEDURE — 2500000003 HC RX 250 WO HCPCS

## 2025-07-23 PROCEDURE — 82948 REAGENT STRIP/BLOOD GLUCOSE: CPT

## 2025-07-23 PROCEDURE — 97530 THERAPEUTIC ACTIVITIES: CPT

## 2025-07-23 PROCEDURE — 6370000000 HC RX 637 (ALT 250 FOR IP): Performed by: PHYSICAL THERAPY ASSISTANT

## 2025-07-23 PROCEDURE — 6370000000 HC RX 637 (ALT 250 FOR IP): Performed by: SURGERY

## 2025-07-23 PROCEDURE — 2500000003 HC RX 250 WO HCPCS: Performed by: PHYSICAL THERAPY ASSISTANT

## 2025-07-23 PROCEDURE — 6360000002 HC RX W HCPCS

## 2025-07-23 PROCEDURE — 97542 WHEELCHAIR MNGMENT TRAINING: CPT

## 2025-07-23 PROCEDURE — 6370000000 HC RX 637 (ALT 250 FOR IP): Performed by: PHYSICIAN ASSISTANT

## 2025-07-23 PROCEDURE — 97535 SELF CARE MNGMENT TRAINING: CPT

## 2025-07-23 PROCEDURE — 1200000000 HC SEMI PRIVATE

## 2025-07-23 RX ORDER — DOCUSATE SODIUM 100 MG/1
100 CAPSULE, LIQUID FILLED ORAL DAILY
Status: DISCONTINUED | OUTPATIENT
Start: 2025-07-23 | End: 2025-07-28 | Stop reason: HOSPADM

## 2025-07-23 RX ADMIN — SODIUM CHLORIDE, PRESERVATIVE FREE 10 ML: 5 INJECTION INTRAVENOUS at 08:26

## 2025-07-23 RX ADMIN — SODIUM CHLORIDE, PRESERVATIVE FREE 10 ML: 5 INJECTION INTRAVENOUS at 22:48

## 2025-07-23 RX ADMIN — CLOPIDOGREL BISULFATE 75 MG: 75 TABLET, FILM COATED ORAL at 08:20

## 2025-07-23 RX ADMIN — AMLODIPINE BESYLATE 10 MG: 10 TABLET ORAL at 08:20

## 2025-07-23 RX ADMIN — WATER 2000 MG: 1 INJECTION INTRAMUSCULAR; INTRAVENOUS; SUBCUTANEOUS at 00:39

## 2025-07-23 RX ADMIN — INSULIN LISPRO 5 UNITS: 100 INJECTION, SOLUTION INTRAVENOUS; SUBCUTANEOUS at 12:07

## 2025-07-23 RX ADMIN — SENNOSIDES 17.2 MG: 8.6 TABLET, FILM COATED ORAL at 22:47

## 2025-07-23 RX ADMIN — WATER 2000 MG: 1 INJECTION INTRAMUSCULAR; INTRAVENOUS; SUBCUTANEOUS at 16:57

## 2025-07-23 RX ADMIN — DOCUSATE SODIUM 100 MG: 100 CAPSULE, LIQUID FILLED ORAL at 08:20

## 2025-07-23 RX ADMIN — INSULIN LISPRO 1 UNITS: 100 INJECTION, SOLUTION INTRAVENOUS; SUBCUTANEOUS at 12:06

## 2025-07-23 RX ADMIN — INSULIN LISPRO 1 UNITS: 100 INJECTION, SOLUTION INTRAVENOUS; SUBCUTANEOUS at 16:19

## 2025-07-23 RX ADMIN — ATORVASTATIN CALCIUM 40 MG: 40 TABLET, FILM COATED ORAL at 22:47

## 2025-07-23 RX ADMIN — WATER 2000 MG: 1 INJECTION INTRAMUSCULAR; INTRAVENOUS; SUBCUTANEOUS at 11:04

## 2025-07-23 RX ADMIN — INSULIN LISPRO 5 UNITS: 100 INJECTION, SOLUTION INTRAVENOUS; SUBCUTANEOUS at 16:20

## 2025-07-23 RX ADMIN — Medication 1 CAPSULE: at 08:20

## 2025-07-23 RX ADMIN — INSULIN LISPRO 5 UNITS: 100 INJECTION, SOLUTION INTRAVENOUS; SUBCUTANEOUS at 08:20

## 2025-07-23 RX ADMIN — INSULIN GLARGINE 10 UNITS: 100 INJECTION, SOLUTION SUBCUTANEOUS at 22:47

## 2025-07-23 RX ADMIN — ASPIRIN 81 MG: 81 TABLET, CHEWABLE ORAL at 08:20

## 2025-07-23 RX ADMIN — DOCUSATE SODIUM 100 MG: 100 CAPSULE, LIQUID FILLED ORAL at 16:19

## 2025-07-23 RX ADMIN — INSULIN LISPRO 1 UNITS: 100 INJECTION, SOLUTION INTRAVENOUS; SUBCUTANEOUS at 22:48

## 2025-07-23 ASSESSMENT — PAIN SCALES - GENERAL: PAINLEVEL_OUTOF10: 0

## 2025-07-23 NOTE — PROGRESS NOTES
Comprehensive Nutrition Assessment    Type and Reason for Visit:  Reassess    Nutrition Recommendations/Plan:   Diet as per Physician- recommend 4 CHO count diet  ONS: Dustin (BID) and Diet Starry to mix  Diabetic diet education reinforced 7/17 - handouts provided; Pt lives with daughter and KIKI who is a T1DM and does all the cooking.      Malnutrition Assessment:  Malnutrition Status:  No malnutrition (07/17/25 1124)    Context:  Acute Illness     Findings of the 6 clinical characteristics of malnutrition:  Energy Intake:  No decrease in energy intake  Weight Loss:  No weight loss     Body Fat Loss:  No body fat loss     Muscle Mass Loss:  No muscle mass loss    Fluid Accumulation:  No fluid accumulation     Strength:  Not Performed    Nutrition Assessment:     Pt. nutritionally compromised AEB wounds.  At risk for further nutrition compromise r/t increased nutrient needs for wound healing - s/p 5th digit amputation 7/16, OM L foot, angioplasty of popliteal and tibial peroneal trunk 7/17; underlying medical condition ( has a past medical history of CKDIII, Diabetes mellitus, Diabetic polyneuropathy, HLD, HTN, and Psoriasis, hx/o foot wounds and debridements, former smoker).       Nutrition Related Findings:    Pt. Report/Treatments/Miscellaneous: Patient seen and reports good appetite, intake mostly % of meals, states had not tried Dustin yet due to begin uncertain what it was, recalls diabetes diet review from past RD visit and denies having questions  7/17: Pt seen, reports familiarity with diabetic diet. States her son in law is a T1DM and does all the cooking for her and her daughter. She notes that he is very knowledgeable in the diet. Pt accepting of handouts provided. Noted in EMR pt recently prescribed ozempic - has not started yet. Discussed protein needs r/t wounds - pt agreed to trying Dustin and mixing with diet starry.   GI Status: BM - 7/22 per patient   Pertinent Labs: 7/23 glucose 128; HgbA1C  (5/29/25): 9.6%  Pertinent Meds: lipitor, ancef, lantus, humalog, culturelle, colace, senokot    Wound Type:  (s/p 5th digit amputation 7/16, angioplasty of popliteal and tibial peroneal trunk 7/17)       Current Nutrition Intake & Therapies:    Average Meal Intake: 51-75%, % (mostly % per patient)  Average Supplements Intake: Unable to assess (has not tried Dustin yet- explained use/ benefit and agrees to trial)  ADULT DIET; Regular  ADULT ORAL NUTRITION SUPPLEMENT; Breakfast, Dinner; Wound Healing Oral Supplement    Anthropometric Measures:  Height: 177.8 cm (5' 10\")  Ideal Body Weight (IBW): 150 lbs (68 kg)    Admission Body Weight: 95.3 kg (210 lb) (7/14: stated weight; no edema)  Current Body Weight: 102.4 kg (225 lb 12 oz) (7/20; no edema)  Current BMI (kg/m2): 32.4  Usual Body Weight:  (per pt ~200-210# range; per EMR: 4/4/24: 196# 13 oz, 8/28/24: 204# 6 oz, 5/29/25: 218# 13 oz, 7/8/25: 212# 10 oz)        Weight Adjustment For: No Adjustment                 BMI Categories: Obese Class 1 (BMI 30.0-34.9)    Estimated Daily Nutrient Needs:  Energy Requirements Based On: Kcal/kg  Weight Used for Energy Requirements: Admission (95.3 kg)  Energy (kcal/day): 2960-5032 kcals (15-18 kcals/kg)  Weight Used for Protein Requirements: Ideal (68.18 kg)  Protein (g/day): 55-75 grams (0.8-1.1 grams/kg of IBW) - CKD III; wound       Nutrition Diagnosis:   Increased nutrient needs related to increase demand for energy/nutrients as evidenced by wounds    Nutrition Interventions:   Food and/or Nutrient Delivery: Continue Current Diet, Continue Oral Nutrition Supplement  Nutrition Education/Counseling: Education/Counseling initiated (discussed ONS for wound healing; reinforced DM diet 7/17 and 7/23)  Coordination of Nutrition Care: Continue to monitor while inpatient       Goals:  Goals: by next RD assessment, Meet at least 75% of estimated needs  Type of Goal: Continue current goal  Previous Goal Met: Progressing

## 2025-07-23 NOTE — PROGRESS NOTES
[facility]  Podiatric Medicine and Surgery Progress Note      Jocelynn Benjaminsandra    Subjective      7/23/25  Patient seen bedside today on behalf of Dr. Cruz.  Patient very pleasant, was oriented to person, place and time in no acute distress.  Patient states that she has no new pain or discomfort to the operative lower extremity.  Patient states she is still waiting official word for SNF placement at this time.  We discussed that we will be adjusting antibiotics accordingly so that this should assist with approval for a skilled facility.  Patient has been completing physical therapy that she states is going well.  Patient otherwise denies any new symptoms of nausea, fever, vomiting, chills, shortness of breath or chest pain.  No other pedal concerns.    7/22/25  Patient seen bedside today on behalf of Dr Cruz. Patient appeared pleasant, was oriented to person, place and time and in no acute distress. Patient noted that she is feeling well and not in any pain at the time of the visit. Patient denies any strikethrough or drainage in her dressing. Patient noted that she worked with Physical therapy yesterday and they recommended that she be discharged to skilled nursing facility prior to going home. Patient agreed with this as she does not want to go home earlier than when she is ready to avoid further injury. Patient denies any new problems today. Patient denies any N/V/F/C/SOB or CP. No other pedal concerns.    7/21  Patient was seen at the bedside today on behalf of Dr. Cruz.  Patient appeared pleasant was oriented to person, place, and time, in no acute distress.  Patient is 5 days status post left fifth digit amputation, left fifth partial ray amputation.  Patient reports that she was denied for inpatient rehab and she is concerned about getting up for steps and to her house when she is discharged home.  Patient admits to no pain today at bedside.  Patient denies any constitutional  gross sensation diminished.     Musculoskeletal: The left foot and ankle are in rectus alignment. No pain on palpation to periwound. Muscle strength testing deferred due to post-op state.      Labs     BMP:   Lab Results   Component Value Date/Time     07/17/2025 08:01 AM    K 3.7 07/17/2025 08:01 AM    K 4.2 01/09/2023 05:45 PM     07/17/2025 08:01 AM    CO2 18 07/17/2025 08:01 AM    BUN 17 07/17/2025 08:01 AM    CREATININE 1.0 07/17/2025 08:01 AM     CBC:   Lab Results   Component Value Date/Time    WBC 18.2 07/22/2025 06:28 AM    HGB 9.5 07/22/2025 06:28 AM    HCT 30.2 07/22/2025 06:28 AM     PT/INR:   Lab Results   Component Value Date/Time    INR 1.04 04/21/2022 04:56 AM     Albumin:No results found for: \"LABALBU\"  Sed Rate:No results found for: \"SEDRATE\"  CRP:   Lab Results   Component Value Date/Time    CRP 5.79 04/20/2022 01:28 PM     Micro:   Lab Results   Component Value Date/Time    BC  07/14/2025 11:59 AM     No growth 24 hours. No growth 48 hours. No growth at 5 days      Hemoglobin A1C:   Lab Results   Component Value Date/Time    LABA1C 9.6 05/29/2025 01:30 PM    LABA1C 6.8 08/17/2024 10:39 AM       Images  IR ANGIOGRAM EXTREMITY LEFT   Final Result      XR CHEST (2 VW)   Final Result   1. No consolidation.      This document has been electronically signed by: Olvin Gerard MD on    07/14/2025 05:57 PM      CTA ABDOMINAL AORTA W BILAT RUNOFF W WO CONTRAST   Final Result   1. Trifurcation vessels not well demonstrated in either leg on the study. See   comments above.   2. Multifocal calcific stenosis both SFAs upwards of 50% bilaterally. Total   occlusion distal left popliteal artery with good reconstitution of the   trifurcation vessels left calf distally.   3. Bulky calcified uterine fibroids. Likely 3.8 cm seroma or cystlike structure   in the left lower quadrant. Findings of mild paralytic ileus and constipation.   Cholelithiasis but no acute findings of the gallbladder.

## 2025-07-23 NOTE — CARE COORDINATION
7/23/25, 2:50 PM EDT    DISCHARGE PLANNING EVALUATION   Spoke with [facility] admissions, precert started for admission.

## 2025-07-23 NOTE — PROGRESS NOTES
[facility] ORTHOPEDICS 7K  Occupational Therapy  Daily Note    Discharge Recommendations: Subacute/skilled nursing facility  Equipment Recommendations: Yes Transfer tub bench, wheelchair, RW vs slide board, drop arm BSC      Time In: 1315  Time Out: 1353  Timed Code Treatment Minutes: 38 Minutes  Minutes: 38          Date: 2025  Patient Name: Jocelynn Stern,   Gender: female      Room: [facility]/007-A  MRN: [id number]  : 1955  (69 y.o.)  Referring Practitioner: Henrique Nobles PA-C  Diagnosis: Osteomyelitis of left foot, unspecified type (HCC)  Additional Pertinent Hx: The patient is a 69 y.o. female with significant past medical history of Chronic renal disease, stage III, diabetes mellitus,  diabetic polyneuropathy, hypercholesterolemia, hypertension, psoriasis who presents with a left foot ulcer at the fifth digit.  Patient reports that she had previously seen Dr. Cruz in clinic for a wound on her left fourth digit.  Patient notes that while treating this ulcer she got a tear in the skin on her fifth digit from tape.  Patient reports that she first noticed this ulcer approximately a month ago and it has progressively gotten worse.  Patient noted that the ulcer was very large and draining a fair amount of yellow fluid and decided to see Dr. Cruz in clinic.  Patient was seen today by Dr. Cruz in the office and was recommended to come to the ED.  Patient denies any pain in the site at this time.  Patient's noted that her daughter has been dressing the wounds but it is continued to progressively get worse.  Patient notes that she has never seen a vascular physician in the past.  Patient relates that she does not have much sensation in her foot. Patient denies any constitutional symptoms including N/V/D/F or SOB.  On 25, patient underwent Left 5th Digit Amputation Left 5th Partial Ray Amputation    Restrictions/Precautions:  Restrictions/Precautions: Weight Bearing  Left

## 2025-07-23 NOTE — PLAN OF CARE
Problem: Chronic Conditions and Co-morbidities  Goal: Patient's chronic conditions and co-morbidity symptoms are monitored and maintained or improved  Outcome: Progressing  Flowsheets (Taken 7/21/2025 1456 by Kylah Cline LPN)  Care Plan - Patient's Chronic Conditions and Co-Morbidity Symptoms are Monitored and Maintained or Improved: Monitor and assess patient's chronic conditions and comorbid symptoms for stability, deterioration, or improvement     Problem: Discharge Planning  Goal: Discharge to home or other facility with appropriate resources  Outcome: Progressing  Flowsheets (Taken 7/21/2025 1456 by Kylah Cline LPN)  Discharge to home or other facility with appropriate resources:   Identify barriers to discharge with patient and caregiver   Arrange for needed discharge resources and transportation as appropriate     Problem: Pain  Goal: Verbalizes/displays adequate comfort level or baseline comfort level  Outcome: Progressing  Flowsheets (Taken 7/21/2025 1456 by Kylah Cline LPN)  Verbalizes/displays adequate comfort level or baseline comfort level:   Encourage patient to monitor pain and request assistance   Assess pain using appropriate pain scale   Implement non-pharmacological measures as appropriate and evaluate response     Problem: Safety - Adult  Goal: Free from fall injury  Outcome: Progressing  Flowsheets (Taken 7/21/2025 1456 by Kylah Cline LPN)  Free From Fall Injury: Instruct family/caregiver on patient safety     Problem: Skin/Tissue Integrity  Goal: Skin integrity remains intact  Description: 1.  Monitor for areas of redness and/or skin breakdown  2.  Assess vascular access sites hourly  3.  Every 4-6 hours minimum:  Change oxygen saturation probe site  4.  Every 4-6 hours:  If on nasal continuous positive airway pressure, respiratory therapy assess nares and determine need for appliance change or resting period  Outcome: Progressing  Flowsheets (Taken 7/21/2025 1456 by Kylah Cline  within normal limits  Outcome: Progressing  Flowsheets (Taken 7/21/2025 1456 by Kylah Cline LPN)  Electrolytes maintained within normal limits:   Monitor labs and assess patient for signs and symptoms of electrolyte imbalances   Instruct patient on fluid and nutrition restrictions as appropriate     Problem: ABCDS Injury Assessment  Goal: Absence of physical injury  Outcome: Progressing  Flowsheets (Taken 7/21/2025 1456 by Kylah Cline LPN)  Absence of Physical Injury: Implement safety measures based on patient assessment

## 2025-07-23 NOTE — PROGRESS NOTES
Progress note: Infectious diseases    Patient - Jocelynn Stern,  Age - 69 y.o.    - 1955      Room Number - 7K-07/007-A   MRN -  [id number]   [facility] # - [id number]  Date of Admission -  2025 10:53 AM    SUBJECTIVE:   No new issues  OBJECTIVE   VITALS    height is 1.778 m (5' 10\") and weight is 102.4 kg (225 lb 12 oz). Her oral temperature is 98.4 °F (36.9 °C). Her blood pressure is 130/58 (abnormal) and her pulse is 62. Her respiration is 16 and oxygen saturation is 100%.       Wt Readings from Last 3 Encounters:   25 102.4 kg (225 lb 12 oz)   07/10/25 105.1 kg (231 lb 9.6 oz)   25 96.4 kg (212 lb 9.6 oz)       I/O (24 Hours)    Intake/Output Summary (Last 24 hours) at 2025 1353  Last data filed at 2025 1323  Gross per 24 hour   Intake 470 ml   Output 1100 ml   Net -630 ml       General Appearance  Awake, alert, oriented,  not  In acute distress  HEENT - normocephalic, atraumatic, pink conjunctiva,  anicteric sclera  Neck - Supple, no mass  Lungs -  Bilateral good air entry, no rhonchi, no wheeze  Cardiovascular - Heart sounds are normal.  Regular rate and rhythm without murmur, gallop or rub.  Abdomen - soft, not distended, nontender,   Neurologic -decreased sensation in bilateral feet  Skin -ongoing erythema left lateral foot with no drainage  Extremities - No edema, no cyanosis, clubbing     MEDICATIONS:      docusate sodium  100 mg Oral Daily    senna  2 tablet Oral Nightly    aspirin  81 mg Oral Daily    ceFAZolin  2,000 mg IntraVENous Q8H    clopidogrel  75 mg Oral Daily    sodium chloride flush  5-40 mL IntraVENous 2 times per day    lactobacillus  1 capsule Oral Daily with breakfast    sodium chloride flush  5-40 mL IntraVENous 2 times per day    atorvastatin  40 mg Oral Nightly    amLODIPine  10 mg Oral Daily    [Held by provider] lisinopril  40 mg Oral Q12H    insulin glargine  10  Briana Pending    Insurance response  Prescription Drug Insurance: [employer]  Notes: Prior authorization submitted - will update provider when decision has been made by insurance.

## 2025-07-23 NOTE — PLAN OF CARE
Problem: Chronic Conditions and Co-morbidities  Goal: Patient's chronic conditions and co-morbidity symptoms are monitored and maintained or improved  Outcome: Progressing  Flowsheets (Taken 7/23/2025 1606)  Care Plan - Patient's Chronic Conditions and Co-Morbidity Symptoms are Monitored and Maintained or Improved: Monitor and assess patient's chronic conditions and comorbid symptoms for stability, deterioration, or improvement     Problem: Discharge Planning  Goal: Discharge to home or other facility with appropriate resources  Outcome: Progressing  Flowsheets (Taken 7/23/2025 1606)  Discharge to home or other facility with appropriate resources:   Identify barriers to discharge with patient and caregiver   Arrange for needed discharge resources and transportation as appropriate     Problem: Pain  Goal: Verbalizes/displays adequate comfort level or baseline comfort level  Outcome: Progressing  Flowsheets (Taken 7/23/2025 1606)  Verbalizes/displays adequate comfort level or baseline comfort level:   Encourage patient to monitor pain and request assistance   Assess pain using appropriate pain scale   Administer analgesics based on type and severity of pain and evaluate response   Implement non-pharmacological measures as appropriate and evaluate response     Problem: Safety - Adult  Goal: Free from fall injury  Outcome: Progressing  Flowsheets  Taken 7/23/2025 1606 by Ginny Johnson LPN  Free From Fall Injury: Instruct family/caregiver on patient safety  Taken 7/23/2025 1258 by Irlanda Dias, RN  Free From Fall Injury: Instruct family/caregiver on patient safety     Problem: Skin/Tissue Integrity  Goal: Skin integrity remains intact  Description: 1.  Monitor for areas of redness and/or skin breakdown  2.  Assess vascular access sites hourly  3.  Every 4-6 hours minimum:  Change oxygen saturation probe site  4.  Every 4-6 hours:  If on nasal continuous positive airway pressure, respiratory therapy assess

## 2025-07-23 NOTE — PROGRESS NOTES
[facility]  INPATIENT PHYSICAL THERAPY  DAILY NOTE  [facility] ORTHOPEDICS 7K - 7K-07/007-A      Discharge Recommendations: Subacute/Skilled Nursing Facility  Equipment Recommendations: No  anticipate pt will need a w/c and ramp if returning home- defer to next LOC            Time In: 0857  Time Out: 09  Timed Code Treatment Minutes: 54 Minutes  Minutes: 54          Date: 2025  Patient Name: Jocelynn Stern,  Gender:  female        MRN: [id number]  : 1955  (69 y.o.)     Referring Practitioner: Reynaldo Archibald, AGUSTIN  Diagnosis: Osteomyelitis of left foot, unspecified type (HCC)  Additional Pertinent Hx: The patient is a 69 y.o. female with significant past medical history of Chronic renal disease, stage III, diabetes mellitus,  diabetic polyneuropathy, hypercholesterolemia, hypertension, psoriasis who presents with a left foot ulcer at the fifth digit.  Patient reports that she had previously seen Dr. Cruz in clinic for a wound on her left fourth digit.  Patient notes that while treating this ulcer she got a tear in the skin on her fifth digit from tape.  Patient reports that she first noticed this ulcer approximately a month ago and it has progressively gotten worse.  Patient noted that the ulcer was very large and draining a fair amount of yellow fluid and decided to see Dr. Cruz in clinic.  Patient was seen today by Dr. Cruz in the office and was recommended to come to the ED.  Patient denies any pain in the site at this time.  Patient's noted that her daughter has been dressing the wounds but it is continued to progressively get worse.  Patient notes that she has never seen a vascular physician in the past.  Patient relates that she does not have much sensation in her foot. Patient denies any constitutional symptoms including N/V/D/F or SOB.  On 25, patient underwent Left 5th Digit Amputation Left 5th Partial Ray Amputation     Prior Level of Function:  Lives With: Other  Sit:Minimal Assistance, Moderate Assistance, X 1, with increased time for completion, cues for hand placement, poor eccentric control, with PTA holding L LE in NWB postioning     *pt required assistance with maintaining NWB however demo poor carryover with pt demo increased WB On L LE due to decreased force production    Stand Pivot:Moderate Assistance, Maximum Assistance, X 1, with increased time for completion, pt demo poor ability to maintain NWB.     Would recommend slide board at this time to maintain NWB as pt was able to keep NWB with PTA assisting postioning of L LE     Slide Board:Contact Guard Assistance, Minimal Assistance, X 1, with increased time for completion, cues for hand placement, cues for alignment to surface and for safety with assistive device     *Pt completed slideboard to/from bed <> wheelchair. Pt required education on postioning of slideboard, otto/doff arm rest and leg rest, wheelchair postioning and lockign brakes. Pt able to place slideboard with Dawna and required SBA to remove with max cues, pt required modA to postion wheelchair with Dawna to assist with arm/leg rests. Increased motivation to increase independence with transfer.     Wheelchair Mobility:  Stand By Assistance, X 1 on level surface, maxA with 20' ramp with cues for safety, with increased time for completion  Extremities Used: Bilateral Upper Extremities  Type of Chair:Manual  Surface: Level Tile and Ramp  Distance: 250'x1, 20' ramp and within room. Pt required assistance within obstacles and tight spaces due to decreased maneuverability   Quality: Slow Velocity, Short Strokes, and Decreased Fluidity     Balance:  Static Sitting Balance:  Modified Independent  Static Standing Balance: Contact Guard Assistance  Dynamic Standing Balance: Contact Guard Assistance, Minimal Assistance, Moderate Assistance  *pt required use of restroom during session and completed stand pivot to/from raised toilet seat. Pt required Dawna to

## 2025-07-24 LAB
GLUCOSE BLD STRIP.AUTO-MCNC: 117 MG/DL (ref 70–108)
GLUCOSE BLD STRIP.AUTO-MCNC: 184 MG/DL (ref 70–108)
GLUCOSE BLD STRIP.AUTO-MCNC: 209 MG/DL (ref 70–108)
GLUCOSE BLD STRIP.AUTO-MCNC: 269 MG/DL (ref 70–108)

## 2025-07-24 PROCEDURE — 97110 THERAPEUTIC EXERCISES: CPT

## 2025-07-24 PROCEDURE — 6370000000 HC RX 637 (ALT 250 FOR IP): Performed by: PHYSICAL THERAPY ASSISTANT

## 2025-07-24 PROCEDURE — 97542 WHEELCHAIR MNGMENT TRAINING: CPT

## 2025-07-24 PROCEDURE — 6370000000 HC RX 637 (ALT 250 FOR IP)

## 2025-07-24 PROCEDURE — 2500000003 HC RX 250 WO HCPCS

## 2025-07-24 PROCEDURE — 97535 SELF CARE MNGMENT TRAINING: CPT

## 2025-07-24 PROCEDURE — 6370000000 HC RX 637 (ALT 250 FOR IP): Performed by: PHYSICIAN ASSISTANT

## 2025-07-24 PROCEDURE — 6370000000 HC RX 637 (ALT 250 FOR IP): Performed by: SURGERY

## 2025-07-24 PROCEDURE — 82948 REAGENT STRIP/BLOOD GLUCOSE: CPT

## 2025-07-24 PROCEDURE — 1200000000 HC SEMI PRIVATE

## 2025-07-24 PROCEDURE — 6360000002 HC RX W HCPCS

## 2025-07-24 PROCEDURE — 2500000003 HC RX 250 WO HCPCS: Performed by: PHYSICAL THERAPY ASSISTANT

## 2025-07-24 RX ADMIN — CLOPIDOGREL BISULFATE 75 MG: 75 TABLET, FILM COATED ORAL at 08:38

## 2025-07-24 RX ADMIN — ACETAMINOPHEN 650 MG: 325 TABLET ORAL at 21:21

## 2025-07-24 RX ADMIN — ASPIRIN 81 MG: 81 TABLET, CHEWABLE ORAL at 08:38

## 2025-07-24 RX ADMIN — DOCUSATE SODIUM 100 MG: 100 CAPSULE, LIQUID FILLED ORAL at 08:38

## 2025-07-24 RX ADMIN — INSULIN GLARGINE 10 UNITS: 100 INJECTION, SOLUTION SUBCUTANEOUS at 21:22

## 2025-07-24 RX ADMIN — SODIUM CHLORIDE, PRESERVATIVE FREE 10 ML: 5 INJECTION INTRAVENOUS at 08:41

## 2025-07-24 RX ADMIN — AMLODIPINE BESYLATE 10 MG: 10 TABLET ORAL at 08:38

## 2025-07-24 RX ADMIN — Medication 1 CAPSULE: at 08:38

## 2025-07-24 RX ADMIN — WATER 2000 MG: 1 INJECTION INTRAMUSCULAR; INTRAVENOUS; SUBCUTANEOUS at 16:45

## 2025-07-24 RX ADMIN — INSULIN LISPRO 5 UNITS: 100 INJECTION, SOLUTION INTRAVENOUS; SUBCUTANEOUS at 16:44

## 2025-07-24 RX ADMIN — INSULIN LISPRO 1 UNITS: 100 INJECTION, SOLUTION INTRAVENOUS; SUBCUTANEOUS at 12:03

## 2025-07-24 RX ADMIN — SENNOSIDES 17.2 MG: 8.6 TABLET, FILM COATED ORAL at 21:21

## 2025-07-24 RX ADMIN — INSULIN LISPRO 2 UNITS: 100 INJECTION, SOLUTION INTRAVENOUS; SUBCUTANEOUS at 21:22

## 2025-07-24 RX ADMIN — ATORVASTATIN CALCIUM 40 MG: 40 TABLET, FILM COATED ORAL at 21:21

## 2025-07-24 RX ADMIN — WATER 2000 MG: 1 INJECTION INTRAMUSCULAR; INTRAVENOUS; SUBCUTANEOUS at 02:39

## 2025-07-24 RX ADMIN — MAGNESIUM HYDROXIDE 15 ML: 1200 LIQUID ORAL at 08:41

## 2025-07-24 RX ADMIN — SODIUM CHLORIDE, PRESERVATIVE FREE 10 ML: 5 INJECTION INTRAVENOUS at 21:23

## 2025-07-24 RX ADMIN — INSULIN LISPRO 5 UNITS: 100 INJECTION, SOLUTION INTRAVENOUS; SUBCUTANEOUS at 12:03

## 2025-07-24 RX ADMIN — INSULIN LISPRO 1 UNITS: 100 INJECTION, SOLUTION INTRAVENOUS; SUBCUTANEOUS at 16:44

## 2025-07-24 RX ADMIN — WATER 2000 MG: 1 INJECTION INTRAMUSCULAR; INTRAVENOUS; SUBCUTANEOUS at 09:59

## 2025-07-24 RX ADMIN — INSULIN LISPRO 5 UNITS: 100 INJECTION, SOLUTION INTRAVENOUS; SUBCUTANEOUS at 08:38

## 2025-07-24 RX ADMIN — SODIUM CHLORIDE, PRESERVATIVE FREE 10 ML: 5 INJECTION INTRAVENOUS at 08:42

## 2025-07-24 ASSESSMENT — PAIN DESCRIPTION - LOCATION: LOCATION: FOOT

## 2025-07-24 ASSESSMENT — PAIN DESCRIPTION - ONSET: ONSET: ON-GOING

## 2025-07-24 ASSESSMENT — PAIN DESCRIPTION - PAIN TYPE: TYPE: ACUTE PAIN;SURGICAL PAIN

## 2025-07-24 ASSESSMENT — PAIN DESCRIPTION - DESCRIPTORS: DESCRIPTORS: ACHING

## 2025-07-24 ASSESSMENT — PAIN DESCRIPTION - FREQUENCY: FREQUENCY: INTERMITTENT

## 2025-07-24 ASSESSMENT — PAIN SCALES - GENERAL
PAINLEVEL_OUTOF10: 0
PAINLEVEL_OUTOF10: 3

## 2025-07-24 ASSESSMENT — PAIN DESCRIPTION - ORIENTATION: ORIENTATION: LEFT

## 2025-07-24 ASSESSMENT — PAIN - FUNCTIONAL ASSESSMENT: PAIN_FUNCTIONAL_ASSESSMENT: ACTIVITIES ARE NOT PREVENTED

## 2025-07-24 NOTE — PROGRESS NOTES
[facility]  Podiatric Medicine and Surgery Progress Note      Jocelynn Stern    Subjective      7/24/25  Patient seen bedside today on behalf of Dr. Cruz.  Patient.  Pleasant, was oriented person, place and time in no acute distress.  Patient was cleaning herself at presentation at today's visit.  Patient states that she has been attempting to have a bowel movement.  That she does feel she is ready to have 1 at some point.  Today.  However this is a fairly baseline level for.  Patient is otherwise continue with occupational physical therapy and is planning for skilled nursing facility once approved.  Patient has no other pedal concerns or plaints at this time.  Patient denies any nausea, fever, vomiting, chills, shortness of breath or chest pain.  No other pedal concerns.    7/23/25  Patient seen bedside today on behalf of Dr. Cruz.  Patient very pleasant, was oriented to person, place and time in no acute distress.  Patient states that she has no new pain or discomfort to the operative lower extremity.  Patient states she is still waiting official word for SNF placement at this time.  We discussed that we will be adjusting antibiotics accordingly so that this should assist with approval for a skilled facility.  Patient has been completing physical therapy that she states is going well.  Patient otherwise denies any new symptoms of nausea, fever, vomiting, chills, shortness of breath or chest pain.  No other pedal concerns.    7/22/25  Patient seen bedside today on behalf of Dr Cruz. Patient appeared pleasant, was oriented to person, place and time and in no acute distress. Patient noted that she is feeling well and not in any pain at the time of the visit. Patient denies any strikethrough or drainage in her dressing. Patient noted that she worked with Physical therapy yesterday and they recommended that she be discharged to skilled nursing facility prior to going home. Patient agreed  minimal pain to the operative foot. Patient getting ready to be transferred down for her vascular procedure. Patient denies any N/V/F/C/SOB or CP. No other pedal concerns.      Patient underwent Revasc with PTA using drug balloon of the left POP  Revasc with PTA of the Peroneal left side  Left side angiogram     7/16  Patient underwent Left 5th Digit Amputation Left 5th Partial Ray Amputation.     7/15  Patient seen bedside today on behalf of Dr Cruz. Patient appeared pleasant, was oriented to person, place and time and in no acute distress. Patient states she is having minimal pain to her L foot. States someone from the vascular team came and talked to her yesterday about a procedure. States Dr. Cruz had told her in the office that she would likely need an amputation on the L foot. Patient is agreeable. Her only concern is how it will affect her balance. Discussed that therapy will be able to work with her post op. States she has been tolerating her antibiotics fine. Discussed giving her a probiotic to prevent diarrhea.  Patient denies any N/V/F/C/SOB or CP. No other pedal concerns.      Procedure consent ordered. Hold antiplatelets and anticoagulants. NPO at midnight.      7/14  Patient was seen today by Dr. Cruz in the office and was recommended to come to the ED. Patient was seen and evaluated in the ED. Patient was admitted under podiatry service for surgical intervention while she's in house. Hospitalist consulted. Home medication resumed. IV antibiotics ordered. Swab culture collected. Lovenox started. Vascular surgery consulted.     History of present illness  The patient is a 69 y.o. female with significant past medical history of Chronic renal disease, stage III, diabetes mellitus, diabetic polyneuropathy, hypercholesterolemia, hypertension, psoriasis who presents with a left foot ulcer at the fifth digit. Patient reports that she had previously seen Dr. Cruz in clinic for a wound on her left

## 2025-07-24 NOTE — PLAN OF CARE
Problem: Chronic Conditions and Co-morbidities  Goal: Patient's chronic conditions and co-morbidity symptoms are monitored and maintained or improved  Outcome: Progressing  Flowsheets (Taken 7/24/2025 1507)  Care Plan - Patient's Chronic Conditions and Co-Morbidity Symptoms are Monitored and Maintained or Improved: Monitor and assess patient's chronic conditions and comorbid symptoms for stability, deterioration, or improvement     Problem: Discharge Planning  Goal: Discharge to home or other facility with appropriate resources  Outcome: Progressing  Flowsheets (Taken 7/24/2025 1507)  Discharge to home or other facility with appropriate resources:   Identify barriers to discharge with patient and caregiver   Arrange for needed discharge resources and transportation as appropriate   Identify discharge learning needs (meds, wound care, etc)     Problem: Pain  Goal: Verbalizes/displays adequate comfort level or baseline comfort level  Outcome: Progressing  Flowsheets (Taken 7/24/2025 1507)  Verbalizes/displays adequate comfort level or baseline comfort level:   Encourage patient to monitor pain and request assistance   Implement non-pharmacological measures as appropriate and evaluate response   Assess pain using appropriate pain scale   Administer analgesics based on type and severity of pain and evaluate response     Problem: Safety - Adult  Goal: Free from fall injury  Outcome: Progressing  Flowsheets (Taken 7/24/2025 1507)  Free From Fall Injury: Instruct family/caregiver on patient safety     Problem: Skin/Tissue Integrity  Goal: Skin integrity remains intact  Description: 1.  Monitor for areas of redness and/or skin breakdown  2.  Assess vascular access sites hourly  3.  Every 4-6 hours minimum:  Change oxygen saturation probe site  4.  Every 4-6 hours:  If on nasal continuous positive airway pressure, respiratory therapy assess nares and determine need for appliance change or resting period  Outcome:

## 2025-07-24 NOTE — PROGRESS NOTES
[facility]  INPATIENT PHYSICAL THERAPY  DAILY NOTE  [facility] ORTHOPEDICS 7K - 7K-07/007-A      Discharge Recommendations: Subacute/Skilled Nursing Facility and Patient would benefit from continued PT at discharge  Equipment Recommendations: No  anticipate pt will need a w/c and ramp if returning home- defer to next LOC            Time In: 1122  Time Out: 1145  Timed Code Treatment Minutes: 23 Minutes  Minutes: 23          Date: 2025  Patient Name: Jocelynn Stern,  Gender:  female        MRN: [id number]  : 1955  (69 y.o.)     Referring Practitioner: Reynaldo Archibald DPM  Diagnosis: Osteomyelitis of left foot, unspecified type (HCC)  Additional Pertinent Hx: The patient is a 69 y.o. female with significant past medical history of Chronic renal disease, stage III, diabetes mellitus,  diabetic polyneuropathy, hypercholesterolemia, hypertension, psoriasis who presents with a left foot ulcer at the fifth digit.  Patient reports that she had previously seen Dr. Cruz in clinic for a wound on her left fourth digit.  Patient notes that while treating this ulcer she got a tear in the skin on her fifth digit from tape.  Patient reports that she first noticed this ulcer approximately a month ago and it has progressively gotten worse.  Patient noted that the ulcer was very large and draining a fair amount of yellow fluid and decided to see Dr. Cruz in clinic.  Patient was seen today by Dr. Cruz in the office and was recommended to come to the ED.  Patient denies any pain in the site at this time.  Patient's noted that her daughter has been dressing the wounds but it is continued to progressively get worse.  Patient notes that she has never seen a vascular physician in the past.  Patient relates that she does not have much sensation in her foot. Patient denies any constitutional symptoms including N/V/D/F or SOB.  On 25, patient underwent Left 5th Digit Amputation Left 5th Partial Ray  transfer sit <> supine with bed flat and no rail with (I).  Short Term Goal 2: Patient to transfer sit to stand with good compliance with (L) NWB status with walker with min (A) to prepare for pivot transfers.  Short Term Goal 3: Patient to transfer bed <>chair with good compliance with (L) LE NWB with sliding board with set up assistance.  Short Term Goal 4: Patient to be able to complete 15 reps of (B) LE AROM to increase strength for mobility needs.  Short Term Goal 5: Patient to demonstrate (I) WC mobility on level surfaces, thru doorways, and around obstacles.  Long Term Goals  Time Frame for Long Term Goals : n/a d/t short stay    Following session, patient left in safe position in bed, with alarm, and call light within reach

## 2025-07-24 NOTE — PROGRESS NOTES
[facility] ORTHOPEDICS 7K  Occupational Therapy  Daily Note    Discharge Recommendations: Subacute/skilled nursing facility  Equipment Recommendations: Yes Transfer tub bench, wheelchair, RW vs slide board, drop arm BSC      Time In: 810  Time Out: 904  Timed Code Treatment Minutes: 54 Minutes  Minutes: 54          Date: 2025  Patient Name: Jocelynn Stern,   Gender: female      Room: [facility]007-A  MRN: [id number]  : 1955  (69 y.o.)  Referring Practitioner: Henrique Nobles PA-C  Diagnosis: Osteomyelitis of left foot, unspecified type (HCC)  Additional Pertinent Hx: The patient is a 69 y.o. female with significant past medical history of Chronic renal disease, stage III, diabetes mellitus,  diabetic polyneuropathy, hypercholesterolemia, hypertension, psoriasis who presents with a left foot ulcer at the fifth digit.  Patient reports that she had previously seen Dr. Cruz in clinic for a wound on her left fourth digit.  Patient notes that while treating this ulcer she got a tear in the skin on her fifth digit from tape.  Patient reports that she first noticed this ulcer approximately a month ago and it has progressively gotten worse.  Patient noted that the ulcer was very large and draining a fair amount of yellow fluid and decided to see Dr. Cruz in clinic.  Patient was seen today by Dr. Cruz in the office and was recommended to come to the ED.  Patient denies any pain in the site at this time.  Patient's noted that her daughter has been dressing the wounds but it is continued to progressively get worse.  Patient notes that she has never seen a vascular physician in the past.  Patient relates that she does not have much sensation in her foot. Patient denies any constitutional symptoms including N/V/D/F or SOB.  On 25, patient underwent Left 5th Digit Amputation Left 5th Partial Ray Amputation    Restrictions/Precautions:  Restrictions/Precautions: Weight Bearing  Left

## 2025-07-24 NOTE — PROGRESS NOTES
Progress note: Infectious diseases    Patient - Jocelynn Stern,  Age - 69 y.o.    - 1955      Room Number - 7K-07/007-A   MRN -  [id number]   [facility] # - [id number]  Date of Admission -  2025 10:53 AM    SUBJECTIVE:   No new complaints  OBJECTIVE   VITALS    height is 1.778 m (5' 10\") and weight is 102.4 kg (225 lb 12 oz). Her oral temperature is 97.9 °F (36.6 °C). Her blood pressure is 110/47 (abnormal) and her pulse is 71. Her respiration is 16 and oxygen saturation is 99%.       Wt Readings from Last 3 Encounters:   25 102.4 kg (225 lb 12 oz)   07/10/25 105.1 kg (231 lb 9.6 oz)   25 96.4 kg (212 lb 9.6 oz)       I/O (24 Hours)    Intake/Output Summary (Last 24 hours) at 2025 0934  Last data filed at 2025 0723  Gross per 24 hour   Intake 715 ml   Output 2800 ml   Net -2085 ml       General Appearance  Awake, alert, oriented,  not  In acute distress  HEENT - normocephalic, atraumatic, pink conjunctiva,  anicteric sclera  Neck - Supple, no mass  Lungs -  Bilateral good air entry, no rhonchi, no wheeze  Cardiovascular - Heart sounds are normal.     Abdomen - soft, not distended, nontender,   Neurologic -decreased sensation in bilateral feet  Skin -dressed left foot wound  Extremities - No edema, no cyanosis, clubbing     MEDICATIONS:      docusate sodium  100 mg Oral Daily    senna  2 tablet Oral Nightly    aspirin  81 mg Oral Daily    ceFAZolin  2,000 mg IntraVENous Q8H    clopidogrel  75 mg Oral Daily    sodium chloride flush  5-40 mL IntraVENous 2 times per day    lactobacillus  1 capsule Oral Daily with breakfast    sodium chloride flush  5-40 mL IntraVENous 2 times per day    atorvastatin  40 mg Oral Nightly    amLODIPine  10 mg Oral Daily    [Held by provider] lisinopril  40 mg Oral Q12H    insulin glargine  10 Units SubCUTAneous Nightly    insulin lispro  5 Units SubCUTAneous TID WC     insulin lispro  0-4 Units SubCUTAneous 4x Daily AC & HS      sodium chloride Stopped (07/18/25 1649)    sodium chloride      sodium chloride 20 mL/hr at 07/16/25 1213    dextrose       magnesium hydroxide, ALPRAZolam, sodium chloride flush, sodium chloride, ondansetron **OR** ondansetron, oxyCODONE **OR** oxyCODONE, morphine **OR** morphine, sodium chloride flush, sodium chloride, potassium chloride **OR** potassium alternative oral replacement **OR** potassium chloride, acetaminophen **OR** acetaminophen, glucose, dextrose bolus **OR** dextrose bolus, glucagon (rDNA), dextrose      LABS:     CBC:   Recent Labs     07/22/25  0628   WBC 18.2*   HGB 9.5*   *     BMP:  No results for input(s): \"NA\", \"K\", \"CL\", \"CO2\", \"BUN\", \"CREATININE\", \"GLUCOSE\" in the last 72 hours.  Calcium:No results for input(s): \"CALCIUM\" in the last 72 hours.  Ionized Calcium:Invalid input(s): \"IONCA\"  Magnesium:No results for input(s): \"MG\" in the last 72 hours.  Phosphorus:No results for input(s): \"PHOS\" in the last 72 hours.  BNP:No results for input(s): \"BNP\" in the last 72 hours.  Glucose:  Recent Labs     07/23/25  1520 07/23/25  1930 07/24/25  0600   POCGLU 185* 235* 117*        CULTURES:   UA: No results for input(s): \"SPECGRAV\", \"PHUR\", \"COLORU\", \"CLARITYU\", \"MUCUS\", \"PROTEINU\", \"BLOODU\", \"RBCUA\", \"WBCUA\", \"BACTERIA\", \"NITRU\", \"GLUCOSEU\", \"BILIRUBINUR\", \"UROBILINOGEN\", \"KETUA\", \"LABCAST\", \"LABCASTTY\", \"AMORPHOS\" in the last 72 hours.    Invalid input(s): \"CRYSTALS\"  Micro:   Lab Results   Component Value Date/Time    BC  07/14/2025 11:59 AM     No growth 24 hours. No growth 48 hours. No growth at 5 days            Problem list of patient:     Patient Active Problem List   Diagnosis Code    Essential hypertension I10    Hyperlipidemia E78.5     borderline Abnormal nuclear stress test- NO angina or angina equiv- med RX R94.39    Diabetes mellitus due to underlying condition with hyperosmolarity without coma, without long-term

## 2025-07-24 NOTE — CARE COORDINATION
7/24/25, 11:29 AM EDT    DISCHARGE PLANNING EVALUATION    Spoke with Mel Stark, updates for precert provided, will accept when approved.

## 2025-07-25 LAB
ANION GAP SERPL CALC-SCNC: 15 MEQ/L (ref 8–16)
BUN SERPL-MCNC: 28 MG/DL (ref 8–23)
CALCIUM SERPL-MCNC: 9.5 MG/DL (ref 8.8–10.2)
CHLORIDE SERPL-SCNC: 102 MEQ/L (ref 98–111)
CO2 SERPL-SCNC: 21 MEQ/L (ref 22–29)
CREAT SERPL-MCNC: 1.4 MG/DL (ref 0.5–0.9)
GFR SERPL CREATININE-BSD FRML MDRD: 41 ML/MIN/1.73M2
GLUCOSE BLD STRIP.AUTO-MCNC: 136 MG/DL (ref 70–108)
GLUCOSE BLD STRIP.AUTO-MCNC: 194 MG/DL (ref 70–108)
GLUCOSE BLD STRIP.AUTO-MCNC: 208 MG/DL (ref 70–108)
GLUCOSE BLD STRIP.AUTO-MCNC: 224 MG/DL (ref 70–108)
GLUCOSE SERPL-MCNC: 189 MG/DL (ref 74–109)
POTASSIUM SERPL-SCNC: 4.7 MEQ/L (ref 3.5–5.2)
SODIUM SERPL-SCNC: 138 MEQ/L (ref 135–145)

## 2025-07-25 PROCEDURE — 6370000000 HC RX 637 (ALT 250 FOR IP): Performed by: SURGERY

## 2025-07-25 PROCEDURE — 6360000002 HC RX W HCPCS

## 2025-07-25 PROCEDURE — 2500000003 HC RX 250 WO HCPCS: Performed by: PHYSICAL THERAPY ASSISTANT

## 2025-07-25 PROCEDURE — 6370000000 HC RX 637 (ALT 250 FOR IP)

## 2025-07-25 PROCEDURE — 6370000000 HC RX 637 (ALT 250 FOR IP): Performed by: PHYSICAL THERAPY ASSISTANT

## 2025-07-25 PROCEDURE — C1751 CATH, INF, PER/CENT/MIDLINE: HCPCS

## 2025-07-25 PROCEDURE — 80048 BASIC METABOLIC PNL TOTAL CA: CPT

## 2025-07-25 PROCEDURE — 97535 SELF CARE MNGMENT TRAINING: CPT

## 2025-07-25 PROCEDURE — 6370000000 HC RX 637 (ALT 250 FOR IP): Performed by: PHYSICIAN ASSISTANT

## 2025-07-25 PROCEDURE — 97542 WHEELCHAIR MNGMENT TRAINING: CPT

## 2025-07-25 PROCEDURE — 36415 COLL VENOUS BLD VENIPUNCTURE: CPT

## 2025-07-25 PROCEDURE — 2500000003 HC RX 250 WO HCPCS

## 2025-07-25 PROCEDURE — 97110 THERAPEUTIC EXERCISES: CPT

## 2025-07-25 PROCEDURE — 1200000000 HC SEMI PRIVATE

## 2025-07-25 PROCEDURE — 82948 REAGENT STRIP/BLOOD GLUCOSE: CPT

## 2025-07-25 RX ADMIN — AMLODIPINE BESYLATE 10 MG: 10 TABLET ORAL at 08:42

## 2025-07-25 RX ADMIN — ASPIRIN 81 MG: 81 TABLET, CHEWABLE ORAL at 08:42

## 2025-07-25 RX ADMIN — Medication 1 CAPSULE: at 08:42

## 2025-07-25 RX ADMIN — INSULIN LISPRO 1 UNITS: 100 INJECTION, SOLUTION INTRAVENOUS; SUBCUTANEOUS at 20:32

## 2025-07-25 RX ADMIN — SODIUM CHLORIDE, PRESERVATIVE FREE 10 ML: 5 INJECTION INTRAVENOUS at 08:42

## 2025-07-25 RX ADMIN — INSULIN LISPRO 5 UNITS: 100 INJECTION, SOLUTION INTRAVENOUS; SUBCUTANEOUS at 12:17

## 2025-07-25 RX ADMIN — SENNOSIDES 17.2 MG: 8.6 TABLET, FILM COATED ORAL at 20:33

## 2025-07-25 RX ADMIN — INSULIN GLARGINE 10 UNITS: 100 INJECTION, SOLUTION SUBCUTANEOUS at 20:33

## 2025-07-25 RX ADMIN — WATER 2000 MG: 1 INJECTION INTRAMUSCULAR; INTRAVENOUS; SUBCUTANEOUS at 09:11

## 2025-07-25 RX ADMIN — INSULIN LISPRO 5 UNITS: 100 INJECTION, SOLUTION INTRAVENOUS; SUBCUTANEOUS at 08:42

## 2025-07-25 RX ADMIN — WATER 2000 MG: 1 INJECTION INTRAMUSCULAR; INTRAVENOUS; SUBCUTANEOUS at 02:32

## 2025-07-25 RX ADMIN — CLOPIDOGREL BISULFATE 75 MG: 75 TABLET, FILM COATED ORAL at 08:42

## 2025-07-25 RX ADMIN — ATORVASTATIN CALCIUM 40 MG: 40 TABLET, FILM COATED ORAL at 20:31

## 2025-07-25 RX ADMIN — INSULIN LISPRO 5 UNITS: 100 INJECTION, SOLUTION INTRAVENOUS; SUBCUTANEOUS at 16:54

## 2025-07-25 RX ADMIN — WATER 2000 MG: 1 INJECTION INTRAMUSCULAR; INTRAVENOUS; SUBCUTANEOUS at 16:07

## 2025-07-25 RX ADMIN — INSULIN LISPRO 1 UNITS: 100 INJECTION, SOLUTION INTRAVENOUS; SUBCUTANEOUS at 12:17

## 2025-07-25 RX ADMIN — DOCUSATE SODIUM 100 MG: 100 CAPSULE, LIQUID FILLED ORAL at 08:42

## 2025-07-25 RX ADMIN — INSULIN LISPRO 1 UNITS: 100 INJECTION, SOLUTION INTRAVENOUS; SUBCUTANEOUS at 16:54

## 2025-07-25 RX ADMIN — SODIUM CHLORIDE, PRESERVATIVE FREE 10 ML: 5 INJECTION INTRAVENOUS at 20:33

## 2025-07-25 ASSESSMENT — PAIN SCALES - GENERAL
PAINLEVEL_OUTOF10: 0
PAINLEVEL_OUTOF10: 0

## 2025-07-25 NOTE — DISCHARGE INSTR - COC
Continuity of Care Form    Patient Name: Jocelynn Stern   :  1955  MRN:  [id number]    Admit date:  2025  Discharge date:  2025    Code Status Order: Full Code   Advance Directives:     Admitting Physician:  Ge Cruz DPM  PCP: Janice Duff MD    Discharging Nurse: Rafaela TOVAR  Discharging Hospital Unit/Room#: 7K-07/007-A  Discharging Unit Phone Number: [phone]    Emergency Contact:   Extended Emergency Contact Information  Primary Emergency Contact: Liam Arechiga  Address: [address]  Home Phone: [phone]  Relation: Son-in-Law  Secondary Emergency Contact: Tatianna Arechiga  Address: [address]  Home Phone: [phone]  Mobile Phone: [phone]  Relation: Child  Preferred language: English   needed? No    Past Surgical History:  Past Surgical History:   Procedure Laterality Date    BREAST BIOPSY N/A     doesnt remember which breast, can't find a scar    FOOT DEBRIDEMENT Right 2022    RIGHT FOOT DEBRIDEMENT/WASH OUT performed by Ge Cruz DPM at [facility] OR    LEG SURGERY N/A 2024    Right Excision of Osteomyelitis 5th Metatarsal Resection of Wound & Primary Closure of Wound performed by Ge Cruz DPM at [facility] OR    TOE AMPUTATION Right 2022    RIGHT FOOT WOUND DEBRIDEMENT WITH POSS RIGHT 5TH METATARSAL RESECTION performed by Ge Cruz DPM at [facility] OR    TOE AMPUTATION Left 2025    Left 5th Digit Amputation Left 5th Partial Ray Amputation performed by Ge Cruz DPM at [facility] OR       Immunization History:   Immunization History   Administered Date(s) Administered    COVID-19, PFIZER PURPLE top, DILUTE for use, (age 12 y+), 30mcg/0.3mL 2021, 2021, 10/20/2021    Influenza, FLUCELVAX, (age 6 mo+), MDCK, Quadv PF, 0.5mL 2021       Active Problems:  Patient Active Problem

## 2025-07-25 NOTE — PROGRESS NOTES
Progress note: Infectious diseases    Patient - Jocelynn Stern,  Age - 69 y.o.    - 1955      Room Number - 7K-07/007-A   MRN -  [id number]   [facility] # - [id number]  Date of Admission -  2025 10:53 AM    SUBJECTIVE:   No new complaints  OBJECTIVE   VITALS    height is 1.778 m (5' 10\") and weight is 99.5 kg (219 lb 5.7 oz). Her oral temperature is 98.1 °F (36.7 °C). Her blood pressure is 108/53 (abnormal) and her pulse is 67. Her respiration is 17 and oxygen saturation is 98%.       Wt Readings from Last 3 Encounters:   25 99.5 kg (219 lb 5.7 oz)   07/10/25 105.1 kg (231 lb 9.6 oz)   25 96.4 kg (212 lb 9.6 oz)       I/O (24 Hours)    Intake/Output Summary (Last 24 hours) at 2025 0919  Last data filed at 2025 0417  Gross per 24 hour   Intake 660 ml   Output 75 ml   Net 585 ml       General Appearance  Awake, alert, oriented,  not  In acute distress  HEENT - normocephalic, atraumatic, pink conjunctiva,  anicteric sclera  Neck - Supple, no mass  Lungs -  Bilateral good air entry, no rhonchi, no wheeze  Cardiovascular - Heart sounds are normal.     Abdomen - soft, not distended, nontender,   Neurologic -decreased sensation in bilateral feet  Skin -dressed left foot wound  Extremities - No edema, no cyanosis, clubbing     MEDICATIONS:      docusate sodium  100 mg Oral Daily    senna  2 tablet Oral Nightly    aspirin  81 mg Oral Daily    ceFAZolin  2,000 mg IntraVENous Q8H    clopidogrel  75 mg Oral Daily    sodium chloride flush  5-40 mL IntraVENous 2 times per day    lactobacillus  1 capsule Oral Daily with breakfast    sodium chloride flush  5-40 mL IntraVENous 2 times per day    atorvastatin  40 mg Oral Nightly    amLODIPine  10 mg Oral Daily    [Held by provider] lisinopril  40 mg Oral Q12H    insulin glargine  10 Units SubCUTAneous Nightly    insulin lispro  5 Units SubCUTAneous TID WC

## 2025-07-25 NOTE — CARE COORDINATION
7/25/25, 7:18 AM EDT    DISCHARGE PLANNING EVALUATION    Precert is approved for [facility], snf can accept today

## 2025-07-25 NOTE — DISCHARGE SUMMARY
Physician Discharge Summary     Patient ID:  Jocelynn Stern  [id number]    Physician: Ge Cruz DPM     Admission date: 7/14/2025    Discharge date: 07/21/25    Date of Surgery: 07/16/25    Admission Diagnoses: Osteomyelitis of left foot, unspecified type (HCC) [M86.9]    Discharge Diagnoses: Osteomyelitis of left foot, unspecified type (HCC) [M86.9]    Procedures: Left 5th Digit Amputation Left 5th Partial Ray Amputation     History, Physical Exam:   The patient is a 69 y.o. female with significant past medical history of Chronic renal disease, stage III, diabetes mellitus,  diabetic polyneuropathy, hypercholesterolemia, hypertension, psoriasis who presents with a left foot ulcer at the fifth digit.  Patient reports that she had previously seen Dr. Cruz in clinic for a wound on her left fourth digit.  Patient notes that while treating this ulcer she got a tear in the skin on her fifth digit from tape.  Patient reports that she first noticed this ulcer approximately a month ago and it has progressively gotten worse.  Patient noted that the ulcer was very large and draining a fair amount of yellow fluid and decided to see Dr. Cruz in clinic.  Patient was seen today by Dr. Cruz in the office and was recommended to come to the ED.  Patient denies any pain in the site at this time.  Patient's noted that her daughter has been dressing the wounds but it is continued to progressively get worse.  Patient notes that she has never seen a vascular physician in the past.  Patient relates that she does not have much sensation in her foot. Patient denies any constitutional symptoms including N/V/D/F or SOB.     Physical exam (lower extremity focused)  Vascular: Dorsalis pedis and posterior tibial pulses nonpalpable. No pedal hair present. Minimal edema that is consistent with post-op state. Skin temperature warm to warm. Capillary fill time brisk.      Dermatologic: Minimal erythema, consistent with postoperative

## 2025-07-25 NOTE — PROGRESS NOTES
[facility]  INPATIENT PHYSICAL THERAPY  DAILY NOTE  [facility] ORTHOPEDICS 7K - 7K-07/007-A      Discharge Recommendations: Subacute/Skilled Nursing Facility and Patient would benefit from continued PT at discharge  Equipment Recommendations: No  anticipate pt will need a w/c and ramp if returning home- defer to next LOC            Time In: 935  Time Out: 959  Timed Code Treatment Minutes: 24 Minutes  Minutes: 24          Date: 2025  Patient Name: Jocelynn Stern,  Gender:  female        MRN: [id number]  : 1955  (69 y.o.)     Referring Practitioner: Reynaldo Archibald DPM  Diagnosis: Osteomyelitis of left foot, unspecified type (HCC)  Additional Pertinent Hx: The patient is a 69 y.o. female with significant past medical history of Chronic renal disease, stage III, diabetes mellitus,  diabetic polyneuropathy, hypercholesterolemia, hypertension, psoriasis who presents with a left foot ulcer at the fifth digit.  Patient reports that she had previously seen Dr. Cruz in clinic for a wound on her left fourth digit.  Patient notes that while treating this ulcer she got a tear in the skin on her fifth digit from tape.  Patient reports that she first noticed this ulcer approximately a month ago and it has progressively gotten worse.  Patient noted that the ulcer was very large and draining a fair amount of yellow fluid and decided to see Dr. Cruz in clinic.  Patient was seen today by Dr. Cruz in the office and was recommended to come to the ED.  Patient denies any pain in the site at this time.  Patient's noted that her daughter has been dressing the wounds but it is continued to progressively get worse.  Patient notes that she has never seen a vascular physician in the past.  Patient relates that she does not have much sensation in her foot. Patient denies any constitutional symptoms including N/V/D/F or SOB.  On 25, patient underwent Left 5th Digit Amputation Left 5th Partial Ray  Amputation     Prior Level of Function:  Lives With: Other (Comment) (Daughter, son in law, and grandkids)  Type of Home: House  Home Layout: One level  Home Access: Stairs to enter with rails  Entrance Stairs - Number of Steps: 4-5  Entrance Stairs - Rails: Both  Home Equipment: Walker - Rolling   Bathroom Shower/Tub: Tub/Shower unit  Bathroom Toilet: Standard  Bathroom Equipment: Shower chair, Grab bars in shower, Grab bars around toilet    Prior Level of Assist for ADLs: Independent  Prior Level of Assist for Homemaking: Independent  Homemaking Responsibilities: Yes  Prior Level of Assist for Transfers: Independent  Active : No  Prior Level of Assist for Ambulation: Independent household ambulator, with or without device  Has the patient had two or more falls in the past year or any fall with injury in the past year?: No    Restrictions/Precautions:  Restrictions/Precautions: Weight Bearing  Left Lower Extremity Weight Bearing: Non Weight Bearing  Partial Weight Bearing Percentage Or Pounds: Order changed to \"nonweightbearing and patient is to only use heel for partial weightbearing when going up the steps to enter her home.\" on 7/21  Required Braces or Orthoses  Left Lower Extremity Brace: Boot (CAM boot arrived with patient. Patient states she has not had it on yet. Podiatry in during assessment and approved.)  LLE Brace Type: CAM boot     SUBJECTIVE: patient in wheelchair, agreeable to PT.      PAIN: 0/10: reports no pain    Vitals: Vitals not assessed per clinical judgement, see nursing flowsheet    OBJECTIVE:  Bed Mobility:  Not Tested  Patient wishing to stay in wheelchair at end of session    Transfers:  Sit to Stand: Maximum Assistance, X 1, poor compliance of WB restrictions  Stand to Sit:Maximum Assistance, X 1, poor compliance of WB restrictions    Ambulation:  Not Tested    Wheelchair Mobility:  Supervision  Extremities Used: Bilateral Upper Extremities  Type of Chair:Manual  Surface: Level

## 2025-07-25 NOTE — PROGRESS NOTES
[facility] ORTHOPEDICS 7K  Occupational Therapy  Daily Note    Discharge Recommendations: Subacute/skilled nursing facility  Equipment Recommendations: Yes Transfer tub bench, wheelchair, RW vs slide board, drop arm BSC      Time In: 752  Time Out: 830  Timed Code Treatment Minutes: 38 Minutes  Minutes: 38          Date: 2025  Patient Name: Jocelynn Stern,   Gender: female      Room: [facility]007-A  MRN: [id number]  : 1955  (69 y.o.)  Referring Practitioner: Henrique Nobles PA-C  Diagnosis: Osteomyelitis of left foot, unspecified type (HCC)  Additional Pertinent Hx: The patient is a 69 y.o. female with significant past medical history of Chronic renal disease, stage III, diabetes mellitus,  diabetic polyneuropathy, hypercholesterolemia, hypertension, psoriasis who presents with a left foot ulcer at the fifth digit.  Patient reports that she had previously seen Dr. Cruz in clinic for a wound on her left fourth digit.  Patient notes that while treating this ulcer she got a tear in the skin on her fifth digit from tape.  Patient reports that she first noticed this ulcer approximately a month ago and it has progressively gotten worse.  Patient noted that the ulcer was very large and draining a fair amount of yellow fluid and decided to see Dr. Cruz in clinic.  Patient was seen today by Dr. Cruz in the office and was recommended to come to the ED.  Patient denies any pain in the site at this time.  Patient's noted that her daughter has been dressing the wounds but it is continued to progressively get worse.  Patient notes that she has never seen a vascular physician in the past.  Patient relates that she does not have much sensation in her foot. Patient denies any constitutional symptoms including N/V/D/F or SOB.  On 25, patient underwent Left 5th Digit Amputation Left 5th Partial Ray Amputation    Restrictions/Precautions:  Restrictions/Precautions: Weight Bearing  Left

## 2025-07-25 NOTE — PROGRESS NOTES
VAT to room for PICC line placement.  Poor upper arm vasculature noted bilaterally.  Left cephalic vein only vein of appropriate size.  Attempted x3 to access left cephalic vein.  Was able to get small amount of blood return after third attempt, but unable to thread guidewire through needle.  Procedure ended at this time. Notified primary LA Chun and Dr. Chandra.  Suggested order for PICC placement through IR.

## 2025-07-25 NOTE — PLAN OF CARE
Problem: Chronic Conditions and Co-morbidities  Goal: Patient's chronic conditions and co-morbidity symptoms are monitored and maintained or improved  Outcome: Adequate for Discharge  Flowsheets (Taken 7/25/2025 1109)  Care Plan - Patient's Chronic Conditions and Co-Morbidity Symptoms are Monitored and Maintained or Improved: Monitor and assess patient's chronic conditions and comorbid symptoms for stability, deterioration, or improvement     Problem: Discharge Planning  Goal: Discharge to home or other facility with appropriate resources  Outcome: Adequate for Discharge  Flowsheets (Taken 7/25/2025 1109)  Discharge to home or other facility with appropriate resources:   Identify barriers to discharge with patient and caregiver   Arrange for needed discharge resources and transportation as appropriate     Problem: Pain  Goal: Verbalizes/displays adequate comfort level or baseline comfort level  Outcome: Adequate for Discharge  Flowsheets (Taken 7/25/2025 1109)  Verbalizes/displays adequate comfort level or baseline comfort level:   Encourage patient to monitor pain and request assistance   Assess pain using appropriate pain scale     Problem: Safety - Adult  Goal: Free from fall injury  Outcome: Adequate for Discharge  Flowsheets (Taken 7/25/2025 1109)  Free From Fall Injury:   Instruct family/caregiver on patient safety   Based on caregiver fall risk screen, instruct family/caregiver to ask for assistance with transferring infant if caregiver noted to have fall risk factors     Problem: Skin/Tissue Integrity  Goal: Skin integrity remains intact  Description: 1.  Monitor for areas of redness and/or skin breakdown  2.  Assess vascular access sites hourly  3.  Every 4-6 hours minimum:  Change oxygen saturation probe site  4.  Every 4-6 hours:  If on nasal continuous positive airway pressure, respiratory therapy assess nares and determine need for appliance change or resting period  Outcome: Adequate for

## 2025-07-25 NOTE — DISCHARGE SUMMARY
Physician Discharge Summary     Patient ID:  Jocelynn Stren  [id number]    Physician: Ge Cruz DPM     Admission date: 7/14/2025    Discharge date: 07/21/25    Date of Surgery: 07/16/25    Admission Diagnoses: Osteomyelitis of left foot, unspecified type (HCC) [M86.9]    Discharge Diagnoses: Osteomyelitis of left foot, unspecified type (HCC) [M86.9]    Procedures: Left 5th Digit Amputation Left 5th Partial Ray Amputation     History, Physical Exam:   The patient is a 69 y.o. female with significant past medical history of Chronic renal disease, stage III, diabetes mellitus,  diabetic polyneuropathy, hypercholesterolemia, hypertension, psoriasis who presents with a left foot ulcer at the fifth digit.  Patient reports that she had previously seen Dr. Cruz in clinic for a wound on her left fourth digit.  Patient notes that while treating this ulcer she got a tear in the skin on her fifth digit from tape.  Patient reports that she first noticed this ulcer approximately a month ago and it has progressively gotten worse.  Patient noted that the ulcer was very large and draining a fair amount of yellow fluid and decided to see Dr. Cruz in clinic.  Patient was seen today by Dr. Cruz in the office and was recommended to come to the ED.  Patient denies any pain in the site at this time.  Patient's noted that her daughter has been dressing the wounds but it is continued to progressively get worse.  Patient notes that she has never seen a vascular physician in the past.  Patient relates that she does not have much sensation in her foot. Patient denies any constitutional symptoms including N/V/D/F or SOB.     Physical exam (lower extremity focused)  Vascular: Dorsalis pedis and posterior tibial pulses nonpalpable. No pedal hair present. Minimal edema that is consistent with post-op state. Skin temperature warm to warm. Capillary fill time brisk.      Dermatologic: Minimal erythema, consistent with postoperative

## 2025-07-26 LAB
GLUCOSE BLD STRIP.AUTO-MCNC: 134 MG/DL (ref 70–108)
GLUCOSE BLD STRIP.AUTO-MCNC: 136 MG/DL (ref 70–108)
GLUCOSE BLD STRIP.AUTO-MCNC: 146 MG/DL (ref 70–108)
GLUCOSE BLD STRIP.AUTO-MCNC: 212 MG/DL (ref 70–108)

## 2025-07-26 PROCEDURE — 6370000000 HC RX 637 (ALT 250 FOR IP): Performed by: PHYSICAL THERAPY ASSISTANT

## 2025-07-26 PROCEDURE — 6370000000 HC RX 637 (ALT 250 FOR IP)

## 2025-07-26 PROCEDURE — 2500000003 HC RX 250 WO HCPCS

## 2025-07-26 PROCEDURE — 6360000002 HC RX W HCPCS

## 2025-07-26 PROCEDURE — 2500000003 HC RX 250 WO HCPCS: Performed by: PHYSICAL THERAPY ASSISTANT

## 2025-07-26 PROCEDURE — 6370000000 HC RX 637 (ALT 250 FOR IP): Performed by: SURGERY

## 2025-07-26 PROCEDURE — 82948 REAGENT STRIP/BLOOD GLUCOSE: CPT

## 2025-07-26 PROCEDURE — 94761 N-INVAS EAR/PLS OXIMETRY MLT: CPT

## 2025-07-26 PROCEDURE — 6370000000 HC RX 637 (ALT 250 FOR IP): Performed by: PHYSICIAN ASSISTANT

## 2025-07-26 PROCEDURE — 1200000000 HC SEMI PRIVATE

## 2025-07-26 RX ADMIN — INSULIN LISPRO 5 UNITS: 100 INJECTION, SOLUTION INTRAVENOUS; SUBCUTANEOUS at 17:06

## 2025-07-26 RX ADMIN — WATER 2000 MG: 1 INJECTION INTRAMUSCULAR; INTRAVENOUS; SUBCUTANEOUS at 17:06

## 2025-07-26 RX ADMIN — CLOPIDOGREL BISULFATE 75 MG: 75 TABLET, FILM COATED ORAL at 07:57

## 2025-07-26 RX ADMIN — ATORVASTATIN CALCIUM 40 MG: 40 TABLET, FILM COATED ORAL at 20:49

## 2025-07-26 RX ADMIN — WATER 2000 MG: 1 INJECTION INTRAMUSCULAR; INTRAVENOUS; SUBCUTANEOUS at 08:26

## 2025-07-26 RX ADMIN — SODIUM CHLORIDE, PRESERVATIVE FREE 10 ML: 5 INJECTION INTRAVENOUS at 07:57

## 2025-07-26 RX ADMIN — AMLODIPINE BESYLATE 10 MG: 10 TABLET ORAL at 07:58

## 2025-07-26 RX ADMIN — Medication 1 CAPSULE: at 07:57

## 2025-07-26 RX ADMIN — SENNOSIDES 17.2 MG: 8.6 TABLET, FILM COATED ORAL at 20:50

## 2025-07-26 RX ADMIN — OXYCODONE 10 MG: 5 TABLET ORAL at 01:48

## 2025-07-26 RX ADMIN — INSULIN LISPRO 5 UNITS: 100 INJECTION, SOLUTION INTRAVENOUS; SUBCUTANEOUS at 07:57

## 2025-07-26 RX ADMIN — INSULIN LISPRO 5 UNITS: 100 INJECTION, SOLUTION INTRAVENOUS; SUBCUTANEOUS at 12:51

## 2025-07-26 RX ADMIN — SODIUM CHLORIDE, PRESERVATIVE FREE 10 ML: 5 INJECTION INTRAVENOUS at 20:51

## 2025-07-26 RX ADMIN — WATER 2000 MG: 1 INJECTION INTRAMUSCULAR; INTRAVENOUS; SUBCUTANEOUS at 00:10

## 2025-07-26 RX ADMIN — INSULIN GLARGINE 10 UNITS: 100 INJECTION, SOLUTION SUBCUTANEOUS at 20:50

## 2025-07-26 RX ADMIN — ASPIRIN 81 MG: 81 TABLET, CHEWABLE ORAL at 07:57

## 2025-07-26 RX ADMIN — DOCUSATE SODIUM 100 MG: 100 CAPSULE, LIQUID FILLED ORAL at 07:57

## 2025-07-26 RX ADMIN — INSULIN LISPRO 1 UNITS: 100 INJECTION, SOLUTION INTRAVENOUS; SUBCUTANEOUS at 20:50

## 2025-07-26 ASSESSMENT — PAIN DESCRIPTION - LOCATION: LOCATION: FOOT

## 2025-07-26 ASSESSMENT — PAIN SCALES - GENERAL
PAINLEVEL_OUTOF10: 7
PAINLEVEL_OUTOF10: 0
PAINLEVEL_OUTOF10: 5
PAINLEVEL_OUTOF10: 5

## 2025-07-26 ASSESSMENT — PAIN DESCRIPTION - DESCRIPTORS: DESCRIPTORS: ACHING;DISCOMFORT

## 2025-07-26 ASSESSMENT — PAIN DESCRIPTION - ORIENTATION: ORIENTATION: LEFT

## 2025-07-26 NOTE — PROGRESS NOTES
[facility]  Podiatric Medicine and Surgery Progress Note      Jocelynn Stern    Subjective     7/26/2025.  Patient was seen bedside today on behalf of Dr. Cruz.  Patient very pleasant, was oriented to person, place and time in no acute distress.  Patient continues to rest comfortably.  Patient states that yesterday the VAT team was attempting to replace the PICC line and was unsuccessful.  Discussed with patient that we will place a consult to interventional radiology for PICC line placement prior to discharge.  We expressed that due to transportation conflicts the patient will remain in the facility until Monday at which point with PICC line placement and transportation in order she will be discharged.  Patient is agreeable has no other additional questions or concerns.    7/25/25 patient seen bedside today on behalf of Dr. Cruz.  Patient appeared pleasant, was oriented to person, place and time in no acute distress.  Patient was resting comfortably in her wheelchair watching television at time of the visit.  Patient states she is feeling well overall and has no new complaints.  We discussed that she has been certified for a skilled nursing facility at this time.  She will receive a PICC line prior to discharge and will be followed up outpatient.  Patient no additional questions.  Patient has no other pedal concerns and denies any other nausea fever, vomiting, chills, shortness of breath or chest pain.  No other concerns.     7/24/25  Patient seen bedside today on behalf of Dr. Cruz.  Patient.  Pleasant, was oriented person, place and time in no acute distress.  Patient was cleaning herself at presentation at today's visit.  Patient states that she has been attempting to have a bowel movement.  That she does feel she is ready to have 1 at some point.  Today.  However this is a fairly baseline level for.  Patient is otherwise continue with occupational physical therapy and is planning

## 2025-07-26 NOTE — PROGRESS NOTES
Progress note: Infectious diseases    Patient - Jocelynn Stern,  Age - 69 y.o.    - 1955      Room Number - 7K-07/007-A   MRN -  [id number]   [facility] # - [id number]  Date of Admission -  2025 10:53 AM    SUBJECTIVE:   No new issues  OBJECTIVE   VITALS    height is 1.778 m (5' 10\") and weight is 99.5 kg (219 lb 5.7 oz). Her oral temperature is 98.7 °F (37.1 °C). Her blood pressure is 133/64 and her pulse is 60. Her respiration is 19 and oxygen saturation is 99%.       Wt Readings from Last 3 Encounters:   25 99.5 kg (219 lb 5.7 oz)   07/10/25 105.1 kg (231 lb 9.6 oz)   25 96.4 kg (212 lb 9.6 oz)       I/O (24 Hours)    Intake/Output Summary (Last 24 hours) at 2025 1050  Last data filed at 2025 0610  Gross per 24 hour   Intake 1550 ml   Output 1600 ml   Net -50 ml       General Appearance  Awake, alert, oriented,  not  In acute distress  HEENT - normocephalic, atraumatic, pink conjunctiva,  anicteric sclera  Neck - Supple, no mass  Lungs -  Bilateral good air entry, no rhonchi, no wheeze  Cardiovascular - Heart sounds are normal.     Abdomen - soft, not distended, nontender,   Neurologic -decreased sensation in bilateral feet  Skin -dressed left foot wound  Extremities - No edema, no cyanosis, clubbing     MEDICATIONS:      docusate sodium  100 mg Oral Daily    senna  2 tablet Oral Nightly    aspirin  81 mg Oral Daily    ceFAZolin  2,000 mg IntraVENous Q8H    clopidogrel  75 mg Oral Daily    sodium chloride flush  5-40 mL IntraVENous 2 times per day    lactobacillus  1 capsule Oral Daily with breakfast    sodium chloride flush  5-40 mL IntraVENous 2 times per day    atorvastatin  40 mg Oral Nightly    amLODIPine  10 mg Oral Daily    [Held by provider] lisinopril  40 mg Oral Q12H    insulin glargine  10 Units SubCUTAneous Nightly    insulin lispro  5 Units SubCUTAneous TID WC    insulin lispro

## 2025-07-26 NOTE — PLAN OF CARE
Problem: Chronic Conditions and Co-morbidities  Goal: Patient's chronic conditions and co-morbidity symptoms are monitored and maintained or improved  Outcome: Progressing  Flowsheets (Taken 7/25/2025 1109)  Care Plan - Patient's Chronic Conditions and Co-Morbidity Symptoms are Monitored and Maintained or Improved: Monitor and assess patient's chronic conditions and comorbid symptoms for stability, deterioration, or improvement     Problem: Discharge Planning  Goal: Discharge to home or other facility with appropriate resources  Outcome: Progressing  Flowsheets (Taken 7/26/2025 0933)  Discharge to home or other facility with appropriate resources:   Identify barriers to discharge with patient and caregiver   Arrange for needed discharge resources and transportation as appropriate   Identify discharge learning needs (meds, wound care, etc)     Problem: Pain  Goal: Verbalizes/displays adequate comfort level or baseline comfort level  Outcome: Progressing  Flowsheets (Taken 7/26/2025 0933)  Verbalizes/displays adequate comfort level or baseline comfort level:   Encourage patient to monitor pain and request assistance   Assess pain using appropriate pain scale   Administer analgesics based on type and severity of pain and evaluate response     Problem: Safety - Adult  Goal: Free from fall injury  Outcome: Progressing  Flowsheets (Taken 7/26/2025 0933)  Free From Fall Injury:   Instruct family/caregiver on patient safety   Based on caregiver fall risk screen, instruct family/caregiver to ask for assistance with transferring infant if caregiver noted to have fall risk factors     Problem: Skin/Tissue Integrity  Goal: Skin integrity remains intact  Description: 1.  Monitor for areas of redness and/or skin breakdown  2.  Assess vascular access sites hourly  3.  Every 4-6 hours minimum:  Change oxygen saturation probe site  4.  Every 4-6 hours:  If on nasal continuous positive airway pressure, respiratory therapy assess

## 2025-07-27 ENCOUNTER — APPOINTMENT (OUTPATIENT)
Dept: INTERVENTIONAL RADIOLOGY/VASCULAR | Age: 70
DRG: 617 | End: 2025-07-27
Payer: MEDICARE

## 2025-07-27 LAB
DEPRECATED RDW RBC AUTO: 39.1 FL (ref 35–45)
ERYTHROCYTE [DISTWIDTH] IN BLOOD BY AUTOMATED COUNT: 13.2 % (ref 11.5–14.5)
GLUCOSE BLD STRIP.AUTO-MCNC: 135 MG/DL (ref 70–108)
GLUCOSE BLD STRIP.AUTO-MCNC: 145 MG/DL (ref 70–108)
GLUCOSE BLD STRIP.AUTO-MCNC: 169 MG/DL (ref 70–108)
GLUCOSE BLD STRIP.AUTO-MCNC: 219 MG/DL (ref 70–108)
HCT VFR BLD AUTO: 33.2 % (ref 37–47)
HGB BLD-MCNC: 10.2 GM/DL (ref 12–16)
MCH RBC QN AUTO: 25.8 PG (ref 26–33)
MCHC RBC AUTO-ENTMCNC: 30.7 GM/DL (ref 32.2–35.5)
MCV RBC AUTO: 84.1 FL (ref 81–99)
PLATELET # BLD AUTO: 377 THOU/MM3 (ref 130–400)
PMV BLD AUTO: 9.4 FL (ref 9.4–12.4)
RBC # BLD AUTO: 3.95 MILL/MM3 (ref 4.2–5.4)
WBC # BLD AUTO: 15.7 THOU/MM3 (ref 4.8–10.8)

## 2025-07-27 PROCEDURE — 6370000000 HC RX 637 (ALT 250 FOR IP)

## 2025-07-27 PROCEDURE — 6370000000 HC RX 637 (ALT 250 FOR IP): Performed by: SURGERY

## 2025-07-27 PROCEDURE — 6370000000 HC RX 637 (ALT 250 FOR IP): Performed by: PHYSICAL THERAPY ASSISTANT

## 2025-07-27 PROCEDURE — 2500000003 HC RX 250 WO HCPCS: Performed by: PHYSICAL THERAPY ASSISTANT

## 2025-07-27 PROCEDURE — 36573 INSJ PICC RS&I 5 YR+: CPT

## 2025-07-27 PROCEDURE — 6360000002 HC RX W HCPCS

## 2025-07-27 PROCEDURE — 2500000003 HC RX 250 WO HCPCS

## 2025-07-27 PROCEDURE — 36415 COLL VENOUS BLD VENIPUNCTURE: CPT

## 2025-07-27 PROCEDURE — 02HV33Z INSERTION OF INFUSION DEVICE INTO SUPERIOR VENA CAVA, PERCUTANEOUS APPROACH: ICD-10-PCS | Performed by: PODIATRIST

## 2025-07-27 PROCEDURE — 1200000000 HC SEMI PRIVATE

## 2025-07-27 PROCEDURE — 2709999900 IR FLUORO GUIDED CVA DEVICE PLACEMENT (AKA CVAD)

## 2025-07-27 PROCEDURE — 6370000000 HC RX 637 (ALT 250 FOR IP): Performed by: PHYSICIAN ASSISTANT

## 2025-07-27 PROCEDURE — 82948 REAGENT STRIP/BLOOD GLUCOSE: CPT

## 2025-07-27 PROCEDURE — 85027 COMPLETE CBC AUTOMATED: CPT

## 2025-07-27 PROCEDURE — C1751 CATH, INF, PER/CENT/MIDLINE: HCPCS

## 2025-07-27 RX ADMIN — AMLODIPINE BESYLATE 10 MG: 10 TABLET ORAL at 08:09

## 2025-07-27 RX ADMIN — DOCUSATE SODIUM 100 MG: 100 CAPSULE, LIQUID FILLED ORAL at 08:08

## 2025-07-27 RX ADMIN — INSULIN GLARGINE 10 UNITS: 100 INJECTION, SOLUTION SUBCUTANEOUS at 21:17

## 2025-07-27 RX ADMIN — SODIUM CHLORIDE, PRESERVATIVE FREE 10 ML: 5 INJECTION INTRAVENOUS at 08:09

## 2025-07-27 RX ADMIN — ACETAMINOPHEN 650 MG: 325 TABLET ORAL at 21:17

## 2025-07-27 RX ADMIN — INSULIN LISPRO 1 UNITS: 100 INJECTION, SOLUTION INTRAVENOUS; SUBCUTANEOUS at 21:17

## 2025-07-27 RX ADMIN — ASPIRIN 81 MG: 81 TABLET, CHEWABLE ORAL at 08:08

## 2025-07-27 RX ADMIN — WATER 2000 MG: 1 INJECTION INTRAMUSCULAR; INTRAVENOUS; SUBCUTANEOUS at 08:54

## 2025-07-27 RX ADMIN — WATER 2000 MG: 1 INJECTION INTRAMUSCULAR; INTRAVENOUS; SUBCUTANEOUS at 16:58

## 2025-07-27 RX ADMIN — CLOPIDOGREL BISULFATE 75 MG: 75 TABLET, FILM COATED ORAL at 08:09

## 2025-07-27 RX ADMIN — ATORVASTATIN CALCIUM 40 MG: 40 TABLET, FILM COATED ORAL at 21:17

## 2025-07-27 RX ADMIN — SODIUM CHLORIDE, PRESERVATIVE FREE 10 ML: 5 INJECTION INTRAVENOUS at 21:18

## 2025-07-27 RX ADMIN — SENNOSIDES 17.2 MG: 8.6 TABLET, FILM COATED ORAL at 21:17

## 2025-07-27 RX ADMIN — INSULIN LISPRO 5 UNITS: 100 INJECTION, SOLUTION INTRAVENOUS; SUBCUTANEOUS at 17:09

## 2025-07-27 RX ADMIN — Medication 1 CAPSULE: at 08:08

## 2025-07-27 RX ADMIN — ACETAMINOPHEN 650 MG: 325 TABLET ORAL at 04:00

## 2025-07-27 RX ADMIN — WATER 2000 MG: 1 INJECTION INTRAMUSCULAR; INTRAVENOUS; SUBCUTANEOUS at 00:31

## 2025-07-27 RX ADMIN — INSULIN LISPRO 5 UNITS: 100 INJECTION, SOLUTION INTRAVENOUS; SUBCUTANEOUS at 08:08

## 2025-07-27 RX ADMIN — INSULIN LISPRO 5 UNITS: 100 INJECTION, SOLUTION INTRAVENOUS; SUBCUTANEOUS at 12:14

## 2025-07-27 ASSESSMENT — PAIN DESCRIPTION - ONSET: ONSET: ON-GOING

## 2025-07-27 ASSESSMENT — PAIN DESCRIPTION - ORIENTATION
ORIENTATION: LOWER
ORIENTATION: LEFT

## 2025-07-27 ASSESSMENT — PAIN SCALES - GENERAL
PAINLEVEL_OUTOF10: 0
PAINLEVEL_OUTOF10: 3
PAINLEVEL_OUTOF10: 0
PAINLEVEL_OUTOF10: 3

## 2025-07-27 ASSESSMENT — PAIN DESCRIPTION - LOCATION
LOCATION: LEG;FOOT
LOCATION: BACK

## 2025-07-27 ASSESSMENT — PAIN DESCRIPTION - DESCRIPTORS
DESCRIPTORS: SORE
DESCRIPTORS: ACHING

## 2025-07-27 ASSESSMENT — PAIN - FUNCTIONAL ASSESSMENT: PAIN_FUNCTIONAL_ASSESSMENT: ACTIVITIES ARE NOT PREVENTED

## 2025-07-27 ASSESSMENT — PAIN DESCRIPTION - FREQUENCY: FREQUENCY: INTERMITTENT

## 2025-07-27 NOTE — PROGRESS NOTES
[facility]  Podiatric Medicine and Surgery Progress Note      Jocelynn Stern    Subjective      7/27/2025 Patient seen bedside today on behalf of Dr Cruz. Patient appeared pleasant, was oriented to person, place and time and in no acute distress.  Patient denies any pain at this time.  Patient denies any change since our last visit.  Patient noted she is still waiting for IR to place her PICC line and that she is ready to be discharged to a SNF tomorrow.  Patient is aware that due to transportation concerns her discharge will not be done until Monday.  Patient denies any N/V/F/C/SOB or CP. No other pedal concerns.    7/26/2025.  Patient was seen bedside today on behalf of Dr. Cruz.  Patient very pleasant, was oriented to person, place and time in no acute distress.  Patient continues to rest comfortably.  Patient states that yesterday the VAT team was attempting to replace the PICC line and was unsuccessful.  Discussed with patient that we will place a consult to interventional radiology for PICC line placement prior to discharge.  We expressed that due to transportation conflicts the patient will remain in the facility until Monday at which point with PICC line placement and transportation in order she will be discharged.  Patient is agreeable has no other additional questions or concerns.     7/25/25 patient seen bedside today on behalf of Dr. Cruz.  Patient appeared pleasant, was oriented to person, place and time in no acute distress.  Patient was resting comfortably in her wheelchair watching television at time of the visit.  Patient states she is feeling well overall and has no new complaints.  We discussed that she has been certified for a skilled nursing facility at this time.  She will receive a PICC line prior to discharge and will be followed up outpatient.  Patient no additional questions.  Patient has no other pedal concerns and denies any other nausea fever, vomiting,

## 2025-07-27 NOTE — H&P
Formulation and discussion of sedation / procedure plans, risks, benefits, side effects and alternatives with patient and/or responsible adult completed.    Electronically signed by William Ambrose MD on 7/27/2025 at 2:29 PM

## 2025-07-27 NOTE — PROGRESS NOTES
Progress note: Infectious diseases    Patient - Jocelynn Stern,  Age - 69 y.o.    - 1955      Room Number - 7K-07/007-A   MRN -  [id number]   [facility] # - [id number]  Date of Admission -  2025 10:53 AM    SUBJECTIVE:   No new issues  OBJECTIVE   VITALS    height is 1.778 m (5' 10\") and weight is 99.5 kg (219 lb 5.7 oz). Her oral temperature is 98.1 °F (36.7 °C). Her blood pressure is 122/62 and her pulse is 80. Her respiration is 17 and oxygen saturation is 95%.       Wt Readings from Last 3 Encounters:   25 99.5 kg (219 lb 5.7 oz)   07/10/25 105.1 kg (231 lb 9.6 oz)   25 96.4 kg (212 lb 9.6 oz)       I/O (24 Hours)    Intake/Output Summary (Last 24 hours) at 2025 1119  Last data filed at 2025 0456  Gross per 24 hour   Intake 850 ml   Output 1750 ml   Net -900 ml       General Appearance  Awake, alert, oriented,  not  In acute distress  HEENT - normocephalic, atraumatic, pink conjunctiva,  anicteric sclera  Neck - Supple, no mass  Lungs -  Bilateral   air entry, no rhonchi, no wheeze  Cardiovascular - Heart sounds are normal.     Abdomen - soft, not distended, nontender,   Neurologic -decreased sensation in bilateral feet  Skin -dressed left foot wound  Extremities - dressed left foot wound.    MEDICATIONS:      docusate sodium  100 mg Oral Daily    senna  2 tablet Oral Nightly    aspirin  81 mg Oral Daily    ceFAZolin  2,000 mg IntraVENous Q8H    clopidogrel  75 mg Oral Daily    sodium chloride flush  5-40 mL IntraVENous 2 times per day    lactobacillus  1 capsule Oral Daily with breakfast    sodium chloride flush  5-40 mL IntraVENous 2 times per day    atorvastatin  40 mg Oral Nightly    amLODIPine  10 mg Oral Daily    [Held by provider] lisinopril  40 mg Oral Q12H    insulin glargine  10 Units SubCUTAneous Nightly    insulin lispro  5 Units SubCUTAneous TID WC    insulin lispro  0-4 Units

## 2025-07-27 NOTE — PLAN OF CARE
Problem: Chronic Conditions and Co-morbidities  Goal: Patient's chronic conditions and co-morbidity symptoms are monitored and maintained or improved  Outcome: Progressing  Flowsheets (Taken 7/27/2025 0840)  Care Plan - Patient's Chronic Conditions and Co-Morbidity Symptoms are Monitored and Maintained or Improved:   Monitor and assess patient's chronic conditions and comorbid symptoms for stability, deterioration, or improvement   Collaborate with multidisciplinary team to address chronic and comorbid conditions and prevent exacerbation or deterioration     Problem: Discharge Planning  Goal: Discharge to home or other facility with appropriate resources  Outcome: Progressing  Flowsheets (Taken 7/27/2025 0840)  Discharge to home or other facility with appropriate resources:   Identify barriers to discharge with patient and caregiver   Arrange for needed discharge resources and transportation as appropriate   Identify discharge learning needs (meds, wound care, etc)     Problem: Pain  Goal: Verbalizes/displays adequate comfort level or baseline comfort level  Outcome: Progressing  Flowsheets (Taken 7/27/2025 0840)  Verbalizes/displays adequate comfort level or baseline comfort level:   Encourage patient to monitor pain and request assistance   Assess pain using appropriate pain scale   Administer analgesics based on type and severity of pain and evaluate response     Problem: Safety - Adult  Goal: Free from fall injury  Outcome: Progressing  Flowsheets (Taken 7/27/2025 0840)  Free From Fall Injury:   Based on caregiver fall risk screen, instruct family/caregiver to ask for assistance with transferring infant if caregiver noted to have fall risk factors   Instruct family/caregiver on patient safety     Problem: Skin/Tissue Integrity  Goal: Skin integrity remains intact  Description: 1.  Monitor for areas of redness and/or skin breakdown  2.  Assess vascular access sites hourly  3.  Every 4-6 hours minimum:  Change

## 2025-07-27 NOTE — OP NOTE
Department of Radiology  Post Procedure Progress Note      Pre-Procedure Diagnosis:  Left foot osteomyelitis, need for IV antibiotics    Procedure Performed:  PICC insertion    Anesthesia: local     Findings: successful    Immediate Complications:  None    Estimated Blood Loss: minimal    SEE DICTATED PROCEDURE NOTE FOR COMPLETE DETAILS.    Electronically signed by William Ambrose MD on 7/27/2025 at 2:37 PM

## 2025-07-27 NOTE — PROGRESS NOTES
1415 Patient received in IR for PICC line insertion.  1420 This procedure has been fully reviewed with the patient and written informed consent has been obtained.  1429 Procedure started with Dr. Ambrose.  1435 Procedure completed; patient tolerated well. Dressing to right arm; no bleeding noted.  1440 Patient on bed; comfort ensured.  1442 Patient taken to 7K via bed. Pt alert and oriented x3; follows commands. Skin pink, warm, and dry. Respirations easy, regular, and nonlabored.

## 2025-07-28 VITALS
RESPIRATION RATE: 16 BRPM | SYSTOLIC BLOOD PRESSURE: 132 MMHG | WEIGHT: 219.36 LBS | BODY MASS INDEX: 31.4 KG/M2 | OXYGEN SATURATION: 98 % | HEART RATE: 62 BPM | HEIGHT: 70 IN | DIASTOLIC BLOOD PRESSURE: 52 MMHG | TEMPERATURE: 98 F

## 2025-07-28 LAB
GLUCOSE BLD STRIP.AUTO-MCNC: 167 MG/DL (ref 70–108)
GLUCOSE BLD STRIP.AUTO-MCNC: 327 MG/DL (ref 70–108)

## 2025-07-28 PROCEDURE — 6370000000 HC RX 637 (ALT 250 FOR IP): Performed by: PHYSICAL THERAPY ASSISTANT

## 2025-07-28 PROCEDURE — 6370000000 HC RX 637 (ALT 250 FOR IP)

## 2025-07-28 PROCEDURE — 97530 THERAPEUTIC ACTIVITIES: CPT

## 2025-07-28 PROCEDURE — 2500000003 HC RX 250 WO HCPCS

## 2025-07-28 PROCEDURE — 97542 WHEELCHAIR MNGMENT TRAINING: CPT

## 2025-07-28 PROCEDURE — 6370000000 HC RX 637 (ALT 250 FOR IP): Performed by: SURGERY

## 2025-07-28 PROCEDURE — 2500000003 HC RX 250 WO HCPCS: Performed by: PHYSICAL THERAPY ASSISTANT

## 2025-07-28 PROCEDURE — 6360000002 HC RX W HCPCS

## 2025-07-28 PROCEDURE — 97110 THERAPEUTIC EXERCISES: CPT

## 2025-07-28 PROCEDURE — 82948 REAGENT STRIP/BLOOD GLUCOSE: CPT

## 2025-07-28 RX ADMIN — INSULIN LISPRO 3 UNITS: 100 INJECTION, SOLUTION INTRAVENOUS; SUBCUTANEOUS at 11:22

## 2025-07-28 RX ADMIN — CLOPIDOGREL BISULFATE 75 MG: 75 TABLET, FILM COATED ORAL at 09:24

## 2025-07-28 RX ADMIN — INSULIN LISPRO 5 UNITS: 100 INJECTION, SOLUTION INTRAVENOUS; SUBCUTANEOUS at 11:22

## 2025-07-28 RX ADMIN — INSULIN LISPRO 5 UNITS: 100 INJECTION, SOLUTION INTRAVENOUS; SUBCUTANEOUS at 09:24

## 2025-07-28 RX ADMIN — WATER 2000 MG: 1 INJECTION INTRAMUSCULAR; INTRAVENOUS; SUBCUTANEOUS at 00:12

## 2025-07-28 RX ADMIN — AMLODIPINE BESYLATE 10 MG: 10 TABLET ORAL at 09:25

## 2025-07-28 RX ADMIN — ASPIRIN 81 MG: 81 TABLET, CHEWABLE ORAL at 09:23

## 2025-07-28 RX ADMIN — WATER 2000 MG: 1 INJECTION INTRAMUSCULAR; INTRAVENOUS; SUBCUTANEOUS at 09:28

## 2025-07-28 RX ADMIN — SODIUM CHLORIDE, PRESERVATIVE FREE 10 ML: 5 INJECTION INTRAVENOUS at 09:27

## 2025-07-28 RX ADMIN — DOCUSATE SODIUM 100 MG: 100 CAPSULE, LIQUID FILLED ORAL at 09:24

## 2025-07-28 RX ADMIN — Medication 1 CAPSULE: at 09:23

## 2025-07-28 ASSESSMENT — PAIN DESCRIPTION - DESCRIPTORS: DESCRIPTORS: SORE

## 2025-07-28 ASSESSMENT — PAIN - FUNCTIONAL ASSESSMENT: PAIN_FUNCTIONAL_ASSESSMENT: PREVENTS OR INTERFERES SOME ACTIVE ACTIVITIES AND ADLS

## 2025-07-28 ASSESSMENT — PAIN SCALES - GENERAL
PAINLEVEL_OUTOF10: 2
PAINLEVEL_OUTOF10: 0

## 2025-07-28 ASSESSMENT — PAIN DESCRIPTION - PAIN TYPE: TYPE: SURGICAL PAIN

## 2025-07-28 ASSESSMENT — PAIN DESCRIPTION - LOCATION: LOCATION: FOOT

## 2025-07-28 ASSESSMENT — PAIN DESCRIPTION - ORIENTATION: ORIENTATION: LEFT

## 2025-07-28 NOTE — PROGRESS NOTES
Report called to RN at [facility]. Patient discharged to ECF, left with all belopngings, AVS, blue folder. Patient transported by [facility].

## 2025-07-28 NOTE — CARE COORDINATION
7/28/25, 11:52 AM EDT    DISCHARGE PLANNING EVALUATION    Planning [facility] today, transport available at 2:30 pm, confirmed plan with daughter, Tatianna, and snf admissions, Jet.  Updated RN.        7/28/25, 11:54 AM EDT    Patient goals/plan/ treatment preferences discussed by  and .  Patient goals/plan/ treatment preferences reviewed with patient/ family.  Patient/ family verbalize understanding of discharge plan and are in agreement with goal/plan/treatment preferences.  Understanding was demonstrated using the teach back method.  AVS provided by RN at time of discharge, which includes all necessary medical information pertaining to the patients current course of illness, treatment, post-discharge goals of care, and treatment preferences.     Services At/After Discharge: Skilled Nursing Facility (SNF) and In ambulance

## 2025-07-28 NOTE — PROGRESS NOTES
Progress note: Infectious diseases    Patient - Jocelynn Stern,  Age - 69 y.o.    - 1955      Room Number - 7K-07/007-A   MRN -  [id number]   [id number]  Date of Admission -  2025 10:53 AM    SUBJECTIVE:   No new issues.  OBJECTIVE   VITALS    height is 1.778 m (5' 10\") and weight is 99.5 kg (219 lb 5.7 oz). Her oral temperature is 98 °F (36.7 °C). Her blood pressure is 132/52 (abnormal) and her pulse is 62. Her respiration is 16 and oxygen saturation is 98%.       Wt Readings from Last 3 Encounters:   25 99.5 kg (219 lb 5.7 oz)   07/10/25 105.1 kg (231 lb 9.6 oz)   25 96.4 kg (212 lb 9.6 oz)       I/O (24 Hours)    Intake/Output Summary (Last 24 hours) at 2025 1245  Last data filed at 2025 1013  Gross per 24 hour   Intake 1290 ml   Output 1575 ml   Net -285 ml       General Appearance  Awake, alert, oriented,  not  In acute distress  HEENT - normocephalic, atraumatic, pink conjunctiva,  anicteric sclera  Neck - Supple, no mass  Lungs -  Bilateral   air entry, no rhonchi, no wheeze  Cardiovascular - Heart sounds are normal.     Abdomen - soft, not distended, nontender,   Neurologic -decreased sensation in bilateral feet  Skin -dressed left foot wound  Extremities - dressed left foot wound.     MEDICATIONS:      docusate sodium  100 mg Oral Daily    senna  2 tablet Oral Nightly    aspirin  81 mg Oral Daily    ceFAZolin  2,000 mg IntraVENous Q8H    clopidogrel  75 mg Oral Daily    lactobacillus  1 capsule Oral Daily with breakfast    sodium chloride flush  5-40 mL IntraVENous 2 times per day    atorvastatin  40 mg Oral Nightly    amLODIPine  10 mg Oral Daily    [Held by provider] lisinopril  40 mg Oral Q12H    insulin glargine  10 Units SubCUTAneous Nightly    insulin lispro  5 Units SubCUTAneous TID WC    insulin lispro  0-4 Units SubCUTAneous 4x Daily AC & HS      sodium chloride

## 2025-07-28 NOTE — PROGRESS NOTES
[facility]  INPATIENT PHYSICAL THERAPY  DAILY NOTE  [facility] ORTHOPEDICS 7K - 7K-07/007-A      Discharge Recommendations: Subacute/Skilled Nursing Facility and Patient would benefit from continued PT at discharge  Equipment Recommendations: No  anticipate pt will need a w/c and ramp if returning home- defer to next LOC            Time In: 856  Time Out: 936  Timed Code Treatment Minutes: 40 Minutes  Minutes: 40          Date: 2025  Patient Name: Jocelynn Stern,  Gender:  female        MRN: [id number]  : 1955  (69 y.o.)     Referring Practitioner: Reynaldo Archibald DPM  Diagnosis: Osteomyelitis of left foot, unspecified type (HCC)  Additional Pertinent Hx: The patient is a 69 y.o. female with significant past medical history of Chronic renal disease, stage III, diabetes mellitus,  diabetic polyneuropathy, hypercholesterolemia, hypertension, psoriasis who presents with a left foot ulcer at the fifth digit.  Patient reports that she had previously seen Dr. Cruz in clinic for a wound on her left fourth digit.  Patient notes that while treating this ulcer she got a tear in the skin on her fifth digit from tape.  Patient reports that she first noticed this ulcer approximately a month ago and it has progressively gotten worse.  Patient noted that the ulcer was very large and draining a fair amount of yellow fluid and decided to see Dr. Cruz in clinic.  Patient was seen today by Dr. Cruz in the office and was recommended to come to the ED.  Patient denies any pain in the site at this time.  Patient's noted that her daughter has been dressing the wounds but it is continued to progressively get worse.  Patient notes that she has never seen a vascular physician in the past.  Patient relates that she does not have much sensation in her foot. Patient denies any constitutional symptoms including N/V/D/F or SOB.  On 25, patient underwent Left 5th Digit Amputation Left 5th Partial Ray

## 2025-08-04 NOTE — PROGRESS NOTES
Physician Progress Note      PATIENT:               ANETA THOMPSON  CSN #:                  [id number]  :                       1955  ADMIT DATE:       2025 10:53 AM  DISCH DATE:        2025 2:40 PM  RESPONDING  PROVIDER #:        Cristopher Chandra MD          QUERY TEXT:    osteomyelitis with gangrene is documented in the medical record Discharge   Summary by Khris Quiñonez DPM at 2025 Please clarify any associated   diagnoses:    The clinical indicators include:  69 Years old female with DM , HTN , PVD , foot ulcer , osteomyelitis ,   gangrene    - \"Presents with complaint of foot ulcer to left lateral foot. have diabetes   and diabetes complications including retinopathy, foot ulcers.. left foot   gangrene, left 5th metatarsal and toe osteomyelitis due to IDDM with poor   control and PAD. \"-Discharge Summary by Khris Quiñonez DPM at 2025    \"SKIN: Moderate periwound erythema present.  Wound present on the lateral   aspect of the fifth digit along the fifth metatarsal.  Wound edges do not   appear viable at this caro.  Firm necrotic base nonmobile.  Minimal purulent   drainage.\"-H&P by Reynaldo Archibald DPM at 2025  result review- WBC 17.5 , wound culture-Staphylococcus aureus Abnormal   Flow sheet NC-97 , 91    7/15 result review WBC 28.8     result review -W3BC 23.4 , wound culture -Streptococcus agalactiae     result review WBC 13.2     result review WBC 14     result review WBC 14      Flow sheet Temp 35.6(flow sheet)     result review WBC 18.2     result review WBC 15.7          -  IV vancomycin,  IV Ancef , IV Zosyn (MAR), serial lab , image studies ,   amputation ,    Thank you    Gerald Hwang [facility]  ,  CDS  Options provided:  -- Sepsis due to osteomyelitis and gangrene , present on admission  -- osteomyelitis and gangrene without sepsis  -- Other - I will add my own diagnosis  -- Disagree - Not applicable / Not

## 2025-08-18 ENCOUNTER — HOSPITAL ENCOUNTER (OUTPATIENT)
Dept: INTERVENTIONAL RADIOLOGY/VASCULAR | Age: 70
Discharge: HOME | End: 2025-08-20
Attending: PHYSICIAN ASSISTANT
Payer: MEDICARE

## 2025-08-18 DIAGNOSIS — I73.9 PAD (PERIPHERAL ARTERY DISEASE): ICD-10-CM

## 2025-08-18 PROCEDURE — 93925 LOWER EXTREMITY STUDY: CPT

## 2025-08-26 ENCOUNTER — OFFICE VISIT (OUTPATIENT)
Age: 70
End: 2025-08-26
Payer: MEDICARE

## 2025-08-26 VITALS
HEIGHT: 70 IN | BODY MASS INDEX: 31.47 KG/M2 | SYSTOLIC BLOOD PRESSURE: 124 MMHG | HEART RATE: 75 BPM | DIASTOLIC BLOOD PRESSURE: 60 MMHG

## 2025-08-26 DIAGNOSIS — I73.9 PAD (PERIPHERAL ARTERY DISEASE): Primary | ICD-10-CM

## 2025-08-26 PROCEDURE — 3074F SYST BP LT 130 MM HG: CPT | Performed by: THORACIC SURGERY (CARDIOTHORACIC VASCULAR SURGERY)

## 2025-08-26 PROCEDURE — G8427 DOCREV CUR MEDS BY ELIG CLIN: HCPCS | Performed by: THORACIC SURGERY (CARDIOTHORACIC VASCULAR SURGERY)

## 2025-08-26 PROCEDURE — 1111F DSCHRG MED/CURRENT MED MERGE: CPT | Performed by: THORACIC SURGERY (CARDIOTHORACIC VASCULAR SURGERY)

## 2025-08-26 PROCEDURE — 3078F DIAST BP <80 MM HG: CPT | Performed by: THORACIC SURGERY (CARDIOTHORACIC VASCULAR SURGERY)

## 2025-08-26 PROCEDURE — 3017F COLORECTAL CA SCREEN DOC REV: CPT | Performed by: THORACIC SURGERY (CARDIOTHORACIC VASCULAR SURGERY)

## 2025-08-26 PROCEDURE — 1090F PRES/ABSN URINE INCON ASSESS: CPT | Performed by: THORACIC SURGERY (CARDIOTHORACIC VASCULAR SURGERY)

## 2025-08-26 PROCEDURE — G8400 PT W/DXA NO RESULTS DOC: HCPCS | Performed by: THORACIC SURGERY (CARDIOTHORACIC VASCULAR SURGERY)

## 2025-08-26 PROCEDURE — 1159F MED LIST DOCD IN RCRD: CPT | Performed by: THORACIC SURGERY (CARDIOTHORACIC VASCULAR SURGERY)

## 2025-08-26 PROCEDURE — 1036F TOBACCO NON-USER: CPT | Performed by: THORACIC SURGERY (CARDIOTHORACIC VASCULAR SURGERY)

## 2025-08-26 PROCEDURE — 99213 OFFICE O/P EST LOW 20 MIN: CPT | Performed by: THORACIC SURGERY (CARDIOTHORACIC VASCULAR SURGERY)

## 2025-08-26 PROCEDURE — 1123F ACP DISCUSS/DSCN MKR DOCD: CPT | Performed by: THORACIC SURGERY (CARDIOTHORACIC VASCULAR SURGERY)

## 2025-08-26 PROCEDURE — G8417 CALC BMI ABV UP PARAM F/U: HCPCS | Performed by: THORACIC SURGERY (CARDIOTHORACIC VASCULAR SURGERY)

## 2025-08-26 PROCEDURE — 1160F RVW MEDS BY RX/DR IN RCRD: CPT | Performed by: THORACIC SURGERY (CARDIOTHORACIC VASCULAR SURGERY)

## 2025-08-26 RX ORDER — CEFTRIAXONE 2 G/1
2000 INJECTION, POWDER, FOR SOLUTION INTRAMUSCULAR; INTRAVENOUS DAILY
COMMUNITY

## 2025-08-26 RX ORDER — LIRAGLUTIDE 6 MG/ML
1.2 INJECTION SUBCUTANEOUS DAILY
COMMUNITY

## 2025-08-26 RX ORDER — ACETAMINOPHEN 325 MG/1
650 TABLET ORAL EVERY 4 HOURS PRN
COMMUNITY

## 2025-08-26 RX ORDER — BISACODYL 10 MG
10 SUPPOSITORY, RECTAL RECTAL DAILY PRN
COMMUNITY

## (undated) DEVICE — SYRINGE MED 10ML LUERLOCK TIP W/O SFTY DISP

## (undated) DEVICE — IMPREGNATED GAUZE DRESSING: Brand: CUTICERIN 7.5X7.5CM CTN 50

## (undated) DEVICE — SPONGE GZ W4XL4IN COT 12 PLY TYP VII WVN C FLD DSGN

## (undated) DEVICE — Z DISCONTINUED USE 2752451 SUTURE VCRL SZ 3-0 L27IN ABSRB UD FS-2 L19MM 1/2 CIR J423H

## (undated) DEVICE — GLOVE ORANGE PI 8   MSG9080

## (undated) DEVICE — 450 ML BOTTLE OF 0.05% CHLORHEXIDINE GLUCONATE IN 99.95% STERILE WATER FOR IRRIGATION, USP AND APPLICATOR.: Brand: IRRISEPT ANTIMICROBIAL WOUND LAVAGE

## (undated) DEVICE — DRESSING,GAUZE,OIL EMULSION,CURAD,3"X3": Brand: CURAD

## (undated) DEVICE — HYPODERMIC SAFETY NEEDLE: Brand: MAGELLAN

## (undated) DEVICE — SOLUTION SURG PREP POV IOD 7.5% 4 OZ

## (undated) DEVICE — SOLUTION PREP PAINT POV IOD FOR SKIN MUCOUS MEM

## (undated) DEVICE — BASIC SINGLE BASIN BTC-LF: Brand: MEDLINE INDUSTRIES, INC.

## (undated) DEVICE — GLOVE BIOGEL POWDER FREE SZ 8

## (undated) DEVICE — DRAPE,EXTREMITY,89X128,STERILE: Brand: MEDLINE

## (undated) DEVICE — GLOVE SURG SZ 8 L11.77IN FNGR THK9.8MIL STRW LTX POLYMER

## (undated) DEVICE — BANDAGE,GAUZE,4.5"X4.1YD,STERILE,LF: Brand: MEDLINE

## (undated) DEVICE — BANDAGE COMPR E SGL LAYERED CLSR BGE W/ CLP W4INXL15FT

## (undated) DEVICE — FAN SPRAY KIT: Brand: PULSAVAC®

## (undated) DEVICE — PACK PROCEDURE SURG SET UP SRMC

## (undated) DEVICE — Device

## (undated) DEVICE — DEVICE SUTURE ADH RETENTION RIGID HEMI BRIDGE LAYR

## (undated) DEVICE — SUTURE PROL SZ 4-0 L18IN NONABSORBABLE BLU L19MM PS-2 3/8 8682G

## (undated) DEVICE — PREP SOL PVP IODINE 4%  4 OZ/BTL

## (undated) DEVICE — PACK-MAJOR

## (undated) DEVICE — GAUZE,SPONGE,8"X4",12PLY,XRAY,STRL,LF: Brand: MEDLINE

## (undated) DEVICE — PREMIUM DRY TRAY LF: Brand: MEDLINE INDUSTRIES, INC.

## (undated) DEVICE — SUTURE PROL SZ 2-0 L30IN NONABSORBABLE BLU L26MM CT-1 1/2 8423H

## (undated) DEVICE — PENCIL SMK EVAC ALL IN 1 DSGN ENH VISIBILITY IMPROVED AIR

## (undated) DEVICE — SOLUTION IV IRRIG 1000ML POUR BTL 2F7114

## (undated) DEVICE — Z DISCONTINUED BY MEDLINE USE 2711682 TRAY SKIN PREP DRY W/ PREM GLV

## (undated) DEVICE — Z DISCONTINUED USE 2273272 SOLUTION SURG PREP SCRUB POV IOD 7.5% 4 OZ

## (undated) DEVICE — SOLUTION SCRB 4OZ 7.5% POVIDONE IOD ANTIMIC BTL

## (undated) DEVICE — BANDAGE COMPR W4INXL12FT E DISP ESMARCH EVEN

## (undated) DEVICE — SUTURE ETHILON SZ 3-0 L30IN NONABSORBABLE BLK L30MM PSLX 3/8 1691H